# Patient Record
Sex: MALE | Race: WHITE | NOT HISPANIC OR LATINO | Employment: FULL TIME | URBAN - NONMETROPOLITAN AREA
[De-identification: names, ages, dates, MRNs, and addresses within clinical notes are randomized per-mention and may not be internally consistent; named-entity substitution may affect disease eponyms.]

---

## 2017-01-10 DIAGNOSIS — J44.9 CHRONIC OBSTRUCTIVE PULMONARY DISEASE, UNSPECIFIED COPD TYPE (HCC): Primary | ICD-10-CM

## 2017-01-26 ENCOUNTER — OFFICE VISIT (OUTPATIENT)
Dept: FAMILY MEDICINE CLINIC | Facility: CLINIC | Age: 42
End: 2017-01-26

## 2017-01-26 VITALS
HEIGHT: 70 IN | HEART RATE: 84 BPM | SYSTOLIC BLOOD PRESSURE: 128 MMHG | DIASTOLIC BLOOD PRESSURE: 88 MMHG | BODY MASS INDEX: 28.49 KG/M2 | OXYGEN SATURATION: 98 % | WEIGHT: 199 LBS

## 2017-01-26 DIAGNOSIS — J44.9 CHRONIC OBSTRUCTIVE PULMONARY DISEASE, UNSPECIFIED COPD TYPE (HCC): ICD-10-CM

## 2017-01-26 DIAGNOSIS — R55 PRE-SYNCOPE: ICD-10-CM

## 2017-01-26 DIAGNOSIS — R07.9 CHEST PAIN, UNSPECIFIED TYPE: ICD-10-CM

## 2017-01-26 DIAGNOSIS — F17.200 TOBACCO DEPENDENCE SYNDROME: ICD-10-CM

## 2017-01-26 DIAGNOSIS — K21.9 GASTROESOPHAGEAL REFLUX DISEASE, ESOPHAGITIS PRESENCE NOT SPECIFIED: Primary | ICD-10-CM

## 2017-01-26 PROCEDURE — 99406 BEHAV CHNG SMOKING 3-10 MIN: CPT | Performed by: FAMILY MEDICINE

## 2017-01-26 PROCEDURE — 99213 OFFICE O/P EST LOW 20 MIN: CPT | Performed by: FAMILY MEDICINE

## 2017-01-26 RX ORDER — BUDESONIDE 0.25 MG/2ML
0.25 INHALANT ORAL
Qty: 60 ML | Refills: 3 | Status: SHIPPED | OUTPATIENT
Start: 2017-01-26 | End: 2017-01-26 | Stop reason: ALTCHOICE

## 2017-01-26 RX ORDER — ALBUTEROL SULFATE 2.5 MG/3ML
2.5 SOLUTION RESPIRATORY (INHALATION) EVERY 4 HOURS PRN
Qty: 3 ML | Refills: 12 | Status: SHIPPED | OUTPATIENT
Start: 2017-01-26 | End: 2017-05-19 | Stop reason: ALTCHOICE

## 2017-01-26 RX ORDER — ESOMEPRAZOLE MAGNESIUM 40 MG/1
40 CAPSULE, DELAYED RELEASE ORAL
Qty: 30 CAPSULE | Refills: 3 | Status: SHIPPED | OUTPATIENT
Start: 2017-01-26 | End: 2017-06-01 | Stop reason: SDUPTHER

## 2017-01-26 RX ORDER — DICLOFENAC SODIUM 75 MG/1
1 TABLET, DELAYED RELEASE ORAL 2 TIMES DAILY
Refills: 1 | COMMUNITY
Start: 2017-01-11 | End: 2017-01-26 | Stop reason: ALTCHOICE

## 2017-01-26 NOTE — PROGRESS NOTES
"  Subjective:   Subjective   Aquiles Najera is a 41 y.o. male who presents for 2 month follow-up of abdominal pain/chest pain.    Patient states he is not having either at this time.  However he is having quite severe intermittent \"heartburn\", which is somewhat controlled with the Protonix 40 mg.  However his insurance is no longer covering the Protonix and he inquired and was told Nexium is covered.      Patient also notes generalized fatigue for last for 5 days.  He works out regularly at the gym, and on Monday when he was at the end of his workout, he felt lightheaded and dizzy.  This is unusual in that usually at the end of his workout he feels well.  He been working out for about 30-45 minutes.  He sat down for about 10-15 minutes, and the symptoms subsided.  He noted no chest pain at the time.  He denied syncopal episode.  States this has never happened previously.  Patient states he did fill prescription for the Nitrostat; however, he was told by a co-worker that \"if you're taking those pills, you should have a heart cath done first.\"  Patient states he has seen Dr. Parks in the past, and thinks he had a treadmill stress test at his office in 2015 that was normal.  No known family history of coronary artery disease.  He still smokes cigarettes, although he has cut back significantly, and also uses smokeless tobacco.  He is not diabetic, no HTN.  Previous history of cocaine use, patient states his clean now.       The following portions of the patient's history were reviewed and updated as appropriate: allergies, current medications, past family history, past medical history, past social history, past surgical history and problem list.    Preventative:  Over the past 2 weeks, have you felt down, depressed, or hopeless?No   Over the past 2 weeks, have you felt little interest or pleasure in doing things?No  Clinical depression screening refused by patient.No     On osteoporosis therapy?No     Past " Medical Hx:  Past Medical History   Diagnosis Date   • Abdominal pain    • Acute exacerbation of chronic obstructive airways disease    • Allergic rhinitis    • Chest pain      unspecified   • Chronic cough    • Conjunctivitis    • Dyslipidemia    • Dyspnea    • Gastroesophageal reflux disease    • Pain      Pain in left ankle and joints of left foot      • Pain      pain in left leg   • Tobacco dependence syndrome    • Umbilical hernia without obstruction or gangrene    • Viral gastroenteritis        Past Surgical Hx:  Past Surgical History   Procedure Laterality Date   • Incision and drainage abscess  06/09/2009     Irrigation and debridement of facial soft tissue injuries with closure of complex lacerations and repair of maxillary dental alveolar fracture       Health Maintenance:  Health Maintenance   Topic Date Due   • TDAP/TD VACCINES (2 - Td) 08/11/2025   • PNEUMOCOCCAL VACCINE (19-64 MEDIUM RISK)  Completed   • INFLUENZA VACCINE  Addressed       Current Meds:    Current Outpatient Prescriptions:   •  albuterol (VENTOLIN HFA) 108 (90 BASE) MCG/ACT inhaler, Inhale 2 puffs every 4 (four) hours as needed for wheezing., Disp: 18 g, Rfl: 3  •  docusate sodium (COLACE) 100 MG capsule, Take 1 capsule by mouth 2 (two) times a day., Disp: 60 capsule, Rfl: 0  •  Fluticasone Propionate (FLONASE ALLERGY RELIEF NA), into each nostril., Disp: , Rfl:   •  loratadine (CLARITIN) 10 MG tablet, Take 1 tablet by mouth daily., Disp: 30 tablet, Rfl: 11  •  meloxicam (MOBIC) 15 MG tablet, Take 15 mg by mouth daily., Disp: , Rfl:   •  nitroglycerin (NITROSTAT) 0.4 MG SL tablet, Place 1 tablet under the tongue Every 5 (Five) Minutes As Needed for chest pain. Take no more than 3 doses in 15 minutes., Disp: 30 tablet, Rfl: 12  •  pravastatin (PRAVACHOL) 40 MG tablet, Take 1 tablet by mouth daily., Disp: 30 tablet, Rfl: 3  •  albuterol (PROVENTIL) (2.5 MG/3ML) 0.083% nebulizer solution, Take 2.5 mg by nebulization Every 4 (Four) Hours  "As Needed for wheezing., Disp: 3 mL, Rfl: 12  •  budesonide (PULMICORT FLEXHALER) 180 MCG/ACT inhaler, Inhale 2 puffs 2 (Two) Times a Day., Disp: 1 inhaler, Rfl: 11  •  esomeprazole (NEXIUM) 40 MG capsule, Take 1 capsule by mouth Every Morning Before Breakfast., Disp: 30 capsule, Rfl: 3    Allergies:  Review of patient's allergies indicates no known allergies.    Family Hx:  Family History   Problem Relation Age of Onset   • Coronary artery disease Neg Hx    • Hypertension Neg Hx         Social History:  Social History     Social History   • Marital status:      Spouse name: N/A   • Number of children: N/A   • Years of education: N/A     Occupational History   • Not on file.     Social History Main Topics   • Smoking status: Current Every Day Smoker     Packs/day: 0.15     Years: 20.00     Types: Cigarettes   • Smokeless tobacco: Not on file      Comment: Previously smoked 3-5 ppd, quit Jan 2016, then re-started in April (1 pack/week or ~3 cig/day)   • Alcohol use No   • Drug use: Defer   • Sexual activity: Defer     Other Topics Concern   • Not on file     Social History Narrative       Review of Systems  Review of Systems  General ROS: negative  Psychological ROS: negative  Ophthalmic ROS: negative  ENT ROS: negative  Allergy and Immunology ROS: negative  Hematological and Lymphatic ROS: negative  Endocrine ROS: negative  Respiratory ROS: no cough, shortness of breath, or wheezing  Cardiovascular ROS: as above  Gastrointestinal ROS: as above  Genito-Urinary ROS: no dysuria, trouble voiding, or hematuria  Musculoskeletal ROS: negative    Objective:     Visit Vitals   • /88   • Pulse 84   • Ht 70\" (177.8 cm)   • Wt 199 lb (90.3 kg)   • SpO2 98%   • BMI 28.55 kg/m2     Visit Vitals   • /88   • Pulse 84   • Ht 70\" (177.8 cm)   • Wt 199 lb (90.3 kg)   • SpO2 98%   • BMI 28.55 kg/m2       General Appearance:    Alert, cooperative, no distress, appears stated age   Head:    Normocephalic, without " obvious abnormality, atraumatic   Eyes:    PERRL, conjunctiva/corneas clear, EOM's intact, fundi     benign, both eyes        Throat:   Lips, mucosa, and tongue normal; poor dentition   Neck:   Supple, symmetrical, trachea midline, no adenopathy;        thyroid:  No enlargement/tenderness/nodules; no carotid    bruit or JVD   Back:     Symmetric, no curvature, ROM normal, no CVA tenderness   Lungs:     Clear to auscultation bilaterally, respirations unlabored   Chest wall:    No tenderness or deformity   Heart:    Regular rate and rhythm, S1 and S2 normal, no murmur, rub   or gallop   Abdomen:     Soft, bowel sounds active all four quadrants,     no masses, no organomegaly.    Extremities:   Extremities normal, atraumatic, no cyanosis or edema   Pulses:   2+ and symmetric all extremities   Skin:   Skin color, texture, turgor normal, no rashes or lesions   Lymph nodes:   Cervical, supraclavicular, and axillary nodes normal   Neurologic:   CNII-XII intact. Normal strength, sensation and reflexes       throughout      Assessment:     1. Gastroesophageal reflux disease, esophagitis presence not specified    2. Chest pain, unspecified type    3. Chronic obstructive pulmonary disease, unspecified COPD type    4. Pre-syncope         Plan:   1. Atypical chest pain  - Discussed with Dr Mahoney. ECG 12 Lead ordered, patient instructed to go upstairs and have it done today; however, apparently he had somewhere important to go, and therefore subsequently left before it was able to be done.  Patient called and advised he needs to return for EKG.  - Ambulatory Referral to Cardiology.  - Reviewed [facility]; patient had echocardiogram at Dr. Parks's office 6/29/2016, which was normal with normal EF.  His January 2016 note also mentions treadmill stress test to be performed, however result was not sent over to our office; therefore unsure if patient actually did it.  - C/w Nitrostat.    2. Gastroesophageal reflux disease, esophagitis  presence not specified  - esomeprazole (NEXIUM) 40 MG capsule; Take 1 capsule by mouth Every Morning Before Breakfast.  Dispense: 30 capsule; Refill: 3    3. Chronic obstructive pulmonary disease, unspecified COPD type  - albuterol (PROVENTIL) (2.5 MG/3ML) 0.083% nebulizer solution; Take 2.5 mg by nebulization Every 4 (Four) Hours As Needed for wheezing.  Dispense: 3 mL; Refill: 12  - budesonide (PULMICORT FLEXHALER) 180 MCG/ACT inhaler; Inhale 2 puffs 2 (Two) Times a Day.  Dispense: 1 inhaler; Refill: 11  (Qvar no longer covered)  - Full Pulmonary Function Test With Bronchodilator; Future  - Handwritten prescriptions given for nebulizer hose and mouthpiece.    4. Pre-syncope, one episode  -Refer to Cardiology (see #1)    GOALS:  Improve pain.  BARRIERS TO GOALS:  None    Preventative:  Male Preventative: Cholesterol screening up to date  none  delayed   Recommended:Influenza. Declined.  Smoking cessation counseling was provided., 3-10 minutes  does not drink  increase water intake, increase physical activity, cut out extra servings and reduce fast food intake    Return in about 1 month (around 2/26/2017) for Next scheduled follow up.    RISK SCORE: 4           This document has been electronically signed by Dong Lozada MD on January 28, 2017 2:45 PM

## 2017-01-26 NOTE — MR AVS SNAPSHOT
Aquiles Najera   1/26/2017 9:15 AM   Office Visit    Dept Phone:  [phone]   Encounter #:  [id number]    Provider:  Dong Lozada MD   Department:  [facility]                Your Full Care Plan              Your Updated Medication List          This list is accurate as of: 1/26/17 10:01 AM.  Always use your most recent med list.                albuterol 108 (90 BASE) MCG/ACT inhaler   Commonly known as:  VENTOLIN HFA   Inhale 2 puffs every 4 (four) hours as needed for wheezing.       beclomethasone 80 MCG/ACT inhaler   Commonly known as:  QVAR       DICLOFENAC PO       docusate sodium 100 MG capsule   Commonly known as:  COLACE   Take 1 capsule by mouth 2 (two) times a day.       FLONASE ALLERGY RELIEF NA       loratadine 10 MG tablet   Commonly known as:  CLARITIN   Take 1 tablet by mouth daily.       meloxicam 15 MG tablet   Commonly known as:  MOBIC       nitroglycerin 0.4 MG SL tablet   Commonly known as:  NITROSTAT   Place 1 tablet under the tongue Every 5 (Five) Minutes As Needed for chest pain. Take no more than 3 doses in 15 minutes.       pantoprazole 40 MG EC tablet   Commonly known as:  PROTONIX   Take 1 tablet by mouth Daily.       pravastatin 40 MG tablet   Commonly known as:  PRAVACHOL   Take 1 tablet by mouth daily.               We Performed the Following     ECG 12 Lead       You Were Diagnosed With        Codes Comments    Chest pain, unspecified type    -  Primary ICD-10-CM: R07.9  ICD-9-CM: 786.50       Instructions     None    Patient Instructions History      Upcoming Appointments     Visit Type Date Time Department    OFFICE VISIT 1/26/2017  9:15 AM [facility] [place]    OFFICE VISIT 2/24/2017  1:45 PM [facility]IDENT [facility]      MyChart Signup     Our records indicate that you have declined [facility] signup. If you would like to sign up for [facility], please email [email or web address] or call [phone]  "to obtain an activation code.             Other Info from Your Visit           Your Appointments     Feb 24, 2017  1:45 PM CST   Office Visit with Dong Lozada MD   [facility] (--)    [address] [place] [address] [phone]           Arrive 15 minutes prior to appointment.              Other Notes About Your Plan     Risk score 4        Allergies     No Known Allergies      Reason for Visit     Abdominal Pain     Med Refill talk to you about changes some meds       Vital Signs     Blood Pressure Pulse Height Weight Oxygen Saturation Body Mass Index    128/88 84 70\" (177.8 cm) 199 lb (90.3 kg) 98% 28.55 kg/m2    Smoking Status                   Current Every Day Smoker           Problems and Diagnoses Noted     Chest pain        "

## 2017-01-28 PROBLEM — R55 PRE-SYNCOPE: Status: ACTIVE | Noted: 2017-01-28

## 2017-01-30 NOTE — PROGRESS NOTES
I have reviewed the notes, assessments, and/or procedures performed. I concur with her/his documentation of Aquiles Najera.     Jesus Mahoney, DO

## 2017-02-01 ENCOUNTER — TELEPHONE (OUTPATIENT)
Dept: FAMILY MEDICINE CLINIC | Facility: CLINIC | Age: 42
End: 2017-02-01

## 2017-02-01 NOTE — TELEPHONE ENCOUNTER
11:43.  Called pt, no answer, left voicemail.          This document has been electronically signed by Dong Lozada MD on February 1, 2017 11:43 AM        ----- Message from Sierra Ritchie sent at 2/1/2017  8:04 AM CST -----  PT CALLED AGAIN THIS MORNING.  HE IS ONLY NEEDING MOUTH PIECE AND THE HOSE.  HE SAID THAT YOU WROTE THE SCRIPT FOR THOSE.       PT SAID THAT THERE IS STILL SOME CONFUSION ON THE INHALERS, HE IS WANTING TO KNOW IF YOU CAN CALL THE PHARMACY ABOUT THE INHALER    HE BROUGHT A LIST IN AND THE LIST BELOW IS WHAT HE BROUGHT BUT HE IS HOW SAYING THAT HE IS STILL WITHOUT AN INHALER.   PT SAID THAT PHARMACY IS TELLING HIM THAT PULMICORT IS NOT COVERED, HE BROUGHT LIST IN TODAY AND IT HAD BUDESONIDE, AEROSPAN AND ARNUITY INHALER.  PT SAID TAHT THIS IS WHAT THE INSURANCE TOLD HIM.       PT IS ALSO WANTING TO TALK TO YOU ABOUT HIS STOMACH ISSUES, SAID THAT HE IS WANTING TO TALK ABOUT GETTING THE STOMACH SURGERY SET UP, SAID THAT IT IS GETTING A LOT WORSE.   HE IS ASKING THAT YOU PLEASE CALL HIM [phone]  ----- Message -----     From: Dong Lozada MD     Sent: 1/26/2017   4:00 PM       To: Sierra Ritchie    I was confused, because he ALSO asked for nebulizer supplies today.  I re-sent the Pulmicort as an inhaler.    Thanks.      ----- Message -----     From: Sierra Ritchie     Sent: 1/26/2017   1:39 PM       To: Dong Lozada MD    PT SAID THAT THE INHALER WAS SENT IN WRONG.   SAID THAT IT WAS SENT IN FOR NEB TREATMENT SOLUTION.      SAID THAT IT WAS SUPPOSED TO BE JUST THE QVAR....PT SAID THAT INS WILL NOT COVER THAT ANYMORE IT WILL NEED TO BE PULMICORT INHALER, AEROSPAN INHALER, ARNUITY ELLIPTA ARE ALL THAT THE INS WILL COVER    PT USES [facility].    YOU CAN REACH PT [phone] SHOULD YOU HAVE QUESTIONS ABOUT THIS.    HE SPOKE TO THE INS COMPANY ABOUT THIS.    HE NEEDS THIS TODAY, AS HE IS ABOUT OUT.

## 2017-02-03 DIAGNOSIS — J44.9 CHRONIC OBSTRUCTIVE PULMONARY DISEASE, UNSPECIFIED COPD TYPE (HCC): Primary | ICD-10-CM

## 2017-02-21 ENCOUNTER — DOCUMENTATION (OUTPATIENT)
Dept: CARDIOLOGY | Facility: CLINIC | Age: 42
End: 2017-02-21

## 2017-02-21 ENCOUNTER — OFFICE VISIT (OUTPATIENT)
Dept: CARDIOLOGY | Facility: CLINIC | Age: 42
End: 2017-02-21

## 2017-02-21 VITALS
WEIGHT: 200 LBS | SYSTOLIC BLOOD PRESSURE: 140 MMHG | HEART RATE: 74 BPM | BODY MASS INDEX: 28.63 KG/M2 | HEIGHT: 70 IN | DIASTOLIC BLOOD PRESSURE: 92 MMHG

## 2017-02-21 DIAGNOSIS — R55 PRE-SYNCOPE: ICD-10-CM

## 2017-02-21 DIAGNOSIS — E78.5 DYSLIPIDEMIA: ICD-10-CM

## 2017-02-21 DIAGNOSIS — R07.2 PRECORDIAL PAIN: Primary | ICD-10-CM

## 2017-02-21 PROCEDURE — 99214 OFFICE O/P EST MOD 30 MIN: CPT | Performed by: INTERNAL MEDICINE

## 2017-02-21 PROCEDURE — 93000 ELECTROCARDIOGRAM COMPLETE: CPT | Performed by: INTERNAL MEDICINE

## 2017-02-21 RX ORDER — METOPROLOL SUCCINATE 50 MG/1
50 TABLET, EXTENDED RELEASE ORAL DAILY
Qty: 1 TABLET | Refills: 0 | Status: SHIPPED | OUTPATIENT
Start: 2017-02-21 | End: 2017-06-01

## 2017-02-21 NOTE — PROGRESS NOTES
Pt scheduled for CTA coronary on 3/3/17 at 1100. Pt given instruction sheet and reviewed with him. Prescription sent to his stated pharmacy. He denies questions at this time.

## 2017-02-22 NOTE — PROGRESS NOTES
Aquiles Najera  41 y.o. male    02/21/2017  1. Precordial pain    2. Pre-syncope    3. Dyslipidemia        History of Present Illness    Mr. Najera is a 41-year-old male who is being seen by me after long interval.  I had seen him back in January 2016 when he presented for evaluation of chest pain and the this was in the background of tobacco and drug abuse in the past.  His EKG showed no acute changes and echocardiogram showed normal LV systolic function with no wall motion abnormalities or valvular abnormalities.  He walked for 9 minutes of the Ty protocol with no EKG changes suggestive of ischemia.  He was referred for reevaluation of intermittent chest pain that his been going on for the past several weeks.  The pain is described as sharp and sometimes radiating to his arm and back.  His blood pressure was borderline elevated.  His EKG showed sinus rhythm with incomplete right bundle branch block with no ST-T wave changes suggestive of ischemia.        SUBJECTIVE    No Known Allergies      Past Medical History   Diagnosis Date   • Abdominal pain    • Acute exacerbation of chronic obstructive airways disease    • Allergic rhinitis    • Chest pain      unspecified   • Chronic cough    • Conjunctivitis    • Dyslipidemia    • Dyspnea    • Gastroesophageal reflux disease    • Pain      Pain in left ankle and joints of left foot      • Pain      pain in left leg   • Tobacco dependence syndrome    • Umbilical hernia without obstruction or gangrene    • Viral gastroenteritis          Past Surgical History   Procedure Laterality Date   • Incision and drainage abscess  06/09/2009     Irrigation and debridement of facial soft tissue injuries with closure of complex lacerations and repair of maxillary dental alveolar fracture         Family History   Problem Relation Age of Onset   • Coronary artery disease Neg Hx    • Hypertension Neg Hx          Social History     Social History   • Marital status:       Spouse name: N/A   • Number of children: N/A   • Years of education: N/A     Occupational History   • Not on file.     Social History Main Topics   • Smoking status: Current Every Day Smoker     Packs/day: 0.15     Years: 20.00     Types: Cigarettes   • Smokeless tobacco: Current User      Comment: Previously smoked 3-5 ppd, quit Jan 2016, then re-started in April (1 pack/week or ~3 cig/day)   • Alcohol use No   • Drug use: Yes     Special: Cocaine      Comment: not in one year   • Sexual activity: Defer     Other Topics Concern   • Not on file     Social History Narrative         Current Outpatient Prescriptions   Medication Sig Dispense Refill   • albuterol (PROVENTIL) (2.5 MG/3ML) 0.083% nebulizer solution Take 2.5 mg by nebulization Every 4 (Four) Hours As Needed for wheezing. 3 mL 12   • albuterol (VENTOLIN HFA) 108 (90 BASE) MCG/ACT inhaler Inhale 2 puffs every 4 (four) hours as needed for wheezing. 18 g 3   • docusate sodium (COLACE) 100 MG capsule Take 1 capsule by mouth 2 (two) times a day. (Patient taking differently: Take 100 mg by mouth 2 (Two) Times a Day As Needed.) 60 capsule 0   • esomeprazole (NEXIUM) 40 MG capsule Take 1 capsule by mouth Every Morning Before Breakfast. 30 capsule 3   • Flunisolide HFA 80 MCG/ACT aerosol solution Inhale 2 puffs 2 (Two) Times a Day. 5.1 g 3   • Fluticasone Propionate (FLONASE ALLERGY RELIEF NA) into each nostril.     • loratadine (CLARITIN) 10 MG tablet Take 1 tablet by mouth daily. 30 tablet 11   • meloxicam (MOBIC) 15 MG tablet Take 15 mg by mouth daily.     • nitroglycerin (NITROSTAT) 0.4 MG SL tablet Place 1 tablet under the tongue Every 5 (Five) Minutes As Needed for chest pain. Take no more than 3 doses in 15 minutes. 30 tablet 12   • pravastatin (PRAVACHOL) 40 MG tablet Take 1 tablet by mouth daily. 30 tablet 3   • metoprolol succinate XL (TOPROL-XL) 50 MG 24 hr tablet Take 1 tablet by mouth Daily. 1 tablet 0     No current facility-administered medications  "for this visit.          OBJECTIVE    Visit Vitals   • /92   • Pulse 74   • Ht 70\" (177.8 cm)   • Wt 200 lb (90.7 kg)   • BMI 28.7 kg/m2           Review of Systems     Constitutional:  Denies recent weight loss, weight gain, fever or chills, no change in exercise tolerance     HENT:  Denies any hearing loss, epistaxis, hoarseness, or difficulty speaking.     Eyes: Wears eyeglasses or contact lenses     Respiratory:  Dyspnea with exertion,no cough, wheezing, or hemoptysis.     Cardiovascular: See HPI    Gastrointestinal:  GERD    Endocrine: Negative for cold intolerance, heat intolerance, polydipsia, polyphagia and polyuria. Denies any history of weight change, heat/cold intolerance, polydipsia, polyuria     Genitourinary: Negative.      Musculoskeletal: Denies any history of arthritic symptoms or back problems     Skin:  Denies any change in hair or nails, rashes, or skin lesions.     Allergic/Immunologic: Negative.  Negative for environmental allergies, food allergies and immunocompromised state.     Neurological:  Denies any history of recurrent headaches, strokes, TIA, or seizure disorder.     Hematological: Denies any food allergies, seasonal allergies, bleeding disorders, or lymphadenopathy.     Psychiatric/Behavioral: Denies any history of depression, substance abuse, or change in cognitive function.         Physical Exam     Constitutional: Cooperative, alert and oriented, well-developed, well-nourished, in no acute distress.     HENT:   Head: Normocephalic, normal hair patterns, no masses or tenderness.  Ears, Nose, and Throat: No gross abnormalities. No pallor or cyanosis. Dentition good.   Eyes: EOMS intact, PERRL, conjunctivae and lids unremarkable. Fundoscopic exam and visual fields not performed.   Neck: No palpable masses or adenopathy, no thyromegaly, no JVD, carotid pulses are full and equal bilaterally and without  Bruits.     Cardiovascular: Regular rhythm, S1 and S2 normal, no S3 or S4. " Apical impulse not displaced. No murmurs, gallops, or rubs detected.     Pulmonary/Chest: Chest: normal symmetry, no tenderness to palpation, normal respiratory excursion, no intercostal retraction, no use of accessory muscles.            Pulmonary: Normal breath sounds. No rales or ronchi.    Abdominal: Abdomen soft, bowel sounds normoactive, no masses, no hepatosplenomegaly, non-tender, no bruits.     Musculoskeletal: No deformities, clubbing, cyanosis, erythema, or edema observed. There are no spinal abnormalities noted. Normal muscle strength and tone. Pulses full and equal in all extremities, no bruits auscultated.     Neurological: No gross motor or sensory deficits noted, affect appropriate, oriented to time, person, place.     Skin: Warm and dry to the touch, no apparent skin lesions or masses noted.     Psychiatric : Anxious.          ECG 12 Lead  Date/Time: 2/22/2017 7:28 AM  Performed by: CURLY DORANTES  Authorized by: CURLY DORANTES   Comparison: not compared with previous ECG   Rhythm: sinus rhythm  Rate: normal  Conduction: incomplete RBBB  QRS axis: normal  Comments: EKG was performed on 2/21/2017              Lab Results   Component Value Date    WBC 7.9 11/15/2016    HGB 14.1 11/15/2016    HCT 40.7 11/15/2016    MCV 82.2 11/15/2016     11/15/2016     Lab Results   Component Value Date    GLUCOSE 80 11/15/2016    BUN 14 11/15/2016    CREATININE 0.9 11/15/2016    CO2 30 11/15/2016    CALCIUM 9.4 11/15/2016    ALBUMIN 4.2 11/15/2016    AST 25 11/15/2016    ALT 48 11/15/2016     No results found for: CHOL  Lab Results   Component Value Date    TRIG 153 06/14/2016    TRIG 292 (H) 01/05/2016     Lab Results   Component Value Date    HDL 54 (L) 06/14/2016    HDL 49 (L) 01/05/2016     Lab Results   Component Value Date    LDLCALC 72 06/14/2016    LDLCALC 125 01/05/2016     No results found for: LDL  No results found for: HDLLDLRATIO  No components found for: CHOLHDL  Lab Results    Component Value Date    HGBA1C 5.4 06/14/2016     No results found for: TSH, J3KEJLH, V2IXKGQ, THYROIDAB        ASSESSMENT AND PLAN  Mr. Najera does have risk factors for coronary artery disease and history of substance abuse in the past including tobacco and cocaine.  EKG shows no objective evidence of myocardial ischemia.  Exercise treadmill stress test 1 year prior was within normal limits.  I discussed the different options with him and the plan would be to proceed with a CT angiogram to definitively rule out underlying coronary artery disease.  The patient is very concerned about CAD.  Further recommendations will follow.    Aquiles was seen today for chest pain.    Diagnoses and all orders for this visit:    Precordial pain  -     CT Angiogram Coronary; Future    Pre-syncope  -     CT Angiogram Coronary; Future    Dyslipidemia  -     CT Angiogram Coronary; Future        Rosa Parks MD  2/22/2017  7:23 AM

## 2017-03-03 ENCOUNTER — APPOINTMENT (OUTPATIENT)
Dept: CT IMAGING | Facility: HOSPITAL | Age: 42
End: 2017-03-03

## 2017-03-06 ENCOUNTER — OFFICE VISIT (OUTPATIENT)
Dept: FAMILY MEDICINE CLINIC | Facility: CLINIC | Age: 42
End: 2017-03-06

## 2017-03-06 VITALS
DIASTOLIC BLOOD PRESSURE: 78 MMHG | BODY MASS INDEX: 28.06 KG/M2 | HEIGHT: 70 IN | WEIGHT: 196 LBS | HEART RATE: 100 BPM | OXYGEN SATURATION: 98 % | SYSTOLIC BLOOD PRESSURE: 128 MMHG

## 2017-03-06 DIAGNOSIS — R07.2 PRECORDIAL PAIN: ICD-10-CM

## 2017-03-06 DIAGNOSIS — K42.9 UMBILICAL HERNIA WITHOUT OBSTRUCTION OR GANGRENE: Primary | ICD-10-CM

## 2017-03-06 PROCEDURE — 99212 OFFICE O/P EST SF 10 MIN: CPT | Performed by: FAMILY MEDICINE

## 2017-03-06 NOTE — PROGRESS NOTES
"  Subjective:   Subjective   Aquiles Najera is a 41 y.o. male who presents for 1 month follow-up of abdominal pain, umbilical hernia.    The patient called me last week, and requested a prompt referral to general surgery for hernia surgery.  I instructed patient he would need to be seen in the clinic, and re-examined before such referral can be made.  Apparently, Dr. Granda referred him to Dr. Herbert for this issue in January 2016; according to Dr. Herbert's note, the patient was told he needed to be off work for 4 weeks and therefore he declined to have the surgery done at that time, as he was minimally symptomatic.  However patient does want to have the surgery done now, as he is having \"ripping pain.\"    The patient is currently being evaluated by Dr. Parks for possible coronary artery disease.  The patient has a coronary CT scheduled for this week.  However patient states he does he does not want to undergo CT, as he is concerned \"they'll drop my heart rate and then something will happen.\"  He is once again requesting a left heart cath.      The following portions of the patient's history were reviewed and updated as appropriate: allergies, current medications, past family history, past medical history, past social history, past surgical history and problem list.    Preventative:  Over the past 2 weeks, have you felt down, depressed, or hopeless?No   Over the past 2 weeks, have you felt little interest or pleasure in doing things?No  Clinical depression screening refused by patient.No     On osteoporosis therapy?No     Past Medical Hx:  Past Medical History   Diagnosis Date   • Abdominal pain    • Acute exacerbation of chronic obstructive airways disease    • Allergic rhinitis    • Chest pain      unspecified   • Chronic cough    • Conjunctivitis    • Dyslipidemia    • Dyspnea    • Gastroesophageal reflux disease    • Pain      Pain in left ankle and joints of left foot      • Pain      pain in " left leg   • Tobacco dependence syndrome    • Umbilical hernia without obstruction or gangrene    • Viral gastroenteritis        Past Surgical Hx:  Past Surgical History   Procedure Laterality Date   • Incision and drainage abscess  06/09/2009     Irrigation and debridement of facial soft tissue injuries with closure of complex lacerations and repair of maxillary dental alveolar fracture       Health Maintenance:  Health Maintenance   Topic Date Due   • TDAP/TD VACCINES (2 - Td) 08/11/2025   • PNEUMOCOCCAL VACCINE (19-64 MEDIUM RISK)  Completed   • INFLUENZA VACCINE  Addressed       Current Meds:    Current Outpatient Prescriptions:   •  albuterol (PROVENTIL) (2.5 MG/3ML) 0.083% nebulizer solution, Take 2.5 mg by nebulization Every 4 (Four) Hours As Needed for wheezing., Disp: 3 mL, Rfl: 12  •  albuterol (VENTOLIN HFA) 108 (90 BASE) MCG/ACT inhaler, Inhale 2 puffs every 4 (four) hours as needed for wheezing., Disp: 18 g, Rfl: 3  •  docusate sodium (COLACE) 100 MG capsule, Take 1 capsule by mouth 2 (two) times a day. (Patient taking differently: Take 100 mg by mouth 2 (Two) Times a Day As Needed.), Disp: 60 capsule, Rfl: 0  •  esomeprazole (NEXIUM) 40 MG capsule, Take 1 capsule by mouth Every Morning Before Breakfast., Disp: 30 capsule, Rfl: 3  •  Flunisolide HFA 80 MCG/ACT aerosol solution, Inhale 2 puffs 2 (Two) Times a Day., Disp: 5.1 g, Rfl: 3  •  Fluticasone Propionate (FLONASE ALLERGY RELIEF NA), into each nostril., Disp: , Rfl:   •  loratadine (CLARITIN) 10 MG tablet, Take 1 tablet by mouth daily., Disp: 30 tablet, Rfl: 11  •  metoprolol succinate XL (TOPROL-XL) 50 MG 24 hr tablet, Take 1 tablet by mouth Daily., Disp: 1 tablet, Rfl: 0  •  nitroglycerin (NITROSTAT) 0.4 MG SL tablet, Place 1 tablet under the tongue Every 5 (Five) Minutes As Needed for chest pain. Take no more than 3 doses in 15 minutes., Disp: 30 tablet, Rfl: 12  •  pravastatin (PRAVACHOL) 40 MG tablet, Take 1 tablet by mouth daily., Disp: 30  "tablet, Rfl: 3  •  meloxicam (MOBIC) 15 MG tablet, Take 15 mg by mouth daily., Disp: , Rfl:     Allergies:  Review of patient's allergies indicates no known allergies.    Family Hx:  Family History   Problem Relation Age of Onset   • Coronary artery disease Neg Hx    • Hypertension Neg Hx         Social History:  Social History     Social History   • Marital status:      Spouse name: N/A   • Number of children: N/A   • Years of education: N/A     Occupational History   • Not on file.     Social History Main Topics   • Smoking status: Current Every Day Smoker     Packs/day: 0.15     Years: 20.00     Types: Cigarettes   • Smokeless tobacco: Current User      Comment: Previously smoked 3-5 ppd, quit Jan 2016, then re-started in April (1 pack/week or ~3 cig/day)   • Alcohol use No   • Drug use: Yes     Special: Cocaine      Comment: not in one year   • Sexual activity: Defer     Other Topics Concern   • Not on file     Social History Narrative       Review of Systems  Review of Systems  General ROS: negative  Psychological ROS: negative  Ophthalmic ROS: negative  ENT ROS: negative  Allergy and Immunology ROS: negative  Hematological and Lymphatic ROS: negative  Endocrine ROS: negative  Respiratory ROS: no cough, shortness of breath, or wheezing  Cardiovascular ROS: as above  Gastrointestinal ROS: as above  Genito-Urinary ROS: no dysuria, trouble voiding, or hematuria  Musculoskeletal ROS: negative    Objective:     Visit Vitals   • /78   • Pulse 100   • Ht 70\" (177.8 cm)   • Wt 196 lb (88.9 kg)   • SpO2 98%   • BMI 28.12 kg/m2     Patient refused to let me examine him.  I stated I would need to re-examine his hernia and he refused stating \"it hurts too much just to touch it.\"    General Appearance:    Agitated   Head:    Normocephalic, without obvious abnormality, atraumatic       Throat:   Lips, mucosa, and tongue normal; poor dentition                       Abdomen:     Umbilical hernia present, possible " "ventral hernia.  Unsure if it is reducible, as patient refused to let me examine him                   Neurologic:   CNII-XII intact. Normal gait      Assessment:     1. Umbilical hernia without obstruction or gangrene    2. Precordial pain         Plan:   1. Atypical chest pain  - Had extensive discussion with patient, that he has risk factors for CAD, and advised that the coronary CT is a less invasive test that tells his cardiologist whether a heart cath is needed or not, and recommended him to have it done.  However, I suspect he is unlikely to follow through with the CT.    2. Umbilical/ventral hernia  - Advised patient that a general surgeon typically wants to know what risk the patient is, and his ongoing evaluation for CAD is an important part of the risk stratification.  Nonetheless he is still demanding a referral to surgery.  At this point he became quite irate, and states \"well then I'll die with this thing!\"     Patient abruptly left the exam room before I could examine the pt, check out to Dr. Mahoney, or discuss follow-up.    Preventative:  Did not discuss    No Follow-up on file.    RISK SCORE: 4           This document has been electronically signed by Dong Lozada MD on March 6, 2017 10:00 PM    "

## 2017-03-16 RX ORDER — PRAVASTATIN SODIUM 40 MG
TABLET ORAL
Qty: 30 TABLET | Refills: 3 | Status: SHIPPED | OUTPATIENT
Start: 2017-03-16 | End: 2017-07-08 | Stop reason: SDUPTHER

## 2017-05-02 ENCOUNTER — HOSPITAL ENCOUNTER (OUTPATIENT)
Dept: CT IMAGING | Facility: HOSPITAL | Age: 42
Discharge: HOME | End: 2017-05-02
Admitting: INTERNAL MEDICINE

## 2017-05-02 DIAGNOSIS — R55 PRE-SYNCOPE: ICD-10-CM

## 2017-05-02 DIAGNOSIS — R07.2 PRECORDIAL PAIN: ICD-10-CM

## 2017-05-02 DIAGNOSIS — E78.5 DYSLIPIDEMIA: ICD-10-CM

## 2017-05-02 PROCEDURE — 75574 CT ANGIO HRT W/3D IMAGE: CPT

## 2017-05-02 PROCEDURE — 0 IOPAMIDOL PER 1 ML: Performed by: INTERNAL MEDICINE

## 2017-05-02 RX ADMIN — IOPAMIDOL 80 ML: 755 INJECTION, SOLUTION INTRAVENOUS at 12:00

## 2017-05-08 DIAGNOSIS — J44.9 CHRONIC OBSTRUCTIVE PULMONARY DISEASE, UNSPECIFIED COPD TYPE (HCC): ICD-10-CM

## 2017-05-08 RX ORDER — FLUTICASONE PROPIONATE 50 MCG
1 SPRAY, SUSPENSION (ML) NASAL DAILY
Qty: 1 EACH | Refills: 0 | Status: SHIPPED | OUTPATIENT
Start: 2017-05-08 | End: 2017-05-19 | Stop reason: SDUPTHER

## 2017-05-19 ENCOUNTER — OFFICE VISIT (OUTPATIENT)
Dept: FAMILY MEDICINE CLINIC | Facility: CLINIC | Age: 42
End: 2017-05-19

## 2017-05-19 VITALS
SYSTOLIC BLOOD PRESSURE: 140 MMHG | HEART RATE: 73 BPM | WEIGHT: 204.1 LBS | DIASTOLIC BLOOD PRESSURE: 80 MMHG | HEIGHT: 69 IN | BODY MASS INDEX: 30.23 KG/M2 | OXYGEN SATURATION: 96 %

## 2017-05-19 DIAGNOSIS — K42.9 UMBILICAL HERNIA WITHOUT OBSTRUCTION OR GANGRENE: Primary | ICD-10-CM

## 2017-05-19 DIAGNOSIS — J44.9 CHRONIC OBSTRUCTIVE PULMONARY DISEASE, UNSPECIFIED COPD TYPE (HCC): ICD-10-CM

## 2017-05-19 DIAGNOSIS — J30.9 ALLERGIC RHINITIS, UNSPECIFIED ALLERGIC RHINITIS TRIGGER, UNSPECIFIED RHINITIS SEASONALITY: ICD-10-CM

## 2017-05-19 PROCEDURE — 99213 OFFICE O/P EST LOW 20 MIN: CPT | Performed by: FAMILY MEDICINE

## 2017-05-19 RX ORDER — FLUTICASONE PROPIONATE 50 MCG
1 SPRAY, SUSPENSION (ML) NASAL DAILY
Qty: 18.2 ML | Refills: 12 | Status: SHIPPED | OUTPATIENT
Start: 2017-05-19 | End: 2017-06-18

## 2017-05-19 RX ORDER — IPRATROPIUM BROMIDE AND ALBUTEROL SULFATE 2.5; .5 MG/3ML; MG/3ML
3 SOLUTION RESPIRATORY (INHALATION) EVERY 4 HOURS PRN
Qty: 360 ML | Refills: 3 | Status: SHIPPED | OUTPATIENT
Start: 2017-05-19 | End: 2018-04-02 | Stop reason: SDUPTHER

## 2017-05-22 ENCOUNTER — DOCUMENTATION (OUTPATIENT)
Dept: FAMILY MEDICINE CLINIC | Facility: CLINIC | Age: 42
End: 2017-05-22

## 2017-05-26 ENCOUNTER — CONSULT (OUTPATIENT)
Dept: SURGERY | Facility: CLINIC | Age: 42
End: 2017-05-26

## 2017-05-26 VITALS
DIASTOLIC BLOOD PRESSURE: 78 MMHG | SYSTOLIC BLOOD PRESSURE: 136 MMHG | HEIGHT: 69 IN | WEIGHT: 199 LBS | BODY MASS INDEX: 29.47 KG/M2

## 2017-05-26 DIAGNOSIS — K42.9 UMBILICAL HERNIA WITHOUT OBSTRUCTION AND WITHOUT GANGRENE: Primary | ICD-10-CM

## 2017-05-26 PROCEDURE — 99213 OFFICE O/P EST LOW 20 MIN: CPT | Performed by: SURGERY

## 2017-05-26 RX ORDER — SODIUM CHLORIDE 0.9 % (FLUSH) 0.9 %
1-10 SYRINGE (ML) INJECTION AS NEEDED
Status: CANCELLED | OUTPATIENT
Start: 2017-06-02

## 2017-05-26 RX ORDER — PROPRANOLOL HYDROCHLORIDE 10 MG/1
20 TABLET ORAL DAILY
COMMUNITY
Start: 2017-05-25 | End: 2017-08-08 | Stop reason: SDUPTHER

## 2017-05-26 RX ORDER — SODIUM CHLORIDE 9 MG/ML
100 INJECTION, SOLUTION INTRAVENOUS CONTINUOUS
Status: CANCELLED | OUTPATIENT
Start: 2017-06-02

## 2017-05-26 RX ORDER — ASPIRIN 81 MG/1
81 TABLET, CHEWABLE ORAL DAILY
COMMUNITY
End: 2018-02-02 | Stop reason: SDUPTHER

## 2017-06-01 ENCOUNTER — ANESTHESIA EVENT (OUTPATIENT)
Dept: PERIOP | Facility: HOSPITAL | Age: 42
End: 2017-06-01

## 2017-06-01 ENCOUNTER — APPOINTMENT (OUTPATIENT)
Dept: PREADMISSION TESTING | Facility: HOSPITAL | Age: 42
End: 2017-06-01

## 2017-06-01 VITALS
OXYGEN SATURATION: 98 % | SYSTOLIC BLOOD PRESSURE: 120 MMHG | WEIGHT: 203 LBS | BODY MASS INDEX: 30.07 KG/M2 | DIASTOLIC BLOOD PRESSURE: 82 MMHG | HEART RATE: 90 BPM | RESPIRATION RATE: 18 BRPM | HEIGHT: 69 IN

## 2017-06-01 DIAGNOSIS — K42.9 UMBILICAL HERNIA WITHOUT OBSTRUCTION AND WITHOUT GANGRENE: ICD-10-CM

## 2017-06-01 LAB
ANION GAP SERPL CALCULATED.3IONS-SCNC: 10 MMOL/L (ref 5–15)
BUN BLD-MCNC: 15 MG/DL (ref 7–21)
BUN/CREAT SERPL: 14.7 (ref 7–25)
CALCIUM SPEC-SCNC: 9.4 MG/DL (ref 8.4–10.2)
CHLORIDE SERPL-SCNC: 103 MMOL/L (ref 95–110)
CO2 SERPL-SCNC: 23 MMOL/L (ref 22–31)
CREAT BLD-MCNC: 1.02 MG/DL (ref 0.7–1.3)
DEPRECATED RDW RBC AUTO: 39.9 FL (ref 35.1–43.9)
ERYTHROCYTE [DISTWIDTH] IN BLOOD BY AUTOMATED COUNT: 13.3 % (ref 11.5–14.5)
GFR SERPL CREATININE-BSD FRML MDRD: 81 ML/MIN/1.73 (ref 60–162)
GLUCOSE BLD-MCNC: 118 MG/DL (ref 60–100)
HCT VFR BLD AUTO: 41.6 % (ref 39–49)
HGB BLD-MCNC: 14 G/DL (ref 13.7–17.3)
MCH RBC QN AUTO: 27.8 PG (ref 26.5–34)
MCHC RBC AUTO-ENTMCNC: 33.7 G/DL (ref 31.5–36.3)
MCV RBC AUTO: 82.5 FL (ref 80–98)
MRSA DNA SPEC QL NAA+PROBE: NEGATIVE
PLATELET # BLD AUTO: 207 10*3/MM3 (ref 150–450)
PMV BLD AUTO: 11.3 FL (ref 8–12)
POTASSIUM BLD-SCNC: 4 MMOL/L (ref 3.5–5.1)
RBC # BLD AUTO: 5.04 10*6/MM3 (ref 4.37–5.74)
SODIUM BLD-SCNC: 136 MMOL/L (ref 137–145)
WBC NRBC COR # BLD: 7.95 10*3/MM3 (ref 3.2–9.8)

## 2017-06-01 PROCEDURE — 85027 COMPLETE CBC AUTOMATED: CPT | Performed by: SURGERY

## 2017-06-01 PROCEDURE — 87641 MR-STAPH DNA AMP PROBE: CPT | Performed by: SURGERY

## 2017-06-01 PROCEDURE — 36415 COLL VENOUS BLD VENIPUNCTURE: CPT

## 2017-06-01 PROCEDURE — 80048 BASIC METABOLIC PNL TOTAL CA: CPT | Performed by: SURGERY

## 2017-06-01 NOTE — DISCHARGE INSTRUCTIONS
[facility]  Pre-op Information and Guidelines    You will be called after 2 p.m. the day before your surgery (Friday for Monday surgery) and notified of your time for arrival and approximate surgery time.  If you have not received a call by 4P.M., please contact Same Day Surgery at [phone] of if outside [facility] call [phone].    Please Follow these Important Safety Guidelines:    • The morning of your procedure, take only the medications listed below with   A sip of water:_____________________________________________       ___INHALERS, CLARITIN, NEXIUM________________    • DO NOT eat or drink anything after 12:00 midnight the night before surgery  Specific instructions concerning drinking clear liquids will be discussed during  the pre-surgery instruction call the day before your surgery.    • If you take a blood thinner (ex. Plavix, Coumadin, aspirin), ask your doctor when to stop it before surgery  STOP DATE: _________________    • Only 2 visitors are allowed in patient rooms at a time  Your visitors will be asked to wait in the lobby until the admission process is complete with the exception of a parent with a child and patients in need of special assistance.    • YOU CANNOT DRIVE YOURSELF HOME  You must be accompanied by someone who will be responsible for driving you home after surgery and for your care at home.    • DO NOT chew gum, use breath mints, hard candy, or smoke the day of surgery  • DO NOT drink alcohol for at least 24 hours before your surgery  • DO NOT wear any jewelry and remove all body piercing before coming to the hospital  • DO NOT wear make-up to the hospital  • If you are having surgery on an extremity (arm/leg/foot) remove nail polish/artificial nails on the surgical side  • Clothing, glasses, contacts, dentures, and hairpieces must be removed before surgery  • Bathe the night before or the morning of your surgery and do not use powders/lotions on  skin.

## 2017-06-02 ENCOUNTER — ANESTHESIA (OUTPATIENT)
Dept: PERIOP | Facility: HOSPITAL | Age: 42
End: 2017-06-02

## 2017-06-02 ENCOUNTER — HOSPITAL ENCOUNTER (OUTPATIENT)
Facility: HOSPITAL | Age: 42
Setting detail: HOSPITAL OUTPATIENT SURGERY
Discharge: HOME | End: 2017-06-02
Attending: SURGERY | Admitting: SURGERY

## 2017-06-02 VITALS
TEMPERATURE: 97.4 F | HEART RATE: 51 BPM | OXYGEN SATURATION: 96 % | BODY MASS INDEX: 29.55 KG/M2 | HEIGHT: 69 IN | RESPIRATION RATE: 18 BRPM | SYSTOLIC BLOOD PRESSURE: 134 MMHG | WEIGHT: 199.52 LBS | DIASTOLIC BLOOD PRESSURE: 77 MMHG

## 2017-06-02 DIAGNOSIS — K42.9 UMBILICAL HERNIA WITHOUT OBSTRUCTION AND WITHOUT GANGRENE: ICD-10-CM

## 2017-06-02 LAB
AMPHET+METHAMPHET UR QL: NEGATIVE
BARBITURATES UR QL SCN: NEGATIVE
BENZODIAZ UR QL SCN: NEGATIVE
CANNABINOIDS SERPL QL: NEGATIVE
COCAINE UR QL: NEGATIVE
METHADONE UR QL SCN: NEGATIVE
OPIATES UR QL: NEGATIVE
OXYCODONE UR QL SCN: NEGATIVE

## 2017-06-02 PROCEDURE — 80307 DRUG TEST PRSMV CHEM ANLYZR: CPT | Performed by: ANESTHESIOLOGY

## 2017-06-02 PROCEDURE — 25010000002 NEOSTIGMINE PER 0.5 MG: Performed by: NURSE ANESTHETIST, CERTIFIED REGISTERED

## 2017-06-02 PROCEDURE — 25010000002 FENTANYL CITRATE (PF) 100 MCG/2ML SOLUTION: Performed by: NURSE ANESTHETIST, CERTIFIED REGISTERED

## 2017-06-02 PROCEDURE — 25010000002 HYDROMORPHONE PER 4 MG: Performed by: NURSE ANESTHETIST, CERTIFIED REGISTERED

## 2017-06-02 PROCEDURE — 49585 PR REPAIR UMBILICAL HERN,5+Y/O,REDUC: CPT | Performed by: SURGERY

## 2017-06-02 PROCEDURE — 25010000002 PROPOFOL 10 MG/ML EMULSION: Performed by: NURSE ANESTHETIST, CERTIFIED REGISTERED

## 2017-06-02 PROCEDURE — 25010000002 MIDAZOLAM PER 1 MG: Performed by: NURSE ANESTHETIST, CERTIFIED REGISTERED

## 2017-06-02 PROCEDURE — 25010000003 CEFAZOLIN PER 500 MG: Performed by: SURGERY

## 2017-06-02 PROCEDURE — C1781 MESH (IMPLANTABLE): HCPCS | Performed by: SURGERY

## 2017-06-02 DEVICE — VENTRALEX ST HERNIA PATCH
Type: IMPLANTABLE DEVICE | Site: UMBILICAL | Status: FUNCTIONAL
Brand: VENTRALEX ST HERNIA PATCH

## 2017-06-02 RX ORDER — DIPHENHYDRAMINE HYDROCHLORIDE 50 MG/ML
12.5 INJECTION INTRAMUSCULAR; INTRAVENOUS
Status: DISCONTINUED | OUTPATIENT
Start: 2017-06-02 | End: 2017-06-02 | Stop reason: HOSPADM

## 2017-06-02 RX ORDER — ACETAMINOPHEN 650 MG/1
650 SUPPOSITORY RECTAL ONCE AS NEEDED
Status: DISCONTINUED | OUTPATIENT
Start: 2017-06-02 | End: 2017-06-02 | Stop reason: HOSPADM

## 2017-06-02 RX ORDER — DOCUSATE SODIUM 250 MG
250 CAPSULE ORAL 2 TIMES DAILY PRN
Qty: 60 CAPSULE | Refills: 0 | Status: SHIPPED | OUTPATIENT
Start: 2017-06-02 | End: 2018-01-09 | Stop reason: SINTOL

## 2017-06-02 RX ORDER — ACETAMINOPHEN 325 MG/1
650 TABLET ORAL ONCE AS NEEDED
Status: DISCONTINUED | OUTPATIENT
Start: 2017-06-02 | End: 2017-06-02 | Stop reason: HOSPADM

## 2017-06-02 RX ORDER — LIDOCAINE HYDROCHLORIDE 20 MG/ML
INJECTION, SOLUTION INFILTRATION; PERINEURAL AS NEEDED
Status: DISCONTINUED | OUTPATIENT
Start: 2017-06-02 | End: 2017-06-02 | Stop reason: SURG

## 2017-06-02 RX ORDER — FENTANYL CITRATE 50 UG/ML
INJECTION, SOLUTION INTRAMUSCULAR; INTRAVENOUS AS NEEDED
Status: DISCONTINUED | OUTPATIENT
Start: 2017-06-02 | End: 2017-06-02 | Stop reason: SURG

## 2017-06-02 RX ORDER — LABETALOL HYDROCHLORIDE 5 MG/ML
5 INJECTION, SOLUTION INTRAVENOUS
Status: DISCONTINUED | OUTPATIENT
Start: 2017-06-02 | End: 2017-06-02 | Stop reason: HOSPADM

## 2017-06-02 RX ORDER — MIDAZOLAM HYDROCHLORIDE 1 MG/ML
INJECTION INTRAMUSCULAR; INTRAVENOUS AS NEEDED
Status: DISCONTINUED | OUTPATIENT
Start: 2017-06-02 | End: 2017-06-02 | Stop reason: SURG

## 2017-06-02 RX ORDER — HYDROCODONE BITARTRATE AND ACETAMINOPHEN 5; 325 MG/1; MG/1
1 TABLET ORAL ONCE AS NEEDED
Status: COMPLETED | OUTPATIENT
Start: 2017-06-02 | End: 2017-06-02

## 2017-06-02 RX ORDER — ONDANSETRON 2 MG/ML
4 INJECTION INTRAMUSCULAR; INTRAVENOUS ONCE AS NEEDED
Status: DISCONTINUED | OUTPATIENT
Start: 2017-06-02 | End: 2017-06-02 | Stop reason: HOSPADM

## 2017-06-02 RX ORDER — PROPOFOL 10 MG/ML
VIAL (ML) INTRAVENOUS AS NEEDED
Status: DISCONTINUED | OUTPATIENT
Start: 2017-06-02 | End: 2017-06-02 | Stop reason: SURG

## 2017-06-02 RX ORDER — SODIUM CHLORIDE 0.9 % (FLUSH) 0.9 %
1-10 SYRINGE (ML) INJECTION AS NEEDED
Status: DISCONTINUED | OUTPATIENT
Start: 2017-06-02 | End: 2017-06-02 | Stop reason: HOSPADM

## 2017-06-02 RX ORDER — HYDROCODONE BITARTRATE AND ACETAMINOPHEN 5; 325 MG/1; MG/1
1-2 TABLET ORAL EVERY 4 HOURS PRN
Qty: 40 TABLET | Refills: 0 | Status: SHIPPED | OUTPATIENT
Start: 2017-06-02 | End: 2018-01-09

## 2017-06-02 RX ORDER — NALOXONE HCL 0.4 MG/ML
0.2 VIAL (ML) INJECTION AS NEEDED
Status: DISCONTINUED | OUTPATIENT
Start: 2017-06-02 | End: 2017-06-02 | Stop reason: HOSPADM

## 2017-06-02 RX ORDER — SODIUM CHLORIDE 9 MG/ML
100 INJECTION, SOLUTION INTRAVENOUS CONTINUOUS
Status: DISCONTINUED | OUTPATIENT
Start: 2017-06-02 | End: 2017-06-02 | Stop reason: HOSPADM

## 2017-06-02 RX ORDER — FLUMAZENIL 0.1 MG/ML
0.2 INJECTION INTRAVENOUS AS NEEDED
Status: DISCONTINUED | OUTPATIENT
Start: 2017-06-02 | End: 2017-06-02 | Stop reason: HOSPADM

## 2017-06-02 RX ORDER — BUPIVACAINE HYDROCHLORIDE AND EPINEPHRINE 5; 5 MG/ML; UG/ML
INJECTION, SOLUTION EPIDURAL; INTRACAUDAL; PERINEURAL AS NEEDED
Status: DISCONTINUED | OUTPATIENT
Start: 2017-06-02 | End: 2017-06-02 | Stop reason: HOSPADM

## 2017-06-02 RX ORDER — VECURONIUM BROMIDE 20 MG/20ML
INJECTION, POWDER, LYOPHILIZED, FOR SOLUTION INTRAVENOUS AS NEEDED
Status: DISCONTINUED | OUTPATIENT
Start: 2017-06-02 | End: 2017-06-02 | Stop reason: SURG

## 2017-06-02 RX ORDER — GLYCOPYRROLATE 0.2 MG/ML
INJECTION INTRAMUSCULAR; INTRAVENOUS AS NEEDED
Status: DISCONTINUED | OUTPATIENT
Start: 2017-06-02 | End: 2017-06-02 | Stop reason: SURG

## 2017-06-02 RX ADMIN — SODIUM CHLORIDE: 900 INJECTION, SOLUTION INTRAVENOUS at 09:29

## 2017-06-02 RX ADMIN — HYDROMORPHONE HYDROCHLORIDE 0.5 MG: 1 INJECTION, SOLUTION INTRAMUSCULAR; INTRAVENOUS; SUBCUTANEOUS at 10:59

## 2017-06-02 RX ADMIN — CEFAZOLIN SODIUM 2 G: 1 INJECTION, POWDER, FOR SOLUTION INTRAMUSCULAR; INTRAVENOUS at 09:40

## 2017-06-02 RX ADMIN — NEOSTIGMINE METHYLSULFATE 1 MG: 1 INJECTION, SOLUTION INTRAMUSCULAR; INTRAVENOUS; SUBCUTANEOUS at 10:35

## 2017-06-02 RX ADMIN — HYDROMORPHONE HYDROCHLORIDE 0.5 MG: 1 INJECTION, SOLUTION INTRAMUSCULAR; INTRAVENOUS; SUBCUTANEOUS at 11:18

## 2017-06-02 RX ADMIN — NEOSTIGMINE METHYLSULFATE 2 MG: 1 INJECTION, SOLUTION INTRAMUSCULAR; INTRAVENOUS; SUBCUTANEOUS at 10:43

## 2017-06-02 RX ADMIN — LIDOCAINE HYDROCHLORIDE 100 MG: 20 INJECTION, SOLUTION INFILTRATION; PERINEURAL at 09:35

## 2017-06-02 RX ADMIN — HYDROCODONE BITARTRATE AND ACETAMINOPHEN 1 TABLET: 5; 325 TABLET ORAL at 12:52

## 2017-06-02 RX ADMIN — SODIUM CHLORIDE 100 ML/HR: 900 INJECTION, SOLUTION INTRAVENOUS at 08:31

## 2017-06-02 RX ADMIN — FENTANYL CITRATE 100 MCG: 50 INJECTION, SOLUTION INTRAMUSCULAR; INTRAVENOUS at 09:35

## 2017-06-02 RX ADMIN — VECURONIUM BROMIDE 5 MG: 1 INJECTION, POWDER, LYOPHILIZED, FOR SOLUTION INTRAVENOUS at 09:35

## 2017-06-02 RX ADMIN — HYDROMORPHONE HYDROCHLORIDE 0.5 MG: 1 INJECTION, SOLUTION INTRAMUSCULAR; INTRAVENOUS; SUBCUTANEOUS at 10:54

## 2017-06-02 RX ADMIN — GLYCOPYRROLATE 0.2 MG: 0.2 INJECTION, SOLUTION INTRAMUSCULAR; INTRAVENOUS at 10:35

## 2017-06-02 RX ADMIN — HYDROMORPHONE HYDROCHLORIDE 0.5 MG: 1 INJECTION, SOLUTION INTRAMUSCULAR; INTRAVENOUS; SUBCUTANEOUS at 11:08

## 2017-06-02 RX ADMIN — MIDAZOLAM 2 MG: 1 INJECTION INTRAMUSCULAR; INTRAVENOUS at 09:29

## 2017-06-02 RX ADMIN — GLYCOPYRROLATE 0.2 MG: 0.2 INJECTION, SOLUTION INTRAMUSCULAR; INTRAVENOUS at 10:43

## 2017-06-02 RX ADMIN — PROPOFOL 150 MG: 10 INJECTION, EMULSION INTRAVENOUS at 09:35

## 2017-06-02 RX ADMIN — FENTANYL CITRATE 50 MCG: 50 INJECTION, SOLUTION INTRAMUSCULAR; INTRAVENOUS at 10:05

## 2017-06-02 NOTE — PLAN OF CARE
Problem: Patient Care Overview (Adult)  Goal: Plan of Care Review  Outcome: Ongoing (interventions implemented as appropriate)    06/02/17 1127   Coping/Psychosocial Response Interventions   Plan Of Care Reviewed With patient   Patient Care Overview   Progress improving   Outcome Evaluation   Outcome Summary/Follow up Plan VSS on room air oxygen. Denies having nausea, states pain is now tolerable. Ready for phase II care.         Problem: Perioperative Period (Adult)  Goal: Signs and Symptoms of Listed Potential Problems Will be Absent or Manageable (Perioperative Period)  Outcome: Ongoing (interventions implemented as appropriate)

## 2017-06-02 NOTE — ANESTHESIA POSTPROCEDURE EVALUATION
Patient: Aquiles Najera    Procedure Summary     Date Anesthesia Start Anesthesia Stop Room / Location    06/02/17 0929 1057  [facility] 09 / [facility]       Procedure Diagnosis Surgeon Provider    OPEN UMBILICAL AND EPIGASTRIC HERNIA REPAIR WITH MESH (N/A Abdomen) Umbilical hernia without obstruction and without gangrene  (Umbilical hernia without obstruction and without gangrene [K42.9]) MD Brian Han MD          Anesthesia Type: general  Last vitals  /86 (06/02/17 1128)    Temp 97.6 °F (36.4 °C) (06/02/17 1128)    Pulse 62 (06/02/17 1128)   Resp 20 (06/02/17 1128)    SpO2 100 % (06/02/17 1128)      Post Anesthesia Care and Evaluation    Patient location during evaluation: PACU  Patient participation: complete - patient participated  Level of consciousness: awake and alert  Pain score: 0  Pain management: adequate  Airway patency: patent  Anesthetic complications: No anesthetic complications  PONV Status: none  Cardiovascular status: acceptable  Respiratory status: acceptable  Hydration status: acceptable

## 2017-06-02 NOTE — ANESTHESIA PROCEDURE NOTES
Airway  Urgency: elective    Airway not difficult    General Information and Staff    Patient location during procedure: OR  CRNA: AIDEE OSUNA    Indications and Patient Condition  Indications for airway management: airway protection    Preoxygenated: yes  Mask difficulty assessment: 2 - vent by mask + OA or adjuvant +/- NMBA    Final Airway Details  Final airway type: endotracheal airway      Successful airway: ETT  Cuffed: yes   Successful intubation technique: direct laryngoscopy  Facilitating devices/methods: intubating stylet  Blade: Gilmer  Blade size: #4  ETT size: 7.5 mm  Cormack-Lehane Classification: grade IIa - partial view of glottis  Placement verified by: chest auscultation and capnometry   Cuff volume (mL): 8  Measured from: lips  ETT to lips (cm): 22  Number of attempts at approach: 1

## 2017-06-02 NOTE — H&P (VIEW-ONLY)
CHIEF COMPLAINT:    Umbilical hernia    HISTORY OF PRESENT ILLNESS:    Aquiles Najera is a 41 y.o. male who I have seen previously for an umbilical hernia.  This was approximately 2 years ago per the patient's recollection.  At that time he was actively using cocaine, and smoking and his hernia was minimally symptomatic.  Since that time he states the hernia has enlarged.  He now regularly has pain in the sherlyn-umbilical region related to the bulge there.  The hernia does reduce spontaneously when he is lying down and relaxing.  He denies any current smoking or drug use.  He would like to have his hernia repaired.    Past Medical History:   Diagnosis Date   • Abdominal pain    • Acute exacerbation of chronic obstructive airways disease    • Allergic rhinitis    • Chest pain     unspecified   • Chronic cough    • Conjunctivitis    • Dyslipidemia    • Dyspnea    • Gastroesophageal reflux disease    • Pain     Pain in left ankle and joints of left foot      • Pain     pain in left leg   • Tobacco dependence syndrome    • Umbilical hernia without obstruction or gangrene    • Viral gastroenteritis        Past Surgical History:   Procedure Laterality Date   • INCISION AND DRAINAGE ABSCESS  06/09/2009    Irrigation and debridement of facial soft tissue injuries with closure of complex lacerations and repair of maxillary dental alveolar fracture         Current Outpatient Prescriptions:   •  albuterol (VENTOLIN HFA) 108 (90 BASE) MCG/ACT inhaler, Inhale 2 puffs every 4 (four) hours as needed for wheezing., Disp: 18 g, Rfl: 3  •  amoxicillin-clavulanate (AUGMENTIN) 875-125 MG per tablet, Take 1 tablet by mouth 2 (Two) Times a Day for 10 days., Disp: 20 tablet, Rfl: 0  •  aspirin 81 MG chewable tablet, Chew 81 mg Daily., Disp: , Rfl:   •  budesonide (PULMICORT) 0.5 MG/2ML nebulizer solution, Take 2 mL by nebulization every night at bedtime., Disp: 80 mL, Rfl: 0  •  esomeprazole (NEXIUM) 40 MG capsule, Take 1 capsule by  mouth Every Morning Before Breakfast., Disp: 30 capsule, Rfl: 3  •  Flunisolide HFA 80 MCG/ACT aerosol solution, Inhale 2 puffs 2 (Two) Times a Day., Disp: 5.1 g, Rfl: 3  •  fluticasone (FLONASE ALLERGY RELIEF) 50 MCG/ACT nasal spray, 1 spray into each nostril Daily for 30 days., Disp: 18.2 mL, Rfl: 12  •  ipratropium-albuterol (DUONEB) 0.5-2.5 mg/mL nebulizer, Take 3 mL by nebulization Every 4 (Four) Hours As Needed for Wheezing., Disp: 360 mL, Rfl: 3  •  loratadine (CLARITIN) 10 MG tablet, Take 1 tablet by mouth daily., Disp: 30 tablet, Rfl: 11  •  nitroglycerin (NITROSTAT) 0.4 MG SL tablet, Place 1 tablet under the tongue Every 5 (Five) Minutes As Needed for chest pain. Take no more than 3 doses in 15 minutes., Disp: 30 tablet, Rfl: 12  •  pravastatin (PRAVACHOL) 40 MG tablet, TAKE 1 TABLET BY MOUTH DAILY., Disp: 30 tablet, Rfl: 3  •  docusate sodium (COLACE) 100 MG capsule, Take 1 capsule by mouth 2 (two) times a day. (Patient taking differently: Take 100 mg by mouth 2 (Two) Times a Day As Needed.), Disp: 60 capsule, Rfl: 0  •  meloxicam (MOBIC) 15 MG tablet, Take 15 mg by mouth daily., Disp: , Rfl:   •  metoprolol succinate XL (TOPROL-XL) 50 MG 24 hr tablet, Take 1 tablet by mouth Daily., Disp: 1 tablet, Rfl: 0  •  NEXIUM 24HR 20 MG capsule, Take 20 mg by mouth Daily., Disp: , Rfl:   •  predniSONE (DELTASONE) 10 MG tablet, Take one tablet by mouth x 3 day then 0.5 tablet by mouth daily x 4 days., Disp: 5 tablet, Rfl: 0  •  promethazine-dextromethorphan (PROMETHAZINE-DM) 6.25-15 MG/5ML syrup, Take 5 mL by mouth 4 (Four) Times a Day As Needed for Cough., Disp: 150 mL, Rfl: 0    No Known Allergies    Family History   Problem Relation Age of Onset   • Coronary artery disease Neg Hx    • Hypertension Neg Hx        Social History     Social History   • Marital status:      Spouse name: N/A   • Number of children: N/A   • Years of education: N/A     Occupational History   • Not on file.     Social History Main  "Topics   • Smoking status: Former Smoker     Packs/day: 0.15     Years: 20.00     Types: Cigarettes   • Smokeless tobacco: Current User      Comment: Previously smoked 3-5 ppd, quit Jan 2016, then re-started in April (1 pack/week or ~3 cig/day)   • Alcohol use No   • Drug use: Yes     Special: Cocaine      Comment: not in one year   • Sexual activity: Defer     Other Topics Concern   • Not on file     Social History Narrative       Review of Systems   Constitutional: Negative for activity change, appetite change, chills and fever.   HENT: Negative for hearing loss, nosebleeds and trouble swallowing.    Respiratory: Positive for shortness of breath and wheezing.    Cardiovascular: Positive for chest pain. Negative for palpitations and leg swelling.   Gastrointestinal: Positive for abdominal pain. Negative for abdominal distention, anal bleeding, blood in stool, constipation, diarrhea, nausea, rectal pain and vomiting.   Endocrine: Negative for cold intolerance, heat intolerance, polydipsia and polyuria.   Genitourinary: Negative for decreased urine volume, difficulty urinating, dysuria, enuresis, frequency, hematuria and urgency.   Musculoskeletal: Positive for back pain. Negative for arthralgias, gait problem, myalgias and neck pain.   Skin: Negative for pallor, rash and wound.   Allergic/Immunologic: Negative for immunocompromised state.   Neurological: Negative for dizziness, seizures, weakness, light-headedness, numbness and headaches.   Psychiatric/Behavioral: Negative for agitation and behavioral problems. The patient is not nervous/anxious.        Objective     /78  Ht 68.5\" (174 cm)  Wt 199 lb (90.3 kg)  BMI 29.82 kg/m2    Physical Exam   Constitutional: He is oriented to person, place, and time. He appears well-developed and well-nourished.   HENT:   Head: Normocephalic and atraumatic.   Nose: Nose normal.   Eyes: Conjunctivae and EOM are normal. Right eye exhibits no discharge. Left eye exhibits no " discharge.   Neck: Trachea normal, normal range of motion and phonation normal. Neck supple. No JVD present. No tracheal deviation and no edema present. No thyromegaly present.   Cardiovascular: Normal rate, regular rhythm and normal heart sounds.  Exam reveals no gallop and no friction rub.    No murmur heard.  Pulmonary/Chest: Effort normal and breath sounds normal. No accessory muscle usage. No respiratory distress. He has no decreased breath sounds. He has no wheezes. He has no rales. He exhibits no tenderness.   Abdominal: Soft. He exhibits no distension, no fluid wave, no ascites, no pulsatile midline mass and no mass. There is no tenderness. There is no rebound and no guarding. A hernia is present.       Diastasis recti   Musculoskeletal: Normal range of motion. He exhibits no edema, tenderness or deformity.   Lymphadenopathy:     He has no cervical adenopathy.        Left: No supraclavicular adenopathy present.   Neurological: He is alert and oriented to person, place, and time. He has normal strength. No cranial nerve deficit.   Skin: Skin is warm and dry. No rash noted. He is not diaphoretic. No erythema. No pallor.   Psychiatric: He has a normal mood and affect. His speech is normal and behavior is normal. Judgment and thought content normal. Cognition and memory are normal.   Vitals reviewed.        ASSESSMENT:    Umbilical hernia containing fat    PLAN:    His umbilical hernia is now symptomatic.  He would like to have it repaired.  Risks and benefits of open umbilical hernia repair with mesh were discussed with the patient and he is agreeable with receiving.  Has been scheduled for June 2.    Although he has had chest pain in the past few months he did have a coronary CT which showed no blockages.  He denies any recent chest pain.          This document has been electronically signed by Sal Herbert MD on May 26, 2017 4:27 PM

## 2017-06-02 NOTE — ANESTHESIA PREPROCEDURE EVALUATION
" Anesthesia Evaluation     Patient summary reviewed and Nursing notes reviewed   NPO Solid Status: > 8 hours       Airway   Mallampati: II  TM distance: >3 FB  Neck ROM: full  possible difficult intubation  Dental    (+) edentulous    Pulmonary - normal exam    breath sounds clear to auscultation  (+) a smoker Former, COPD mild, shortness of breath,   Cardiovascular - negative cardio ROS and normal exam    ECG reviewed  Rhythm: regular  Rate: normal      ROS comment: EKG:NSR with incomplete bundle branch block.    Neuro/Psych- negative ROS  GI/Hepatic/Renal/Endo    (+) obesity,  GERD well controlled,     Musculoskeletal (-) negative ROS    Abdominal    Substance History   (+) drug use (History of stimulant abuse;shows no signs or symptoms this AM. Patient denies any use for \"months\".)     OB/GYN negative ob/gyn ROS         Other - negative ROS                                       Anesthesia Plan    ASA 3     general     intravenous induction   Anesthetic plan and risks discussed with patient and mother.      "

## 2017-06-02 NOTE — INTERVAL H&P NOTE
H&P reviewed. The patient was examined and there are no changes to the H&P.         This document has been electronically signed by Sal Herbert MD on June 2, 2017 8:51 AM

## 2017-06-02 NOTE — PLAN OF CARE
Problem: Patient Care Overview (Adult)  Goal: Plan of Care Review  Outcome: Outcome(s) achieved Date Met:  06/02/17  Discharge criteria met

## 2017-06-05 NOTE — OP NOTE
UMBILICAL HERNIA REPAIR  Procedure Note    Aquiles Najera  6/2/2017    Pre-op Diagnosis:   Umbilical hernia without obstruction and without gangrene [K42.9]    Post-op Diagnosis:     Post-Op Diagnosis Codes:     * Umbilical hernia without obstruction and without gangrene [K42.9]    Procedure/CPT® Codes:      Procedure(s):  OPEN UMBILICAL AND EPIGASTRIC HERNIA REPAIR WITH MESH    Surgeon(s):  Sal Herbert MD    Anesthesia: General    Staff:   Circulator: Tracee Don RN; Armando Roland RN  Scrub Person: Saad Russell  Assistant: Natalia Hay    Estimated Blood Loss: 10cc    Specimens:                * No specimens in log *  Implant: 6.4cm VentraLex Mesh    Drains:           Findings: Umbilical and epigastric hernias    Complications: none    Indication: Umbilical and epigastric hernias    Operative Note:    Patient was seen and consented preoperatively.  Following this he is taken to the operating room and placed in supine position on the OR table.  Gen. anesthetic was administered and the patient had an airway placed and secured by anesthesia.  A preoperative briefing was then performed.  The abdomen was prepped and draped in normal sterile fashion.  A timeout was then performed.    Following timeout local anesthetic was injected around the patient's umbilicus.  A curvilinear incision was made below the umbilicus.  Sharp dissection was then undertaken down to and around the umbilical stalk.  A small hernia defect was felt in this area.  The umbilical stalk was divided and the hernia sac was opened.  A small amount of incarcerated fat was encountered.  The hernia sac was trimmed down to the level of the fascia.  Approximately 1 cm superior to this a second hernia defect was found.  The hernia sac in this area was larger and appeared also to contain fat.  It was sharply dissected away from the surrounding tissues.  The hernia sac was then carefully opened and its contents reduced below the  level of the fascia.  This hernia sac was also trimmed away down to the level of the fascia.  The bridge of fascia between these 2 hernia defects was then divided combining them into one larger defect.  The posterior aspect of the abdominal wall was then cleared using a combination of sharp and blunt dissection.  A 6.4 cm round ventralex mesh was then selected.  It was placed below the fascia and secured trans-fascially with a series of 0 Ethibond sutures.  The overlying fascia including the 2 hernia defects was then closed using interrupted figure-of-eight 0 Ethibond sutures as well.  The wound was then irrigated.  The umbilical stalk was reattached with suture.  The incision was closed in layers using 3-0 and 4-0 Vicryl.  Mastisol, Steri-Strips and a pressure dressing were then placed.  Patient was awakened and returned to recovery in good condition.          This document has been electronically signed by Sal Herbert MD on June 5, 2017 8:28 AM        Sal Herbert MD     Date: 6/5/2017  Time: 8:23 AM

## 2017-06-09 ENCOUNTER — OFFICE VISIT (OUTPATIENT)
Dept: SURGERY | Facility: CLINIC | Age: 42
End: 2017-06-09

## 2017-06-09 VITALS
BODY MASS INDEX: 29.18 KG/M2 | HEIGHT: 69 IN | DIASTOLIC BLOOD PRESSURE: 82 MMHG | WEIGHT: 197 LBS | SYSTOLIC BLOOD PRESSURE: 130 MMHG

## 2017-06-09 DIAGNOSIS — Z09 FOLLOW UP: Primary | ICD-10-CM

## 2017-06-09 PROCEDURE — 99024 POSTOP FOLLOW-UP VISIT: CPT | Performed by: SURGERY

## 2017-06-09 NOTE — PROGRESS NOTES
CHIEF COMPLAINT:    Chief Complaint   Patient presents with   • Post-op     Open Umbilical and epigastric hernia repair with mesh on 6/21/17.       HISTORY OF PRESENT ILLNESS:    Aquiles Najera is a 41 y.o. male who underwent umbilical hernia repair. He is doing well overall.  Has no complaints today.    EXAM:  Vitals:    06/09/17 1003   BP: 130/82         Abdomen soft, incision healing well. No palpable hernia.    ASSESSMENT:    S/p UHR    PLAN:    Overall doing well.  Can return to work 7/2/17.  See prn.            This document has been electronically signed by Sal Herbert MD on June 9, 2017 10:29 AM

## 2017-06-24 ENCOUNTER — HOSPITAL ENCOUNTER (EMERGENCY)
Facility: HOSPITAL | Age: 42
Discharge: HOME | End: 2017-06-24
Attending: EMERGENCY MEDICINE | Admitting: EMERGENCY MEDICINE

## 2017-06-24 ENCOUNTER — APPOINTMENT (OUTPATIENT)
Dept: CT IMAGING | Facility: HOSPITAL | Age: 42
End: 2017-06-24

## 2017-06-24 VITALS
DIASTOLIC BLOOD PRESSURE: 92 MMHG | SYSTOLIC BLOOD PRESSURE: 133 MMHG | RESPIRATION RATE: 20 BRPM | HEIGHT: 68 IN | OXYGEN SATURATION: 97 % | HEART RATE: 69 BPM | TEMPERATURE: 98.3 F | BODY MASS INDEX: 30.28 KG/M2 | WEIGHT: 199.8 LBS

## 2017-06-24 DIAGNOSIS — R10.31 ABDOMINAL WALL PAIN IN RIGHT LOWER QUADRANT: Primary | ICD-10-CM

## 2017-06-24 LAB
ALBUMIN SERPL-MCNC: 4.4 G/DL (ref 3.4–4.8)
ALBUMIN/GLOB SERPL: 1.3 G/DL (ref 1.1–1.8)
ALP SERPL-CCNC: 78 U/L (ref 38–126)
ALT SERPL W P-5'-P-CCNC: 45 U/L (ref 21–72)
ANION GAP SERPL CALCULATED.3IONS-SCNC: 10 MMOL/L (ref 5–15)
AST SERPL-CCNC: 23 U/L (ref 17–59)
BASOPHILS # BLD AUTO: 0.03 10*3/MM3 (ref 0–0.2)
BASOPHILS NFR BLD AUTO: 0.4 % (ref 0–2)
BILIRUB SERPL-MCNC: 0.3 MG/DL (ref 0.2–1.3)
BILIRUB UR QL STRIP: NEGATIVE
BUN BLD-MCNC: 13 MG/DL (ref 7–21)
BUN/CREAT SERPL: 13.4 (ref 7–25)
CALCIUM SPEC-SCNC: 9.5 MG/DL (ref 8.4–10.2)
CHLORIDE SERPL-SCNC: 102 MMOL/L (ref 95–110)
CLARITY UR: CLEAR
CO2 SERPL-SCNC: 28 MMOL/L (ref 22–31)
COLOR UR: YELLOW
CREAT BLD-MCNC: 0.97 MG/DL (ref 0.7–1.3)
DEPRECATED RDW RBC AUTO: 39.5 FL (ref 35.1–43.9)
EOSINOPHIL # BLD AUTO: 0.01 10*3/MM3 (ref 0–0.7)
EOSINOPHIL NFR BLD AUTO: 0.1 % (ref 0–7)
ERYTHROCYTE [DISTWIDTH] IN BLOOD BY AUTOMATED COUNT: 13.3 % (ref 11.5–14.5)
GFR SERPL CREATININE-BSD FRML MDRD: 86 ML/MIN/1.73 (ref 70–162)
GLOBULIN UR ELPH-MCNC: 3.3 GM/DL (ref 2.3–3.5)
GLUCOSE BLD-MCNC: 92 MG/DL (ref 60–100)
GLUCOSE UR STRIP-MCNC: NEGATIVE MG/DL
HCT VFR BLD AUTO: 42.7 % (ref 39–49)
HGB BLD-MCNC: 14.7 G/DL (ref 13.7–17.3)
HGB UR QL STRIP.AUTO: NEGATIVE
HOLD SPECIMEN: NORMAL
HOLD SPECIMEN: NORMAL
IMM GRANULOCYTES # BLD: 0.03 10*3/MM3 (ref 0–0.02)
IMM GRANULOCYTES NFR BLD: 0.4 % (ref 0–0.5)
KETONES UR QL STRIP: NEGATIVE
LEUKOCYTE ESTERASE UR QL STRIP.AUTO: NEGATIVE
LIPASE SERPL-CCNC: 64 U/L (ref 23–300)
LYMPHOCYTES # BLD AUTO: 2.92 10*3/MM3 (ref 0.6–4.2)
LYMPHOCYTES NFR BLD AUTO: 41 % (ref 10–50)
MCH RBC QN AUTO: 27.7 PG (ref 26.5–34)
MCHC RBC AUTO-ENTMCNC: 34.4 G/DL (ref 31.5–36.3)
MCV RBC AUTO: 80.4 FL (ref 80–98)
MONOCYTES # BLD AUTO: 0.33 10*3/MM3 (ref 0–0.9)
MONOCYTES NFR BLD AUTO: 4.6 % (ref 0–12)
NEUTROPHILS # BLD AUTO: 3.81 10*3/MM3 (ref 2–8.6)
NEUTROPHILS NFR BLD AUTO: 53.5 % (ref 37–80)
NITRITE UR QL STRIP: NEGATIVE
NRBC BLD MANUAL-RTO: 0 /100 WBC (ref 0–0)
PH UR STRIP.AUTO: 6.5 [PH] (ref 5–9)
PLATELET # BLD AUTO: 245 10*3/MM3 (ref 150–450)
PMV BLD AUTO: 11.2 FL (ref 8–12)
POTASSIUM BLD-SCNC: 4.3 MMOL/L (ref 3.5–5.1)
PROT SERPL-MCNC: 7.7 G/DL (ref 6.3–8.6)
PROT UR QL STRIP: NEGATIVE
RBC # BLD AUTO: 5.31 10*6/MM3 (ref 4.37–5.74)
SODIUM BLD-SCNC: 140 MMOL/L (ref 137–145)
SP GR UR STRIP: 1.01 (ref 1–1.03)
UROBILINOGEN UR QL STRIP: NORMAL
WBC NRBC COR # BLD: 7.13 10*3/MM3 (ref 3.2–9.8)
WHOLE BLOOD HOLD SPECIMEN: NORMAL
WHOLE BLOOD HOLD SPECIMEN: NORMAL

## 2017-06-24 PROCEDURE — 85025 COMPLETE CBC W/AUTO DIFF WBC: CPT | Performed by: EMERGENCY MEDICINE

## 2017-06-24 PROCEDURE — 81003 URINALYSIS AUTO W/O SCOPE: CPT | Performed by: EMERGENCY MEDICINE

## 2017-06-24 PROCEDURE — 83690 ASSAY OF LIPASE: CPT | Performed by: EMERGENCY MEDICINE

## 2017-06-24 PROCEDURE — 99284 EMERGENCY DEPT VISIT MOD MDM: CPT

## 2017-06-24 PROCEDURE — 74176 CT ABD & PELVIS W/O CONTRAST: CPT

## 2017-06-24 PROCEDURE — 80053 COMPREHEN METABOLIC PANEL: CPT | Performed by: EMERGENCY MEDICINE

## 2017-06-24 RX ORDER — SODIUM CHLORIDE 0.9 % (FLUSH) 0.9 %
10 SYRINGE (ML) INJECTION AS NEEDED
Status: DISCONTINUED | OUTPATIENT
Start: 2017-06-24 | End: 2017-06-25 | Stop reason: HOSPADM

## 2017-06-24 RX ORDER — SODIUM CHLORIDE 9 MG/ML
125 INJECTION, SOLUTION INTRAVENOUS CONTINUOUS
Status: DISCONTINUED | OUTPATIENT
Start: 2017-06-24 | End: 2017-06-25 | Stop reason: HOSPADM

## 2017-06-24 RX ORDER — ONDANSETRON 2 MG/ML
4 INJECTION INTRAMUSCULAR; INTRAVENOUS ONCE
Status: DISCONTINUED | OUTPATIENT
Start: 2017-06-24 | End: 2017-06-25 | Stop reason: HOSPADM

## 2017-06-25 NOTE — ED PROVIDER NOTES
Subjective   HPI Comments: Patient came complaining of  right lower quadrant abdominal pain that radiated to his testicle.  He had umbilical hernia repair about 2 weeks ago by Dr. Herbert and all was fine but he think he may have pulled something.  No nausea vomiting diarrhea fever and there is a lot of discomfort for when he touches abdominal wall in this area or he walk.     Allergic to Colace    Medication: Albuterol aspirin Norco Flonase aspirin and Pravachol    Medical history: COPD GERD hyperlipidemia abdominal pain, gastroenteritis, conjunctivitis    Surgical history: Umbilical hernia    Former smoker    Former alcoholic    Former cocaine use      History provided by:  Patient      Review of Systems   Constitutional: Negative for activity change, appetite change, fatigue and fever.   HENT: Negative for congestion, facial swelling, mouth sores, nosebleeds, sore throat and trouble swallowing.    Eyes: Negative for discharge, redness and itching.   Respiratory: Negative for apnea, cough and wheezing.    Cardiovascular: Negative for chest pain and palpitations.   Gastrointestinal: Positive for abdominal pain. Negative for blood in stool and nausea.   Endocrine: Negative for cold intolerance, heat intolerance, polydipsia, polyphagia and polyuria.   Genitourinary: Negative for difficulty urinating, dysuria, flank pain, frequency and hematuria.   Musculoskeletal: Negative for gait problem, joint swelling and neck pain.   Skin: Negative.  Negative for color change, pallor and rash.   Allergic/Immunologic: Negative for environmental allergies.   Neurological: Negative for dizziness, seizures, syncope, speech difficulty, light-headedness, numbness and headaches.   Hematological: Negative for adenopathy.   Psychiatric/Behavioral: Negative for agitation, behavioral problems, confusion and sleep disturbance. The patient is not nervous/anxious.        Past Medical History:   Diagnosis Date   • Abdominal pain    • Acute  exacerbation of chronic obstructive airways disease    • Allergic rhinitis    • Chest pain     unspecified   • Chronic cough    • Conjunctivitis    • Dyslipidemia    • Dyspnea    • Gastroesophageal reflux disease    • Pain     Pain in left ankle and joints of left foot      • Pain     pain in left leg   • Tobacco dependence syndrome    • Umbilical hernia without obstruction or gangrene    • Viral gastroenteritis        Allergies   Allergen Reactions   • Colace [Docusate]        Past Surgical History:   Procedure Laterality Date   • INCISION AND DRAINAGE ABSCESS  06/09/2009    Irrigation and debridement of facial soft tissue injuries with closure of complex lacerations and repair of maxillary dental alveolar fracture   • UMBILICAL HERNIA REPAIR N/A 6/2/2017    Procedure: OPEN UMBILICAL AND EPIGASTRIC HERNIA REPAIR WITH MESH;  Surgeon: Sal Herbert MD;  Location: [facility] OR;  Service:        Family History   Problem Relation Age of Onset   • Coronary artery disease Neg Hx    • Hypertension Neg Hx        Social History     Social History   • Marital status:      Spouse name: N/A   • Number of children: N/A   • Years of education: N/A     Social History Main Topics   • Smoking status: Former Smoker     Years: 20.00     Types: Cigarettes     Quit date: 03/2017   • Smokeless tobacco: Former User   • Alcohol use No   • Drug use: Yes     Special: Cocaine      Comment: 3/2017 last time   • Sexual activity: Defer     Other Topics Concern   • None     Social History Narrative           Objective   Physical Exam   Constitutional: He is oriented to person, place, and time. He appears well-developed and well-nourished.   HENT:   Head: Normocephalic and atraumatic.   Nose: Nose normal.   Mouth/Throat: Oropharynx is clear and moist.   Eyes: Conjunctivae and EOM are normal. Pupils are equal, round, and reactive to light.   Neck: Normal range of motion. Neck supple.   Cardiovascular: Normal rate, regular rhythm,  normal heart sounds and intact distal pulses.    Pulmonary/Chest: Effort normal and breath sounds normal.   Abdominal: Soft. Bowel sounds are normal.   Musculoskeletal: Normal range of motion.   Neurological: He is alert and oriented to person, place, and time.   Skin: Skin is warm and dry.   Psychiatric: He has a normal mood and affect. His behavior is normal. Judgment and thought content normal.   Nursing note and vitals reviewed.      Procedures         ED Course  ED Course      Labs Reviewed   CBC WITH AUTO DIFFERENTIAL - Abnormal; Notable for the following:        Result Value    Immature Grans, Absolute 0.03 (*)     All other components within normal limits   COMPREHENSIVE METABOLIC PANEL - Normal   LIPASE - Normal   URINALYSIS W/ CULTURE IF INDICATED - Normal    Narrative:     Urine microscopic not indicated.   RAINBOW DRAW    Narrative:     The following orders were created for panel order [place] Draw.  Procedure                               Abnormality         Status                     ---------                               -----------         ------                     Light Blue Top[[id number]]                                   Final result               Green Top (Gel)[[id number]]                                  Final result               Lavender Top[[id number]]                                     Final result               Gold Top - SST[[id number]]                                   Final result                 Please view results for these tests on the individual orders.   CBC AND DIFFERENTIAL    Narrative:     The following orders were created for panel order CBC & Differential.  Procedure                               Abnormality         Status                     ---------                               -----------         ------                     Scan Slide[[id number]]                                                                  CBC Auto Differential[[id number]]        Abnormal             Final result                 Please view results for these tests on the individual orders.   LIGHT BLUE TOP   GREEN TOP   LAVENDER TOP   GOLD TOP - SST        CT Abdomen Pelvis Without Contrast   Final Result   Essentially unremarkable unenhanced exam.      Electronically signed by:  Asim Perez  6/24/2017 9:37 PM   CDT Workstation: [id number]            Test result discussed with patient and prepared for discharge        MDM    Final diagnoses:   Abdominal wall pain in right lower quadrant            Al Carnes MD  06/24/17 4341

## 2017-06-29 ENCOUNTER — TELEPHONE (OUTPATIENT)
Dept: FAMILY MEDICINE CLINIC | Facility: CLINIC | Age: 42
End: 2017-06-29

## 2017-06-29 NOTE — TELEPHONE ENCOUNTER
----- Message from Naye Mooreele Satish sent at 6/29/2017  9:48 AM CDT -----  Regarding: ER F/U-APPT DECLINED  Contact: [phone]  PT SAYS HE IS FINE NOW.  HE WAS JUST FRIGHTENED, THOUGHT HE HAD TORN SOMETHING INSIDE.  (HAD HERNIA SURGERY).  BUT THAT THE ER DR EXPLAINED EVERYTHING WELL TO HIM AND THAT IT WAS JUST BLOOD BACKED UP FROM HIS SURGERY.  NO APPT NEEDED.

## 2017-07-11 RX ORDER — PRAVASTATIN SODIUM 40 MG
TABLET ORAL
Qty: 30 TABLET | Refills: 3 | Status: SHIPPED | OUTPATIENT
Start: 2017-07-11 | End: 2017-11-06 | Stop reason: SDUPTHER

## 2017-07-13 ENCOUNTER — TELEPHONE (OUTPATIENT)
Dept: FAMILY MEDICINE CLINIC | Facility: CLINIC | Age: 42
End: 2017-07-13

## 2017-07-13 DIAGNOSIS — J44.9 CHRONIC OBSTRUCTIVE PULMONARY DISEASE, UNSPECIFIED COPD TYPE (HCC): ICD-10-CM

## 2017-07-13 NOTE — TELEPHONE ENCOUNTER
Flunisolide refill sent.          This document has been electronically signed by Dong Lozada MD on July 13, 2017 5:52 PM

## 2017-07-18 ENCOUNTER — TELEPHONE (OUTPATIENT)
Dept: FAMILY MEDICINE CLINIC | Facility: CLINIC | Age: 42
End: 2017-07-18

## 2017-07-18 DIAGNOSIS — J44.9 CHRONIC OBSTRUCTIVE PULMONARY DISEASE, UNSPECIFIED COPD TYPE (HCC): Primary | ICD-10-CM

## 2017-07-18 NOTE — TELEPHONE ENCOUNTER
PHARMACY CALLED AND THE AEROSPAN INHALER IS ON BACK ORDER THEY ARE WANTING TO KNOW IF THIS CAN BE CHANGED TO SOMETHING ELSE. [phone].

## 2017-07-19 RX ORDER — FLUTICASONE PROPIONATE 50 UG/1
SPRAY, METERED NASAL
COMMUNITY
Start: 2017-06-05 | End: 2019-02-11 | Stop reason: SDUPTHER

## 2017-08-07 ENCOUNTER — TELEPHONE (OUTPATIENT)
Dept: FAMILY MEDICINE CLINIC | Facility: CLINIC | Age: 42
End: 2017-08-07

## 2017-08-07 DIAGNOSIS — K21.9 GASTROESOPHAGEAL REFLUX DISEASE, ESOPHAGITIS PRESENCE NOT SPECIFIED: ICD-10-CM

## 2017-08-07 RX ORDER — PROPRANOLOL HYDROCHLORIDE 10 MG/1
TABLET ORAL
Qty: 42 CAPSULE | Refills: 3 | OUTPATIENT
Start: 2017-08-07

## 2017-08-07 NOTE — TELEPHONE ENCOUNTER
GABRIELLA PT///    PT is requesting a refill of: nexium    Sent [facility]    Please send this at your earliest convenience or let PT know if there is any issues filling this Prescription.    Thanks.

## 2017-08-08 RX ORDER — PROPRANOLOL HYDROCHLORIDE 10 MG/1
20 TABLET ORAL DAILY
Qty: 30 CAPSULE | Refills: 3 | Status: SHIPPED | OUTPATIENT
Start: 2017-08-08 | End: 2018-02-02 | Stop reason: SDUPTHER

## 2017-11-03 ENCOUNTER — OFFICE VISIT (OUTPATIENT)
Dept: FAMILY MEDICINE CLINIC | Facility: CLINIC | Age: 42
End: 2017-11-03

## 2017-11-03 ENCOUNTER — APPOINTMENT (OUTPATIENT)
Dept: LAB | Facility: HOSPITAL | Age: 42
End: 2017-11-03

## 2017-11-03 ENCOUNTER — TELEPHONE (OUTPATIENT)
Dept: FAMILY MEDICINE CLINIC | Facility: CLINIC | Age: 42
End: 2017-11-03

## 2017-11-03 VITALS
HEART RATE: 110 BPM | WEIGHT: 196 LBS | DIASTOLIC BLOOD PRESSURE: 84 MMHG | SYSTOLIC BLOOD PRESSURE: 142 MMHG | OXYGEN SATURATION: 97 % | BODY MASS INDEX: 29.7 KG/M2 | HEIGHT: 68 IN

## 2017-11-03 DIAGNOSIS — K42.9 UMBILICAL HERNIA WITHOUT OBSTRUCTION OR GANGRENE: ICD-10-CM

## 2017-11-03 DIAGNOSIS — R06.00 DYSPNEA, UNSPECIFIED TYPE: Primary | ICD-10-CM

## 2017-11-03 DIAGNOSIS — J44.9 CHRONIC OBSTRUCTIVE PULMONARY DISEASE, UNSPECIFIED COPD TYPE (HCC): ICD-10-CM

## 2017-11-03 DIAGNOSIS — R00.0 TACHYCARDIA: ICD-10-CM

## 2017-11-03 DIAGNOSIS — Z51.81 MEDICATION MONITORING ENCOUNTER: ICD-10-CM

## 2017-11-03 DIAGNOSIS — G89.29 OTHER CHRONIC PAIN: ICD-10-CM

## 2017-11-03 PROCEDURE — 99213 OFFICE O/P EST LOW 20 MIN: CPT | Performed by: FAMILY MEDICINE

## 2017-11-03 PROCEDURE — 80307 DRUG TEST PRSMV CHEM ANLYZR: CPT | Performed by: FAMILY MEDICINE

## 2017-11-03 PROCEDURE — G0481 DRUG TEST DEF 8-14 CLASSES: HCPCS | Performed by: FAMILY MEDICINE

## 2017-11-03 RX ORDER — TRAMADOL HYDROCHLORIDE 50 MG/1
50 TABLET ORAL EVERY 6 HOURS PRN
Qty: 120 TABLET | Refills: 2 | Status: SHIPPED | OUTPATIENT
Start: 2017-11-03 | End: 2018-07-01

## 2017-11-03 NOTE — TELEPHONE ENCOUNTER
I personally called Diana at [employer].  She informed me no pre-auth is required for Qvar or Pravachol for the amounts prescribed.  PA is required for Tramadol, which was approved via phone until 11/3/18.          This document has been electronically signed by Dong Lozada MD on November 3, 2017 2:08 PM    ---------------------    PATIENT CALLED ABOUT PRESCRIPTION FOR TRAMADOL 50 MG.  PER [facility] IT HAS TO HAVE A PA FOR THE QUANTITY.    THANK YOU

## 2017-11-06 ENCOUNTER — TELEPHONE (OUTPATIENT)
Dept: FAMILY MEDICINE CLINIC | Facility: CLINIC | Age: 42
End: 2017-11-06

## 2017-11-06 DIAGNOSIS — J44.9 CHRONIC OBSTRUCTIVE PULMONARY DISEASE, UNSPECIFIED COPD TYPE (HCC): Primary | ICD-10-CM

## 2017-11-06 DIAGNOSIS — E78.5 DYSLIPIDEMIA: ICD-10-CM

## 2017-11-06 RX ORDER — PRAVASTATIN SODIUM 40 MG
40 TABLET ORAL DAILY
Qty: 90 TABLET | Refills: 1 | Status: SHIPPED | OUTPATIENT
Start: 2017-11-06 | End: 2017-11-06 | Stop reason: SDUPTHER

## 2017-11-06 RX ORDER — PRAVASTATIN SODIUM 40 MG
40 TABLET ORAL DAILY
Qty: 30 TABLET | Refills: 3 | Status: SHIPPED | OUTPATIENT
Start: 2017-11-06 | End: 2017-11-07 | Stop reason: SDUPTHER

## 2017-11-06 NOTE — TELEPHONE ENCOUNTER
Medication sent to [facility] and [facility] as requested.          This document has been electronically signed by Dong Lozada MD on November 6, 2017 1:47 PM    -------------    PATIENT IS CALLING ABOUT THE QVAR INHALER, IS WANTING TO SPEAK TO YOU REGARDING SOME ISSUES WITH GETTING THE INHALER.

## 2017-11-07 ENCOUNTER — TELEPHONE (OUTPATIENT)
Dept: FAMILY MEDICINE CLINIC | Facility: CLINIC | Age: 42
End: 2017-11-07

## 2017-11-07 DIAGNOSIS — J44.9 CHRONIC OBSTRUCTIVE PULMONARY DISEASE, UNSPECIFIED COPD TYPE (HCC): ICD-10-CM

## 2017-11-07 DIAGNOSIS — E78.5 DYSLIPIDEMIA: ICD-10-CM

## 2017-11-07 RX ORDER — PRAVASTATIN SODIUM 40 MG
40 TABLET ORAL DAILY
Qty: 30 TABLET | Refills: 3 | Status: SHIPPED | OUTPATIENT
Start: 2017-11-07 | End: 2017-11-07 | Stop reason: SDUPTHER

## 2017-11-07 RX ORDER — PRAVASTATIN SODIUM 40 MG
40 TABLET ORAL DAILY
Qty: 90 TABLET | Refills: 1 | Status: SHIPPED | OUTPATIENT
Start: 2017-11-07 | End: 2018-02-02 | Stop reason: SDUPTHER

## 2017-11-07 NOTE — TELEPHONE ENCOUNTER
Prescriptions re-sent as per pt request          This document has been electronically signed by Dong Lozada MD on November 7, 2017 8:09 PM      ----------------    Pt called said that he was seen last week and that his Prevastatin was sent in for 90 days with 1 refills.   And on his Qvar it was done just as 1 with 11 refills.   Pt stated that this too has to be done as a 3 month supply,      He is wanting to know if this was supposed to be just a 6 month supply or a year supply.   Please call the pt as he is confused as how it was done      Can reach pt at [phone]

## 2017-11-08 NOTE — PROGRESS NOTES
"  Chief Complaint   Patient presents with   • COPD     f/u     Subjective:   Subjective   Aquiles Najera is a 42 y.o. male who presents for follow-up of dyspnea.    He is overall doing well.  He needs refills on to his medications, Qvar and Pravachol, and the specific that he wants [facility], than [facility] since he has recently switched insurance.    He is also requesting that the diagnosis COPD be added to his chart, so that he cannot limited exposure to smoke and dust.  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    Since I last saw him, he had the umbilical hernia repair by Dr. Herbert in June.  Postoperatively he has done well; he last saw Dr. Herbert on 6/9, was told he can be seen as needed.  The patient thinks that his mesh has been displaced superiorly.  He's having no pain as such, just is concerned about it.    He is also requesting another medication for arthritis, as the over-the-counter medications he is currently taking are ineffective.      The following portions of the patient's history were reviewed and updated as appropriate: allergies, current medications, past family history, past medical history, past social history, past surgical history and problem list.    Preventative:  Over the past 2 weeks, have you felt down, depressed, or hopeless?No   Over the past 2 weeks, have you felt little interest or pleasure in doing things?No  Clinical depression screening refused by patient.No     On osteoporosis therapy?No     Past Medical Hx:  Past Medical History:   Diagnosis Date   • Abdominal pain    • Acute exacerbation of chronic obstructive airways disease    • Allergic rhinitis    • Chest pain     unspecified   • Chronic cough    • Conjunctivitis    • Dyslipidemia    • Dyspnea    • Gastroesophageal reflux disease    • Pain     Pain in left " ankle and joints of left foot      • Pain     pain in left leg   • Tobacco dependence syndrome    • Umbilical hernia without obstruction or gangrene    • Viral gastroenteritis        Past Surgical Hx:  Past Surgical History:   Procedure Laterality Date   • INCISION AND DRAINAGE ABSCESS  06/09/2009    Irrigation and debridement of facial soft tissue injuries with closure of complex lacerations and repair of maxillary dental alveolar fracture   • UMBILICAL HERNIA REPAIR N/A 6/2/2017    Procedure: OPEN UMBILICAL AND EPIGASTRIC HERNIA REPAIR WITH MESH;  Surgeon: Sal Herbert MD;  Location: [facility];  Service:        Health Maintenance:  Health Maintenance   Topic Date Due   • INFLUENZA VACCINE  08/01/2017   • TDAP/TD VACCINES (2 - Td) 08/11/2025       Current Meds:    Current Outpatient Prescriptions:   •  albuterol (VENTOLIN HFA) 108 (90 BASE) MCG/ACT inhaler, Inhale 2 puffs every 4 (four) hours as needed for wheezing., Disp: 18 g, Rfl: 3  •  aspirin 81 MG chewable tablet, Chew 81 mg Daily., Disp: , Rfl:   •  budesonide (PULMICORT) 0.5 MG/2ML nebulizer solution, Take 2 mL by nebulization every night at bedtime. (Patient taking differently: Take 0.5 mg by nebulization At Night As Needed.), Disp: 80 mL, Rfl: 0  •  docusate sodium (COLACE) 250 MG capsule, Take 1 capsule by mouth 2 (Two) Times a Day As Needed for Constipation., Disp: 60 capsule, Rfl: 0  •  FLONASE ALLERGY RELIEF 50 MCG/ACT nasal spray, , Disp: , Rfl:   •  Fluticasone Furoate 100 MCG/ACT aerosol powder , Inhale 1 Act Daily., Disp: 30 each, Rfl: 1  •  HYDROcodone-acetaminophen (NORCO) 5-325 MG per tablet, Take 1-2 tablets by mouth Every 4 (Four) Hours As Needed (Pain)., Disp: 40 tablet, Rfl: 0  •  ipratropium-albuterol (DUONEB) 0.5-2.5 mg/mL nebulizer, Take 3 mL by nebulization Every 4 (Four) Hours As Needed for Wheezing., Disp: 360 mL, Rfl: 3  •  loratadine (CLARITIN) 10 MG tablet, Take 1 tablet by mouth daily., Disp: 30 tablet, Rfl: 11  •   NEXIUM 24HR 20 MG capsule, Take 1 capsule by mouth Daily., Disp: 30 capsule, Rfl: 3  •  nitroglycerin (NITROSTAT) 0.4 MG SL tablet, Place 1 tablet under the tongue Every 5 (Five) Minutes As Needed for chest pain. Take no more than 3 doses in 15 minutes., Disp: 30 tablet, Rfl: 12  •  beclomethasone (QVAR) 80 MCG/ACT inhaler, Inhale 1 puff 2 (Two) Times a Day., Disp: 8.7 g, Rfl: 3  •  pravastatin (PRAVACHOL) 40 MG tablet, Take 1 tablet by mouth Daily., Disp: 90 tablet, Rfl: 1  •  traMADol (ULTRAM) 50 MG tablet, Take 1 tablet by mouth Every 6 (Six) Hours As Needed for Moderate Pain  or Severe Pain ., Disp: 120 tablet, Rfl: 2    Allergies:  Colace [docusate]    Family Hx:  Family History   Problem Relation Age of Onset   • Coronary artery disease Neg Hx    • Hypertension Neg Hx         Social History:  Social History     Social History   • Marital status:      Spouse name: N/A   • Number of children: N/A   • Years of education: N/A     Occupational History   • Not on file.     Social History Main Topics   • Smoking status: Former Smoker     Years: 20.00     Types: Cigarettes     Quit date: 03/2017   • Smokeless tobacco: Former User   • Alcohol use No   • Drug use: Yes     Special: Cocaine      Comment: 3/2017 last time   • Sexual activity: Defer     Other Topics Concern   • Not on file     Social History Narrative       Review of Systems  Review of Systems   Constitutional: Negative for activity change and appetite change.   HENT: Negative for congestion and dental problem.    Eyes: Negative for discharge and itching.   Respiratory: Negative for apnea.    Cardiovascular: Negative for chest pain and leg swelling.   Gastrointestinal: Negative for abdominal distention.   Endocrine: Negative for cold intolerance.   Genitourinary: Negative for difficulty urinating and enuresis.   Musculoskeletal: Negative for arthralgias and back pain.   Allergic/Immunologic: Negative for environmental allergies and food allergies.  "  Neurological: Negative for dizziness and facial asymmetry.   Hematological: Negative for adenopathy.   Psychiatric/Behavioral: Negative for agitation and behavioral problems.     Objective:     /84  Pulse 110  Ht 68\" (172.7 cm)  Wt 196 lb (88.9 kg)  SpO2 97%  BMI 29.8 kg/m2    General Appearance:    Alert, appears stated age.       Head:    Normocephalic, without obvious abnormality, atraumatic   Throat:   Lips, mucosa, and tongue normal; Poor dentition   Neck:   Supple, symmetrical, trachea midline, no adenopathy;        thyroid:  No enlargement/tenderness/nodules; no carotid    bruit or JVD   Back:     Symmetric, no abnormal curvature, ROM normal, no CVA tenderness   Lungs:     Clear to auscultation bilaterally, respirations unlabored   Chest wall:    No tenderness or deformity   Heart:    Regular rate and rhythm, S1 and S2 normal, no murmur, rub    or gallop   Abdomen:     Soft, non-tender, bowel sounds active all four quadrants,     no masses, no organomegaly.  Stable post-op abdominal exam, no signs of peritonitis   Extremities:   Extremities normal, atraumatic, no cyanosis or edema   Pulses:   2+ and symmetric all extremities   Skin:   Skin color, texture, turgor normal, no rashes or lesions   Lymph nodes:   Cervical, supraclavicular, and axillary nodes normal   Musculoskeletal:  Neurologic:   No joint tenderness, crepitus, or effusion.    CNII-XII grossly intact.        Assessment:     Aquiles was seen today for copd.    Diagnoses and all orders for this visit:    Dyspnea, unspecified type  -     Full Pulmonary Function Test With Bronchodilator; Future    Medication monitoring encounter  -     ToxASSURE Select 13 (MW) - Urine, Clean Catch    Other chronic pain  - Will try tramadol.  However explained to patient he will need to sign drug contract, and submit Toxassure today.  Patient is agreeable.  [facility] request #[id number] obtained, consistent.  -     traMADol (ULTRAM) 50 MG tablet; Take 1 tablet " by mouth Every 6 (Six) Hours As Needed for Moderate Pain  or Severe Pain .    Chronic obstructive pulmonary disease, unspecified COPD type  -     Full Pulmonary Function Test With Bronchodilator; Future    Umbilical hernia without obstruction or gangrene         - Appears to be stable abdominal exam.  However if he is still concerned about the mesh, he can follow up with Dr. Herbert    Tachycardia         - Patient denies chest pain or palpitations.  May be secondary to anxiety, chart review shows he is typically in the 60s to 70s.  Will monitor for now.    Preventative:  Up to date, former smoker.  Had flu shot at work November 1    Return in about 3 months (around 2/3/2018) for Recheck HLD/COPD.    RISK SCORE: 4           This document has been electronically signed by Dong Lozada MD on November 7, 2017 8:33 PM

## 2017-11-10 LAB — CONV REPORT SUMMARY: NORMAL

## 2017-11-15 ENCOUNTER — OFFICE VISIT (OUTPATIENT)
Dept: PULMONOLOGY | Facility: CLINIC | Age: 42
End: 2017-11-15

## 2017-11-15 DIAGNOSIS — R06.00 DYSPNEA, UNSPECIFIED TYPE: ICD-10-CM

## 2017-11-15 DIAGNOSIS — J44.9 CHRONIC OBSTRUCTIVE PULMONARY DISEASE, UNSPECIFIED COPD TYPE (HCC): ICD-10-CM

## 2017-11-15 PROCEDURE — 94060 EVALUATION OF WHEEZING: CPT | Performed by: INTERNAL MEDICINE

## 2017-11-15 PROCEDURE — 94727 GAS DIL/WSHOT DETER LNG VOL: CPT | Performed by: INTERNAL MEDICINE

## 2017-11-15 PROCEDURE — 94729 DIFFUSING CAPACITY: CPT | Performed by: INTERNAL MEDICINE

## 2017-11-17 ENCOUNTER — TELEPHONE (OUTPATIENT)
Dept: FAMILY MEDICINE CLINIC | Facility: CLINIC | Age: 42
End: 2017-11-17

## 2017-11-17 NOTE — TELEPHONE ENCOUNTER
Patient's only lab was the [facility].  I suspect he was calling about his PFT results.  Called pt back, no answer, voicemail has not been setup.          This document has been electronically signed by Dong Quiroz MD on November 17, 2017 2:03 PM      ---------------      DR QUIROZ     PLEASE CALL PATIENT WITH THE RESULTS OF HIS LABS.      [phone]    THANK YOU

## 2017-11-20 ENCOUNTER — TELEPHONE (OUTPATIENT)
Dept: FAMILY MEDICINE CLINIC | Facility: CLINIC | Age: 42
End: 2017-11-20

## 2017-11-20 NOTE — TELEPHONE ENCOUNTER
Discussed 11/15 pulmonary function test results with patient.  He was found to have moderate restrictive lung disease, with reversibility with administration of bronchodilator, which I explained to have suggests an asthma component.  Continue with current medications.  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  I will provide letter for patient as requested.          This document has been electronically signed by Dong Lozada MD on November 20, 2017 2:43 PM    -----------------    Patient called regarding some results he is wanting. Would like a call back at [phone].

## 2017-12-06 ENCOUNTER — TELEPHONE (OUTPATIENT)
Dept: FAMILY MEDICINE CLINIC | Facility: CLINIC | Age: 42
End: 2017-12-06

## 2017-12-06 NOTE — TELEPHONE ENCOUNTER
DR QUIROZ    PATIENT CALLED ASKING FOR A LETTER STATING HE HAS NO WORK RESTRICTIONS.  [employer]IS [employer] OF EM[employer]OY[employer] IS DEMANDING IT AS SOON AS POSSIBLE OR HE LOSES HIS JOB IMMEDIATELY.

## 2017-12-07 NOTE — TELEPHONE ENCOUNTER
PT CALLED AGAIN THIS MORNING, AND SAYS HIS JOB IS PUTTING PRESSURE ON HIM THAT HE GET A LETTER STATING HE HAS NO RESTRICTIONS, OR HE WILL LOSE HIS JOB.     SAYS HE CANT AFFORD TO LOSE IT.  THEY GAVE HIM TILL TOMORROW MORNING TO LET THEM KNOW.

## 2017-12-08 NOTE — TELEPHONE ENCOUNTER
Spoke to Dr Lozada, as per the Dr MCMANUS explained to the pt that the doctor could not write a new letter stating no restrictions, as in November a letter was written stating that pt had restrictive lung disease and would need to limit exposure to chemicals at his job.   Pt was upset and said that he does not have restrictions. Pt asked to switch to a new provider.

## 2018-01-09 ENCOUNTER — OFFICE VISIT (OUTPATIENT)
Dept: FAMILY MEDICINE CLINIC | Facility: CLINIC | Age: 43
End: 2018-01-09

## 2018-01-09 VITALS
HEIGHT: 69 IN | WEIGHT: 194 LBS | HEART RATE: 95 BPM | OXYGEN SATURATION: 96 % | BODY MASS INDEX: 28.73 KG/M2 | DIASTOLIC BLOOD PRESSURE: 78 MMHG | SYSTOLIC BLOOD PRESSURE: 138 MMHG

## 2018-01-09 DIAGNOSIS — J06.9 ACUTE URI: Primary | ICD-10-CM

## 2018-01-09 LAB
EXPIRATION DATE: NORMAL
FLUAV AG NPH QL: NORMAL
FLUBV AG NPH QL: NORMAL
INTERNAL CONTROL: NORMAL
Lab: NORMAL

## 2018-01-09 PROCEDURE — 99213 OFFICE O/P EST LOW 20 MIN: CPT | Performed by: FAMILY MEDICINE

## 2018-01-09 PROCEDURE — 87804 INFLUENZA ASSAY W/OPTIC: CPT | Performed by: FAMILY MEDICINE

## 2018-01-09 NOTE — PROGRESS NOTES
Subjective   Aquiles Najera is a 42 y.o. male.     HPI Comments: Patient reports sudden onset last night of feeling ill, lightheaded and dizzy.  Patient reported sudden onset of diaphoresis, nausea, with episode of emesis, and sudden weakness in his legs.  He reports that he went home with one episode of any cysts where he felt somewhat better, but did have a headache.  Reports decreased appetite.  Says this morning he did feel a little improved, was able to eat but still feels nauseated.  Patient reports subjective fevers feeling very hot and flushed.  Says that he has had increased thirst reports drinking 2-3 ounces of water a day and more yesterday with onset of illness.  Patient reports that he quit smoking last Easter 2017.  Patient was amenable to influenza swab but refused any other blood tests today.      Headache    Associated symptoms include nausea, rhinorrhea, vomiting and weakness. Pertinent negatives include no abdominal pain, dizziness, ear pain, eye pain, fever or sore throat.   Vomiting    Associated symptoms include chills and headaches. Pertinent negatives include no abdominal pain, arthralgias, chest pain, diarrhea, dizziness or fever.   Fatigue   Associated symptoms include chills, congestion, fatigue, headaches, nausea, vomiting and weakness. Pertinent negatives include no abdominal pain, arthralgias, chest pain, fever, rash or sore throat.        The following portions of the patient's history were reviewed and updated as appropriate: allergies, current medications, past family history, past medical history, past social history, past surgical history and problem list.    Social History     Social History   • Marital status:      Spouse name: N/A   • Number of children: N/A   • Years of education: N/A     Occupational History   • Not on file.     Social History Main Topics   • Smoking status: Former Smoker     Years: 20.00     Types: Cigarettes     Quit date: 03/2017   •  Smokeless tobacco: Former User   • Alcohol use No   • Drug use: No      Comment: 3/2017 last time   • Sexual activity: Defer     Other Topics Concern   • Not on file     Social History Narrative       Immunization History   Administered Date(s) Administered   • Influenza, Quadrivalent 11/01/2017   • Pneumococcal Polysaccharide 08/11/2015   • Tetanus 08/11/2015        Current Outpatient Prescriptions on File Prior to Visit   Medication Sig Dispense Refill   • albuterol (VENTOLIN HFA) 108 (90 BASE) MCG/ACT inhaler Inhale 2 puffs every 4 (four) hours as needed for wheezing. 18 g 3   • aspirin 81 MG chewable tablet Chew 81 mg Daily.     • beclomethasone (QVAR) 80 MCG/ACT inhaler Inhale 1 puff 2 (Two) Times a Day. 8.7 g 3   • budesonide (PULMICORT) 0.5 MG/2ML nebulizer solution Take 2 mL by nebulization every night at bedtime. (Patient taking differently: Take 0.5 mg by nebulization At Night As Needed.) 80 mL 0   • FLONASE ALLERGY RELIEF 50 MCG/ACT nasal spray      • Fluticasone Furoate 100 MCG/ACT aerosol powder  Inhale 1 Act Daily. 30 each 1   • ipratropium-albuterol (DUONEB) 0.5-2.5 mg/mL nebulizer Take 3 mL by nebulization Every 4 (Four) Hours As Needed for Wheezing. 360 mL 3   • loratadine (CLARITIN) 10 MG tablet Take 1 tablet by mouth daily. 30 tablet 11   • NEXIUM 24HR 20 MG capsule Take 1 capsule by mouth Daily. 30 capsule 3   • nitroglycerin (NITROSTAT) 0.4 MG SL tablet Place 1 tablet under the tongue Every 5 (Five) Minutes As Needed for chest pain. Take no more than 3 doses in 15 minutes. 30 tablet 12   • pravastatin (PRAVACHOL) 40 MG tablet Take 1 tablet by mouth Daily. 90 tablet 1   • traMADol (ULTRAM) 50 MG tablet Take 1 tablet by mouth Every 6 (Six) Hours As Needed for Moderate Pain  or Severe Pain . 120 tablet 2   • [DISCONTINUED] azithromycin (ZITHROMAX) 250 MG tablet Take 2 tablets the first day, then 1 tablet daily for 4 days. 6 tablet 0   • [DISCONTINUED] docusate sodium (COLACE) 250 MG capsule Take 1  capsule by mouth 2 (Two) Times a Day As Needed for Constipation. 60 capsule 0   • [DISCONTINUED] HYDROcodone-acetaminophen (NORCO) 5-325 MG per tablet Take 1-2 tablets by mouth Every 4 (Four) Hours As Needed (Pain). 40 tablet 0   • [DISCONTINUED] promethazine-dextromethorphan (PROMETHAZINE-DM) 6.25-15 MG/5ML syrup Take 5 mL by mouth 4 (Four) Times a Day As Needed for Cough. 118 mL 0     No current facility-administered medications on file prior to visit.        Review of Systems   Constitutional: Positive for appetite change, chills and fatigue. Negative for activity change and fever.   HENT: Positive for congestion, postnasal drip and rhinorrhea. Negative for ear pain and sore throat.    Eyes: Negative for pain and visual disturbance.   Respiratory: Positive for chest tightness and wheezing. Negative for shortness of breath.    Cardiovascular: Negative for chest pain and palpitations.   Gastrointestinal: Positive for nausea and vomiting. Negative for abdominal pain, blood in stool, constipation and diarrhea.   Endocrine: Positive for polydipsia. Negative for cold intolerance and heat intolerance.   Genitourinary: Negative for difficulty urinating and dysuria.   Musculoskeletal: Negative for arthralgias and gait problem.   Skin: Negative for color change and rash.   Neurological: Positive for weakness and headaches. Negative for dizziness.   Hematological: Negative for adenopathy. Does not bruise/bleed easily.   Psychiatric/Behavioral: Negative for agitation, confusion and sleep disturbance.       Objective   Physical Exam   Constitutional: He is oriented to person, place, and time. He appears well-developed and well-nourished.   HENT:   Head: Normocephalic and atraumatic.   Nose: Right sinus exhibits maxillary sinus tenderness and frontal sinus tenderness. Left sinus exhibits maxillary sinus tenderness and frontal sinus tenderness.   Mouth/Throat: Oropharynx is clear and moist.   Adentulous.  Patient expressed  significant tenderness to touch of the external ear canal, the maxillary and erythemoid sinuses he reports is chronic.   Eyes: Conjunctivae, EOM and lids are normal. Pupils are equal, round, and reactive to light.   Neck: Normal range of motion. Neck supple.   Cardiovascular: Normal rate, regular rhythm and normal heart sounds.  Exam reveals no gallop and no friction rub.    No murmur heard.  Pulmonary/Chest: Effort normal and breath sounds normal.   Abdominal: Soft. Normal appearance and bowel sounds are normal. There is no tenderness.   Musculoskeletal: Normal range of motion.   Neurological: He is alert and oriented to person, place, and time.   Skin: Skin is warm, dry and intact.   Psychiatric: He has a normal mood and affect. His speech is normal and behavior is normal. Judgment and thought content normal. Cognition and memory are normal.       Lab Results (most recent)     None          Vitals:    01/09/18 1445   BP: 138/78   Pulse: 95   SpO2: 96%       Assessment/Plan   Aquiles was seen today for headache, vomiting and fatigue.    Diagnoses and all orders for this visit:    Acute URI  -     POCT Influenza A/B    Discussed negative results of influenza swab, over the counter medications as best treatment for viral infection, and likely duration of symptoms.   Patients reported water intake is somewhat concerning for polydypsia, but patient was uninterested in any lab work for investigation of his symptoms if not acute infection related, refused further work up.           GOALS:  Pain control  BARRIERS TO GOALS:  Patient reported right shoulder pain with upcoming visit with surgeon, requesting change from Ultram for pain management.     Preventative:  Male Preventative: Exercises regularly  up to date and documented   Recommended:none  Former smoker  does not drink  eat more fruits and vegetables, decrease soda or juice intake, increase physical activity, reduce screen time and reduce portion size    RISK  SCORE: 4        This document has been electronically signed by Margareth Romo MD on January 9, 2018 2:52 PM

## 2018-02-01 ENCOUNTER — TELEPHONE (OUTPATIENT)
Dept: FAMILY MEDICINE CLINIC | Facility: CLINIC | Age: 43
End: 2018-02-01

## 2018-02-01 NOTE — TELEPHONE ENCOUNTER
I  Approved pt to switch residents. He saw Dr. Romo before and would like to switch to her. Pt made an appt with Nga to follow-up with Dr. Romo.

## 2018-02-02 ENCOUNTER — OFFICE VISIT (OUTPATIENT)
Dept: FAMILY MEDICINE CLINIC | Facility: CLINIC | Age: 43
End: 2018-02-02

## 2018-02-02 VITALS
DIASTOLIC BLOOD PRESSURE: 81 MMHG | OXYGEN SATURATION: 98 % | BODY MASS INDEX: 29.28 KG/M2 | WEIGHT: 193.2 LBS | HEART RATE: 89 BPM | HEIGHT: 68 IN | SYSTOLIC BLOOD PRESSURE: 143 MMHG

## 2018-02-02 DIAGNOSIS — J44.9 CHRONIC OBSTRUCTIVE PULMONARY DISEASE, UNSPECIFIED COPD TYPE (HCC): ICD-10-CM

## 2018-02-02 DIAGNOSIS — E78.5 DYSLIPIDEMIA: ICD-10-CM

## 2018-02-02 DIAGNOSIS — J30.9 ALLERGIC RHINITIS, UNSPECIFIED CHRONICITY, UNSPECIFIED SEASONALITY, UNSPECIFIED TRIGGER: ICD-10-CM

## 2018-02-02 DIAGNOSIS — K21.9 GASTROESOPHAGEAL REFLUX DISEASE, ESOPHAGITIS PRESENCE NOT SPECIFIED: Primary | ICD-10-CM

## 2018-02-02 PROCEDURE — 99213 OFFICE O/P EST LOW 20 MIN: CPT | Performed by: FAMILY MEDICINE

## 2018-02-02 RX ORDER — PROPRANOLOL HYDROCHLORIDE 10 MG/1
20 TABLET ORAL DAILY
Qty: 30 CAPSULE | Refills: 6 | Status: SHIPPED | OUTPATIENT
Start: 2018-02-02 | End: 2018-02-05

## 2018-02-02 RX ORDER — LORATADINE 10 MG/1
10 TABLET ORAL DAILY
Qty: 30 TABLET | Refills: 11 | Status: SHIPPED | OUTPATIENT
Start: 2018-02-02 | End: 2019-01-10 | Stop reason: SDUPTHER

## 2018-02-02 RX ORDER — ASPIRIN 81 MG/1
81 TABLET, CHEWABLE ORAL DAILY
Qty: 90 TABLET | Refills: 6 | Status: SHIPPED | OUTPATIENT
Start: 2018-02-02 | End: 2022-01-05 | Stop reason: SDUPTHER

## 2018-02-02 RX ORDER — PRAVASTATIN SODIUM 40 MG
40 TABLET ORAL DAILY
Qty: 90 TABLET | Refills: 6 | Status: SHIPPED | OUTPATIENT
Start: 2018-02-02 | End: 2018-10-30 | Stop reason: SDUPTHER

## 2018-02-02 NOTE — PROGRESS NOTES
Subjective   Aquiles Najera is a 42 y.o. male.     HPI Comments: Patient has GERD on Nexium for two years. Reports that no diet modification or smoking cessation has helped decrease his symptoms. The nexium does work but if he misses two days in a row of doses he gets severe symptom return.       The following portions of the patient's history were reviewed and updated as appropriate: allergies, current medications, past family history, past medical history, past social history, past surgical history and problem list.    Social History     Social History   • Marital status:      Spouse name: N/A   • Number of children: N/A   • Years of education: N/A     Occupational History   • Not on file.     Social History Main Topics   • Smoking status: Former Smoker     Years: 20.00     Types: Cigarettes     Quit date: 03/2017   • Smokeless tobacco: Former User   • Alcohol use No   • Drug use: No      Comment: 3/2017 last time   • Sexual activity: Defer     Other Topics Concern   • Not on file     Social History Narrative       Immunization History   Administered Date(s) Administered   • Influenza, Quadrivalent 11/01/2017   • Pneumococcal Polysaccharide 08/11/2015   • Tetanus 08/11/2015        Current Outpatient Prescriptions on File Prior to Visit   Medication Sig Dispense Refill   • albuterol (VENTOLIN HFA) 108 (90 BASE) MCG/ACT inhaler Inhale 2 puffs every 4 (four) hours as needed for wheezing. 18 g 3   • beclomethasone (QVAR) 80 MCG/ACT inhaler Inhale 1 puff 2 (Two) Times a Day. 8.7 g 3   • budesonide (PULMICORT) 0.5 MG/2ML nebulizer solution Take 2 mL by nebulization every night at bedtime. (Patient taking differently: Take 0.5 mg by nebulization At Night As Needed.) 80 mL 0   • ipratropium-albuterol (DUONEB) 0.5-2.5 mg/mL nebulizer Take 3 mL by nebulization Every 4 (Four) Hours As Needed for Wheezing. 360 mL 3   • nitroglycerin (NITROSTAT) 0.4 MG SL tablet Place 1 tablet under the tongue Every 5 (Five)  Minutes As Needed for chest pain. Take no more than 3 doses in 15 minutes. 30 tablet 12   • traMADol (ULTRAM) 50 MG tablet Take 1 tablet by mouth Every 6 (Six) Hours As Needed for Moderate Pain  or Severe Pain . 120 tablet 2   • [DISCONTINUED] aspirin 81 MG chewable tablet Chew 81 mg Daily.     • [DISCONTINUED] pravastatin (PRAVACHOL) 40 MG tablet Take 1 tablet by mouth Daily. 90 tablet 1   • FLONASE ALLERGY RELIEF 50 MCG/ACT nasal spray      • [DISCONTINUED] Fluticasone Furoate 100 MCG/ACT aerosol powder  Inhale 1 Act Daily. 30 each 1   • [DISCONTINUED] loratadine (CLARITIN) 10 MG tablet Take 1 tablet by mouth daily. 30 tablet 11   • [DISCONTINUED] NEXIUM 24HR 20 MG capsule Take 1 capsule by mouth Daily. 30 capsule 3     No current facility-administered medications on file prior to visit.        Review of Systems   Constitutional: Negative for activity change, appetite change, fatigue and fever.   HENT: Negative for ear pain and sore throat.    Eyes: Negative for pain and visual disturbance.   Respiratory: Negative for cough and shortness of breath.    Cardiovascular: Negative for chest pain and palpitations.   Gastrointestinal: Negative for abdominal pain and nausea.   Endocrine: Negative for cold intolerance and heat intolerance.   Genitourinary: Negative for difficulty urinating and dysuria.   Musculoskeletal: Negative for arthralgias and gait problem.   Skin: Negative for color change and rash.   Neurological: Positive for headaches. Negative for dizziness and weakness.   Hematological: Negative for adenopathy. Does not bruise/bleed easily.   Psychiatric/Behavioral: Negative for agitation, confusion and sleep disturbance.       Objective   Physical Exam   Constitutional: He is oriented to person, place, and time. He appears well-developed and well-nourished.   HENT:   Head: Normocephalic and atraumatic.   Right Ear: Tympanic membrane normal.   Left Ear: Tympanic membrane normal.   Adentulous   Eyes:  Conjunctivae, EOM and lids are normal. Pupils are equal, round, and reactive to light.   Neck: Normal range of motion. Neck supple.   Cardiovascular: Normal rate, regular rhythm and normal heart sounds.  Exam reveals no gallop and no friction rub.    No murmur heard.  Pulmonary/Chest: Effort normal and breath sounds normal.   Abdominal: Soft. Normal appearance and bowel sounds are normal. There is no tenderness.   Musculoskeletal: Normal range of motion.   Neurological: He is alert and oriented to person, place, and time.   Skin: Skin is warm, dry and intact.   Psychiatric: He has a normal mood and affect. His speech is normal and behavior is normal. Judgment and thought content normal. Cognition and memory are normal.       Lab Results (most recent)     None          Vitals:    02/02/18 1432   BP: 143/81   Pulse: 89   SpO2: 98%       Assessment/Plan   Aquiles was seen today for establish care.    Diagnoses and all orders for this visit:    Gastroesophageal reflux disease, esophagitis presence not specified  -     NEXIUM 24HR 20 MG capsule; Take 1 capsule by mouth Daily.    Chronic obstructive pulmonary disease, unspecified COPD type  -     Fluticasone Furoate 100 MCG/ACT aerosol powder ; Inhale 1 Act Daily.    Dyslipidemia  -     pravastatin (PRAVACHOL) 40 MG tablet; Take 1 tablet by mouth Daily.  -     aspirin 81 MG chewable tablet; Chew 1 tablet Daily.    Allergic rhinitis, unspecified chronicity, unspecified seasonality, unspecified trigger  -     loratadine (CLARITIN) 10 MG tablet; Take 1 tablet by mouth Daily.                   GOALS:  Goals     • Reduce portion size          BARRIERS TO GOALS:  Discussed with patient various methods to decrease his symptoms of GERD, including smaller and more frequent meals. Patient expressed concern that he would then be hungry.    Preventative:  Male Preventative: Exercises regularly  up to date and documented   Recommended:none  Former smoker  does not drink  eat more  fruits and vegetables, decrease soda or juice intake and increase water intake    RISK SCORE: 3        This document has been electronically signed by Margareth Romo MD on February 2, 2018 2:53 PM

## 2018-02-02 NOTE — PATIENT INSTRUCTIONS
[place] Diet  Introduction  A bland diet consists of foods that do not have a lot of fat or fiber. Foods without fat or fiber are easier for the body to digest. They are also less likely to irritate your mouth, throat, stomach, and other parts of your gastrointestinal tract. A bland diet is sometimes called a BRAT diet.  What is my plan?  Your health care provider or dietitian may recommend specific changes to your diet to prevent and treat your symptoms, such as:  · Eating small meals often.  · Cooking food until it is soft enough to chew easily.  · Chewing your food well.  · Drinking fluids slowly.  · Not eating foods that are very spicy, sour, or fatty.  · Not eating citrus fruits, such as oranges and grapefruit.  What do I need to know about this diet?  · Eat a variety of foods from the bland diet food list.  · Do not follow a bland diet longer than you have to.  · Ask your health care provider whether you should take vitamins.  What foods can I eat?  Grains   Hot cereals, such as cream of wheat. Bread, crackers, or tortillas made from refined white flour. Rice.  Vegetables   Canned or cooked vegetables. Mashed or boiled potatoes.  Fruits   Bananas. Applesauce. Other types of cooked or canned fruit with the skin and seeds removed, such as canned peaches or pears.  Meats and Other Protein Sources   Scrambled eggs. Creamy peanut butter or other nut butters. Lean, well-cooked meats, such as chicken or fish. Tofu. Soups or broths.  Dairy   Low-fat dairy products, such as milk, cottage cheese, or yogurt.  Beverages   Water. Herbal tea. Apple juice.  Sweets and Desserts   Pudding. Custard. Fruit gelatin. Ice cream.  Fats and Oils   Mild salad dressings. Canola or olive oil.  The items listed above may not be a complete list of allowed foods or beverages. Contact your dietitian for more options.   What foods are not recommended?  Foods and ingredients that are often not recommended include:  · Spicy foods, such as hot  sauce or salsa.  · Fried foods.  · Sour foods, such as pickled or fermented foods.  · Raw vegetables or fruits, especially citrus or berries.  · Caffeinated drinks.  · Alcohol.  · Strongly flavored seasonings or condiments.  The items listed above may not be a complete list of foods and beverages that are not allowed. Contact your dietitian for more information.   This information is not intended to replace advice given to you by your health care provider. Make sure you discuss any questions you have with your health care provider.  Document Released: 04/10/2017 Document Revised: 05/25/2017 Document Reviewed: 12/30/2015 © 2017 [employer]    Food Choices for Gastroesophageal Reflux Disease, Adult  When you have gastroesophageal reflux disease (GERD), the foods you eat and your eating habits are very important. Choosing the right foods can help ease your discomfort.  What guidelines do I need to follow?  · Choose fruits, vegetables, whole grains, and low-fat dairy products.  · Choose low-fat meat, fish, and poultry.  · Limit fats such as oils, salad dressings, butter, nuts, and avocado.  · Keep a food diary. This helps you identify foods that cause symptoms.  · Avoid foods that cause symptoms. These may be different for everyone.  · Eat small meals often instead of 3 large meals a day.  · Eat your meals slowly, in a place where you are relaxed.  · Limit fried foods.  · Cook foods using methods other than frying.  · Avoid drinking alcohol.  · Avoid drinking large amounts of liquids with your meals.  · Avoid bending over or lying down until 2-3 hours after eating.  What foods are not recommended?  These are some foods and drinks that may make your symptoms worse:  Vegetables   Tomatoes. Tomato juice. Tomato and spaghetti sauce. Chili peppers. Onion and garlic. Horseradish.  Fruits   Oranges, grapefruit, and lemon (fruit and juice).  Meats   High-fat meats, fish, and poultry. This includes hot dogs, ribs, ham, sausage,  salami, and mata.  Dairy   Whole milk and chocolate milk. Sour cream. Cream. Butter. Ice cream. Cream cheese.  Drinks   Coffee and tea. Bubbly (carbonated) drinks or energy drinks.  Condiments   Hot sauce. Barbecue sauce.  Sweets/Desserts   Chocolate and cocoa. Donuts. Peppermint and spearmint.  Fats and Oils   High-fat foods. This includes French fries and potato chips.  Other   Vinegar. Strong spices. This includes black pepper, white pepper, red pepper, cayenne, rachel powder, cloves, ginger, and chili powder.  The items listed above may not be a complete list of foods and drinks to avoid. Contact your dietitian for more information.   This information is not intended to replace advice given to you by your health care provider. Make sure you discuss any questions you have with your health care provider.  Document Released: 06/18/2013 Document Revised: 05/25/2017 Document Reviewed: 10/22/2014  [employer] Interactive Patient Education © 2017 [employer]    Heartburn  Heartburn is a type of pain or discomfort that can happen in the throat or chest. It is often described as a burning pain. It may also cause a bad taste in the mouth. Heartburn may feel worse when you lie down or bend over, and it is often worse at night. Heartburn may be caused by stomach contents that move back up into the esophagus (reflux).  Follow these instructions at home:  Take these actions to decrease your discomfort and to help avoid complications.  Diet  · Follow a diet as recommended by your health care provider. This may involve avoiding foods and drinks such as:  ¨ Coffee and tea (with or without caffeine).  ¨ Drinks that contain alcohol.  ¨ Energy drinks and sports drinks.  ¨ Carbonated drinks or sodas.  ¨ Chocolate and cocoa.  ¨ Peppermint and mint flavorings.  ¨ Garlic and onions.  ¨ Horseradish.  ¨ Spicy and acidic foods, including peppers, chili powder, rachel powder, vinegar, hot sauces, and barbecue sauce.  ¨ Citrus fruit juices  and citrus fruits, such as oranges, pio, and limes.  ¨ Tomato-based foods, such as red sauce, chili, salsa, and pizza with red sauce.  ¨ Fried and fatty foods, such as donuts, french fries, potato chips, and high-fat dressings.  ¨ High-fat meats, such as hot dogs and fatty cuts of red and white meats, such as rib eye steak, sausage, ham, and mata.  ¨ High-fat dairy items, such as whole milk, butter, and cream cheese.  · Eat small, frequent meals instead of large meals.  · Avoid drinking large amounts of liquid with your meals.  · Avoid eating meals during the 2-3 hours before bedtime.  · Avoid lying down right after you eat.  · Do not exercise right after you eat.  General instructions  · Pay attention to any changes in your symptoms.  · Take over-the-counter and prescription medicines only as told by your health care provider. Do not take aspirin, ibuprofen, or other NSAIDs unless your health care provider told you to do so.  · Do not use any tobacco products, including cigarettes, chewing tobacco, and e-cigarettes. If you need help quitting, ask your health care provider.  · Wear loose-fitting clothing. Do not wear anything tight around your waist that causes pressure on your abdomen.  · Raise (elevate) the head of your bed about 6 inches (15 cm).  · Try to reduce your stress, such as with yoga or meditation. If you need help reducing stress, ask your health care provider.  · If you are overweight, reduce your weight to an amount that is healthy for you. Ask your health care provider for guidance about a safe weight loss goal.  · Keep all follow-up visits as told by your health care provider. This is important.  Contact a health care provider if:  · You have new symptoms.  · You have unexplained weight loss.  · You have difficulty swallowing, or it hurts to swallow.  · You have wheezing or a persistent cough.  · Your symptoms do not improve with treatment.  · You have frequent heartburn for more than two  weeks.  Get help right away if:  · You have pain in your arms, neck, jaw, teeth, or back.  · You feel sweaty, dizzy, or light-headed.  · You have chest pain or shortness of breath.  · You vomit and your vomit looks like blood or coffee grounds.  · Your stool is bloody or black.  This information is not intended to replace advice given to you by your health care provider. Make sure you discuss any questions you have with your health care provider.  Document Released: 05/06/2010 Document Revised: 05/25/2017 Document Reviewed: 04/13/2016  [employer] Interactive Patient Education © 2017 [employer]

## 2018-02-05 ENCOUNTER — TELEPHONE (OUTPATIENT)
Dept: FAMILY MEDICINE CLINIC | Facility: CLINIC | Age: 43
End: 2018-02-05

## 2018-02-05 RX ORDER — ESOMEPRAZOLE MAGNESIUM 40 MG/1
40 CAPSULE, DELAYED RELEASE ORAL
Qty: 30 CAPSULE | Refills: 6 | Status: SHIPPED | OUTPATIENT
Start: 2018-02-05 | End: 2018-07-09

## 2018-02-05 NOTE — TELEPHONE ENCOUNTER
PT SAYS THEIR INSURANCE WONT COVER THE NEXIUM, HE NEEDS GENERIC.        HE ALSO IS ASKING IF YOU WANT HIM TO COME EVERY 3 OR 4 OR 6 MONTHS.

## 2018-02-05 NOTE — TELEPHONE ENCOUNTER
Please let patient know that I have changed the script to a generic brand and I can see him every 6 months to 1 year unless he is having any issues.

## 2018-02-06 NOTE — TELEPHONE ENCOUNTER
TALKED TO PT AND GAVE HIM YOUR MESSAGE, HE IS SAYING THAT THE PHARMACY DIDN'T RECEIVE THE SCRIPT FOR THE FLONASE.  ASKING IF YOU CAN RESEND IT.

## 2018-02-08 ENCOUNTER — TRANSCRIBE ORDERS (OUTPATIENT)
Dept: PHYSICAL THERAPY | Facility: HOSPITAL | Age: 43
End: 2018-02-08

## 2018-02-08 DIAGNOSIS — M25.511 CHRONIC RIGHT SHOULDER PAIN: Primary | ICD-10-CM

## 2018-02-08 DIAGNOSIS — G89.29 CHRONIC RIGHT SHOULDER PAIN: Primary | ICD-10-CM

## 2018-02-20 ENCOUNTER — HOSPITAL ENCOUNTER (OUTPATIENT)
Dept: PHYSICAL THERAPY | Facility: HOSPITAL | Age: 43
Setting detail: THERAPIES SERIES
Discharge: HOME | End: 2018-02-20

## 2018-02-20 DIAGNOSIS — M25.511 CHRONIC RIGHT SHOULDER PAIN: ICD-10-CM

## 2018-02-20 DIAGNOSIS — G89.29 CHRONIC RIGHT SHOULDER PAIN: ICD-10-CM

## 2018-02-20 PROCEDURE — 97161 PT EVAL LOW COMPLEX 20 MIN: CPT

## 2018-02-20 PROCEDURE — 97110 THERAPEUTIC EXERCISES: CPT

## 2018-02-20 PROCEDURE — 97530 THERAPEUTIC ACTIVITIES: CPT

## 2018-02-20 NOTE — THERAPY EVALUATION
Outpatient Physical Therapy Ortho Initial Evaluation  [facility]     Patient Name: Aquiles Najera  : 1975  MRN: [id number]  Today's Date: 2018      Visit Date: 2018     Insurance [place] medicaid   Visit #  ()   Next MD Visit unsure   Recert Date 3/13/18             Patient Active Problem List   Diagnosis   • Umbilical hernia without obstruction or gangrene   • Tobacco dependence syndrome   • Gastroesophageal reflux disease   • Dyslipidemia   • Chest pain   • Allergic rhinitis   • COPD (chronic obstructive pulmonary disease) - presumed   • Constipation   • Cocaine abuse in remission   • Pre-syncope        Past Medical History:   Diagnosis Date   • Abdominal pain    • Acute exacerbation of chronic obstructive airways disease    • Allergic rhinitis    • Chest pain     unspecified   • Chronic cough    • Conjunctivitis    • Dyslipidemia    • Dyspnea    • Gastroesophageal reflux disease    • Pain     Pain in left ankle and joints of left foot      • Pain     pain in left leg   • Tobacco dependence syndrome    • Umbilical hernia without obstruction or gangrene    • Viral gastroenteritis         Past Surgical History:   Procedure Laterality Date   • INCISION AND DRAINAGE ABSCESS  2009    Irrigation and debridement of facial soft tissue injuries with closure of complex lacerations and repair of maxillary dental alveolar fracture   • UMBILICAL HERNIA REPAIR N/A 2017    Procedure: OPEN UMBILICAL AND EPIGASTRIC HERNIA REPAIR WITH MESH;  Surgeon: Sal Herbert MD;  Location: [facility];  Service:        Visit Dx:     ICD-10-CM ICD-9-CM   1. Chronic right shoulder pain M25.511 719.41    G89.29 338.29             Patient History       18 0900          History    Chief Complaint Pain  -WC      Type of Pain Shoulder pain   Right  -WC      Date Current Problem(s) Began 18  -      Brief Description of Current Complaint Patient s/p R shoulder arthroscopy to  remove a bone spur on 2/16/18. Patient reports prior to surgery he was a  about a month prior to surgery, but had to quit due to his limitations with his R shoulder. He states he wants to get back to working. He reports no restrictions/limitations from MD. Patient reports R shoulder pain off and on for as long as he can remember prior to surgery  -WC      Previous treatment for THIS PROBLEM --   none  -WC      Onset Date- PT 2/20/18  -WC      Patient/Caregiver Goals Return to prior level of function;Relieve pain;Return to work  -WC      Current Tobacco Use no  -WC      Smoking Status quit, about a year  -WC      Patient's Rating of General Health Very good  -WC      Occupation/sports/leisure activities , working with scrap metal  -WC      Pain     Pain Location Shoulder   Right  -WC      Pain at Present 5  -WC      Pain at Best 5  -WC      Pain at Worst 10  -WC      Pain Frequency Constant/continuous  -WC      Pain Description Aching  -WC      What Performance Factors Make the Current Problem(s) WORSE? movement  -WC      What Performance Factors Make the Current Problem(s) BETTER? pain medication  -WC      Is your sleep disturbed? Yes  -      Daily Activities    Pt Participated in POC and Goals Yes  -        User Key  (r) = Recorded By, (t) = Taken By, (c) = Cosigned By    Initials Name Provider Type    YANELI Bah PT Physical Therapist                PT Ortho       02/20/18 0900    Subjective Comments    Subjective Comments see hx  -WC    Precautions and Contraindications    Precautions/Limitations no known precautions/limitations  -WC    Precautions lifting precautions - 10#, R shoulder sling prn per patient report  -WC    Subjective Pain    Able to rate subjective pain? yes  -WC    Pre-Treatment Pain Level 5  -WC    Post-Treatment Pain Level 5  -WC    Posture/Observations    Posture/Observations Comments Patient presented to clinic wearing R shoulder sling, 3 incision points R  shoulder healing well  -WC    Quarter Clearing    Quarter Clearing Upper Quarter Clearing  -WC    Sensory Screen for Light Touch- Upper Quarter Clearing    C5 (lateral upper arm) Intact  -WC    C6 (tip of thumb) Intact  -WC    C7 (tip of 3rd finger) Intact  -WC    C8 (tip of 5th finger) Intact  -WC    T1 (medial lower arm) Intact  -WC    Cervical/Shoulder ROM Screen    Cervical flexion Normal  -WC    Cervical extension Normal  -WC    Cervical lateral flexion Normal  -WC    Cervical rotation Normal  -WC    Shoulder elevation  Normal  -WC    Shoulder Girdle Palpation    Subscapularis Right:;Tender  -WC    Infraspinatus Right:;Tender  -WC    Upper Trap Right:;Tender  -WC    Levator Scapula Right:;Tender  -WC    Shoulder Girdle Palpation? Yes  -    ROM (Range of Motion)    General ROM Detail L shoulder AROM: flex 169 deg,  deg, IR 72 deg, ER 82 deg; R shoulder AROM: flex 148 deg (pain 3/10),  deg (pain 5/10), IR 74 deg, ER 77 deg  -    MMT (Manual Muscle Testing)    General MMT Assessment Detail L shoulder MMT WFL; R shoulder MMT not assessed due to acuity of surgery  -      User Key  (r) = Recorded By, (t) = Taken By, (c) = Cosigned By    Initials Name Provider Type    YANELI Bah, PT Physical Therapist                      Therapy Education  Education Details: Patient educated on PT POC and goals. Instructed patient on HEP and not lifting more than 10# with R arm at this time, handout given  Given: HEP, Symptoms/condition management, Pain management  Program: New  How Provided: Written, Verbal  Provided to: Patient  Level of Understanding: Verbalized, Demonstrated           PT OP Goals       02/20/18 1200       PT Short Term Goals    STG Date to Achieve 03/13/18  -     STG 1 Patient will improve R shoulder active ABD from 111 deg to 135 deg with pain </= 2/10  -     STG 2 Patient will improve R shoulder MMT grades to 4-/5 globally  -     STG 3 Patient be able to wean from R shoulder sling  with pain </= 2/10  -     Long Term Goals    LTG Date to Achieve 04/03/18  -     LTG 1 Patient will have R shoulder AROM WFL and no pain  -     LTG 2 Patient will be independent with HEP  -     LTG 3 Patient will improve R shoulder MMT to 4+/5 globally  -     LTG 4 Patient will report a 90% subjective improvement  -     LTG 5 Patient will improve quickDASH score from 86.36% impairment to < 10% impairment  -     Time Calculation    PT Goal Re-Cert Due Date 03/13/18  -       User Key  (r) = Recorded By, (t) = Taken By, (c) = Cosigned By    Initials Name Provider Type    YANELI Bah PT Physical Therapist                PT Assessment/Plan       02/20/18 1000       PT Assessment    Functional Limitations Limitation in home management;Limitations in community activities;Limitations in functional capacity and performance;Performance in self-care ADL;Performance in work activities  -     Impairments Muscle strength;Pain;Poor body mechanics;Posture;Range of motion  -     Assessment Comments Patient is a 42 year old s/p R shoulder arthroscopy for a bone spur on 2/16/18. Patient presented to PT today in R shoulder sling and demonstrated decreased R shoulder ROM and strength as well as pain. Patient would benefit from skilled PT services in order to return to PLOF and work.  -     Please refer to paper survey for additional self-reported information Yes  -     Rehab Potential Good  -     Patient/caregiver participated in establishment of treatment plan and goals Yes  -     Patient would benefit from skilled therapy intervention Yes  -     PT Plan    PT Frequency 2x/week  -     Predicted Duration of Therapy Intervention (days/wks) 4-8 weeks  -     Planned CPT's? PT EVAL LOW COMPLEXITY: 48901;PT RE-EVAL: 77268;PT THER PROC EA 15 MIN: 87168;PT THER ACT EA 15 MIN: 64868;PT MANUAL THERAPY EA 15 MIN: 29946;PT NEUROMUSC RE-EDUCATION EA 15 MIN: 22333;PT SELF CARE/HOME MGMT/TRAIN EA 15: 99266;PT  ELECTRICAL STIM UNATTEND: ;PT ULTRASOUND EA 15 MIN: 30249;PT THER SUPP EA 15 MIN  -WC     PT Plan Comments PT POC: ROM (progression PROM - AAROM - AROM) as tolerated, gentle scap/shoulder strengthening and progress as tolerated, R shoulder capsule and upper trap/levator stretching modalities for pain (estim and ice) prn; patient as gym equipment at home and wants to get where he can perform therapy at home on own as quickly as possible  -       User Key  (r) = Recorded By, (t) = Taken By, (c) = Cosigned By    Initials Name Provider Type    YANELI Bah PT Physical Therapist                  Exercises       02/20/18 0900          Subjective Comments    Subjective Comments see hx  -WC      Subjective Pain    Able to rate subjective pain? yes  -WC      Pre-Treatment Pain Level 5  -WC      Post-Treatment Pain Level 5  -WC      Exercise 1    Exercise Name 1 Pendulums  -WC      Sets 1 2  -WC      Reps 1 20  -WC      Exercise 2    Exercise Name 2 scap squeezes  -WC      Sets 2 2  -WC      Reps 2 15  -WC      Exercise 3    Exercise Name 3 mid rows  -WC      Sets 3 2  -WC      Reps 3 10  -WC      Additional Comments red tband  -WC      Exercise 4    Exercise Name 4 PROM flex/ ABD  -WC      Time (Minutes) 4 5 min  -        User Key  (r) = Recorded By, (t) = Taken By, (c) = Cosigned By    Initials Name Provider Type    YANELI Bah PT Physical Therapist                        Outcome Measure Options: Quick DASH  Quick DASH  Open a tight or new jar.: Unable  Do heavy household chores (e.g., wash walls, wash floors): Unable  Carry a shopping bag or briefcase: Unable  Wash your back: Unable  Use a knife to cut food: Unable  Recreational activities in which you take some force or impact through your arm, should or hand (e.g. golf, hammering, tennis, etc.): Unable  During the past week, to what extent has your arm, shoulder, or hand problem interfered with your normal social activites with family, friends,  neighbors or groups?: Extremely  During the past week, were you limited in your work or other regular daily activities as a result of your arm, shoulder or hand problem?: Unable  Arm, Shoulder, or hand pain: Severe  Tingling (pins and needles) in your arm, shoulder, or hand: None  During the past week, how much difficulty have you had sleeping because of the pain in your arm, shoulder or hand?: Severe Difficulty  Number of Questions Answered: 11  Quick DASH Score: 86.36         Time Calculation:   Start Time: 0929  Stop Time: 1010  Time Calculation (min): 41 min  Total Timed Code Minutes- PT: 30 minute(s)     Therapy Charges for Today     Code Description Service Date Service Provider Modifiers Qty    55194661180 HC PT EVAL LOW COMPLEXITY 1 2/20/2018 Dejon Bah, PT GP 1    31225610568 HC PT THERAPEUTIC ACT EA 15 MIN 2/20/2018 Dejon Bah, PT GP 1    79508650919 HC PT THER PROC EA 15 MIN 2/20/2018 Dejon Bah, PT GP 1                   Dejon Bah PT  2/20/2018

## 2018-02-22 ENCOUNTER — HOSPITAL ENCOUNTER (OUTPATIENT)
Dept: PHYSICAL THERAPY | Facility: HOSPITAL | Age: 43
Setting detail: THERAPIES SERIES
Discharge: HOME | End: 2018-02-22

## 2018-02-22 DIAGNOSIS — M25.511 CHRONIC RIGHT SHOULDER PAIN: Primary | ICD-10-CM

## 2018-02-22 DIAGNOSIS — G89.29 CHRONIC RIGHT SHOULDER PAIN: Primary | ICD-10-CM

## 2018-02-22 PROCEDURE — 97110 THERAPEUTIC EXERCISES: CPT

## 2018-02-22 PROCEDURE — G0283 ELEC STIM OTHER THAN WOUND: HCPCS

## 2018-02-22 NOTE — THERAPY TREATMENT NOTE
Outpatient Physical Therapy Ortho Treatment Note  [facility]     Patient Name: Aquiles Najera  : 1975  MRN: [id number]  Today's Date: 2018      Visit Date: 2018    Insurance [place] medicaid   Visit # 2/2 (20/yr)   Next MD Visit 18   Recert Date 3/13/18     S/p R shoulder arthroscopy on 18      Visit Dx:    ICD-10-CM ICD-9-CM   1. Chronic right shoulder pain M25.511 719.41    G89.29 338.29       Patient Active Problem List   Diagnosis   • Umbilical hernia without obstruction or gangrene   • Tobacco dependence syndrome   • Gastroesophageal reflux disease   • Dyslipidemia   • Chest pain   • Allergic rhinitis   • COPD (chronic obstructive pulmonary disease) - presumed   • Constipation   • Cocaine abuse in remission   • Pre-syncope        Past Medical History:   Diagnosis Date   • Abdominal pain    • Acute exacerbation of chronic obstructive airways disease    • Allergic rhinitis    • Chest pain     unspecified   • Chronic cough    • Conjunctivitis    • Dyslipidemia    • Dyspnea    • Gastroesophageal reflux disease    • Pain     Pain in left ankle and joints of left foot      • Pain     pain in left leg   • Tobacco dependence syndrome    • Umbilical hernia without obstruction or gangrene    • Viral gastroenteritis         Past Surgical History:   Procedure Laterality Date   • INCISION AND DRAINAGE ABSCESS  2009    Irrigation and debridement of facial soft tissue injuries with closure of complex lacerations and repair of maxillary dental alveolar fracture   • UMBILICAL HERNIA REPAIR N/A 2017    Procedure: OPEN UMBILICAL AND EPIGASTRIC HERNIA REPAIR WITH MESH;  Surgeon: Sal Herbert MD;  Location: [facility];  Service:              PT Ortho       18 0800    Subjective Comments    Subjective Comments Patient reports his shoulder is pretty sore today, he tries not to use it but it is hard. He states that he unconsciously picks things up with his R hand  every now and then  -    Precautions and Contraindications    Precautions/Limitations no known precautions/limitations  -    Precautions R shoulder sling PRN  -    Subjective Pain    Able to rate subjective pain? yes  -    Pre-Treatment Pain Level 7  -WC    Post-Treatment Pain Level 3  -WC      02/20/18 0900    Subjective Comments    Subjective Comments see hx  -WC    Precautions and Contraindications    Precautions/Limitations no known precautions/limitations  -    Precautions lifting precautions - 10#, R shoulder sling prn per patient report  -WC    Subjective Pain    Able to rate subjective pain? yes  -    Pre-Treatment Pain Level 5  -WC    Post-Treatment Pain Level 5  -    Posture/Observations    Posture/Observations Comments Patient presented to clinic wearing R shoulder sling, 3 incision points R shoulder healing well  -WC    Quarter Clearing    Quarter Clearing Upper Quarter Clearing  -    Sensory Screen for Light Touch- Upper Quarter Clearing    C5 (lateral upper arm) Intact  -WC    C6 (tip of thumb) Intact  -WC    C7 (tip of 3rd finger) Intact  -WC    C8 (tip of 5th finger) Intact  -WC    T1 (medial lower arm) Intact  -WC    Cervical/Shoulder ROM Screen    Cervical flexion Normal  -WC    Cervical extension Normal  -WC    Cervical lateral flexion Normal  -WC    Cervical rotation Normal  -WC    Shoulder elevation  Normal  -WC    Shoulder Girdle Palpation    Subscapularis Right:;Tender  -WC    Infraspinatus Right:;Tender  -WC    Upper Trap Right:;Tender  -WC    Levator Scapula Right:;Tender  -WC    Shoulder Girdle Palpation? Yes  -    ROM (Range of Motion)    General ROM Detail L shoulder AROM: flex 169 deg,  deg, IR 72 deg, ER 82 deg; R shoulder AROM: flex 148 deg (pain 3/10),  deg (pain 5/10), IR 74 deg, ER 77 deg  -    MMT (Manual Muscle Testing)    General MMT Assessment Detail L shoulder MMT WFL; R shoulder MMT not assessed due to acuity of surgery  -      User Key  (r)  = Recorded By, (t) = Taken By, (c) = Cosigned By    Initials Name Provider Type     Dejon Bah PT Physical Therapist                            PT Assessment/Plan       02/22/18 0900       PT Assessment    Assessment Comments Patient tolerated AAROM and gentle strengthening execises well today as evidenced by decreased pain at end of session  -WC     PT Plan    PT Frequency 2x/week  -     Predicted Duration of Therapy Intervention (days/wks) 4-8 weeks  -WC     PT Plan Comments Continue with PT POC: ROM activities as tolerated, gentle strengthening with progression as tolerated, modalities for pain management PRN  -       User Key  (r) = Recorded By, (t) = Taken By, (c) = Cosigned By    Initials Name Provider Type     Dejon Bah PT Physical Therapist                Modalities       02/22/18 0800          Ice    Ice Applied Yes  -WC      Location R shoulder concurrent with IFC  -      Rx Minutes 15 mins  -WC      Ice S/P Rx Yes  -WC      ELECTRICAL STIMULATION    Attended/Unattended Unattended  -      Stimulation Type IFC  -      Max mAmp 8  -WC      Rx Minutes 15 mins  -WC        User Key  (r) = Recorded By, (t) = Taken By, (c) = Cosigned By    Initials Name Provider Type     Dejon Bah PT Physical Therapist                Exercises       02/22/18 1000 02/22/18 0800       Subjective Comments    Subjective Comments (P)  Pt intially scheduled on CLAUDIO Manning ATC. When patient was asked how he was feeling today he stated to read the chart and help up shoulder in sling. When asked how his pain was today he said 1000. Pt reported he only wanted to see Dejon Bah PT, his primary therapist.   -SOFIA Patient reports his shoulder is pretty sore today, he tries not to use it but it is hard. He states that he unconsciously picks things up with his R hand every now and then  -     Subjective Pain    Able to rate subjective pain?  yes  -WC     Pre-Treatment Pain Level  7  -WC     Post-Treatment Pain Level  3   -WC     Exercise 1    Exercise Name 1  PROM  -WC     Time (Minutes) 1  8 min  -WC     Exercise 2    Exercise Name 2  Cane AAROM flex  -WC     Reps 2  10  -WC     Exercise 3    Exercise Name 3  Pulleys flex/ABD  -WC     Time (Minutes) 3  5  -WC     Exercise 4    Exercise Name 4  shoulder extension  -WC     Sets 4  2  -WC     Reps 4  10  -WC     Additional Comments  red tband  -WC     Exercise 5    Exercise Name 5  no monies  -WC     Sets 5  2  -WC     Reps 5  10  -WC     Additional Comments  red tband  -WC       User Key  (r) = Recorded By, (t) = Taken By, (c) = Cosigned By    Initials Name Provider Type    SOFIA Rodrick Manning, ATC     YANELI Bah, PT Physical Therapist                               PT OP Goals       02/22/18 0900 02/22/18 0800    PT Short Term Goals    STG Date to Achieve 03/13/18  -WC (P)  03/13/18  -SOFIA    STG 1 Patient will improve R shoulder active ABD from 111 deg to 135 deg with pain </= 2/10  -WC (P)  Patient will improve R shoulder active ABD from 111 deg to 135 deg with pain </= 2/10  -SOFIA    STG 1 Progress Progressing  -WC     STG 2 Patient will improve R shoulder MMT grades to 4-/5 globally  -WC (P)  Patient will improve R shoulder MMT grades to 4-/5 globally  -SOFIA    STG 2 Progress Progressing  -WC     STG 3 Patient be able to wean from R shoulder sling with pain </= 2/10  -WC (P)  Patient be able to wean from R shoulder sling with pain </= 2/10  -SOFIA    STG 3 Progress Progressing  -WC     Long Term Goals    LTG Date to Achieve 04/03/18  -WC (P)  04/03/18  -SOFIA    LTG 1 Patient will have R shoulder AROM WFL and no pain  -WC (P)  Patient will have R shoulder AROM WFL and no pain  -SOFIA    LTG 2 Patient will be independent with HEP  -WC (P)  Patient will be independent with HEP  -SOFIA    LTG 3 Patient will improve R shoulder MMT to 4+/5 globally  -WC (P)  Patient will improve R shoulder MMT to 4+/5 globally  -SOFIA    LTG 4 Patient will report a 90% subjective improvement  -WC (P)   Patient will report a 90% subjective improvement  -SOFIA    LTG 5 Patient will improve quickDASH score from 86.36% impairment to < 10% impairment  -WC (P)  Patient will improve quickDASH score from 86.36% impairment to < 10% impairment  -SOFIA      User Key  (r) = Recorded By, (t) = Taken By, (c) = Cosigned By    Initials Name Provider Type    SOFIA Manning, ATC     YANELI Bah, PT Physical Therapist          Therapy Education  Education Details: Added no monies and shoulder extensions  Given: HEP  Program: Progressed  How Provided: Verbal, Written  Provided to: Patient  Level of Understanding: Verbalized, Demonstrated              Time Calculation:   Start Time: 0855  Stop Time: 0940  Time Calculation (min): 45 min  Total Timed Code Minutes- PT: 30 minute(s)    Therapy Charges for Today     Code Description Service Date Service Provider Modifiers Qty    61693551226  PT THER PROC EA 15 MIN 2/22/2018 Dejon Bah, PT GP 2    [id number]  PT ELECTRICAL STIM UNATTENDED 2/22/2018 Dejon Bah PT  1    [id number]  PT THER SUPP EA 15 MIN 2/22/2018 Dejon Bah PT GP 1                    Dejon Bah PT  2/22/2018

## 2018-02-26 ENCOUNTER — HOSPITAL ENCOUNTER (OUTPATIENT)
Dept: PHYSICAL THERAPY | Facility: HOSPITAL | Age: 43
Setting detail: THERAPIES SERIES
Discharge: HOME | End: 2018-02-26

## 2018-02-26 DIAGNOSIS — M25.511 CHRONIC RIGHT SHOULDER PAIN: Primary | ICD-10-CM

## 2018-02-26 DIAGNOSIS — G89.29 CHRONIC RIGHT SHOULDER PAIN: Primary | ICD-10-CM

## 2018-02-26 PROCEDURE — G0283 ELEC STIM OTHER THAN WOUND: HCPCS

## 2018-02-26 PROCEDURE — 97110 THERAPEUTIC EXERCISES: CPT

## 2018-02-26 NOTE — THERAPY TREATMENT NOTE
Outpatient Physical Therapy Ortho Treatment Note  [facility]     Patient Name: Aquiles Najera  : 1975  MRN: [id number]  Today's Date: 2018      Visit Date: 2018    Insurance [place] medicaid   Visit # 3/3 (20/yr)   Next MD Visit 18   Recert Date 3/13/18           Visit Dx:    ICD-10-CM ICD-9-CM   1. Chronic right shoulder pain M25.511 719.41    G89.29 338.29       Patient Active Problem List   Diagnosis   • Umbilical hernia without obstruction or gangrene   • Tobacco dependence syndrome   • Gastroesophageal reflux disease   • Dyslipidemia   • Chest pain   • Allergic rhinitis   • COPD (chronic obstructive pulmonary disease) - presumed   • Constipation   • Cocaine abuse in remission   • Pre-syncope        Past Medical History:   Diagnosis Date   • Abdominal pain    • Acute exacerbation of chronic obstructive airways disease    • Allergic rhinitis    • Chest pain     unspecified   • Chronic cough    • Conjunctivitis    • Dyslipidemia    • Dyspnea    • Gastroesophageal reflux disease    • Pain     Pain in left ankle and joints of left foot      • Pain     pain in left leg   • Tobacco dependence syndrome    • Umbilical hernia without obstruction or gangrene    • Viral gastroenteritis         Past Surgical History:   Procedure Laterality Date   • INCISION AND DRAINAGE ABSCESS  2009    Irrigation and debridement of facial soft tissue injuries with closure of complex lacerations and repair of maxillary dental alveolar fracture   • UMBILICAL HERNIA REPAIR N/A 2017    Procedure: OPEN UMBILICAL AND EPIGASTRIC HERNIA REPAIR WITH MESH;  Surgeon: Sal Herbert MD;  Location: [facility];  Service:              PT Ortho       18 1100    Subjective Comments    Subjective Comments Patient reports he has been weaning from his sling and his pain medication. He reports his shoulder has been feeling better, and states no increase in pain without sling. States he hasn't been  wearing R shoulder sling for the past 2 days  -WC    Precautions and Contraindications    Precautions/Limitations no known precautions/limitations  -WC    Precautions R shoulder sling prn  -WC    Subjective Pain    Able to rate subjective pain? yes  -WC    Pre-Treatment Pain Level 4  -WC    Post-Treatment Pain Level (P)  2  -WC    Posture/Observations    Posture/Observations Comments Patient presents to clinic with no shoulder sling  -WC      User Key  (r) = Recorded By, (t) = Taken By, (c) = Cosigned By    Initials Name Provider Type    YANELI Bah PT Physical Therapist                            PT Assessment/Plan       02/26/18 1100       PT Assessment    Assessment Comments Patient tolerated increased intensity with strengthening execises well today. Patient only c/o slight anterior shoulder pain during strengthening exercises.   -WC     PT Plan    PT Frequency 2x/week  -WC     Predicted Duration of Therapy Intervention (days/wks) 4-8 weeks  -WC     PT Plan Comments Continue with current PT POC - progress ROM and strengthening as tolerated  -WC       User Key  (r) = Recorded By, (t) = Taken By, (c) = Cosigned By    Initials Name Provider Type    YANELI Bah PT Physical Therapist                Modalities       02/26/18 1100          Ice    Ice Applied Yes  -WC      Location R shoulder concurrent with IFC  -      Rx Minutes 15 mins  -      Ice S/P Rx Yes  -      ELECTRICAL STIMULATION    Attended/Unattended Unattended  -      Stimulation Type IFC  -      Max mAmp 11  -WC      Rx Minutes 15 mins  -WC        User Key  (r) = Recorded By, (t) = Taken By, (c) = Cosigned By    Initials Name Provider Type    YANELI Bah PT Physical Therapist                Exercises       02/26/18 1100          Subjective Comments    Subjective Comments Patient reports he has been weaning from his sling and his pain medication. He reports his shoulder has been feeling better, and states no increase in pain  without sling. States he hasn't been wearing R shoulder sling for the past 2 days  -WC      Subjective Pain    Able to rate subjective pain? yes  -WC      Pre-Treatment Pain Level 4  -WC      Post-Treatment Pain Level (P)  2  -WC      Exercise 1    Exercise Name 1 Pro II UE only  -WC      Time (Minutes) 1 10 min  -WC      Additional Comments L1  -WC      Exercise 2    Exercise Name 2 PROM  -WC      Time (Minutes) 2 5  -WC      Exercise 3    Exercise Name 3 Cane AAROM  -WC      Reps 3 15  -WC      Additional Comments 1#  -WC      Exercise 4    Exercise Name 4 Supine AROM flex  -WC      Reps 4 10  -WC      Exercise 5    Exercise Name 5 sidelying ER  -WC      Sets 5 2  -WC      Reps 5 10  -WC      Additional Comments 1#  -WC      Exercise 6    Exercise Name 6 prone shoulder horizontal ABD  -WC      Sets 6 2  -WC      Reps 6 10  -WC      Exercise 7    Exercise Name 7 prone shoulder extension  -WC      Sets 7 2  -WC      Reps 7 10  -WC      Exercise 8    Exercise Name 8 prone rows  -WC      Sets 8 2  -WC      Reps 8 10  -WC      Exercise 9    Exercise Name 9 Biodex mid row  -WC      Sets 9 2  -WC      Reps 9 10  -WC      Additional Comments 3 plates  -WC      Exercise 10    Exercise Name 10 standing scaption  -WC      Sets 10 2  -WC      Reps 10 10  -WC      Exercise 11    Exercise Name 11 Ball wall shoulder stabalization rolls  -WC      Sets 11 2  -WC      Reps 11 15  -WC      Additional Comments clockwise/counterclockwise  -WC        User Key  (r) = Recorded By, (t) = Taken By, (c) = Cosigned By    Initials Name Provider Type    YANELI Bah, PT Physical Therapist                               PT OP Goals       02/26/18 1100       PT Short Term Goals    STG Date to Achieve 03/13/18  -WC     STG 1 Patient will improve R shoulder active ABD from 111 deg to 135 deg with pain </= 2/10  -WC     STG 1 Progress Progressing  -WC     STG 2 Patient will improve R shoulder MMT grades to 4-/5 globally  -WC     STG 2 Progress  Progressing  -     STG 3 Patient be able to wean from R shoulder sling with pain </= 2/10  -     STG 3 Progress Progressing  -     Long Term Goals    LTG Date to Achieve 04/03/18  -     LTG 1 Patient will have R shoulder AROM WFL and no pain  -     LTG 2 Patient will be independent with HEP  -     LTG 3 Patient will improve R shoulder MMT to 4+/5 globally  -     LTG 4 Patient will report a 90% subjective improvement  -     LTG 5 Patient will improve quickDASH score from 86.36% impairment to < 10% impairment  -       User Key  (r) = Recorded By, (t) = Taken By, (c) = Cosigned By    Initials Name Provider Type    WC Dejon Bah PT Physical Therapist          Therapy Education  Education Details: Progressed HEP to using green tband  Given: HEP  Program: Progressed  How Provided: Verbal  Provided to: Patient  Level of Understanding: Verbalized              Time Calculation:   Start Time: 1058  Stop Time: 1155  Time Calculation (min): 57 min  Total Timed Code Minutes- PT: 42 minute(s)    Therapy Charges for Today     Code Description Service Date Service Provider Modifiers Qty    52397122037 HC PT THER PROC EA 15 MIN 2/26/2018 Dejon Bah PT GP 3    00518914561 HC PT THER SUPP EA 15 MIN 2/26/2018 Dejon Bah, PT GP 1    [id number]  PT ELECTRICAL STIM UNATTENDED 2/26/2018 Dejon Bah PT  1                    Dejon Bah PT  2/26/2018

## 2018-02-28 ENCOUNTER — HOSPITAL ENCOUNTER (OUTPATIENT)
Dept: PHYSICAL THERAPY | Facility: HOSPITAL | Age: 43
Setting detail: THERAPIES SERIES
Discharge: HOME | End: 2018-02-28

## 2018-02-28 DIAGNOSIS — G89.29 CHRONIC RIGHT SHOULDER PAIN: Primary | ICD-10-CM

## 2018-02-28 DIAGNOSIS — M25.511 CHRONIC RIGHT SHOULDER PAIN: Primary | ICD-10-CM

## 2018-02-28 PROCEDURE — 97110 THERAPEUTIC EXERCISES: CPT

## 2018-02-28 PROCEDURE — G0283 ELEC STIM OTHER THAN WOUND: HCPCS

## 2018-03-05 ENCOUNTER — HOSPITAL ENCOUNTER (OUTPATIENT)
Dept: PHYSICAL THERAPY | Facility: HOSPITAL | Age: 43
Setting detail: THERAPIES SERIES
Discharge: HOME | End: 2018-03-05

## 2018-03-05 DIAGNOSIS — G89.29 CHRONIC RIGHT SHOULDER PAIN: Primary | ICD-10-CM

## 2018-03-05 DIAGNOSIS — M25.511 CHRONIC RIGHT SHOULDER PAIN: Primary | ICD-10-CM

## 2018-03-05 PROCEDURE — G0283 ELEC STIM OTHER THAN WOUND: HCPCS

## 2018-03-05 PROCEDURE — 97110 THERAPEUTIC EXERCISES: CPT

## 2018-03-05 NOTE — THERAPY TREATMENT NOTE
Outpatient Physical Therapy Ortho Treatment Note  [facility]     Patient Name: Aquiles Najera  : 1975  MRN: [id number]  Today's Date: 3/5/2018      Visit Date: 2018     Insurance [place] medicaid   Visit # 5/   Next MD Visit 3/28/18   Recert Date 3/13/18           Visit Dx:    ICD-10-CM ICD-9-CM   1. Chronic right shoulder pain M25.511 719.41    G89.29 338.29       Patient Active Problem List   Diagnosis   • Umbilical hernia without obstruction or gangrene   • Tobacco dependence syndrome   • Gastroesophageal reflux disease   • Dyslipidemia   • Chest pain   • Allergic rhinitis   • COPD (chronic obstructive pulmonary disease) - presumed   • Constipation   • Cocaine abuse in remission   • Pre-syncope        Past Medical History:   Diagnosis Date   • Abdominal pain    • Acute exacerbation of chronic obstructive airways disease    • Allergic rhinitis    • Chest pain     unspecified   • Chronic cough    • Conjunctivitis    • Dyslipidemia    • Dyspnea    • Gastroesophageal reflux disease    • Pain     Pain in left ankle and joints of left foot      • Pain     pain in left leg   • Tobacco dependence syndrome    • Umbilical hernia without obstruction or gangrene    • Viral gastroenteritis         Past Surgical History:   Procedure Laterality Date   • INCISION AND DRAINAGE ABSCESS  2009    Irrigation and debridement of facial soft tissue injuries with closure of complex lacerations and repair of maxillary dental alveolar fracture   • UMBILICAL HERNIA REPAIR N/A 2017    Procedure: OPEN UMBILICAL AND EPIGASTRIC HERNIA REPAIR WITH MESH;  Surgeon: aSl Herbert MD;  Location: [facility];  Service:              PT Ortho       18 1000    Subjective Comments    Subjective Comments Patient states his shoulder is feeling much better than last visit. He reports he has been taking it easy with his R shoulder. Reports his MD appt went well with no complaints  -WC    Precautions and  Contraindications    Precautions/Limitations no known precautions/limitations  -    Subjective Pain    Able to rate subjective pain? yes  -WC    Pre-Treatment Pain Level 3  -WC    Post-Treatment Pain Level 1  -      User Key  (r) = Recorded By, (t) = Taken By, (c) = Cosigned By    Initials Name Provider Type    YANELI Bah PT Physical Therapist                            PT Assessment/Plan       03/05/18 1100       PT Assessment    Assessment Comments Patient tolerated addition of modifed push ups and shoulder extensions today without an increase in pain. Patient continues to demonstrate progress in regards to activity tolerance, pain intensity, and R shoulder function  -     PT Plan    PT Frequency 2x/week  -     Predicted Duration of Therapy Intervention (days/wks) 4-8 weeks  -     PT Plan Comments Continue with current PT POC - progress as toleratable. Add D1 ext next visit  -       User Key  (r) = Recorded By, (t) = Taken By, (c) = Cosigned By    Initials Name Provider Type    YANELI Bah, PT Physical Therapist                Modalities       03/05/18 1000          Ice    Ice Applied Yes  -      Location R shoulder concurrent with IFC  -WC      Rx Minutes 15 mins  -      Ice S/P Rx Yes  -      ELECTRICAL STIMULATION    Attended/Unattended Unattended  -      Stimulation Type IFC  -      Max mAmp 9  -WC      Rx Minutes 15 mins  -WC        User Key  (r) = Recorded By, (t) = Taken By, (c) = Cosigned By    Initials Name Provider Type    YANELI Bah PT Physical Therapist                Exercises       03/05/18 1000          Subjective Comments    Subjective Comments Patient states his shoulder is feeling much better than last visit. He reports he has been taking it easy with his R shoulder. Reports his MD appt went well with no complaints  -      Subjective Pain    Able to rate subjective pain? yes  -WC      Pre-Treatment Pain Level 3  -WC      Post-Treatment Pain Level 1  -       Exercise 1    Exercise Name 1 Pro II UE only  -WC      Time (Minutes) 1 10 min  -WC      Additional Comments L1.5  -WC      Exercise 2    Exercise Name 2 PROM  -WC      Time (Minutes) 2 3 min  -WC      Exercise 3    Exercise Name 3 Prone ITY's  -WC      Sets 3 2  -WC      Reps 3 15  -WC      Exercise 4    Exercise Name 4 Prone rows  -WC      Sets 4 2  -WC      Reps 4 15  -WC      Exercise 5    Exercise Name 5 Cybex shoulder extensions  -WC      Sets 5 2  -WC      Reps 5 10  -WC      Additional Comments 2 plates  -WC      Exercise 6    Exercise Name 6 modified incline push-up  -WC      Sets 6 2  -WC      Reps 6 15  -WC      Exercise 7    Exercise Name 7 Biodex mid row  -WC      Sets 7 2  -WC      Reps 7 15  -WC      Exercise 8    Exercise Name 8 Standing scaption  -WC      Sets 8 2  -WC      Reps 8 15  -WC      Exercise 9    Exercise Name 9 ball wall circles  -WC      Sets 9 2  -WC      Reps 9 20  -WC      Additional Comments counterclockwise/clockwise  -WC        User Key  (r) = Recorded By, (t) = Taken By, (c) = Cosigned By    Initials Name Provider Type    YANELI Bah, PT Physical Therapist                               PT OP Goals       03/05/18 1100       PT Short Term Goals    STG Date to Achieve 03/13/18  -WC     STG 1 Patient will improve R shoulder active ABD from 111 deg to 135 deg with pain </= 2/10  -WC     STG 1 Progress Progressing  -     STG 2 Patient will improve R shoulder MMT grades to 4-/5 globally  -     STG 2 Progress Progressing  -     STG 3 Patient be able to wean from R shoulder sling with pain </= 2/10  -WC     STG 3 Progress Progressing  -     Long Term Goals    LTG Date to Achieve 04/03/18  -WC     LTG 1 Patient will have R shoulder AROM WFL and no pain  -     LTG 2 Patient will be independent with HEP  -     LTG 3 Patient will improve R shoulder MMT to 4+/5 globally  -     LTG 4 Patient will report a 90% subjective improvement  -     LTG 5 Patient will improve  quickDASH score from 86.36% impairment to < 10% impairment  -     Time Calculation    PT Goal Re-Cert Due Date 03/13/18  -WC       User Key  (r) = Recorded By, (t) = Taken By, (c) = Cosigned By    Initials Name Provider Type    YANELI Bah PT Physical Therapist          Therapy Education  Education Details: Upgraded to blue tband  Given: HEP  Program: Progressed  How Provided: Verbal  Provided to: Patient  Level of Understanding: Verbalized              Time Calculation:   Start Time: 1017  Stop Time: 1115  Time Calculation (min): 58 min  Total Timed Code Minutes- PT: 43 minute(s)    Therapy Charges for Today     Code Description Service Date Service Provider Modifiers Qty    55268582585 HC PT THER PROC EA 15 MIN 3/5/2018 Dejon Bah, PT GP 3    60893654923 HC PT ELECTRICAL STIM UNATTENDED 3/5/2018 Dejon Bah, PT  1    92832015926 HC PT THER SUPP EA 15 MIN 3/5/2018 Dejon Bah, PT GP 1                    Dejon Bah PT  3/5/2018

## 2018-03-07 ENCOUNTER — HOSPITAL ENCOUNTER (OUTPATIENT)
Dept: PHYSICAL THERAPY | Facility: HOSPITAL | Age: 43
Setting detail: THERAPIES SERIES
Discharge: HOME | End: 2018-03-07

## 2018-03-07 DIAGNOSIS — M25.511 CHRONIC RIGHT SHOULDER PAIN: Primary | ICD-10-CM

## 2018-03-07 DIAGNOSIS — G89.29 CHRONIC RIGHT SHOULDER PAIN: Primary | ICD-10-CM

## 2018-03-07 PROCEDURE — 97110 THERAPEUTIC EXERCISES: CPT

## 2018-03-07 PROCEDURE — G0283 ELEC STIM OTHER THAN WOUND: HCPCS

## 2018-03-07 NOTE — THERAPY TREATMENT NOTE
Outpatient Physical Therapy Ortho Treatment Note  [facility]     Patient Name: Aquiles Najera  : 1975  MRN: [id number]  Today's Date: 3/7/2018      Visit Date: 2018     Insurance [place] medicaid   Visit # 6 (20/yr)   Next MD Visit 3/28/18   Recert Date 3/13/18           Visit Dx:    ICD-10-CM ICD-9-CM   1. Chronic right shoulder pain M25.511 719.41    G89.29 338.29       Patient Active Problem List   Diagnosis   • Umbilical hernia without obstruction or gangrene   • Tobacco dependence syndrome   • Gastroesophageal reflux disease   • Dyslipidemia   • Chest pain   • Allergic rhinitis   • COPD (chronic obstructive pulmonary disease) - presumed   • Constipation   • Cocaine abuse in remission   • Pre-syncope        Past Medical History:   Diagnosis Date   • Abdominal pain    • Acute exacerbation of chronic obstructive airways disease    • Allergic rhinitis    • Chest pain     unspecified   • Chronic cough    • Conjunctivitis    • Dyslipidemia    • Dyspnea    • Gastroesophageal reflux disease    • Pain     Pain in left ankle and joints of left foot      • Pain     pain in left leg   • Tobacco dependence syndrome    • Umbilical hernia without obstruction or gangrene    • Viral gastroenteritis         Past Surgical History:   Procedure Laterality Date   • INCISION AND DRAINAGE ABSCESS  2009    Irrigation and debridement of facial soft tissue injuries with closure of complex lacerations and repair of maxillary dental alveolar fracture   • UMBILICAL HERNIA REPAIR N/A 2017    Procedure: OPEN UMBILICAL AND EPIGASTRIC HERNIA REPAIR WITH MESH;  Surgeon: Sal Herbert MD;  Location: [facility];  Service:              PT Ortho       18 0900    Subjective Comments    Subjective Comments Patient reports his shoulder is pretty sore today, no new complaints  -WC    Precautions and Contraindications    Precautions/Limitations no known precautions/limitations  -WC    Subjective  Pain    Able to rate subjective pain? yes  -    Pre-Treatment Pain Level 3  -WC      03/05/18 1000    Subjective Comments    Subjective Comments Patient states his shoulder is feeling much better than last visit. He reports he has been taking it easy with his R shoulder. Reports his MD appt went well with no complaints  -WC    Precautions and Contraindications    Precautions/Limitations no known precautions/limitations  -WC    Subjective Pain    Able to rate subjective pain? yes  -WC    Pre-Treatment Pain Level 3  -WC    Post-Treatment Pain Level 1  -      User Key  (r) = Recorded By, (t) = Taken By, (c) = Cosigned By    Initials Name Provider Type    YANELI Bah PT Physical Therapist                            PT Assessment/Plan       03/07/18 1000       PT Assessment    Assessment Comments Patient continues to tolerate treatment well without an increase in pain and demonstrates correct body mechanics during therex.   -     PT Plan    PT Frequency 2x/week  -     Predicted Duration of Therapy Intervention (days/wks) 4-8 weeks  -     PT Plan Comments Continue with current PT POC - progress R shoulder strengthening as tolerated  -       User Key  (r) = Recorded By, (t) = Taken By, (c) = Cosigned By    Initials Name Provider Type    YANELI Bah PT Physical Therapist                    Exercises       03/07/18 0900          Subjective Comments    Subjective Comments Patient reports his shoulder is pretty sore today, no new complaints  -      Subjective Pain    Able to rate subjective pain? yes  -      Pre-Treatment Pain Level 3  -      Exercise 1    Exercise Name 1 Pro II UE  -      Time (Minutes) 1 10 min  -      Additional Comments L2  -WC      Exercise 2    Exercise Name 2 Cane AAROM  -WC      Reps 2 20  -WC      Additional Comments 3#  -WC      Exercise 3    Exercise Name 3 supine flexion  -      Sets 3 2  -WC      Reps 3 15  -WC      Additional Comments 2#  -WC      Exercise 4     Exercise Name 4 Prone ITYs  -WC      Sets 4 2  -WC      Reps 4 15  -WC      Exercise 5    Exercise Name 5 Prone rows  -WC      Sets 5 2  -WC      Reps 5 15  -WC      Exercise 6    Exercise Name 6 rhythmic stabalization  -WC      Sets 6 2  -WC      Time (Seconds) 6 60  -WC      Exercise 7    Exercise Name 7 modified incline push-up  -WC      Sets 7 2  -WC      Reps 7 15  -WC      Exercise 8    Exercise Name 8 cybex sholder ext  -WC      Sets 8 2  -WC      Reps 8 15  -WC      Additional Comments 2 plates  -WC      Exercise 9    Exercise Name 9 Biodex mid row  -WC      Sets 9 2  -WC      Reps 9 15  -WC      Additional Comments 5 plates  -WC        User Key  (r) = Recorded By, (t) = Taken By, (c) = Cosigned By    Initials Name Provider Type    YANELI Bah PT Physical Therapist                               PT OP Goals       03/07/18 1000       PT Short Term Goals    STG Date to Achieve 03/13/18  -WC     STG 1 Patient will improve R shoulder active ABD from 111 deg to 135 deg with pain </= 2/10  -     STG 1 Progress Progressing  -     STG 2 Patient will improve R shoulder MMT grades to 4-/5 globally  -     STG 2 Progress Progressing  -WC     STG 3 Patient be able to wean from R shoulder sling with pain </= 2/10  -WC     STG 3 Progress Met  -WC     Long Term Goals    LTG Date to Achieve 04/03/18  -WC     LTG 1 Patient will have R shoulder AROM WFL and no pain  -     LTG 2 Patient will be independent with HEP  -     LTG 3 Patient will improve R shoulder MMT to 4+/5 globally  -     LTG 4 Patient will report a 90% subjective improvement  -     LTG 5 Patient will improve quickDASH score from 86.36% impairment to < 10% impairment  -       User Key  (r) = Recorded By, (t) = Taken By, (c) = Cosigned By    Initials Name Provider Type    YANELI Bah PT Physical Therapist          Therapy Education  Given: HEP  Program: Reinforced  How Provided: Verbal  Provided to: Patient  Level of Understanding:  Verbalized              Time Calculation:   Start Time: 0930  Stop Time: 1020  Time Calculation (min): 50 min  Total Timed Code Minutes- PT: 35 minute(s)    Therapy Charges for Today     Code Description Service Date Service Provider Modifiers Qty    12837245298 HC PT THER PROC EA 15 MIN 3/7/2018 Dejon Bah, PT GP 2    62364495362 HC PT ELECTRICAL STIM UNATTENDED 3/7/2018 Dejon Bah PT  1    52174041400 HC PT THER SUPP EA 15 MIN 3/7/2018 Dejon Bah PT GP 1                    Dejon Bah PT  3/7/2018

## 2018-03-12 ENCOUNTER — HOSPITAL ENCOUNTER (OUTPATIENT)
Dept: PHYSICAL THERAPY | Facility: HOSPITAL | Age: 43
Setting detail: THERAPIES SERIES
Discharge: HOME | End: 2018-03-12

## 2018-03-12 DIAGNOSIS — M25.511 CHRONIC RIGHT SHOULDER PAIN: Primary | ICD-10-CM

## 2018-03-12 DIAGNOSIS — G89.29 CHRONIC RIGHT SHOULDER PAIN: Primary | ICD-10-CM

## 2018-03-12 PROCEDURE — 97110 THERAPEUTIC EXERCISES: CPT

## 2018-03-12 PROCEDURE — G0283 ELEC STIM OTHER THAN WOUND: HCPCS

## 2018-03-12 NOTE — THERAPY PROGRESS REPORT/RE-CERT
Outpatient Physical Therapy Ortho Progress Note  [facility]     Patient Name: Aquiles Najera  : 1975  MRN: [id number]  Today's Date: 3/12/2018      Visit Date: 2018    Insurance [place]   Visit # 7/   Next MD Visit 3/28/18   Recert Date 18           Patient Active Problem List   Diagnosis   • Umbilical hernia without obstruction or gangrene   • Tobacco dependence syndrome   • Gastroesophageal reflux disease   • Dyslipidemia   • Chest pain   • Allergic rhinitis   • COPD (chronic obstructive pulmonary disease) - presumed   • Constipation   • Cocaine abuse in remission   • Pre-syncope        Past Medical History:   Diagnosis Date   • Abdominal pain    • Acute exacerbation of chronic obstructive airways disease    • Allergic rhinitis    • Chest pain     unspecified   • Chronic cough    • Conjunctivitis    • Dyslipidemia    • Dyspnea    • Gastroesophageal reflux disease    • Pain     Pain in left ankle and joints of left foot      • Pain     pain in left leg   • Tobacco dependence syndrome    • Umbilical hernia without obstruction or gangrene    • Viral gastroenteritis         Past Surgical History:   Procedure Laterality Date   • INCISION AND DRAINAGE ABSCESS  2009    Irrigation and debridement of facial soft tissue injuries with closure of complex lacerations and repair of maxillary dental alveolar fracture   • UMBILICAL HERNIA REPAIR N/A 2017    Procedure: OPEN UMBILICAL AND EPIGASTRIC HERNIA REPAIR WITH MESH;  Surgeon: Sal Herbert MD;  Location: [facility];  Service:        Visit Dx:     ICD-10-CM ICD-9-CM   1. Chronic right shoulder pain M25.511 719.41    G89.29 338.29                 PT Ortho     Row Name 18 0900       Subjective Comments    Subjective Comments Patient denies pain on this date, just reports slight soreness. He states his R shoulder function is returning, he just has to continue to be careful when lifting objects with R UE. 100%  subjective improvement noted  -       Precautions and Contraindications    Precautions/Limitations no known precautions/limitations  -       Subjective Pain    Able to rate subjective pain? yes  -    Pre-Treatment Pain Level 0  -       Posture/Observations    Posture/Observations Comments Unremarkable  -WC       Shoulder Girdle Palpation    Subacromial Space Right:;Tender  -WC    Subscapularis --   non-tender  -WC    Infraspinatus --   non-tender  -WC    Upper Trap --   non-tender  -WC    Levator Scapula --   non-tender  -WC    Shoulder Girdle Palpation? Yes  -       General ROM    GENERAL ROM COMMENTS R shoulder AROM: flex 161 deg,  deg, IR 60 deg, ER 87 deg - no pain  -       General Assessment (Manual Muscle Testing)    Comment, General Manual Muscle Testing (MMT) Assessment R shoulder MMT:  4+/5 flex, ABD, IR, and ER with mild soreness with resistance; scap stabalizers 4-/5  -      User Key  (r) = Recorded By, (t) = Taken By, (c) = Cosigned By    Initials Name Provider Type    YANELI Bah, PT Physical Therapist                      Therapy Education  Education Details: Patient updated on PT POC and progress toward goals  Given: HEP  Program: Reinforced  How Provided: Verbal  Provided to: Patient  Level of Understanding: Verbalized           PT OP Goals     Row Name 03/12/18 0900          PT Short Term Goals    STG Date to Achieve 03/13/18  -     STG 1 Patient will improve R shoulder active ABD from 111 deg to 135 deg with pain </= 2/10  -     STG 1 Progress Met  -     STG 1 Progress Comments 171 deg  -     STG 2 Patient will improve R shoulder MMT grades to 4-/5 globally  -     STG 2 Progress Met  -     STG 3 Patient be able to wean from R shoulder sling with pain </= 2/10  -     STG 3 Progress Met  -        Long Term Goals    LTG Date to Achieve 04/03/18  -     LTG 1 Patient will have R shoulder AROM WFL and no pain  -     LTG 1 Progress Progressing  -     LTG 1  Progress Comments mild pain  -     LTG 2 Patient will be independent with HEP  -     LTG 2 Progress Progressing  -     LTG 3 Patient will improve R shoulder MMT to 4+/5 globally  -WC     LTG 3 Progress Progressing  -     LTG 3 Progress Comments scap stabalizers 4-/5  -WC     LTG 4 Patient will report a 90% subjective improvement  -     LTG 4 Progress Met  -     LTG 4 Progress Comments 100%  -     LTG 5 Patient will improve quickDASH score from 86.36% impairment to < 10% impairment  -     LTG 5 Progress Met  -     LTG 5 Progress Comments 9.09% impairment  -        Time Calculation    PT Goal Re-Cert Due Date 04/02/18  -       User Key  (r) = Recorded By, (t) = Taken By, (c) = Cosigned By    Initials Name Provider Type    YANELI Bah PT Physical Therapist                PT Assessment/Plan     Row Name 03/12/18 1000          PT Assessment    Functional Limitations Limitation in home management;Limitations in community activities;Limitations in functional capacity and performance;Performance in leisure activities;Performance in self-care ADL  -     Impairments Muscle strength;Pain;Poor body mechanics;Posture  -     Assessment Comments Patient demonstrated significant progress today as indicated by achievement of all short term goals and several long term goals. Patient demonstrated R shoulder AROM WFL with only mild pain and R shoulder MMT 5/5 exccept scap stabalizers. Patient requires moderate verbal cueing for posture and biomechanics with exercises. Patient would continue to benefit from skilled PT services in order to return R shoulder function to PLOF.  -     Please refer to paper survey for additional self-reported information Yes  -     Rehab Potential Good  -     Patient/caregiver participated in establishment of treatment plan and goals Yes  -     Patient would benefit from skilled therapy intervention Yes  -        PT Plan    PT Frequency 1x/week;2x/week  -     PT  Plan Comments Continue with current PT POC - downgrade to once per week starting next week; continue progressing R shoulder/scapular strengthening as tolerated and continue with posture/biomechanic education  -WC        Clinical Impression    Predicted Duration of Therapy Intervention (days/wks) 3-4 weeks  -WC       User Key  (r) = Recorded By, (t) = Taken By, (c) = Cosigned By    Initials Name Provider Type    YANELI Bah, PT Physical Therapist                Modalities     Row Name 03/12/18 0900             Ice    Ice Applied Yes  -WC      Location R shoulder concurrent with IFC  -WC      Rx Minutes 15 mins  -WC      Ice S/P Rx Yes  -WC         ELECTRICAL STIMULATION    Attended/Unattended Unattended  -      Stimulation Type IFC  -      Max mAmp 8  -WC      Rx Minutes 15 mins  -WC        User Key  (r) = Recorded By, (t) = Taken By, (c) = Cosigned By    Initials Name Provider Type    YANELI Bah, PT Physical Therapist              Exercises     Row Name 03/12/18 0900             Subjective Comments    Subjective Comments Patient denies pain on this date, just reports slight soreness. He states his R shoulder function is returning, he just has to continue to be careful when lifting objects with R UE. 100% subjective improvement noted  -WC         Subjective Pain    Able to rate subjective pain? yes  -WC      Pre-Treatment Pain Level 0  -WC         Exercise 1    Exercise Name 1 Pro II UE  -WC      Time 1 10 min  -WC      Additional Comments L3  -WC         Exercise 2    Exercise Name 2 Prone ITY  -WC      Sets 2 2  -WC      Reps 2 15  -WC      Additional Comments 2#  -WC         Exercise 3    Exercise Name 3 Prone rows  -WC      Sets 3 2  -WC      Reps 3 15  -WC      Additional Comments 2#  -WC         Exercise 4    Exercise Name 4 Cybex row machine  -WC      Sets 4 2  -WC      Reps 4 15  -WC      Additional Comments 30#  -WC         Exercise 5    Sets 5 2  -WC      Reps 5 10  -WC      Additional Comments  1 plate  -WC      Time (Seconds) 5 D1 ext  -WC         Exercise 6    Sets 6 2  -WC      Reps 6 15  -WC      Additional Comments 2 plates  -WC      Time (Seconds) 6 Shoulder extensions  -WC        User Key  (r) = Recorded By, (t) = Taken By, (c) = Cosigned By    Initials Name Provider Type    YANELI Bah PT Physical Therapist                        Outcome Measure Options: Quick DASH  Quick DASH  Open a tight or new jar.: Mild Difficulty  Do heavy household chores (e.g., wash walls, wash floors): No Difficulty  Carry a shopping bag or briefcase: No Difficulty  Wash your back: No Difficulty  Use a knife to cut food: No Difficulty  Recreational activities in which you take some force or impact through your arm, should or hand (e.g. golf, hammering, tennis, etc.): Mild Difficulty  During the past week, to what extent has your arm, shoulder, or hand problem interfered with your normal social activites with family, friends, neighbors or groups?: Not at all  During the past week, were you limited in your work or other regular daily activities as a result of your arm, shoulder or hand problem?: Slightly Limited  Arm, Shoulder, or hand pain: Mild  Tingling (pins and needles) in your arm, shoulder, or hand: None  During the past week, how much difficulty have you had sleeping because of the pain in your arm, shoulder or hand?: No difficulty  Number of Questions Answered: 11  Quick DASH Score: 9.09         Time Calculation:   Start Time: 0930  Stop Time: 1025  Time Calculation (min): 55 min  Total Timed Code Minutes- PT: 40 minute(s)     Therapy Charges for Today     Code Description Service Date Service Provider Modifiers Qty    56885017591 HC PT THER PROC EA 15 MIN 3/12/2018 Dejon Bah PT GP 2    42320954446 HC PT ELECTRICAL STIM UNATTENDED 3/12/2018 Dejon Bah PT  1    79380913969 HC PT THER SUPP EA 15 MIN 3/12/2018 Dejon Bah PT GP 1                   Dejon Bah PT  3/12/2018

## 2018-03-14 ENCOUNTER — HOSPITAL ENCOUNTER (OUTPATIENT)
Dept: PHYSICAL THERAPY | Facility: HOSPITAL | Age: 43
Setting detail: THERAPIES SERIES
Discharge: HOME | End: 2018-03-14

## 2018-03-14 DIAGNOSIS — M25.511 CHRONIC RIGHT SHOULDER PAIN: Primary | ICD-10-CM

## 2018-03-14 DIAGNOSIS — G89.29 CHRONIC RIGHT SHOULDER PAIN: Primary | ICD-10-CM

## 2018-03-14 PROCEDURE — G0283 ELEC STIM OTHER THAN WOUND: HCPCS

## 2018-03-14 PROCEDURE — 97110 THERAPEUTIC EXERCISES: CPT

## 2018-03-14 NOTE — THERAPY TREATMENT NOTE
Outpatient Physical Therapy Ortho Treatment Note  [facility]     Patient Name: Aquiles Najera  : 1975  MRN: [id number]  Today's Date: 3/14/2018      Visit Date: 2018     Insurance [place] medicaid   Visit # 8/8   Next MD Visit 3/28/18   Recert Date 18           Visit Dx:    ICD-10-CM ICD-9-CM   1. Chronic right shoulder pain M25.511 719.41    G89.29 338.29       Patient Active Problem List   Diagnosis   • Umbilical hernia without obstruction or gangrene   • Tobacco dependence syndrome   • Gastroesophageal reflux disease   • Dyslipidemia   • Chest pain   • Allergic rhinitis   • COPD (chronic obstructive pulmonary disease) - presumed   • Constipation   • Cocaine abuse in remission   • Pre-syncope        Past Medical History:   Diagnosis Date   • Abdominal pain    • Acute exacerbation of chronic obstructive airways disease    • Allergic rhinitis    • Chest pain     unspecified   • Chronic cough    • Conjunctivitis    • Dyslipidemia    • Dyspnea    • Gastroesophageal reflux disease    • Pain     Pain in left ankle and joints of left foot      • Pain     pain in left leg   • Tobacco dependence syndrome    • Umbilical hernia without obstruction or gangrene    • Viral gastroenteritis         Past Surgical History:   Procedure Laterality Date   • INCISION AND DRAINAGE ABSCESS  2009    Irrigation and debridement of facial soft tissue injuries with closure of complex lacerations and repair of maxillary dental alveolar fracture   • UMBILICAL HERNIA REPAIR N/A 2017    Procedure: OPEN UMBILICAL AND EPIGASTRIC HERNIA REPAIR WITH MESH;  Surgeon: Sal Herbert MD;  Location: [facility];  Service:              PT Ortho     Row Name 18 0900       Subjective Comments    Subjective Comments Patient states his shoulder is feeling good today, with just slight pain. No new complaints  -WC       Precautions and Contraindications    Precautions/Limitations no known  precautions/limitations  -       Subjective Pain    Able to rate subjective pain? yes  -    Pre-Treatment Pain Level 1  -    Row Name 03/12/18 0900       Subjective Comments    Subjective Comments Patient denies pain on this date, just reports slight soreness. He states his R shoulder function is returning, he just has to continue to be careful when lifting objects with R UE. 100% subjective improvement noted  -       Precautions and Contraindications    Precautions/Limitations no known precautions/limitations  -       Subjective Pain    Able to rate subjective pain? yes  -    Pre-Treatment Pain Level 0  -       Posture/Observations    Posture/Observations Comments Unremarkable  -       Shoulder Girdle Palpation    Subacromial Space Right:;Tender  -WC    Subscapularis --   non-tender  -WC    Infraspinatus --   non-tender  -WC    Upper Trap --   non-tender  -WC    Levator Scapula --   non-tender  -WC    Shoulder Girdle Palpation? Yes  -       General ROM    GENERAL ROM COMMENTS R shoulder AROM: flex 161 deg,  deg, IR 60 deg, ER 87 deg - no pain  -       General Assessment (Manual Muscle Testing)    Comment, General Manual Muscle Testing (MMT) Assessment R shoulder MMT:  4+/5 flex, ABD, IR, and ER with mild soreness with resistance; scap stabalizers 4-/5  -      User Key  (r) = Recorded By, (t) = Taken By, (c) = Cosigned By    Initials Name Provider Type    YANELI Bah PT Physical Therapist                            PT Assessment/Plan     Row Name 03/14/18 1000          PT Assessment    Assessment Comments Patient tolerated tx well today, able to perform D2 flex and Lat pulldown machine with no increase in pain today  -        PT Plan    PT Frequency 1x/week  -     PT Plan Comments continue with current PT POC - add ball wall shoulder rhythmic stabalization next session  -        Clinical Impression    Predicted Duration of Therapy Intervention (days/wks) 3 week  -       User  Key  (r) = Recorded By, (t) = Taken By, (c) = Cosigned By    Initials Name Provider Type    YANELI Bah PT Physical Therapist                    Exercises     Row Name 03/14/18 0900             Subjective Comments    Subjective Comments Patient states his shoulder is feeling good today, with just slight pain. No new complaints  -WC         Subjective Pain    Able to rate subjective pain? yes  -WC      Pre-Treatment Pain Level 1  -WC         Exercise 1    Exercise Name 1 Pro II UE  -WC      Time 1 10 min  -WC      Additional Comments L4  -WC         Exercise 2    Exercise Name 2 Supine flexion  -WC      Reps 2 20  -WC      Additional Comments 3#  -WC         Exercise 3    Exercise Name 3 Prone ITY  -WC      Sets 3 2  -WC      Reps 3 10  -WC      Additional Comments 3#  -WC         Exercise 4    Exercise Name 4 Prone rows  -WC      Sets 4 2  -WC      Reps 4 10  -WC      Additional Comments 3#  -WC         Exercise 5    Exercise Name 5 cybex row machine  -WC      Sets 5 2  -WC      Reps 5 15  -WC      Additional Comments 30#  -WC         Exercise 6    Exercise Name 6 Cybex LAT pulldownmachine  -WC      Sets 6 2  -WC      Reps 6 15  -WC      Additional Comments 50#  -WC         Exercise 7    Exercise Name 7 D1 Extension cybex  -WC      Sets 7 2  -WC      Reps 7 15  -WC      Additional Comments 1 plate  -WC         Exercise 8    Exercise Name 8 D2 flex cybex  -WC      Sets 8 1  -WC      Reps 8 10  -WC      Additional Comments 1 plate  -WC         Exercise 9    Exercise Name 9 cybex shoulder ext  -WC      Sets 9 2  -WC      Reps 9 15  -WC      Additional Comments 2 plates  -WC        User Key  (r) = Recorded By, (t) = Taken By, (c) = Cosigned By    Initials Name Provider Type    YANELI Bah PT Physical Therapist                             Therapy Education  Education Details: Educated on return to gym activity - light weights and only to tolerance  Given: Symptoms/condition management  Program: New  How  Provided: Verbal  Provided to: Patient  Level of Understanding: Verbalized              Time Calculation:   Start Time: 0930  Stop Time: 1018  Time Calculation (min): 48 min  Total Timed Code Minutes- PT: 32 minute(s)    Therapy Charges for Today     Code Description Service Date Service Provider Modifiers Qty    13533565340 HC PT THER PROC EA 15 MIN 3/14/2018 Dejon Bah, PT GP 2    78666437809 HC PT ELECTRICAL STIM UNATTENDED 3/14/2018 Dejon Bah PT  1    03680671927 HC PT THER SUPP EA 15 MIN 3/14/2018 Dejon Bah PT GP 1                    Dejon Bah PT  3/14/2018

## 2018-03-19 ENCOUNTER — HOSPITAL ENCOUNTER (OUTPATIENT)
Dept: PHYSICAL THERAPY | Facility: HOSPITAL | Age: 43
Setting detail: THERAPIES SERIES
Discharge: HOME | End: 2018-03-19

## 2018-03-19 DIAGNOSIS — G89.29 CHRONIC RIGHT SHOULDER PAIN: Primary | ICD-10-CM

## 2018-03-19 DIAGNOSIS — M25.511 CHRONIC RIGHT SHOULDER PAIN: Primary | ICD-10-CM

## 2018-03-19 PROCEDURE — G0283 ELEC STIM OTHER THAN WOUND: HCPCS

## 2018-03-19 PROCEDURE — 97110 THERAPEUTIC EXERCISES: CPT

## 2018-03-19 NOTE — THERAPY TREATMENT NOTE
Outpatient Physical Therapy Ortho Treatment Note  [facility]     Patient Name: Aquiles Najera  : 1975  MRN: [id number]  Today's Date: 3/19/2018      Visit Date: 2018    Insurance [place] Medicaid   Visit # 9/ (20/yr)   Next MD Visit 3/28/18   Recert Date 18           Visit Dx:    ICD-10-CM ICD-9-CM   1. Chronic right shoulder pain M25.511 719.41    G89.29 338.29       Patient Active Problem List   Diagnosis   • Umbilical hernia without obstruction or gangrene   • Tobacco dependence syndrome   • Gastroesophageal reflux disease   • Dyslipidemia   • Chest pain   • Allergic rhinitis   • COPD (chronic obstructive pulmonary disease) - presumed   • Constipation   • Cocaine abuse in remission   • Pre-syncope        Past Medical History:   Diagnosis Date   • Abdominal pain    • Acute exacerbation of chronic obstructive airways disease    • Allergic rhinitis    • Chest pain     unspecified   • Chronic cough    • Conjunctivitis    • Dyslipidemia    • Dyspnea    • Gastroesophageal reflux disease    • Pain     Pain in left ankle and joints of left foot      • Pain     pain in left leg   • Tobacco dependence syndrome    • Umbilical hernia without obstruction or gangrene    • Viral gastroenteritis         Past Surgical History:   Procedure Laterality Date   • INCISION AND DRAINAGE ABSCESS  2009    Irrigation and debridement of facial soft tissue injuries with closure of complex lacerations and repair of maxillary dental alveolar fracture   • UMBILICAL HERNIA REPAIR N/A 2017    Procedure: OPEN UMBILICAL AND EPIGASTRIC HERNIA REPAIR WITH MESH;  Surgeon: Sal Herbert MD;  Location: [facility];  Service:              PT Ortho     Row Name 18 0800       Subjective Comments    Subjective Comments Patient reports his R shoulder has had a burning sensation since Thursday  when he was picking up scrap metal. Patient states the pain has decreased since that day, and that he rested  the rest of the weekend.  -WC       Subjective Pain    Able to rate subjective pain? yes  -WC    Pre-Treatment Pain Level 1  -      User Key  (r) = Recorded By, (t) = Taken By, (c) = Cosigned By    Initials Name Provider Type    YANELI Bah, PT Physical Therapist                            PT Assessment/Plan     Row Name 03/19/18 0900          PT Assessment    Assessment Comments Patient demonstrated improved strenght today indicated by increased weight with machine based exercises. Patient also tolerated rhytmic stabalization well today with ball. Patient continues to demonstrate functional progress  -WC        PT Plan    PT Frequency 1x/week  -WC     Predicted Duration of Therapy Intervention (OT Eval) 2-3 weeks  -WC     PT Plan Comments Continue with current PT POC - progression as tolerated  -        Clinical Impression    Predicted Duration of Therapy Intervention (days/wks) 2-3 weeks  -       User Key  (r) = Recorded By, (t) = Taken By, (c) = Cosigned By    Initials Name Provider Type    YANELI Bah, PT Physical Therapist                Modalities     Row Name 03/19/18 0900             Ice    Ice Applied Yes  -      Location R shoulder  -WC      Rx Minutes 15 mins  -      Ice S/P Rx Yes  -      Patient reports will apply ice at home to involved area Yes  -         ELECTRICAL STIMULATION    Attended/Unattended Unattended  -      Stimulation Type IFC  -      Max mAmp 8  -      Location/Electrode Placement/Other R shoulder girdle  -WC      Rx Minutes 15 mins  -WC        User Key  (r) = Recorded By, (t) = Taken By, (c) = Cosigned By    Initials Name Provider Type    YANELI Bah, PT Physical Therapist                Exercises     Row Name 03/19/18 0800             Subjective Comments    Subjective Comments Patient reports his R shoulder has had a burning sensation since Thursday  when he was picking up scrap metal. Patient states the pain has decreased since that day, and that he  rested the rest of the weekend.  -WC         Subjective Pain    Able to rate subjective pain? yes  -WC      Pre-Treatment Pain Level 1  -WC         Exercise 1    Exercise Name 1 Pro II UE  -WC      Time 1 10 min  -WC      Additional Comments L4  -WC         Exercise 2    Exercise Name 2 R upper trap stretch  -WC      Reps 2 3  -WC      Time 2 30 sec  -WC      Additional Comments --  -WC         Exercise 3    Exercise Name 3 Supine flexion  -WC      Sets 3 --  -WC      Reps 3 20  -WC      Additional Comments 3#  -WC         Exercise 4    Exercise Name 4 Prone ITY  -WC      Sets 4 2  -WC      Reps 4 10  -WC      Additional Comments 3#  -WC         Exercise 5    Exercise Name 5 Prone Rows  -WC      Sets 5 2  -WC      Reps 5 10  -WC      Additional Comments 3#  -WC         Exercise 6    Exercise Name 6 cybex row machine  -WC      Sets 6 2  -WC      Reps 6 15  -WC      Additional Comments 45#  -WC         Exercise 7    Exercise Name 7 Cybex LAT pulldown  -WC      Sets 7 2  -WC      Reps 7 15  -WC      Additional Comments 60#  -WC         Exercise 8    Exercise Name 8 D1 extension cybex  -WC      Sets 8 2  -WC      Reps 8 15  -WC      Additional Comments 2 plates  -WC         Exercise 9    Exercise Name 9 D2 flex cybex  -WC      Sets 9 2  -WC      Reps 9 10  -WC      Additional Comments 1 plate  -WC         Exercise 10    Exercise Name 10 cybex shoulder ext  -WC      Sets 10 2  -WC      Reps 10 15  -WC      Additional Comments 2 plates  -WC         Exercise 11    Exercise Name 11 ball wall rhythmic stabalization  -WC      Sets 11 2  -WC      Time 11 1 min  -WC        User Key  (r) = Recorded By, (t) = Taken By, (c) = Cosigned By    Initials Name Provider Type    YANELI Bah PT Physical Therapist                               PT OP Goals     Row Name 03/19/18 0900          PT Short Term Goals    STG Date to Achieve 03/13/18  -WC     STG 1 Patient will improve R shoulder active ABD from 111 deg to 135 deg with pain </=  2/10  -     STG 1 Progress Met  -     STG 2 Patient will improve R shoulder MMT grades to 4-/5 globally  -     STG 2 Progress Met  -     STG 3 Patient be able to wean from R shoulder sling with pain </= 2/10  -     STG 3 Progress Met  -        Long Term Goals    LTG Date to Achieve 04/03/18  -     LTG 1 Patient will have R shoulder AROM WFL and no pain  -     LTG 1 Progress Progressing  -     LTG 2 Patient will be independent with HEP  -     LTG 2 Progress Progressing  -     LTG 3 Patient will improve R shoulder MMT to 4+/5 globally  -     LTG 3 Progress Progressing  -     LTG 4 Patient will report a 90% subjective improvement  -     LTG 4 Progress Met  -     LTG 5 Patient will improve quickDASH score from 86.36% impairment to < 10% impairment  -     LTG 5 Progress Met  -       User Key  (r) = Recorded By, (t) = Taken By, (c) = Cosigned By    Initials Name Provider Type    YANELI Bah PT Physical Therapist          Therapy Education  Given: HEP  Program: Reinforced  How Provided: Verbal  Provided to: Patient  Level of Understanding: Verbalized              Time Calculation:   Start Time: 0846  Stop Time: 0945  Time Calculation (min): 59 min  Total Timed Code Minutes- PT: 44 minute(s)    Therapy Charges for Today     Code Description Service Date Service Provider Modifiers Qty    49561639814 HC PT THER PROC EA 15 MIN 3/19/2018 Dejon Bah, PT GP 3    08691289538 HC PT THER SUPP EA 15 MIN 3/19/2018 Dejon Bah, PT GP 1    65105676316 HC PT ELECTRICAL STIM UNATTENDED 3/19/2018 Dejon Bah PT  1                    Dejon Bah PT  3/19/2018

## 2018-03-26 ENCOUNTER — HOSPITAL ENCOUNTER (OUTPATIENT)
Dept: PHYSICAL THERAPY | Facility: HOSPITAL | Age: 43
Setting detail: THERAPIES SERIES
Discharge: HOME | End: 2018-03-26

## 2018-03-26 DIAGNOSIS — G89.29 CHRONIC RIGHT SHOULDER PAIN: Primary | ICD-10-CM

## 2018-03-26 DIAGNOSIS — M25.511 CHRONIC RIGHT SHOULDER PAIN: Primary | ICD-10-CM

## 2018-03-26 PROCEDURE — 97110 THERAPEUTIC EXERCISES: CPT

## 2018-04-02 ENCOUNTER — OFFICE VISIT (OUTPATIENT)
Dept: FAMILY MEDICINE CLINIC | Facility: CLINIC | Age: 43
End: 2018-04-02

## 2018-04-02 ENCOUNTER — TELEPHONE (OUTPATIENT)
Dept: FAMILY MEDICINE CLINIC | Facility: CLINIC | Age: 43
End: 2018-04-02

## 2018-04-02 VITALS
BODY MASS INDEX: 29.92 KG/M2 | DIASTOLIC BLOOD PRESSURE: 80 MMHG | HEIGHT: 68 IN | HEART RATE: 91 BPM | OXYGEN SATURATION: 95 % | WEIGHT: 197.44 LBS | SYSTOLIC BLOOD PRESSURE: 126 MMHG

## 2018-04-02 DIAGNOSIS — M54.6 ACUTE BILATERAL THORACIC BACK PAIN: Primary | ICD-10-CM

## 2018-04-02 DIAGNOSIS — J44.9 CHRONIC OBSTRUCTIVE PULMONARY DISEASE, UNSPECIFIED COPD TYPE (HCC): ICD-10-CM

## 2018-04-02 DIAGNOSIS — I10 ESSENTIAL HYPERTENSION: ICD-10-CM

## 2018-04-02 PROCEDURE — 99213 OFFICE O/P EST LOW 20 MIN: CPT | Performed by: FAMILY MEDICINE

## 2018-04-02 RX ORDER — ALBUTEROL SULFATE 90 UG/1
2 AEROSOL, METERED RESPIRATORY (INHALATION) EVERY 4 HOURS PRN
Qty: 18 G | Refills: 3 | Status: SHIPPED | OUTPATIENT
Start: 2018-04-02 | End: 2018-10-16 | Stop reason: SDUPTHER

## 2018-04-02 RX ORDER — SULFAMETHOXAZOLE AND TRIMETHOPRIM 800; 160 MG/1; MG/1
TABLET ORAL
Refills: 0 | COMMUNITY
Start: 2018-03-02 | End: 2018-04-02

## 2018-04-02 RX ORDER — MELOXICAM 15 MG/1
15 TABLET ORAL DAILY
Qty: 30 TABLET | Refills: 2 | Status: SHIPPED | OUTPATIENT
Start: 2018-04-02 | End: 2018-07-01

## 2018-04-02 RX ORDER — IPRATROPIUM BROMIDE AND ALBUTEROL SULFATE 2.5; .5 MG/3ML; MG/3ML
3 SOLUTION RESPIRATORY (INHALATION) EVERY 4 HOURS PRN
Qty: 360 ML | Refills: 3 | Status: SHIPPED | OUTPATIENT
Start: 2018-04-02 | End: 2019-04-18 | Stop reason: SDUPTHER

## 2018-04-02 NOTE — PROGRESS NOTES
Subjective   Aquiles Najera is a 42 y.o. male.     Patient reports that two days ago he had chest pain that was substernal and pressure like that lasted a day before resolving. He could not describe any further qualities, denies any alleviating or aggravating symptoms. He did not take any of his home sublingual nitro pills but does report having them. Immediately following the chest pain resolution he reports onset of ~T12 back pain that was bilateral, constant, and dull. Nothing makes it better or worse. He has not taken any medications previously for pain relief. He has taken controlled substances for pain in the past reported their effectiveness in treating his pain but reports he has not had any such medications recently.  Discussed patients multiple reading of elevated blood pressure indicating he has hypertension. Patient reports that his blood pressure gets up when he has to deal with people. He was evasive about wanting to start new medications.         The following portions of the patient's history were reviewed and updated as appropriate: allergies, current medications, past family history, past medical history, past social history, past surgical history and problem list.    Social History     Social History   • Marital status:      Spouse name: N/A   • Number of children: N/A   • Years of education: N/A     Occupational History   • Not on file.     Social History Main Topics   • Smoking status: Former Smoker     Years: 20.00     Types: Cigarettes     Quit date: 03/2017   • Smokeless tobacco: Former User   • Alcohol use No   • Drug use: No      Comment: 3/2017 last time   • Sexual activity: Defer     Other Topics Concern   • Not on file     Social History Narrative   • No narrative on file       Immunization History   Administered Date(s) Administered   • Influenza, Quadrivalent 11/01/2017   • Pneumococcal Polysaccharide (PPSV23) 08/11/2015   • Tetanus 08/11/2015        Current Outpatient  Prescriptions on File Prior to Visit   Medication Sig Dispense Refill   • albuterol (VENTOLIN HFA) 108 (90 BASE) MCG/ACT inhaler Inhale 2 puffs every 4 (four) hours as needed for wheezing. 18 g 3   • aspirin 81 MG chewable tablet Chew 1 tablet Daily. 90 tablet 6   • beclomethasone (QVAR) 80 MCG/ACT inhaler Inhale 1 puff 2 (Two) Times a Day. 8.7 g 3   • budesonide (PULMICORT) 0.5 MG/2ML nebulizer solution Take 2 mL by nebulization every night at bedtime. (Patient taking differently: Take 0.5 mg by nebulization At Night As Needed.) 80 mL 0   • esomeprazole (nexIUM) 40 MG capsule Take 1 capsule by mouth Every Morning Before Breakfast. 30 capsule 6   • FLONASE ALLERGY RELIEF 50 MCG/ACT nasal spray      • Fluticasone Furoate 100 MCG/ACT aerosol powder  Inhale 1 Act Daily. 30 each 6   • ipratropium-albuterol (DUONEB) 0.5-2.5 mg/mL nebulizer Take 3 mL by nebulization Every 4 (Four) Hours As Needed for Wheezing. 360 mL 3   • loratadine (CLARITIN) 10 MG tablet Take 1 tablet by mouth Daily. 30 tablet 11   • nitroglycerin (NITROSTAT) 0.4 MG SL tablet Place 1 tablet under the tongue Every 5 (Five) Minutes As Needed for chest pain. Take no more than 3 doses in 15 minutes. 30 tablet 12   • pravastatin (PRAVACHOL) 40 MG tablet Take 1 tablet by mouth Daily. 90 tablet 6   • traMADol (ULTRAM) 50 MG tablet Take 1 tablet by mouth Every 6 (Six) Hours As Needed for Moderate Pain  or Severe Pain . 120 tablet 2     No current facility-administered medications on file prior to visit.        Review of Systems   Constitutional: Negative for activity change, appetite change, fatigue and fever.   HENT: Negative for ear pain and sore throat.    Eyes: Negative for pain and visual disturbance.   Respiratory: Negative for cough and shortness of breath.    Cardiovascular: Negative for chest pain and palpitations.   Gastrointestinal: Negative for abdominal pain and nausea.   Endocrine: Negative for cold intolerance and heat intolerance.    Genitourinary: Negative for difficulty urinating and dysuria.   Musculoskeletal: Positive for back pain. Negative for arthralgias and gait problem.   Skin: Negative for color change and rash.   Neurological: Negative for dizziness, weakness and headaches.   Hematological: Negative for adenopathy. Does not bruise/bleed easily.   Psychiatric/Behavioral: Negative for agitation, confusion and sleep disturbance.       Objective   Physical Exam   Constitutional: He is oriented to person, place, and time. He appears well-developed and well-nourished.   HENT:   Head: Normocephalic and atraumatic.   Eyes: Conjunctivae, EOM and lids are normal. Pupils are equal, round, and reactive to light.   Neck: Normal range of motion. Neck supple.   Cardiovascular: Normal rate, regular rhythm and normal heart sounds.  Exam reveals no gallop and no friction rub.    No murmur heard.  Pulmonary/Chest: Effort normal and breath sounds normal.   Abdominal: Soft. Normal appearance and bowel sounds are normal. There is no tenderness.   Musculoskeletal: Normal range of motion.        Thoracic back: He exhibits tenderness and pain. He exhibits normal range of motion, no bony tenderness, no swelling, no edema, no deformity and no laceration.        Back:    Neurological: He is alert and oriented to person, place, and time.   Skin: Skin is warm, dry and intact.   Psychiatric: He has a normal mood and affect. His speech is normal and behavior is normal. Judgment and thought content normal. Cognition and memory are normal.       Lab Results (most recent)     None          Vitals:    04/02/18 1506   BP: 126/80   Pulse: 91   SpO2: 95%       Assessment/Plan   Aquiles was seen today for bronchitis, back pain and chest pain.    Diagnoses and all orders for this visit:    Acute bilateral thoracic back pain  -     meloxicam (MOBIC) 15 MG tablet; Take 1 tablet by mouth Daily.    Chronic obstructive pulmonary disease, unspecified COPD type  -     albuterol  (VENTOLIN HFA) 108 (90 Base) MCG/ACT inhaler; Inhale 2 puffs Every 4 (Four) Hours As Needed for Wheezing.  -     ipratropium-albuterol (DUONEB) 0.5-2.5 mg/mL nebulizer; Take 3 mL by nebulization Every 4 (Four) Hours As Needed for Wheezing.    Essential hypertension  -     lisinopril (PRINIVIL,ZESTRIL) 10 MG tablet; Take 1 tablet by mouth Daily.      And initially seemed completely uninterested and does not want to start any blood pressure medications but later called asking for prescription.         GOALS:  Goals     • Reduce portion size            BARRIERS TO GOALS:  Patient has history of poor compliance     Preventative:  Male Preventative: Exercises regularly  up to date and documented   Recommended:none  Former smoker  does not drink  eat more fruits and vegetables, decrease soda or juice intake, increase water intake and increase physical activity    RISK SCORE: 3        This document has been electronically signed by Margareth Romo MD on April 2, 2018 3:22 PM

## 2018-04-02 NOTE — TELEPHONE ENCOUNTER
Patient called and requested a refill on his Ventolin inhaler and his Duoneb nebulizer solution.     [facility]    Thank you

## 2018-04-02 NOTE — PATIENT INSTRUCTIONS
Chronic Back Pain  When back pain lasts longer than 3 months, it is called chronic back pain. The cause of your back pain may not be known. Some common causes include:  · Wear and tear (degenerative disease) of the bones, ligaments, or disks in your back.  · Inflammation and stiffness in your back (arthritis).  People who have chronic back pain often go through certain periods in which the pain is more intense (flare-ups). Many people can learn to manage the pain with home care.  Follow these instructions at home:  Pay attention to any changes in your symptoms. Take these actions to help with your pain:  Activity   · Avoid bending and activities that make the problem worse.  · Do not sit or  one place for long periods of time.  · Take brief periods of rest throughout the day. This will reduce your pain. Resting in a lying or standing position is usually better than sitting to rest.  · When you are resting for longer periods, mix in some mild activity or stretching between periods of rest. This will help to prevent stiffness and pain.  · Get regular exercise. Ask your health care provider what activities are safe for you.  · Do not lift anything that is heavier than 10 lb (4.5 kg). Always use proper lifting technique, which includes:  ¨ Bending your knees.  ¨ Keeping the load close to your body.  ¨ Avoiding twisting.  Managing pain   · If directed, apply ice to the painful area. Your health care provider may recommend applying ice during the first 24-48 hours after a flare-up begins.  ¨ Put ice in a plastic bag.  ¨ Place a towel between your skin and the bag.  ¨ Leave the ice on for 20 minutes, 2-3 times per day.  · After icing, apply heat to the affected area as often as told by your health care provider. Use the heat source that your health care provider recommends, such as a moist heat pack or a heating pad.  ¨ Place a towel between your skin and the heat source.  ¨ Leave the heat on for 20-30  minutes.  ¨ Remove the heat if your skin turns bright red. This is especially important if you are unable to feel pain, heat, or cold. You may have a greater risk of getting burned.  · Try soaking in a warm tub.  · Take over-the-counter and prescription medicines only as told by your health care provider.  · Keep all follow-up visits as told by your health care provider. This is important.  Contact a health care provider if:  · You have pain that is not relieved with rest or medicine.  Get help right away if:  · You have weakness or numbness in one or both of your legs or feet.  · You have trouble controlling your bladder or your bowels.  · You have nausea or vomiting.  · You have pain in your abdomen.  · You have shortness of breath or you faint.  This information is not intended to replace advice given to you by your health care provider. Make sure you discuss any questions you have with your health care provider.  Document Released: 01/25/2006 Document Revised: 04/27/2017 Document Reviewed: 06/06/2016  [employer] Interactive Patient Education © 2017 [employer]    Back Pain, Adult  Back pain is very common in adults. The cause of back pain is rarely dangerous and the pain often gets better over time. The cause of your back pain may not be known. Some common causes of back pain include:  · Strain of the muscles or ligaments supporting the spine.  · Wear and tear (degeneration) of the spinal disks.  · Arthritis.  · Direct injury to the back.  For many people, back pain may return. Since back pain is rarely dangerous, most people can learn to manage this condition on their own.  Follow these instructions at home:  Watch your back pain for any changes. The following actions may help to lessen any discomfort you are feeling:  · Remain active. It is stressful on your back to sit or  one place for long periods of time. Do not sit, drive, or  one place for more than 30 minutes at a time. Take short walks  on even surfaces as soon as you are able. Try to increase the length of time you walk each day.  · Exercise regularly as directed by your health care provider. Exercise helps your back heal faster. It also helps avoid future injury by keeping your muscles strong and flexible.  · Do not stay in bed. Resting more than 1-2 days can delay your recovery.  · Pay attention to your body when you bend and lift. The most comfortable positions are those that put less stress on your recovering back. Always use proper lifting techniques, including:  ¨ Bending your knees.  ¨ Keeping the load close to your body.  ¨ Avoiding twisting.  · Find a comfortable position to sleep. Use a firm mattress and lie on your side with your knees slightly bent. If you lie on your back, put a pillow under your knees.  · Avoid feeling anxious or stressed. Stress increases muscle tension and can worsen back pain. It is important to recognize when you are anxious or stressed and learn ways to manage it, such as with exercise.  · Take medicines only as directed by your health care provider. Over-the-counter medicines to reduce pain and inflammation are often the most helpful. Your health care provider may prescribe muscle relaxant drugs. These medicines help dull your pain so you can more quickly return to your normal activities and healthy exercise.  · Apply ice to the injured area:  ¨ Put ice in a plastic bag.  ¨ Place a towel between your skin and the bag.  ¨ Leave the ice on for 20 minutes, 2-3 times a day for the first 2-3 days. After that, ice and heat may be alternated to reduce pain and spasms.  · Maintain a healthy weight. Excess weight puts extra stress on your back and makes it difficult to maintain good posture.  Contact a health care provider if:  · You have pain that is not relieved with rest or medicine.  · You have increasing pain going down into the legs or buttocks.  · You have pain that does not improve in one week.  · You have night  pain.  · You lose weight.  · You have a fever or chills.  Get help right away if:  · You develop new bowel or bladder control problems.  · You have unusual weakness or numbness in your arms or legs.  · You develop nausea or vomiting.  · You develop abdominal pain.  · You feel faint.  This information is not intended to replace advice given to you by your health care provider. Make sure you discuss any questions you have with your health care provider.  Document Released: 2006 Document Revised: 2017 Document Reviewed: 2015  [employer] Interactive Patient Education © 2017 [employer]    Back Exercises  If you have pain in your back, do these exercises 2-3 times each day or as told by your doctor. When the pain goes away, do the exercises once each day, but repeat the steps more times for each exercise (do more repetitions). If you do not have pain in your back, do these exercises once each day or as told by your doctor.  Exercises  Single Knee to Chest     Do these steps 3-5 times in a row for each le. Lie on your back on a firm bed or the floor with your legs stretched out.  2. Bring one knee to your chest.  3. Hold your knee to your chest by grabbing your knee or thigh.  4. Pull on your knee until you feel a gentle stretch in your lower back.  5. Keep doing the stretch for 10-30 seconds.  6. Slowly let go of your leg and straighten it.  Pelvic Tilt     Do these steps 5-10 times in a row:  1. Lie on your back on a firm bed or the floor with your legs stretched out.  2. Bend your knees so they point up to the ceiling. Your feet should be flat on the floor.  3. Tighten your lower belly (abdomen) muscles to press your lower back against the floor. This will make your tailbone point up to the ceiling instead of pointing down to your feet or the floor.  4. Stay in this position for 5-10 seconds while you gently tighten your muscles and breathe evenly.  Cat-Cow     Do these steps until your lower  back bends more easily:  1. Get on your hands and knees on a firm surface. Keep your hands under your shoulders, and keep your knees under your hips. You may put padding under your knees.  2. Let your head hang down, and make your tailbone point down to the floor so your lower back is round like the back of a cat.  3. Stay in this position for 5 seconds.  4. Slowly lift your head and make your tailbone point up to the ceiling so your back hangs low (sags) like the back of a cow.  5. Stay in this position for 5 seconds.  Press-Ups     Do these steps 5-10 times in a row:  1. Lie on your belly (face-down) on the floor.  2. Place your hands near your head, about shoulder-width apart.  3. While you keep your back relaxed and keep your hips on the floor, slowly straighten your arms to raise the top half of your body and lift your shoulders. Do not use your back muscles. To make yourself more comfortable, you may change where you place your hands.  4. Stay in this position for 5 seconds.  5. Slowly return to lying flat on the floor.  Bridges     Do these steps 10 times in a row:  1. Lie on your back on a firm surface.  2. Bend your knees so they point up to the ceiling. Your feet should be flat on the floor.  3. Tighten your butt muscles and lift your butt off of the floor until your waist is almost as high as your knees. If you do not feel the muscles working in your butt and the back of your thighs, slide your feet 1-2 inches farther away from your butt.  4. Stay in this position for 3-5 seconds.  5. Slowly lower your butt to the floor, and let your butt muscles relax.  If this exercise is too easy, try doing it with your arms crossed over your chest.  Sonny Godfreyes     Do these steps 5-10 times in a row:  1. Lie on your back on a firm bed or the floor with your legs stretched out.  2. Bend your knees so they point up to the ceiling. Your feet should be flat on the floor.  3. Cross your arms over your chest.  4. Tip  your chin a little bit toward your chest but do not bend your neck.  5. Tighten your belly muscles and slowly raise your chest just enough to lift your shoulder blades a tiny bit off of the floor.  6. Slowly lower your chest and your head to the floor.  Back Lifts   Do these steps 5-10 times in a row:  1. Lie on your belly (face-down) with your arms at your sides, and rest your forehead on the floor.  2. Tighten the muscles in your legs and your butt.  3. Slowly lift your chest off of the floor while you keep your hips on the floor. Keep the back of your head in line with the curve in your back. Look at the floor while you do this.  4. Stay in this position for 3-5 seconds.  5. Slowly lower your chest and your face to the floor.  Contact a doctor if:  · Your back pain gets a lot worse when you do an exercise.  · Your back pain does not lessen 2 hours after you exercise.  If you have any of these problems, stop doing the exercises. Do not do them again unless your doctor says it is okay.  Get help right away if:  · You have sudden, very bad back pain. If this happens, stop doing the exercises. Do not do them again unless your doctor says it is okay.  This information is not intended to replace advice given to you by your health care provider. Make sure you discuss any questions you have with your health care provider.  Document Released: 01/20/2012 Document Revised: 05/25/2017 Document Reviewed: 02/11/2016  [employer] Interactive Patient Education © 2017 [employer]

## 2018-04-02 NOTE — PROGRESS NOTES
I have seen the patient.  I have reviewed the notes, assessments, and/or procedures performed by Margareth Romo MD , I concur with her/his documentation and assessment and plan for Aquiles Najera.          This document has been electronically signed by Vania Cruz MD on April 2, 2018 3:30 PM

## 2018-04-03 ENCOUNTER — TELEPHONE (OUTPATIENT)
Dept: FAMILY MEDICINE CLINIC | Facility: CLINIC | Age: 43
End: 2018-04-03

## 2018-04-03 PROBLEM — I10 ESSENTIAL HYPERTENSION: Status: ACTIVE | Noted: 2018-04-03

## 2018-04-03 RX ORDER — LISINOPRIL 10 MG/1
10 TABLET ORAL DAILY
Qty: 30 TABLET | Refills: 2 | Status: SHIPPED | OUTPATIENT
Start: 2018-04-03 | End: 2018-05-02

## 2018-04-03 NOTE — TELEPHONE ENCOUNTER
PATIENT CALLED ASKING ABOUT A BLOOD PRESSURE MEDICATION YOU MENTIONED YOU MIGHT PUT HIM ON ?    AND ALSO, THE PHARMACY DID NOT RECEIVE THE DUONEB PRESCRIPTION.    THANK YOU

## 2018-05-02 ENCOUNTER — TELEPHONE (OUTPATIENT)
Dept: FAMILY MEDICINE CLINIC | Facility: CLINIC | Age: 43
End: 2018-05-02

## 2018-05-02 DIAGNOSIS — I10 ESSENTIAL HYPERTENSION: Primary | ICD-10-CM

## 2018-05-02 RX ORDER — CANDESARTAN 4 MG/1
4 TABLET ORAL DAILY
Qty: 30 TABLET | Refills: 1 | Status: SHIPPED | OUTPATIENT
Start: 2018-05-02 | End: 2018-07-01

## 2018-05-23 ENCOUNTER — TELEPHONE (OUTPATIENT)
Dept: FAMILY MEDICINE CLINIC | Facility: CLINIC | Age: 43
End: 2018-05-23

## 2018-05-23 NOTE — TELEPHONE ENCOUNTER
Pt called and asked about [facility] Pt was extremely upset that he hadn't heard back and said his insurance company called him and said they were still waiting on the PA to be done. He said he asked about it on Friday when he called. He was told when he called that he should have kept his appointment because there was nothing we could do since he missed it.     Pt became very belligerent and said that he would call very day until this was done (while using a few choice words in the process).     I told him that his choice of language wasn't necessary and that I couldn't help him if he continued to yell, berate, and cuss at me.     He said this better be done then and soon and then hung up on me.

## 2018-05-30 NOTE — TELEPHONE ENCOUNTER
PT SAYS HIS INS WILL PAY FOR  FLOVENT OR DULERA ARESOL TWICE A DAY IF WE CANT DO A PA ON THE OTHERS.

## 2018-05-31 NOTE — TELEPHONE ENCOUNTER
Tried to call pt to let him know what Dr Romo said in regards to his PA for Qvar.     No answer; phone went straight to automated message saying no voicemail set up.

## 2018-06-30 DIAGNOSIS — I10 ESSENTIAL HYPERTENSION: ICD-10-CM

## 2018-06-30 DIAGNOSIS — M54.6 ACUTE BILATERAL THORACIC BACK PAIN: ICD-10-CM

## 2018-07-01 ENCOUNTER — HOSPITAL ENCOUNTER (OUTPATIENT)
Dept: CT IMAGING | Facility: HOSPITAL | Age: 43
Discharge: HOME | End: 2018-07-01
Admitting: NURSE PRACTITIONER

## 2018-07-01 DIAGNOSIS — R10.9 ABDOMINAL PAIN, UNSPECIFIED ABDOMINAL LOCATION: ICD-10-CM

## 2018-07-01 PROCEDURE — 25010000002 IOPAMIDOL 61 % SOLUTION: Performed by: NURSE PRACTITIONER

## 2018-07-01 PROCEDURE — 0 DIATRIZOATE MEGLUMINE & SODIUM PER 1 ML: Performed by: NURSE PRACTITIONER

## 2018-07-01 PROCEDURE — 74177 CT ABD & PELVIS W/CONTRAST: CPT

## 2018-07-01 RX ADMIN — IOPAMIDOL 90 ML: 612 INJECTION, SOLUTION INTRAVENOUS at 14:30

## 2018-07-01 RX ADMIN — DIATRIZOATE MEGLUMINE AND DIATRIZOATE SODIUM 15 ML: 660; 100 LIQUID ORAL; RECTAL at 14:30

## 2018-07-02 RX ORDER — CANDESARTAN 4 MG/1
TABLET ORAL
Qty: 30 TABLET | Refills: 1 | Status: SHIPPED | OUTPATIENT
Start: 2018-07-02 | End: 2018-08-10

## 2018-07-02 RX ORDER — MELOXICAM 15 MG/1
15 TABLET ORAL DAILY
Qty: 30 TABLET | Refills: 2 | Status: SHIPPED | OUTPATIENT
Start: 2018-07-02 | End: 2018-08-10

## 2018-07-09 ENCOUNTER — OFFICE VISIT (OUTPATIENT)
Dept: FAMILY MEDICINE CLINIC | Facility: CLINIC | Age: 43
End: 2018-07-09

## 2018-07-09 VITALS
DIASTOLIC BLOOD PRESSURE: 88 MMHG | BODY MASS INDEX: 31.13 KG/M2 | SYSTOLIC BLOOD PRESSURE: 130 MMHG | OXYGEN SATURATION: 99 % | RESPIRATION RATE: 20 BRPM | WEIGHT: 205.4 LBS | HEIGHT: 68 IN | HEART RATE: 79 BPM

## 2018-07-09 DIAGNOSIS — H65.01 RIGHT ACUTE SEROUS OTITIS MEDIA, RECURRENCE NOT SPECIFIED: ICD-10-CM

## 2018-07-09 DIAGNOSIS — I10 ESSENTIAL HYPERTENSION: ICD-10-CM

## 2018-07-09 DIAGNOSIS — J44.9 CHRONIC OBSTRUCTIVE PULMONARY DISEASE, UNSPECIFIED COPD TYPE (HCC): ICD-10-CM

## 2018-07-09 DIAGNOSIS — K59.00 CONSTIPATION, UNSPECIFIED CONSTIPATION TYPE: ICD-10-CM

## 2018-07-09 DIAGNOSIS — Z00.00 ENCOUNTER FOR SCREENING AND PREVENTATIVE CARE: Primary | ICD-10-CM

## 2018-07-09 DIAGNOSIS — K21.9 GASTROESOPHAGEAL REFLUX DISEASE, ESOPHAGITIS PRESENCE NOT SPECIFIED: ICD-10-CM

## 2018-07-09 PROCEDURE — 99396 PREV VISIT EST AGE 40-64: CPT | Performed by: NURSE PRACTITIONER

## 2018-07-09 RX ORDER — FLUTICASONE PROPIONATE 110 UG/1
1 AEROSOL, METERED RESPIRATORY (INHALATION)
Qty: 1 INHALER | Refills: 11 | Status: SHIPPED | OUTPATIENT
Start: 2018-07-09 | End: 2018-08-10

## 2018-07-09 RX ORDER — POLYETHYLENE GLYCOL 3350 17 G/17G
17 POWDER, FOR SOLUTION ORAL DAILY
Qty: 765 G | Refills: 5 | Status: SHIPPED | OUTPATIENT
Start: 2018-07-09 | End: 2019-01-10 | Stop reason: SDUPTHER

## 2018-07-09 RX ORDER — ESOMEPRAZOLE MAGNESIUM 20 MG/1
40 TABLET, DELAYED RELEASE ORAL DAILY
Qty: 60 TABLET | Refills: 0 | Status: SHIPPED | OUTPATIENT
Start: 2018-07-09 | End: 2018-08-10 | Stop reason: DRUGHIGH

## 2018-07-09 RX ORDER — ESOMEPRAZOLE MAGNESIUM 20 MG/1
40 TABLET, DELAYED RELEASE ORAL DAILY
Qty: 30 TABLET | Refills: 0 | Status: SHIPPED | OUTPATIENT
Start: 2018-07-09 | End: 2018-07-09 | Stop reason: SDUPTHER

## 2018-07-09 RX ORDER — PREDNISONE 20 MG/1
20 TABLET ORAL DAILY
Qty: 3 TABLET | Refills: 0 | Status: SHIPPED | OUTPATIENT
Start: 2018-07-09 | End: 2018-07-12

## 2018-07-09 NOTE — PATIENT INSTRUCTIONS
Constipation, Adult  Constipation is when a person has fewer bowel movements in a week than normal, has difficulty having a bowel movement, or has stools that are dry, hard, or larger than normal. Constipation may be caused by an underlying condition. It may become worse with age if a person takes certain medicines and does not take in enough fluids.  Follow these instructions at home:  Eating and drinking    · Eat foods that have a lot of fiber, such as fresh fruits and vegetables, whole grains, and beans.  · Limit foods that are high in fat, low in fiber, or overly processed, such as french fries, hamburgers, cookies, candies, and soda.  · Drink enough fluid to keep your urine clear or pale yellow.  General instructions  · Exercise regularly or as told by your health care provider.  · Go to the restroom when you have the urge to go. Do not hold it in.  · Take over-the-counter and prescription medicines only as told by your health care provider. These include any fiber supplements.  · Practice pelvic floor retraining exercises, such as deep breathing while relaxing the lower abdomen and pelvic floor relaxation during bowel movements.  · Watch your condition for any changes.  · Keep all follow-up visits as told by your health care provider. This is important.  Contact a health care provider if:  · You have pain that gets worse.  · You have a fever.  · You do not have a bowel movement after 4 days.  · You vomit.  · You are not hungry.  · You lose weight.  · You are bleeding from the anus.  · You have thin, pencil-like stools.  Get help right away if:  · You have a fever and your symptoms suddenly get worse.  · You leak stool or have blood in your stool.  · Your abdomen is bloated.  · You have severe pain in your abdomen.  · You feel dizzy or you faint.  This information is not intended to replace advice given to you by your health care provider. Make sure you discuss any questions you have with your health care  provider.  Document Released: 09/15/2005 Document Revised: 07/07/2017 Document Reviewed: 06/07/2017  [employer] Interactive Patient Education © 2018 [employer]  Chronic Obstructive Pulmonary Disease  Chronic obstructive pulmonary disease (COPD) is a long-term (chronic) condition that affects the lungs. COPD is a general term that can be used to describe many different lung problems that cause lung swelling (inflammation) and limit airflow, including chronic bronchitis and emphysema. If you have COPD, your lung function will probably never return to normal. In most cases, it gets worse over time. However, there are steps you can take to slow the progression of the disease and improve your quality of life.  What are the causes?  This condition may be caused by:  · Smoking. This is the most common cause.  · Certain genes passed down through families.    What increases the risk?  The following factors may make you more likely to develop this condition:  · Secondhand smoke from cigarettes, pipes, or cigars.  · Exposure to chemicals and other irritants such as fumes and dust in the work environment.  · Chronic lung conditions or infections.    What are the signs or symptoms?  Symptoms of this condition include:  · Shortness of breath, especially during physical activity.  · Chronic cough with a large amount of thick mucus. Sometimes the cough may not have any mucus (dry cough).  · Wheezing.  · Rapid breaths.  · Gray or bluish discoloration (cyanosis) of the skin, especially in your fingers, toes, or lips.  · Feeling tired (fatigue).  · Weight loss.  · Chest tightness.  · Frequent infections.  · Episodes when breathing symptoms become much worse (exacerbations).  · Swelling in the ankles, feet, or legs. This may occur in later stages of the disease.    How is this diagnosed?  This condition is diagnosed based on:  · Your medical history.  · A physical exam.    You may also have tests, including:  · Lung (pulmonary)  function tests. This may include a spirometry test, which measures your ability to exhale properly.  · Chest X-ray.  · CT scan.  · Blood tests.    How is this treated?  This condition may be treated with:  · Medicines. These may include inhaled rescue medicines to treat acute exacerbations as well as long-term, or maintenance, medicines to prevent flare-ups of COPD.  ? Bronchodilators help treat COPD by dilating the airways to allow increased airflow and make your breathing more comfortable.  ? Steroids can reduce airway inflammation and help prevent exacerbations.  · Smoking cessation. If you smoke, your health care provider may ask you to quit, and may also recommend therapy or replacement products to help you quit.  · Pulmonary rehabilitation. This may involve working with a team of health care providers and specialists, such as respiratory, occupational, and physical therapists.  · Exercise and physical activity. These are beneficial for nearly all people with COPD.  · Nutrition therapy to gain weight, if you are underweight.  · Oxygen. Supplemental oxygen therapy is only helpful if you have a low oxygen level in your blood (hypoxemia).  · Lung surgery or transplant.  · Palliative care. This is to help people with COPD feel comfortable when treatment is no longer working.    Follow these instructions at home:  Medicines  · Take over-the-counter and prescription medicines (inhaled or pills) only as told by your health care provider.  · Talk to your health care provider before taking any cough or allergy medicines. You may need to avoid certain medicines that dry out your airways.  Lifestyle  · If you are a smoker, the most important thing that you can do is to stop smoking. Do not use any products that contain nicotine or tobacco, such as cigarettes and e-cigarettes. If you need help quitting, ask your health care provider. Continuing to smoke will cause the disease to progress faster.  · Avoid exposure to things  that irritate your lungs, such as smoke, chemicals, and fumes.  · Stay active, but balance activity with periods of rest. Exercise and physical activity will help you maintain your ability to do things you want to do.  · Learn and use relaxation techniques to manage stress and to control your breathing.  · Get the right amount of sleep and get quality sleep. Most adults need 7 or more hours per night.  · Eat healthy foods. Eating smaller, more frequent meals and resting before meals may help you maintain your strength.  Controlled breathing  Learn and use controlled breathing techniques as directed by your health care provider. Controlled breathing techniques include:  · Pursed lip breathing. Start by breathing in (inhaling) through your nose for 1 second. Then, purse your lips as if you were going to whistle and breathe out (exhale) through the pursed lips for 2 seconds.  · Diaphragmatic breathing. Start by putting one hand on your abdomen just above your waist. Inhale slowly through your nose. The hand on your abdomen should move out. Then purse your lips and exhale slowly. You should be able to feel the hand on your abdomen moving in as you exhale.    Controlled coughing  Learn and use controlled coughing to clear mucus from your lungs. Controlled coughing is a series of short, progressive coughs. The steps of controlled coughing are:  1. Lean your head slightly forward.  2. Breathe in deeply using diaphragmatic breathing.  3. Try to hold your breath for 3 seconds.  4. Keep your mouth slightly open while coughing twice.  5. Spit any mucus out into a tissue.  6. Rest and repeat the steps once or twice as needed.    General instructions  · Make sure you receive all the vaccines that your health care provider recommends, especially the pneumococcal and influenza vaccines. Preventing infection and hospitalization is very important when you have COPD.  · Use oxygen therapy and pulmonary rehabilitation if directed to  by your health care provider. If you require home oxygen therapy, ask your health care provider whether you should purchase a pulse oximeter to measure your oxygen level at home.  · Work with your health care provider to develop a COPD action plan. This will help you know what steps to take if your condition gets worse.  · Keep other chronic health conditions under control as told by your health care provider.  · Avoid extreme temperature and humidity changes.  · Avoid contact with people who have an illness that spreads from person to person (is contagious), such as viral infections or pneumonia.  · Keep all follow-up visits as told by your health care provider. This is important.  Contact a health care provider if:  · You are coughing up more mucus than usual.  · There is a change in the color or thickness of your mucus.  · Your breathing is more labored than usual.  · Your breathing is faster than usual.  · You have difficulty sleeping.  · You need to use your rescue medicines or inhalers more often than expected.  · You have trouble doing routine activities such as getting dressed or walking around the house.  Get help right away if:  · You have shortness of breath while you are resting.  · You have shortness of breath that prevents you from:  ? Being able to talk.  ? Performing your usual physical activities.  · You have chest pain lasting longer than 5 minutes.  · Your skin color is more blue (cyanotic) than usual.  · You measure low oxygen saturations for longer than 5 minutes with a pulse oximeter.  · You have a fever.  · You feel too tired to breathe normally.  Summary  · Chronic obstructive pulmonary disease (COPD) is a long-term (chronic) condition that affects the lungs.  · Your lung function will probably never return to normal. In most cases, it gets worse over time. However, there are steps you can take to slow the progression of the disease and improve your quality of life.  · Treatment for COPD may  include taking medicines, quitting smoking, pulmonary rehabilitation, and changes to diet and exercise. As the disease progresses, you may need oxygen therapy, a lung transplant, or palliative care.  · To help manage your condition, do not smoke, avoid exposure to things that irritate your lungs, stay up to date on all vaccines, and follow your health care provider's instructions for taking medicines.  This information is not intended to replace advice given to you by your health care provider. Make sure you discuss any questions you have with your health care provider.  Document Released: 09/27/2006 Document Revised: 01/22/2018 Document Reviewed: 01/22/2018  [employer] Interactive Patient Education © 2018 [employer]

## 2018-07-09 NOTE — PROGRESS NOTES
Subjective   Aquiles Najera is a 42 y.o. male.     Mr. Najera is a 42-year-old male who presents today for a wellness visit and to establish care.         The following portions of the patient's history were reviewed and updated as appropriate: allergies, current medications, past family history, past medical history, past social history, past surgical history and problem list.    Review of Systems   Constitutional: Negative for activity change, appetite change and unexpected weight gain.   HENT: Negative.  Negative for congestion, sneezing, sore throat, trouble swallowing and voice change.    Eyes: Negative.    Respiratory: Negative for choking and shortness of breath.         Patient is a former smoker and reports history of COPD.  Patient states he is intolerant to powder inhalers.  Patient requests to aerosol-type inhalers.   Cardiovascular:        Patient denies chest pain shortness of breath or palpitations.  Patient states he does not take his prescribed blood pressure medication.  Historically over the last 3 months patient's blood pressure has been less than 140/90 without any medication.   Gastrointestinal: Positive for constipation, GERD and indigestion. Negative for diarrhea and vomiting.        Patient states that he has a history of GERD.  Has difficulty obtaining over-the-counter PPIs due to financial reasons.  Insurance does not cover his PPIs because they are over-the-counter.   Endocrine: Negative.    Genitourinary: Negative for dysuria, hematuria, penile pain and scrotal swelling.   Musculoskeletal: Negative for neck pain and neck stiffness.   Skin: Negative.    Psychiatric/Behavioral: Negative for agitation, suicidal ideas and depressed mood. The patient is not nervous/anxious.        Objective   Physical Exam   Constitutional: He is oriented to person, place, and time. He appears well-developed and well-nourished. No distress.   HENT:   Head: Normocephalic and atraumatic.   Right Ear:  External ear normal. No swelling or tenderness. Tympanic membrane is not erythematous. A middle ear effusion is present.   Left Ear: External ear normal. No swelling or tenderness. Tympanic membrane is not erythematous.  No middle ear effusion.   Nose: Nose normal.   Mouth/Throat: Oropharynx is clear and moist.   Eyes: Conjunctivae and EOM are normal. Pupils are equal, round, and reactive to light.   Neck: Normal range of motion. Neck supple.   Cardiovascular: Normal rate, regular rhythm, normal heart sounds and intact distal pulses.  Exam reveals no gallop and no friction rub.    No murmur heard.  Pulmonary/Chest: Effort normal and breath sounds normal. No stridor. No respiratory distress. He has no wheezes. He has no rales.   Abdominal: Soft. Normal appearance. He exhibits no distension and no mass. Bowel sounds are decreased. There is no tenderness. There is no rebound and no guarding. No hernia.   Musculoskeletal: Normal range of motion. He exhibits no edema.   Lymphadenopathy:     He has no cervical adenopathy.   Neurological: He is alert and oriented to person, place, and time. No cranial nerve deficit.   Skin: Skin is warm and dry. No rash noted. He is not diaphoretic. No pallor.   Psychiatric: He has a normal mood and affect. His behavior is normal. Judgment and thought content normal.   Nursing note and vitals reviewed.        Assessment/Plan   Aquiles was seen today for establish care.    Diagnoses and all orders for this visit:    Encounter for screening and preventative care  -     CBC & Differential; Future  -     Comprehensive Metabolic Panel; Future  -     Hemoglobin A1c; Future  -     TSH; Future  -     T4, Free; Future  -     Lipid Panel; Future  -     Urinalysis With Microscopic If Indicated (No Culture) - Urine, Clean Catch; Future    Chronic obstructive pulmonary disease, unspecified COPD type (CMS/HCC)    Constipation, unspecified constipation type    Right acute serous otitis media, recurrence  not specified    Essential hypertension    Gastroesophageal reflux disease, esophagitis presence not specified    Other orders  -     fluticasone (FLOVENT HFA) 110 MCG/ACT inhaler; Inhale 1 puff 2 (Two) Times a Day.  -     polyethylene glycol (MIRALAX) powder; Take 17 g by mouth Daily.  -     predniSONE (DELTASONE) 20 MG tablet; Take 1 tablet by mouth Daily for 3 days.  -     Discontinue: Esomeprazole Magnesium 20 MG tablet delayed-release; Take 40 mg by mouth Daily.  -     Esomeprazole Magnesium 20 MG tablet delayed-release; Take 40 mg by mouth Daily.    1.  Health maintenance-routine lab work ordered today.  Will call results.    2.  COPD-Flovent inhaler ordered.  Follow-up in one month.  Patient to call if medication is ineffective before 1 month.    3.  Constipation-MiraLAX daily ordered.  Patient instructed to increase fluid intake and fiber intake.    4.  Right ear serous otitis media-continue Claritin and Flonase daily and is on 20 mg by mouth daily ×3 days ordered.  Patient to call if symptoms do not improve.    5.  Hypertension-patient states he has never taken his blood pressure medication.  Blood pressure over the last 3 months has been less than 140/90.  Patient's blood pressure medicationremoved from med list.  Reevaluate blood pressure in one month and next follow-up.    6.  GERD-Nexium 20 mg 2 tablets daily.  If this is not covered and patient is unable to obtain will pursue other options.  Will follow up in 1 month appointment.    7.  Follow-up appointment in 1 month or sooner as needed.            This document has been electronically signed by ERIC Oleary on July 9, 2018 3:28 PM

## 2018-07-16 ENCOUNTER — LAB (OUTPATIENT)
Dept: LAB | Facility: HOSPITAL | Age: 43
End: 2018-07-16

## 2018-07-16 DIAGNOSIS — R79.89 LOW TSH LEVEL: ICD-10-CM

## 2018-07-16 DIAGNOSIS — R79.89 LOW TSH LEVEL: Primary | ICD-10-CM

## 2018-07-16 DIAGNOSIS — Z00.00 ENCOUNTER FOR SCREENING AND PREVENTATIVE CARE: ICD-10-CM

## 2018-07-16 LAB
ALBUMIN SERPL-MCNC: 4.6 G/DL (ref 3.4–4.8)
ALBUMIN/GLOB SERPL: 1.4 G/DL (ref 1.1–1.8)
ALP SERPL-CCNC: 134 U/L (ref 38–126)
ALT SERPL W P-5'-P-CCNC: 81 U/L (ref 21–72)
ANION GAP SERPL CALCULATED.3IONS-SCNC: 8 MMOL/L (ref 5–15)
ARTICHOKE IGE QN: 119 MG/DL (ref 1–129)
AST SERPL-CCNC: 69 U/L (ref 17–59)
BASOPHILS # BLD AUTO: 0.02 10*3/MM3 (ref 0–0.2)
BASOPHILS NFR BLD AUTO: 0.3 % (ref 0–2)
BILIRUB SERPL-MCNC: 0.7 MG/DL (ref 0.2–1.3)
BILIRUB UR QL STRIP: NEGATIVE
BUN BLD-MCNC: 20 MG/DL (ref 7–21)
BUN/CREAT SERPL: 25.6 (ref 7–25)
CALCIUM SPEC-SCNC: 9.2 MG/DL (ref 8.4–10.2)
CHLORIDE SERPL-SCNC: 102 MMOL/L (ref 95–110)
CHOLEST SERPL-MCNC: 212 MG/DL (ref 0–199)
CLARITY UR: CLEAR
CO2 SERPL-SCNC: 29 MMOL/L (ref 22–31)
COLOR UR: YELLOW
CREAT BLD-MCNC: 0.78 MG/DL (ref 0.7–1.3)
DEPRECATED RDW RBC AUTO: 42.2 FL (ref 35.1–43.9)
EOSINOPHIL # BLD AUTO: 0 10*3/MM3 (ref 0–0.7)
EOSINOPHIL NFR BLD AUTO: 0 % (ref 0–7)
ERYTHROCYTE [DISTWIDTH] IN BLOOD BY AUTOMATED COUNT: 14.5 % (ref 11.5–14.5)
GFR SERPL CREATININE-BSD FRML MDRD: 110 ML/MIN/1.73 (ref 63–147)
GLOBULIN UR ELPH-MCNC: 3.3 GM/DL (ref 2.3–3.5)
GLUCOSE BLD-MCNC: 101 MG/DL (ref 60–100)
GLUCOSE UR STRIP-MCNC: NEGATIVE MG/DL
HBA1C MFR BLD: 5.5 % (ref 4–5.6)
HCT VFR BLD AUTO: 46.6 % (ref 39–49)
HDLC SERPL-MCNC: 68 MG/DL (ref 60–200)
HGB BLD-MCNC: 15.9 G/DL (ref 13.7–17.3)
HGB UR QL STRIP.AUTO: NEGATIVE
IMM GRANULOCYTES # BLD: 0.03 10*3/MM3 (ref 0–0.02)
IMM GRANULOCYTES NFR BLD: 0.4 % (ref 0–0.5)
KETONES UR QL STRIP: NEGATIVE
LDLC/HDLC SERPL: 1.61 {RATIO} (ref 0–3.55)
LEUKOCYTE ESTERASE UR QL STRIP.AUTO: NEGATIVE
LYMPHOCYTES # BLD AUTO: 2.71 10*3/MM3 (ref 0.6–4.2)
LYMPHOCYTES NFR BLD AUTO: 37.4 % (ref 10–50)
MCH RBC QN AUTO: 27.1 PG (ref 26.5–34)
MCHC RBC AUTO-ENTMCNC: 34.1 G/DL (ref 31.5–36.3)
MCV RBC AUTO: 79.4 FL (ref 80–98)
MONOCYTES # BLD AUTO: 0.4 10*3/MM3 (ref 0–0.9)
MONOCYTES NFR BLD AUTO: 5.5 % (ref 0–12)
NEUTROPHILS # BLD AUTO: 4.08 10*3/MM3 (ref 2–8.6)
NEUTROPHILS NFR BLD AUTO: 56.4 % (ref 37–80)
NITRITE UR QL STRIP: NEGATIVE
PH UR STRIP.AUTO: 7 [PH] (ref 5–9)
PLATELET # BLD AUTO: 220 10*3/MM3 (ref 150–450)
PMV BLD AUTO: 10.1 FL (ref 8–12)
POTASSIUM BLD-SCNC: 4.9 MMOL/L (ref 3.5–5.1)
PROT SERPL-MCNC: 7.9 G/DL (ref 6.3–8.6)
PROT UR QL STRIP: NEGATIVE
RBC # BLD AUTO: 5.87 10*6/MM3 (ref 4.37–5.74)
SODIUM BLD-SCNC: 139 MMOL/L (ref 137–145)
SP GR UR STRIP: 1.02 (ref 1–1.03)
T4 FREE SERPL-MCNC: 1.03 NG/DL (ref 0.78–2.19)
TRIGL SERPL-MCNC: 174 MG/DL (ref 20–199)
TSH SERPL DL<=0.05 MIU/L-ACNC: <0.02 MIU/ML (ref 0.46–4.68)
UROBILINOGEN UR QL STRIP: NORMAL
WBC NRBC COR # BLD: 7.24 10*3/MM3 (ref 3.2–9.8)

## 2018-07-16 PROCEDURE — 84443 ASSAY THYROID STIM HORMONE: CPT

## 2018-07-16 PROCEDURE — 81003 URINALYSIS AUTO W/O SCOPE: CPT

## 2018-07-16 PROCEDURE — 84481 FREE ASSAY (FT-3): CPT

## 2018-07-16 PROCEDURE — 86376 MICROSOMAL ANTIBODY EACH: CPT

## 2018-07-16 PROCEDURE — 36415 COLL VENOUS BLD VENIPUNCTURE: CPT

## 2018-07-16 PROCEDURE — 80061 LIPID PANEL: CPT

## 2018-07-16 PROCEDURE — 84439 ASSAY OF FREE THYROXINE: CPT

## 2018-07-16 PROCEDURE — 80053 COMPREHEN METABOLIC PANEL: CPT

## 2018-07-16 PROCEDURE — 83036 HEMOGLOBIN GLYCOSYLATED A1C: CPT

## 2018-07-16 PROCEDURE — 85025 COMPLETE CBC W/AUTO DIFF WBC: CPT

## 2018-07-17 LAB
T3FREE SERPL-MCNC: 3.4 PG/ML (ref 2–4.4)
THYROPEROXIDASE AB SERPL-ACNC: >600 IU/ML (ref 0–34)

## 2018-07-17 NOTE — PROGRESS NOTES
Please let Mr Najera know that his labs came back. His total cholesterol was slightly elevated at 212. Encourage him to continue with diet and exercise and we will reevaluate in 1 year. His TSH was low and I have sent off additional lab work from the lab sample he provided for further testing. It will take up to 7 days for it to come back and we will call with results and if we need to see him again we will make an appt at that time.. Thank you.

## 2018-07-18 DIAGNOSIS — R79.89 LOW THYROID STIMULATING HORMONE (TSH) LEVEL: Primary | ICD-10-CM

## 2018-07-18 NOTE — PROGRESS NOTES
Please make a referral to Endocrinology to see Dr. Fernandez for suppressed TSH less than 0.02 and TPO over 600.   Please call Mr. Najera and tell him that his lab work indicates that his thyroid is not properly functioning and we are getting him an appointment to see and Endocrinologist to further evaluate this. If he has any issues before seeing Dr. Fernandez have him follow up with me. Thank you.

## 2018-07-23 ENCOUNTER — TRANSCRIBE ORDERS (OUTPATIENT)
Dept: PHYSICAL THERAPY | Facility: HOSPITAL | Age: 43
End: 2018-07-23

## 2018-07-23 DIAGNOSIS — M54.12 CERVICAL RADICULITIS: Primary | ICD-10-CM

## 2018-08-03 ENCOUNTER — HOSPITAL ENCOUNTER (OUTPATIENT)
Dept: PHYSICAL THERAPY | Facility: HOSPITAL | Age: 43
Setting detail: THERAPIES SERIES
Discharge: HOME | End: 2018-08-03

## 2018-08-03 DIAGNOSIS — M54.12 CERVICAL RADICULITIS: Primary | ICD-10-CM

## 2018-08-03 PROCEDURE — 97140 MANUAL THERAPY 1/> REGIONS: CPT

## 2018-08-03 PROCEDURE — 97161 PT EVAL LOW COMPLEX 20 MIN: CPT

## 2018-08-10 ENCOUNTER — OFFICE VISIT (OUTPATIENT)
Dept: ENDOCRINOLOGY | Facility: CLINIC | Age: 43
End: 2018-08-10

## 2018-08-10 ENCOUNTER — OFFICE VISIT (OUTPATIENT)
Dept: FAMILY MEDICINE CLINIC | Facility: CLINIC | Age: 43
End: 2018-08-10

## 2018-08-10 ENCOUNTER — HOSPITAL ENCOUNTER (OUTPATIENT)
Dept: PHYSICAL THERAPY | Facility: HOSPITAL | Age: 43
Setting detail: THERAPIES SERIES
Discharge: HOME | End: 2018-08-10

## 2018-08-10 ENCOUNTER — APPOINTMENT (OUTPATIENT)
Dept: LAB | Facility: HOSPITAL | Age: 43
End: 2018-08-10

## 2018-08-10 ENCOUNTER — TELEPHONE (OUTPATIENT)
Dept: ENDOCRINOLOGY | Facility: CLINIC | Age: 43
End: 2018-08-10

## 2018-08-10 VITALS
HEIGHT: 68 IN | TEMPERATURE: 97.7 F | HEART RATE: 73 BPM | RESPIRATION RATE: 20 BRPM | BODY MASS INDEX: 30.3 KG/M2 | WEIGHT: 199.9 LBS | OXYGEN SATURATION: 99 % | DIASTOLIC BLOOD PRESSURE: 86 MMHG | SYSTOLIC BLOOD PRESSURE: 130 MMHG

## 2018-08-10 VITALS
HEART RATE: 73 BPM | SYSTOLIC BLOOD PRESSURE: 124 MMHG | DIASTOLIC BLOOD PRESSURE: 90 MMHG | WEIGHT: 201 LBS | BODY MASS INDEX: 30.46 KG/M2 | HEIGHT: 68 IN

## 2018-08-10 DIAGNOSIS — M54.12 CERVICAL RADICULITIS: Primary | ICD-10-CM

## 2018-08-10 DIAGNOSIS — E78.00 ELEVATED CHOLESTEROL: Primary | ICD-10-CM

## 2018-08-10 DIAGNOSIS — E04.9 ENLARGED THYROID: ICD-10-CM

## 2018-08-10 DIAGNOSIS — J44.9 CHRONIC OBSTRUCTIVE PULMONARY DISEASE, UNSPECIFIED COPD TYPE (HCC): ICD-10-CM

## 2018-08-10 DIAGNOSIS — M25.511 ACUTE PAIN OF RIGHT SHOULDER: ICD-10-CM

## 2018-08-10 DIAGNOSIS — R94.6 ABNORMAL THYROID FUNCTION TEST: Primary | ICD-10-CM

## 2018-08-10 DIAGNOSIS — I10 HYPERTENSION, UNSPECIFIED TYPE: ICD-10-CM

## 2018-08-10 DIAGNOSIS — K21.9 GASTROESOPHAGEAL REFLUX DISEASE, ESOPHAGITIS PRESENCE NOT SPECIFIED: ICD-10-CM

## 2018-08-10 DIAGNOSIS — R79.89 LOW TSH LEVEL: ICD-10-CM

## 2018-08-10 DIAGNOSIS — E05.90 HYPERTHYROIDISM: Primary | ICD-10-CM

## 2018-08-10 LAB
T3 SERPL-MCNC: 220 NG/DL (ref 97–169)
T4 FREE SERPL-MCNC: 1.3 NG/DL (ref 0.78–2.19)
TSH SERPL DL<=0.05 MIU/L-ACNC: <0.02 MIU/ML (ref 0.46–4.68)

## 2018-08-10 PROCEDURE — 84445 ASSAY OF TSI GLOBULIN: CPT | Performed by: NURSE PRACTITIONER

## 2018-08-10 PROCEDURE — 97140 MANUAL THERAPY 1/> REGIONS: CPT

## 2018-08-10 PROCEDURE — G0283 ELEC STIM OTHER THAN WOUND: HCPCS

## 2018-08-10 PROCEDURE — 86376 MICROSOMAL ANTIBODY EACH: CPT | Performed by: NURSE PRACTITIONER

## 2018-08-10 PROCEDURE — 36415 COLL VENOUS BLD VENIPUNCTURE: CPT | Performed by: NURSE PRACTITIONER

## 2018-08-10 PROCEDURE — 99244 OFF/OP CNSLTJ NEW/EST MOD 40: CPT | Performed by: NURSE PRACTITIONER

## 2018-08-10 PROCEDURE — 84439 ASSAY OF FREE THYROXINE: CPT | Performed by: NURSE PRACTITIONER

## 2018-08-10 PROCEDURE — 84443 ASSAY THYROID STIM HORMONE: CPT | Performed by: NURSE PRACTITIONER

## 2018-08-10 PROCEDURE — 84480 ASSAY TRIIODOTHYRONINE (T3): CPT | Performed by: NURSE PRACTITIONER

## 2018-08-10 PROCEDURE — 83520 IMMUNOASSAY QUANT NOS NONAB: CPT | Performed by: NURSE PRACTITIONER

## 2018-08-10 PROCEDURE — 99213 OFFICE O/P EST LOW 20 MIN: CPT | Performed by: NURSE PRACTITIONER

## 2018-08-10 RX ORDER — CYCLOBENZAPRINE HCL 5 MG
5 TABLET ORAL 2 TIMES DAILY PRN
Qty: 15 TABLET | Refills: 0 | Status: SHIPPED | OUTPATIENT
Start: 2018-08-10 | End: 2018-09-07 | Stop reason: SDDI

## 2018-08-10 RX ORDER — METHIMAZOLE 10 MG/1
10 TABLET ORAL DAILY
Qty: 30 TABLET | Refills: 2 | Status: SHIPPED | OUTPATIENT
Start: 2018-08-10 | End: 2018-09-20 | Stop reason: DRUGHIGH

## 2018-08-10 RX ORDER — ESOMEPRAZOLE MAGNESIUM 40 MG/1
40 FOR SUSPENSION ORAL
Qty: 30 EACH | Refills: 6 | Status: SHIPPED | OUTPATIENT
Start: 2018-08-10 | End: 2018-08-15

## 2018-08-10 NOTE — THERAPY TREATMENT NOTE
Outpatient Physical Therapy Ortho Treatment Note  [facility]     Patient Name: Aquiles Najera  : 1975  MRN: [id number]  Today's Date: 8/10/2018      Visit Date: 08/10/2018  Recert: 18  Visit: /2  MD f/u: 5 weeks  Sub Imp: n/a      Visit Dx:    ICD-10-CM ICD-9-CM   1. Cervical radiculitis M54.12 723.4       Patient Active Problem List   Diagnosis   • Umbilical hernia without obstruction or gangrene   • Tobacco dependence syndrome   • Gastroesophageal reflux disease   • Dyslipidemia   • Chest pain   • Allergic rhinitis   • COPD (chronic obstructive pulmonary disease) - presumed   • Constipation   • Cocaine abuse in remission   • Pre-syncope   • Essential hypertension   • Abnormal thyroid function test   • Enlarged thyroid        Past Medical History:   Diagnosis Date   • Abdominal pain    • Acute exacerbation of chronic obstructive airways disease (CMS/HCC)    • Allergic rhinitis    • Chest pain     unspecified   • Chronic cough    • Conjunctivitis    • Dyslipidemia    • Dyspnea    • Gastroesophageal reflux disease    • Pain     Pain in left ankle and joints of left foot      • Pain     pain in left leg   • Tobacco dependence syndrome    • Umbilical hernia without obstruction or gangrene    • Viral gastroenteritis         Past Surgical History:   Procedure Laterality Date   • INCISION AND DRAINAGE ABSCESS  2009    Irrigation and debridement of facial soft tissue injuries with closure of complex lacerations and repair of maxillary dental alveolar fracture   • UMBILICAL HERNIA REPAIR N/A 2017    Procedure: OPEN UMBILICAL AND EPIGASTRIC HERNIA REPAIR WITH MESH;  Surgeon: Sal Herbert MD;  Location: [facility];  Service:              PT Ortho     Row Name 08/10/18 1000       Precautions and Contraindications    Precautions hx of chest pains in the past  -MW       Subjective Pain    Post-Treatment Pain Level 5  -MW       Posture/Observations    Posture/Observations Comments  Pt has significant guarding present in R scap stabilizers. Large palpable triggerpoints present throughout thoracic paraspinals.  -MW      User Key  (r) = Recorded By, (t) = Taken By, (c) = Cosigned By    Initials Name Provider Type    Sabiha Le, PT Physical Therapist                            PT Assessment/Plan     Row Name 08/10/18 1000          PT Assessment    Assessment Comments The pt tolerated tx well today. The pt is requesting to come more frequently secondary to increased shoulder pain.  Re-examined pt's shoulder today and pt does have increased soft-tissue restrictions compared to previous session. Adjusting pt's POC to incorporate possibly 2x/week if pt's schedule allows.  It is my impression the pt's goals and bout of therapy will be completed in approx 4 weeks.   -MW     Patient/caregiver participated in establishment of treatment plan and goals Yes  -MW     Patient would benefit from skilled therapy intervention Yes  -MW        PT Plan    PT Frequency 1x/week;2x/week  -MW     Predicted Duration of Therapy Intervention (Therapy Eval) 4 weeks  -MW     PT Plan Comments Cont per POC.  -MW       User Key  (r) = Recorded By, (t) = Taken By, (c) = Cosigned By    Initials Name Provider Type    Sabiha Le, PT Physical Therapist                Modalities     Row Name 08/10/18 1000             Moist Heat    MH Applied Yes   w/ IFC  -MW      Location R scapula  -MW      Rx Minutes 15 mins   not billed  -MW         ELECTRICAL STIMULATION    Attended/Unattended Unattended  -MW      Stimulation Type IFC  -MW      Max mAmp 9.5  -MW      Location/Electrode Placement/Other thoracic paraspinals/scap stabilizers- 15 mins  -MW        User Key  (r) = Recorded By, (t) = Taken By, (c) = Cosigned By    Initials Name Provider Type    Sabiha Le, PT Physical Therapist                Exercises     Row Name 08/10/18 1000             Subjective Comments    Subjective Comments The pt states he  felt  "much better following last tx session for approx 24 hours; however, he states his shoulder now hurts worse than ever. he states he thinks he may have \"over done it\". He states he would like to try and come more frequently to PT (possibly 2x/week) secondary to his sxs worsening and him wanting his shoulder to heal more quickly than it is. he consents to tx.  -MW         Subjective Pain    Able to rate subjective pain? yes  -MW      Pre-Treatment Pain Level 10  -MW      Post-Treatment Pain Level 5  -MW        User Key  (r) = Recorded By, (t) = Taken By, (c) = Cosigned By    Initials Name Provider Type    MW Sabiha Maravilla, PT Physical Therapist                        Manual Rx (last 36 hours)      Manual Treatments     Row Name 08/10/18 1100             Manual Rx 1    Manual Rx 1 Location R scap stabilzers/paraspinals/UT  -MW      Manual Rx 1 Type MFR  -MW      Manual Rx 1 Duration 15'  -MW         Manual Rx 2    Manual Rx 2 Location thoracic paraspinals  -MW      Manual Rx 2 Type triggerpooint release/PA mobs  -MW      Manual Rx 2 Duration 10'  -MW        User Key  (r) = Recorded By, (t) = Taken By, (c) = Cosigned By    Initials Name Provider Type    MW Sabiha Maravilla, PT Physical Therapist                PT OP Goals     Row Name 08/10/18 1000          PT Short Term Goals    STG 1 STG deferred  -MW        Long Term Goals    LTG Date to Achieve 08/31/18  -MW     LTG 1 pt will be independent w/ HEP  -MW     LTG 1 Progress Progressing  -MW     LTG 2 Pt will report subjective improvement >50%  -MW     LTG 2 Progress Progressing  -MW     LTG 3 pt will tolerate ADLs and work activities w/ <2/10 pain   -MW     LTG 3 Progress Progressing  -MW        Time Calculation    PT Goal Re-Cert Due Date 08/24/18  -MW       User Key  (r) = Recorded By, (t) = Taken By, (c) = Cosigned By    Initials Name Provider Type    Sabiha Le, PT Physical Therapist                         Time Calculation:   Start Time: 1050  Stop " Time: 1140  Time Calculation (min): 50 min  Total Timed Code Minutes- PT: 25 minute(s)     Therapy Suggested Charges     Code   Minutes Charges    None           Therapy Charges for Today     Code Description Service Date Service Provider Modifiers Qty    69895571627 HC PT THER SUPP EA 15 MIN 8/10/2018 Sabiha Maravilla, PT GP 1    89919099111 HC PT MANUAL THERAPY EA 15 MIN 8/10/2018 Sabiha Maravilla, PT GP 2    19519665074 HC PT ELECTRICAL STIM UNATTENDED 8/10/2018 Sabiha Maarvilla, PT  1                    Sabiha Maravilla PT  8/10/2018

## 2018-08-10 NOTE — PATIENT INSTRUCTIONS
Cholesterol  Cholesterol is a white, waxy, fat-like substance that is needed by the human body in small amounts. The liver makes all the cholesterol we need. Cholesterol is carried from the liver by the blood through the blood vessels. Deposits of cholesterol (plaques) may build up on blood vessel (artery) walls. Plaques make the arteries narrower and stiffer. Cholesterol plaques increase the risk for heart attack and stroke.  You cannot feel your cholesterol level even if it is very high. The only way to know that it is high is to have a blood test. Once you know your cholesterol levels, you should keep a record of the test results. Work with your health care provider to keep your levels in the desired range.  What do the results mean?  · Total cholesterol is a rough measure of all the cholesterol in your blood.  · LDL (low-density lipoprotein) is the “bad” cholesterol. This is the type that causes plaque to build up on the artery walls. You want this level to be low.  · HDL (high-density lipoprotein) is the “good” cholesterol because it cleans the arteries and carries the LDL away. You want this level to be high.  · Triglycerides are fat that the body can either burn for energy or store. High levels are closely linked to heart disease.  What are the desired levels of cholesterol?  · Total cholesterol below 200.  · LDL below 100 for people who are at risk, below 70 for people at very high risk.  · HDL above 40 is good. A level of 60 or higher is considered to be protective against heart disease.  · Triglycerides below 150.  How can I lower my cholesterol?  Diet  Follow your diet program as told by your health care provider.  · Choose fish or white meat chicken and turkey, roasted or baked. Limit fatty cuts of red meat, fried foods, and processed meats, such as sausage and lunch meats.  · Eat lots of fresh fruits and vegetables.  · Choose whole grains, beans, pasta, potatoes, and cereals.  · Choose olive oil, corn  oil, or canola oil, and use only small amounts.  · Avoid butter, mayonnaise, shortening, or palm kernel oils.  · Avoid foods with trans fats.  · Drink skim or nonfat milk and eat low-fat or nonfat yogurt and cheeses. Avoid whole milk, cream, ice cream, egg yolks, and full-fat cheeses.  · Healthier desserts include kenyon food cake, marilin snaps, animal crackers, hard candy, popsicles, and low-fat or nonfat frozen yogurt. Avoid pastries, cakes, pies, and cookies.    Exercise  · Follow your exercise program as told by your health care provider. A regular program:  ? Helps to decrease LDL and raise HDL.  ? Helps with weight control.  · Do things that increase your activity level, such as gardening, walking, and taking the stairs.  · Ask your health care provider about ways that you can be more active in your daily life.    Medicine  · Take over-the-counter and prescription medicines only as told by your health care provider.  ? Medicine may be prescribed by your health care provider to help lower cholesterol and decrease the risk for heart disease. This is usually done if diet and exercise have failed to bring down cholesterol levels.  ? If you have several risk factors, you may need medicine even if your levels are normal.    This information is not intended to replace advice given to you by your health care provider. Make sure you discuss any questions you have with your health care provider.  Document Released: 09/12/2002 Document Revised: 07/15/2017 Document Reviewed: 06/17/2017  [employer] Interactive Patient Education © 2018 [employer]

## 2018-08-10 NOTE — PROGRESS NOTES
Referring provider REIC Peter       History       42 year old male presents for consultation    Reason - abnormal thyroid function test       Duration  - since July 2018     Timing - constant     Quality - not controlled    Severity - moderate     Context - routine lab work     Location/size - no ultrasound     Quantity -     Lab Results   Component Value Date    TSH <0.020 (L) 07/16/2018     Component      Latest Ref Rng & Units 7/16/2018   Free T4      0.78 - 2.19 ng/dL 1.03     Component      Latest Ref Rng & Units 7/16/2018   Thyroid Peroxidase Antibody      0 - 34 IU/mL >600 (H)   T3, Free      2.0 - 4.4 pg/mL 3.4     Symptoms -  Fatigue, hot flashes, increased thirst, difficulty swallowing, diarrhea, nausea, joint pain      Alleviating Factors - none    Aggravating Factors - none      Past Medical History:   Diagnosis Date   • Abdominal pain    • Acute exacerbation of chronic obstructive airways disease (CMS/HCC)    • Allergic rhinitis    • Chest pain     unspecified   • Chronic cough    • Conjunctivitis    • Dyslipidemia    • Dyspnea    • Gastroesophageal reflux disease    • Pain     Pain in left ankle and joints of left foot      • Pain     pain in left leg   • Tobacco dependence syndrome    • Umbilical hernia without obstruction or gangrene    • Viral gastroenteritis      Past Surgical History:   Procedure Laterality Date   • INCISION AND DRAINAGE ABSCESS  06/09/2009    Irrigation and debridement of facial soft tissue injuries with closure of complex lacerations and repair of maxillary dental alveolar fracture   • UMBILICAL HERNIA REPAIR N/A 6/2/2017    Procedure: OPEN UMBILICAL AND EPIGASTRIC HERNIA REPAIR WITH MESH;  Surgeon: Sal Herbert MD;  Location: [facility];  Service:      Family History   Problem Relation Age of Onset   • Stroke Mother    • Alcohol abuse Father    • Cirrhosis Father    • No Known Problems Sister    • No Known Problems Brother    • Anxiety disorder Son    •  Coronary artery disease Neg Hx    • Hypertension Neg Hx      Social History   Substance Use Topics   • Smoking status: Former Smoker     Years: 20.00     Types: Cigarettes     Quit date: 03/2017   • Smokeless tobacco: Former User   • Alcohol use No           Current Outpatient Prescriptions:   •  albuterol (VENTOLIN HFA) 108 (90 Base) MCG/ACT inhaler, Inhale 2 puffs Every 4 (Four) Hours As Needed for Wheezing., Disp: 18 g, Rfl: 3  •  aspirin 81 MG chewable tablet, Chew 1 tablet Daily., Disp: 90 tablet, Rfl: 6  •  beclomethasone (QVAR) 80 MCG/ACT inhaler, Inhale 1 puff 2 (Two) Times a Day., Disp: 1 inhaler, Rfl: 6  •  candesartan (ATACAND) 4 MG tablet, TAKE 1 TABLET BY MOUTH EVERY DAY, Disp: 30 tablet, Rfl: 1  •  cyclobenzaprine (FLEXERIL) 5 MG tablet, Take 1 tablet by mouth 2 (Two) Times a Day As Needed for Muscle Spasms., Disp: 15 tablet, Rfl: 0  •  esomeprazole (NexIUM) 40 MG packet, Take 40 mg by mouth Every Morning Before Breakfast., Disp: 30 each, Rfl: 6  •  FLONASE ALLERGY RELIEF 50 MCG/ACT nasal spray, , Disp: , Rfl:   •  ipratropium-albuterol (DUONEB) 0.5-2.5 mg/mL nebulizer, Take 3 mL by nebulization Every 4 (Four) Hours As Needed for Wheezing., Disp: 360 mL, Rfl: 3  •  loratadine (CLARITIN) 10 MG tablet, Take 1 tablet by mouth Daily., Disp: 30 tablet, Rfl: 11  •  nitroglycerin (NITROSTAT) 0.4 MG SL tablet, Place 1 tablet under the tongue Every 5 (Five) Minutes As Needed for chest pain. Take no more than 3 doses in 15 minutes., Disp: 30 tablet, Rfl: 12  •  polyethylene glycol (MIRALAX) powder, Take 17 g by mouth Daily., Disp: 765 g, Rfl: 5  •  pravastatin (PRAVACHOL) 40 MG tablet, Take 1 tablet by mouth Daily., Disp: 90 tablet, Rfl: 6        Review of Systems   Constitutional: Positive for fatigue. Negative for activity change, appetite change and diaphoresis.   HENT: Positive for trouble swallowing. Negative for facial swelling, sneezing, sore throat, tinnitus and voice change.    Eyes: Negative for  photophobia, pain, discharge, redness, itching and visual disturbance.   Respiratory: Positive for shortness of breath. Negative for apnea, cough, choking and chest tightness.    Cardiovascular: Negative for chest pain, palpitations and leg swelling.   Gastrointestinal: Positive for abdominal pain, diarrhea and nausea. Negative for abdominal distention, constipation and vomiting.   Endocrine: Positive for heat intolerance and polydipsia. Negative for cold intolerance, polyphagia and polyuria.   Genitourinary: Negative for difficulty urinating, dysuria, frequency, hematuria and urgency.   Musculoskeletal: Positive for arthralgias and myalgias. Negative for back pain, gait problem, joint swelling, neck pain and neck stiffness.   Skin: Negative for color change, pallor, rash and wound.   Neurological: Negative for dizziness, tremors, weakness, light-headedness, numbness and headaches.   Hematological: Negative for adenopathy. Does not bruise/bleed easily.   Psychiatric/Behavioral: Negative for behavioral problems, confusion and sleep disturbance.       There were no vitals taken for this visit.    Physical Exam   Constitutional: He is oriented to person, place, and time. He appears well-developed and well-nourished. No distress.   HENT:   Head: Normocephalic and atraumatic.   Right Ear: External ear normal.   Left Ear: External ear normal.   Nose: Nose normal.   Eyes: Pupils are equal, round, and reactive to light. Conjunctivae and EOM are normal.   Neck: Normal range of motion. Neck supple. No tracheal deviation present. Thyromegaly present.   25 g gland   Cardiovascular: Normal rate, regular rhythm and normal heart sounds.    No murmur heard.  Pulmonary/Chest: Effort normal and breath sounds normal. No respiratory distress. He has no wheezes.   Abdominal: Soft. Bowel sounds are normal. There is no tenderness. There is no rebound and no guarding.   Musculoskeletal: Normal range of motion. He exhibits no edema,  tenderness or deformity.   Neurological: He is alert and oriented to person, place, and time. No cranial nerve deficit.   Skin: Skin is warm and dry. No rash noted.   Psychiatric: He has a normal mood and affect. His behavior is normal. Judgment and thought content normal.           Labs    Lab Results   Component Value Date    GLUCOSE 101 (H) 07/16/2018    BUN 20 07/16/2018    CREATININE 0.78 07/16/2018    EGFRIFNONA 110 07/16/2018    BCR 25.6 (H) 07/16/2018    K 4.9 07/16/2018    CO2 29.0 07/16/2018    CALCIUM 9.2 07/16/2018    ALBUMIN 4.60 07/16/2018    AST 69 (H) 07/16/2018    ALT 81 (H) 07/16/2018       Lab Results   Component Value Date    WBC 7.24 07/16/2018    HGB 15.9 07/16/2018    HCT 46.6 07/16/2018    MCV 79.4 (L) 07/16/2018     07/16/2018       No results found for: [id number]    Lab Results   Component Value Date    HGBA1C 5.5 07/16/2018    HGBA1C 5.4 06/14/2016             Lab Results   Component Value Date    TSH <0.020 (L) 07/16/2018       Lab Results   Component Value Date    FREET4 1.03 07/16/2018       No results found for: [id number]    Thyroid Peroxidase Antibodies    Lab Results   Component Value Date    THYROIDAB >600 (H) 07/16/2018       ThyroidStimulating Immunoglobulin    No results found for: LABTHYR      Assessment         ICD-10-CM ICD-9-CM   1. Abnormal thyroid function test R94.6 794.5       Abnormal thyroid function studies  Positive Hashimoto's antibody     In summary Mr. Najera is a 42 year old male that was found to have abnormal thyroid function study and has been referred to us for further evaluation.      PLAN    Repeat labs today include TSH, FT4, T3, TSI and TPO ab     Will call with results    Differential includes subclinical hyperthyroidism, or  hashithyrotoxicosis    He does have positive antibodies for Hashimoto's so he may have Graves disease as well    We did discuss methimazole     Side effects including possible liver affection and aplastic anemia discussed. Stop  medication and call the office or go to the ER if fever, sore throat, RUQ pain, and yellowing of the skin     Thyroid enlarged and complaints of difficulty swallowing --- obtain thyroid ultrasound       Will follow up in 6 to 8 weeks        Records from  received and reviewed  Thank you for this consultation              Records from 2018 obtained and summarized  Labs from July 2018 obtained and ordered  Thyroid imaging ordered

## 2018-08-10 NOTE — TELEPHONE ENCOUNTER
----- Message from ERIC Mcguire sent at 8/10/2018 11:52 AM CDT -----  Please let him know the labs confirm hyperthyroidism and the T3 is now elevated -- we need to start methimazole 10 mg daily ( I told him side effects in office;) he needs an appt in 6 weeks and labs a few days prior to the appointment

## 2018-08-10 NOTE — PROGRESS NOTES
Subjective   Aquiles Najera is a 42 y.o. male.     Mr. Najera presents today for follow up related to GERD,COPD, low serum tsh, positive TPO, hx HTN and elevated cholesterol. Patient denies any hyperthyroid like symptoms or history of thyroid disease. Denies chest pain, soa, palpitations, leg pain.   Also reports right shoulder pain. He has surgery on this shoulder earlier this year. States he has aching and stiffness at times. He is scheduled to see his Orthopedic surgeon later this month.          The following portions of the patient's history were reviewed and updated as appropriate: allergies, current medications, past family history, past medical history, past social history, past surgical history and problem list.    Review of Systems   Constitutional: Negative for activity change, appetite change, fatigue, unexpected weight gain and unexpected weight loss.   HENT: Negative for congestion, sore throat, trouble swallowing and voice change.    Eyes: Negative for pain.   Respiratory: Negative for chest tightness, shortness of breath and wheezing.    Cardiovascular: Negative for chest pain, palpitations and leg swelling.   Gastrointestinal: Negative for abdominal pain, constipation, diarrhea, nausea, vomiting, GERD and indigestion.   Endocrine: Negative for cold intolerance, heat intolerance, polydipsia, polyphagia and polyuria.   Genitourinary: Negative for dysuria.   Musculoskeletal: Positive for arthralgias (right shoulder, aching and sore. rest and OTC medication does not help). Negative for joint swelling, neck pain and neck stiffness.   Skin: Negative for rash.   Neurological: Negative.    Hematological: Negative.    Psychiatric/Behavioral: Negative.        Objective   Physical Exam   Constitutional: He is oriented to person, place, and time. He appears well-developed and well-nourished. No distress.   HENT:   Head: Normocephalic and atraumatic.   Right Ear: External ear normal.   Left Ear: External  ear normal.   Nose: Nose normal.   Mouth/Throat: Oropharynx is clear and moist.   Eyes: Pupils are equal, round, and reactive to light. Conjunctivae and EOM are normal.   Neck: Normal range of motion. Neck supple. No tracheal deviation present. No thyromegaly present.   Cardiovascular: Normal rate, regular rhythm and normal heart sounds.    Pulmonary/Chest: Effort normal and breath sounds normal. No stridor. No respiratory distress. He has no wheezes. He has no rales.   Abdominal: Soft. Bowel sounds are normal. He exhibits no distension and no mass. There is no tenderness. There is no rebound and no guarding. No hernia.   Musculoskeletal: Normal range of motion. He exhibits tenderness. He exhibits no edema.        Right shoulder: He exhibits tenderness and pain. He exhibits normal range of motion, no bony tenderness, no swelling, no effusion, no crepitus, no deformity, no laceration, no spasm (reports intermittent cramping type pain), normal pulse and normal strength.   Lymphadenopathy:     He has no cervical adenopathy.   Neurological: He is alert and oriented to person, place, and time.   Skin: Skin is warm and dry. No rash noted. He is not diaphoretic. No erythema. No pallor.   Psychiatric: He has a normal mood and affect. His behavior is normal. Judgment and thought content normal.   Nursing note and vitals reviewed.        Assessment/Plan   Aquiles was seen today for follow-up and shoulder pain.    Diagnoses and all orders for this visit:    Elevated cholesterol  -     Lipid Panel; Future    Low TSH level    Chronic obstructive pulmonary disease, unspecified COPD type (CMS/HCC)    Acute pain of right shoulder    Gastroesophageal reflux disease, esophagitis presence not specified    Hypertension, unspecified type  Comments:  resolved    Other orders  -     beclomethasone (QVAR) 80 MCG/ACT inhaler; Inhale 1 puff 2 (Two) Times a Day.  -     esomeprazole (NexIUM) 40 MG packet; Take 40 mg by mouth Every Morning  Before Breakfast.  -     cyclobenzaprine (FLEXERIL) 5 MG tablet; Take 1 tablet by mouth 2 (Two) Times a Day As Needed for Muscle Spasms.    1. GERD-Nexium 40 mg once daily refilled today. Patient states symptoms are well controlled.    2. Elevated cholesterol- continue with diet and exercise. Recheck lipid level in 6 months.    3. Hypertension-patient remains normotensive without medication. Will not resume medication. Will monitor at each follow-up appointment.    4. COPD-patient states he feels he had better control of his COPD when he was on Qvar inhaler, but his insurance did not cover. Patient requests to restart Qvar and see if we can get a PA to cover it. Qvar 80 MCG one puff 2 times a day. Discontinue Flovent inhaler.    5. Acute right shoulder pain-Flexeril 5 mg 2 times daily as needed for shoulder pain, spasm, quantity 15, no refills. Patient to follow-up with orthopedic surgeon later this month.    6. Low TSH level-patient to see endocrinology, ERIC Yu, later today for further evaluation.    7. Follow-up in 6 months or sooner as needed.            This document has been electronically signed by ERIC Oleary on August 10, 2018 11:00 AM

## 2018-08-11 LAB
THYROPEROXIDASE AB SERPL-ACNC: >600 IU/ML (ref 0–34)
TSH RECEP AB SER-ACNC: <0.5 IU/L (ref 0–1.75)

## 2018-08-13 ENCOUNTER — TELEPHONE (OUTPATIENT)
Dept: ENDOCRINOLOGY | Facility: CLINIC | Age: 43
End: 2018-08-13

## 2018-08-13 ENCOUNTER — HOSPITAL ENCOUNTER (OUTPATIENT)
Dept: ULTRASOUND IMAGING | Facility: HOSPITAL | Age: 43
Discharge: HOME | End: 2018-08-13
Admitting: NURSE PRACTITIONER

## 2018-08-13 LAB — TSI SER-MCNC: 1.01 IU/L (ref 0–0.55)

## 2018-08-13 PROCEDURE — 76536 US EXAM OF HEAD AND NECK: CPT

## 2018-08-13 NOTE — TELEPHONE ENCOUNTER
----- Message from ERIC Mcguire sent at 8/13/2018  4:17 PM CDT -----  Antibody was positive for Graves disease -- this is the cause of the overactive thyroid

## 2018-08-15 RX ORDER — LANSOPRAZOLE 15 MG/1
15 CAPSULE, DELAYED RELEASE ORAL DAILY
Qty: 30 CAPSULE | Refills: 5 | Status: SHIPPED | OUTPATIENT
Start: 2018-08-15 | End: 2018-09-17

## 2018-08-17 ENCOUNTER — HOSPITAL ENCOUNTER (OUTPATIENT)
Dept: PHYSICAL THERAPY | Facility: HOSPITAL | Age: 43
Setting detail: THERAPIES SERIES
Discharge: HOME | End: 2018-08-17

## 2018-08-17 ENCOUNTER — PRIOR AUTHORIZATION (OUTPATIENT)
Dept: FAMILY MEDICINE CLINIC | Facility: CLINIC | Age: 43
End: 2018-08-17

## 2018-08-17 ENCOUNTER — TELEPHONE (OUTPATIENT)
Dept: ENDOCRINOLOGY | Facility: CLINIC | Age: 43
End: 2018-08-17

## 2018-08-17 DIAGNOSIS — M54.12 CERVICAL RADICULITIS: Primary | ICD-10-CM

## 2018-08-17 PROCEDURE — 97110 THERAPEUTIC EXERCISES: CPT

## 2018-08-17 PROCEDURE — 97140 MANUAL THERAPY 1/> REGIONS: CPT | Performed by: PHYSICAL THERAPIST

## 2018-08-17 NOTE — TELEPHONE ENCOUNTER
----- Message from ERIC Mcguire sent at 8/17/2018  4:15 PM CDT -----  Thyroid ultrasound consistent with Graves

## 2018-08-17 NOTE — THERAPY TREATMENT NOTE
Outpatient Physical Therapy Ortho Treatment Note  [facility]     Patient Name: Aquiles Najera  : 1975  MRN: [id number]  Today's Date: 2018      Visit Date: 2018   Recert: 18  Visit: 3/3  MD f/u: 5 weeks  Sub Imp: n/a    Visit Dx:    ICD-10-CM ICD-9-CM   1. Cervical radiculitis M54.12 723.4       Patient Active Problem List   Diagnosis   • Umbilical hernia without obstruction or gangrene   • Tobacco dependence syndrome   • Gastroesophageal reflux disease   • Dyslipidemia   • Chest pain   • Allergic rhinitis   • COPD (chronic obstructive pulmonary disease) - presumed   • Constipation   • Cocaine abuse in remission   • Pre-syncope   • Essential hypertension   • Abnormal thyroid function test   • Enlarged thyroid        Past Medical History:   Diagnosis Date   • Abdominal pain    • Acute exacerbation of chronic obstructive airways disease (CMS/HCC)    • Allergic rhinitis    • Chest pain     unspecified   • Chronic cough    • Conjunctivitis    • Dyslipidemia    • Dyspnea    • Gastroesophageal reflux disease    • Pain     Pain in left ankle and joints of left foot      • Pain     pain in left leg   • Tobacco dependence syndrome    • Umbilical hernia without obstruction or gangrene    • Viral gastroenteritis         Past Surgical History:   Procedure Laterality Date   • INCISION AND DRAINAGE ABSCESS  2009    Irrigation and debridement of facial soft tissue injuries with closure of complex lacerations and repair of maxillary dental alveolar fracture   • UMBILICAL HERNIA REPAIR N/A 2017    Procedure: OPEN UMBILICAL AND EPIGASTRIC HERNIA REPAIR WITH MESH;  Surgeon: Sal Herbert MD;  Location: [facility];  Service:                              PT Assessment/Plan     Row Name 18 1150 18 1100       PT Assessment    Assessment Comments Twitch response thoracic paraspinals. Overall decrease muscle tension after dry needling. Cavitation mid thoracic spine with  "joint mobilization.  -SS The pt tolerated ther ex well today. He had mild fatigue w/ TB exercises. Encouraged pt to limit the amount of heavy weight he is lifting at home w/ his R shoulder to allow his shoulder to properly heal.  -MW       PT Plan    PT Frequency  -- 1x/week;2x/week  -MW    Predicted Duration of Therapy Intervention (Therapy Eval)  -- 4 weeks  -MW    PT Plan Comments  -- Cont per POC  -MW      User Key  (r) = Recorded By, (t) = Taken By, (c) = Cosigned By    Initials Name Provider Type    SS Lui Romero, PT DPT Physical Therapist    MW Sabiha Maravilla, PT Physical Therapist                    Exercises     Row Name 08/17/18 1100             Subjective Comments    Subjective Comments The pt states he has \"taken it easy as suggested and he states he is hurting worse now than before. He states he has 10/10 pain that is unrelieved by anuthing. He states he continues to have shoulder pain that limits him at work. He consents to tx.  The pt sttaes he hasn't worked since Monday due to some problems at work. He states he feels like PT helps the day of; however, he stattes effects don't last. He states he has been unable to come to PT 2x/week secondary to being busy. The pt states he currently contnues to use heavy weights w/ weightlifting at home.  -MW         Subjective Pain    Able to rate subjective pain? yes  -MW      Pre-Treatment Pain Level 10  -MW      Post-Treatment Pain Level 5  -MW         Exercise 1    Exercise Name 1 ER w/ TB  -MW      Sets 1 2  -MW      Reps 1 10  -MW      Time 1 2 sec hold  -MW         Exercise 2    Exercise Name 2 quadruped rocking for scap training  -MW      Sets 2 2  -MW      Time 2 30\"  -MW         Exercise 3    Exercise Name 3 HEP implementation  -MW      Time 3 5'  -MW      Additional Comments STARS, TB ER  -MW         Exercise 4    Exercise Name 4 TB STARS  -MW      Sets 4 2  -MW      Reps 4 10  -MW         Exercise 5    Exercise Name 5 TB walkouts IR  -MW   "    Sets 5 2  -MW      Reps 5 15  -MW         Exercise 6    Exercise Name 6 TB walkouts ER  -MW      Sets 6 2  -MW      Reps 6 15  -MW         Exercise 7    Exercise Name 7 TB rows  -MW      Sets 7 2  -MW      Reps 7 10  -MW        User Key  (r) = Recorded By, (t) = Taken By, (c) = Cosigned By    Initials Name Provider Type    MW Sabiha Maravilla, PT Physical Therapist                        Manual Rx (last 36 hours)      Manual Treatments     Row Name 08/17/18 1100             Manual Rx 1    Manual Rx 1 Location R thoracic paraspinals  -SS      Manual Rx 1 Type dry needle  -SS         Manual Rx 2    Manual Rx 2 Location R subscapularis medially  -SS      Manual Rx 2 Type dry needle  -SS         Manual Rx 3    Manual Rx 3 Location R thoracic spine  -SS      Manual Rx 3 Type joint mobilization  -SS      Manual Rx 3 Grade 4  -SS        User Key  (r) = Recorded By, (t) = Taken By, (c) = Cosigned By    Initials Name Provider Type    SS Lui Romero, PT DPT Physical Therapist                PT OP Goals     Row Name 08/17/18 1100          PT Short Term Goals    STG 1 STG deferred  -MW        Long Term Goals    LTG Date to Achieve 08/31/18  -MW     LTG 1 pt will be independent w/ HEP  -MW     LTG 1 Progress Progressing  -MW     LTG 2 Pt will report subjective improvement >50%  -MW     LTG 2 Progress Progressing  -MW     LTG 3 pt will tolerate ADLs and work activities w/ <2/10 pain   -MW     LTG 3 Progress Progressing  -MW        Time Calculation    PT Goal Re-Cert Due Date 08/24/18  -MW       User Key  (r) = Recorded By, (t) = Taken By, (c) = Cosigned By    Initials Name Provider Type    MW Sabiha Maravilla, PT Physical Therapist                         Time Calculation:   Start Time: 1058  Stop Time: 1153  Time Calculation (min): 55 min  Therapy Suggested Charges     Code   Minutes Charges    None           Therapy Charges for Today     Code Description Service Date Service Provider Modifiers Qty    [id number]  HC PT THER PROC EA 15 MIN 8/17/2018 Sabiha Maravilla, PT GP 3              [facility] PT MANUAL THERAPY EA 15 MIN [id number]   8/17/2018 Lui Romero, PT DPT GP 1 Filed             Sabiha Maravilla, PT  8/17/2018

## 2018-08-20 ENCOUNTER — APPOINTMENT (OUTPATIENT)
Dept: PHYSICAL THERAPY | Facility: HOSPITAL | Age: 43
End: 2018-08-20

## 2018-08-21 ENCOUNTER — HOSPITAL ENCOUNTER (OUTPATIENT)
Dept: PHYSICAL THERAPY | Facility: HOSPITAL | Age: 43
Setting detail: THERAPIES SERIES
Discharge: HOME | End: 2018-08-21

## 2018-08-21 DIAGNOSIS — M54.12 CERVICAL RADICULITIS: Primary | ICD-10-CM

## 2018-08-21 PROCEDURE — 97110 THERAPEUTIC EXERCISES: CPT

## 2018-08-21 PROCEDURE — 97140 MANUAL THERAPY 1/> REGIONS: CPT

## 2018-08-21 PROCEDURE — G0283 ELEC STIM OTHER THAN WOUND: HCPCS

## 2018-08-21 NOTE — THERAPY TREATMENT NOTE
"    Outpatient Physical Therapy Ortho Treatment Note  [facility]     Patient Name: Aquiles Najera  : 1975  MRN: [id number]  Today's Date: 2018      Visit Date: 2018   Recert: 18  Visit:   MD f/u: 2-3 weeks  Sub Imp: \"hurts\"    Visit Dx:    ICD-10-CM ICD-9-CM   1. Cervical radiculitis M54.12 723.4       Patient Active Problem List   Diagnosis   • Umbilical hernia without obstruction or gangrene   • Tobacco dependence syndrome   • Gastroesophageal reflux disease   • Dyslipidemia   • Chest pain   • Allergic rhinitis   • COPD (chronic obstructive pulmonary disease) - presumed   • Constipation   • Cocaine abuse in remission   • Pre-syncope   • Essential hypertension   • Abnormal thyroid function test   • Enlarged thyroid        Past Medical History:   Diagnosis Date   • Abdominal pain    • Acute exacerbation of chronic obstructive airways disease (CMS/HCC)    • Allergic rhinitis    • Chest pain     unspecified   • Chronic cough    • Conjunctivitis    • Dyslipidemia    • Dyspnea    • Gastroesophageal reflux disease    • Pain     Pain in left ankle and joints of left foot      • Pain     pain in left leg   • Tobacco dependence syndrome    • Umbilical hernia without obstruction or gangrene    • Viral gastroenteritis         Past Surgical History:   Procedure Laterality Date   • INCISION AND DRAINAGE ABSCESS  2009    Irrigation and debridement of facial soft tissue injuries with closure of complex lacerations and repair of maxillary dental alveolar fracture   • UMBILICAL HERNIA REPAIR N/A 2017    Procedure: OPEN UMBILICAL AND EPIGASTRIC HERNIA REPAIR WITH MESH;  Surgeon: Sal Herbert MD;  Location: [facility];  Service:              PT Ortho     Row Name 18 1500       Precautions and Contraindications    Precautions hx of chest pains in the past  -MW    Contraindications Graves Disease  -MW       Posture/Observations    Posture/Observations Comments Pt in no " acute distress. Pt has severe tenderness to palpation along R subscapularis and supraspinatus today w/ palpable triggerpoints.  -MW      User Key  (r) = Recorded By, (t) = Taken By, (c) = Cosigned By    Initials Name Provider Type    Sabiha Le, PT Physical Therapist                            PT Assessment/Plan     Row Name 08/21/18 1500          PT Assessment    Assessment Comments The pt had significantly increased pain w/ scapular protraction/retraction today.  The pt had significant thoracic and cervical spine immobility today. Manual tx applied for improved spine and scapula mobility. Will cont to progress as tolerated. Pt's spirits seemed down this date; however, it is my impression this may be related to his recent health news regarding Graves Disease based on subjective comments made during tx session.  -MW     Patient would benefit from skilled therapy intervention Yes  -MW        PT Plan    PT Frequency 1x/week;2x/week  -MW     Predicted Duration of Therapy Intervention (Therapy Eval) 4 weeks  -MW     PT Plan Comments Dry-needling of subscapularis/teres minor next if applicable. Recert w/ primary PT next week.   -MW       User Key  (r) = Recorded By, (t) = Taken By, (c) = Cosigned By    Initials Name Provider Type    Sabiha Le, PT Physical Therapist                    Exercises     Row Name 08/21/18 1500             Subjective Comments    Subjective Comments The pt states he feels okay today. He states the whole shoulder hurts badly around the joint. He states his back pain and shoulder blade pain improved after dry needling. He consents to tx.  The pt states he found out on Friday that he has Graves Disease per diagnostic testing.  -MW         Subjective Pain    Able to rate subjective pain? yes  -MW      Pre-Treatment Pain Level 7  -MW      Post-Treatment Pain Level 5  -MW         Exercise 1    Exercise Name 1 Pro 2 UE, level 1.5  -MW      Time 1 10'  -MW         Exercise 2     Exercise Name 2 TB STARS w/ green TB  -MW      Sets 2 2  -MW      Reps 2 15  -MW         Exercise 3    Exercise Name 3 Scapular PNF- protraction/retraction  -MW      Sets 3 1  -MW      Reps 3 10  -MW      Additional Comments exercise stopped early due to wincing pain  -MW         Exercise 4    Exercise Name 4 sidelying ER w/ 2#  -MW      Sets 4 2  -MW      Reps 4 10  -MW         Exercise 5    Exercise Name 5 Ling shoulder ext w/ 2#  -MW      Sets 5 2  -MW      Reps 5 10  -MW      Additional Comments exercise induced numbness/tingling into 3rd/4th digits  -MW        User Key  (r) = Recorded By, (t) = Taken By, (c) = Cosigned By    Initials Name Provider Type    MW Sabiha Maravilla, PT Physical Therapist                        Manual Rx (last 36 hours)      Manual Treatments     Row Name 08/21/18 1500             Manual Rx 1    Manual Rx 1 Location throacic spine   -MW      Manual Rx 1 Type PA mobs/MFR or paraspinals  -MW      Manual Rx 1 Duration 8'  -MW         Manual Rx 2    Manual Rx 2 Location cervical spine  -MW      Manual Rx 2 Type PA/lateral glides  -MW      Manual Rx 2 Grade 1, 2   -MW      Manual Rx 2 Duration 5'  -MW         Manual Rx 3    Manual Rx 3 Location subscapularis  -MW      Manual Rx 3 Type triggerpoint release  -MW      Manual Rx 3 Duration 2'  -MW        User Key  (r) = Recorded By, (t) = Taken By, (c) = Cosigned By    Initials Name Provider Type    MW Sabiha Maravilla, PT Physical Therapist                PT OP Goals     Row Name 08/21/18 1500          PT Short Term Goals    STG 1 STG deferred  -MW        Long Term Goals    LTG Date to Achieve 08/31/18  -MW     LTG 1 pt will be independent w/ HEP  -MW     LTG 1 Progress Progressing  -MW     LTG 2 Pt will report subjective improvement >50%  -MW     LTG 2 Progress Progressing  -MW     LTG 3 pt will tolerate ADLs and work activities w/ <2/10 pain   -MW     LTG 3 Progress Progressing  -MW        Time Calculation    PT Goal Re-Cert Due Date  08/24/18  -ASHLI       User Key  (r) = Recorded By, (t) = Taken By, (c) = Cosigned By    Initials Name Provider Type    Sabiha Le, PT Physical Therapist                         Time Calculation:   Start Time: 1500  Stop Time: 1554  Time Calculation (min): 54 min  PT Non-Billable Time (min): 1 min  Total Timed Code Minutes- PT: 38 minute(s)  Therapy Suggested Charges     Code   Minutes Charges    None           Therapy Charges for Today     Code Description Service Date Service Provider Modifiers Qty    75171643870 HC PT THER SUPP EA 15 MIN 8/21/2018 Sabiha Maravilla, PT GP 1    29358070042 HC PT MANUAL THERAPY EA 15 MIN 8/21/2018 Sabiha Maravilla, PT GP 1    00220341348 HC PT THER PROC EA 15 MIN 8/21/2018 Sabiha Maravilla, PT GP 2    37222493739 HC PT ELECTRICAL STIM UNATTENDED 8/21/2018 Sabiha Maravilla, PT  1                    Sabiha Maravilla PT  8/21/2018

## 2018-08-24 ENCOUNTER — APPOINTMENT (OUTPATIENT)
Dept: PHYSICAL THERAPY | Facility: HOSPITAL | Age: 43
End: 2018-08-24

## 2018-08-27 ENCOUNTER — APPOINTMENT (OUTPATIENT)
Dept: PHYSICAL THERAPY | Facility: HOSPITAL | Age: 43
End: 2018-08-27

## 2018-08-27 ENCOUNTER — HOSPITAL ENCOUNTER (OUTPATIENT)
Dept: PHYSICAL THERAPY | Facility: HOSPITAL | Age: 43
Setting detail: THERAPIES SERIES
Discharge: HOME | End: 2018-08-27

## 2018-08-27 DIAGNOSIS — G89.29 CHRONIC RIGHT SHOULDER PAIN: ICD-10-CM

## 2018-08-27 DIAGNOSIS — M25.511 CHRONIC RIGHT SHOULDER PAIN: ICD-10-CM

## 2018-08-27 DIAGNOSIS — M54.12 CERVICAL RADICULITIS: Primary | ICD-10-CM

## 2018-08-27 PROCEDURE — 97140 MANUAL THERAPY 1/> REGIONS: CPT | Performed by: PHYSICAL THERAPIST

## 2018-08-28 NOTE — THERAPY TREATMENT NOTE
Outpatient Physical Therapy Ortho Treatment Note  [facility]     Patient Name: Aquiles Najera  : 1975  MRN: [id number]  Today's Date: 2018      Visit Date: 2018  Attendance:    Subjective Improvement: ???  Next MD Appt: 18  Recert Date: 18      Visit Dx:    ICD-10-CM ICD-9-CM   1. Cervical radiculitis M54.12 723.4   2. Chronic right shoulder pain M25.511 719.41    G89.29 338.29       Patient Active Problem List   Diagnosis   • Umbilical hernia without obstruction or gangrene   • Tobacco dependence syndrome   • Gastroesophageal reflux disease   • Dyslipidemia   • Chest pain   • Allergic rhinitis   • COPD (chronic obstructive pulmonary disease) - presumed   • Constipation   • Cocaine abuse in remission   • Pre-syncope   • Essential hypertension   • Abnormal thyroid function test   • Enlarged thyroid        Past Medical History:   Diagnosis Date   • Abdominal pain    • Acute exacerbation of chronic obstructive airways disease (CMS/HCC)    • Allergic rhinitis    • Chest pain     unspecified   • Chronic cough    • Conjunctivitis    • Dyslipidemia    • Dyspnea    • Gastroesophageal reflux disease    • Pain     Pain in left ankle and joints of left foot      • Pain     pain in left leg   • Tobacco dependence syndrome    • Umbilical hernia without obstruction or gangrene    • Viral gastroenteritis         Past Surgical History:   Procedure Laterality Date   • INCISION AND DRAINAGE ABSCESS  2009    Irrigation and debridement of facial soft tissue injuries with closure of complex lacerations and repair of maxillary dental alveolar fracture   • UMBILICAL HERNIA REPAIR N/A 2017    Procedure: OPEN UMBILICAL AND EPIGASTRIC HERNIA REPAIR WITH MESH;  Surgeon: Sal Herbert MD;  Location: [facility];  Service:              PT Ortho     Row Name 18 1600       Subjective Comments    Subjective Comments Scapular pain is better. Pain is around the shoulder joint  this date.   -SS       Precautions and Contraindications    Precautions hx of chest pains in the past  -SS    Contraindications Graves Disease  -SS       Posture/Observations    Posture/Observations Comments Increased tone and TTP R SS, IS, teres minor, and lat dorsi in axilla  -SS      User Key  (r) = Recorded By, (t) = Taken By, (c) = Cosigned By    Initials Name Provider Type    Lui Zaldivar, PT DPT Physical Therapist                            PT Assessment/Plan     Row Name 08/27/18 1600          PT Assessment    Assessment Comments Twitch responses and decreased adverse muscle tone noted with dry needling. Discussed postural exercises for scapular retraction and positioning.   -SS     Rehab Potential Good  -SS     Patient/caregiver participated in establishment of treatment plan and goals Yes  -SS     Patient would benefit from skilled therapy intervention Yes  -SS        PT Plan    PT Frequency 1x/week;2x/week  -     Predicted Duration of Therapy Intervention (Therapy Eval) 4 weeks  -     PT Plan Comments Recert scheduled 8/29/18. Scap muscle strengthening  -SS       User Key  (r) = Recorded By, (t) = Taken By, (c) = Cosigned By    Initials Name Provider Type    Lui Zaldivar, PT DPT Physical Therapist                                Manual Rx (last 36 hours)      Manual Treatments     Row Name 08/27/18 1600             Manual Rx 1    Manual Rx 1 Location R infraspinatus  -SS      Manual Rx 1 Type dry needle  -SS         Manual Rx 2    Manual Rx 2 Location R supraspinatus  -SS      Manual Rx 2 Type dry needle  -SS         Manual Rx 3    Manual Rx 3 Location R lat dorsi laterally  -SS      Manual Rx 3 Type dry needle  -SS         Manual Rx 4    Manual Rx 4 Location R subscapularis laterally  -SS      Manual Rx 4 Type dry needle  -SS         Manual Rx 5    Manual Rx 5 Location R lower trapezius  -SS      Manual Rx 5 Type dry needle  -SS        User Key  (r) = Recorded By, (t) = Taken  By, (c) = Cosigned By    Initials Name Provider Type    Lui Zaldivar, PT DPT Physical Therapist                PT OP Goals     Row Name 08/27/18 1600          PT Short Term Goals    STG 1 STG deferred  -SS        Long Term Goals    LTG Date to Achieve 08/31/18  -SS     LTG 1 pt will be independent w/ HEP  -SS     LTG 1 Progress Progressing  -SS     LTG 2 Pt will report subjective improvement >50%  -SS     LTG 2 Progress Progressing  -SS     LTG 3 pt will tolerate ADLs and work activities w/ <2/10 pain   -SS     LTG 3 Progress Progressing  -SS        Time Calculation    PT Goal Re-Cert Due Date 08/24/18  -       User Key  (r) = Recorded By, (t) = Taken By, (c) = Cosigned By    Initials Name Provider Type    Lui Zaldivar, PT DPT Physical Therapist          Therapy Education  Education Details: Bent over row, reverse fly, emphasis on scapular retraction with rows/Y/T/I, postural exercises  Given: Posture/body mechanics, Other (comment) (recommendations for personal workout)  How Provided: Verbal, Demonstration  Provided to: Patient  Level of Understanding: Verbalized, Demonstrated              Time Calculation:   Start Time: 1615  Stop Time: 1645  Time Calculation (min): 30 min    Therapy Charges for Today     Code Description Service Date Service Provider Modifiers Qty    05904807110 HC PT MANUAL THERAPY EA 15 MIN 8/27/2018 Lui Romero, PT DPT GP 2                    Lui Romero, PT, DPT, CHT  8/27/2018

## 2018-08-29 ENCOUNTER — HOSPITAL ENCOUNTER (OUTPATIENT)
Dept: PHYSICAL THERAPY | Facility: HOSPITAL | Age: 43
Setting detail: THERAPIES SERIES
Discharge: HOME | End: 2018-08-29

## 2018-08-29 DIAGNOSIS — M54.12 CERVICAL RADICULITIS: Primary | ICD-10-CM

## 2018-08-29 PROCEDURE — 97530 THERAPEUTIC ACTIVITIES: CPT

## 2018-08-29 PROCEDURE — 97110 THERAPEUTIC EXERCISES: CPT

## 2018-08-29 NOTE — THERAPY PROGRESS REPORT/RE-CERT
"    Outpatient Physical Therapy Ortho Progress Note  [facility]     Patient Name: Aquiles Najera  : 1975  MRN: [id number]  Today's Date: 2018      Visit Date: 2018  Attendance:   Subjective Improvement: \"can't say\"  Next MD Appt: not scheduled  Recert Date: 18      Patient Active Problem List   Diagnosis   • Umbilical hernia without obstruction or gangrene   • Tobacco dependence syndrome   • Gastroesophageal reflux disease   • Dyslipidemia   • Chest pain   • Allergic rhinitis   • COPD (chronic obstructive pulmonary disease) - presumed   • Constipation   • Cocaine abuse in remission   • Pre-syncope   • Essential hypertension   • Abnormal thyroid function test   • Enlarged thyroid        Past Medical History:   Diagnosis Date   • Abdominal pain    • Acute exacerbation of chronic obstructive airways disease (CMS/HCC)    • Allergic rhinitis    • Chest pain     unspecified   • Chronic cough    • Conjunctivitis    • Dyslipidemia    • Dyspnea    • Gastroesophageal reflux disease    • Pain     Pain in left ankle and joints of left foot      • Pain     pain in left leg   • Tobacco dependence syndrome    • Umbilical hernia without obstruction or gangrene    • Viral gastroenteritis         Past Surgical History:   Procedure Laterality Date   • INCISION AND DRAINAGE ABSCESS  2009    Irrigation and debridement of facial soft tissue injuries with closure of complex lacerations and repair of maxillary dental alveolar fracture   • UMBILICAL HERNIA REPAIR N/A 2017    Procedure: OPEN UMBILICAL AND EPIGASTRIC HERNIA REPAIR WITH MESH;  Surgeon: Sal Herbert MD;  Location: [facility];  Service:        Visit Dx:     ICD-10-CM ICD-9-CM   1. Cervical radiculitis M54.12 723.4                 PT Ortho     Row Name 18 1600       Precautions and Contraindications    Precautions hx of chest pains in the past  -MW    Contraindications Graves Disease  -MW       Subjective Pain    " Post-Treatment Pain Level 5  -MW       Posture/Observations    Posture/Observations Comments Special testing as follows: (+) Upper Cut test on R, (+) Speed's test on R, (-) Drop Arm. (+) neurotension testing (Median, Ulnar, and Radial) on R. The pt has severe withdrawing pain along the Supraclavicular fossa and UT on the R. Pt's first rib on R does not appear to be elevated; however, light mobing of the first rib on the R caused tenderness throughout the R arm.   -MW       General ROM    GENERAL ROM COMMENTS AROM B shoulders WNL  -MW       MMT (Manual Muscle Testing)    General MMT Comments MMT revealed R UE 5/5 for abduction, 4/5 for flex, 4/5 for ER, and 5/5 for IR  -MW    Row Name 08/27/18 1600       Subjective Comments    Subjective Comments Scapular pain is better. Pain is around the shoulder joint this date.   -SS       Precautions and Contraindications    Precautions hx of chest pains in the past  -SS    Contraindications Graves Disease  -SS       Posture/Observations    Posture/Observations Comments Increased tone and TTP R SS, IS, teres minor, and lat dorsi in axilla  -SS      User Key  (r) = Recorded By, (t) = Taken By, (c) = Cosigned By    Initials Name Provider Type    SS Lui Romero, PT DPT Physical Therapist    Sabiha Le, PT Physical Therapist                                  PT OP Goals     Row Name 08/29/18 1700          PT Short Term Goals    STG 1 STG deferred  -MW        Long Term Goals    LTG Date to Achieve 09/18/18  -MW     LTG 1 pt will be independent w/ HEP  -MW     LTG 1 Progress Progressing  -MW     LTG 2 Pt will report subjective improvement >50%  -MW     LTG 2 Progress Progressing  -MW     LTG 3 pt will tolerate ADLs and work activities w/ <2/10 pain   -MW     LTG 3 Progress Progressing  -MW        Time Calculation    PT Goal Re-Cert Due Date 09/18/18  -MW       User Key  (r) = Recorded By, (t) = Taken By, (c) = Cosigned By    Initials Name Provider Type    ASHLI Maravilla  Sabiha SOLIS PT Physical Therapist                PT Assessment/Plan     Row Name 08/29/18 1700          PT Assessment    Functional Limitations Performance in self-care ADL;Performance in work activities  -     Impairments Impaired flexibility;Muscle strength;Pain;Posture  -MW     Assessment Comments The pt continues to have pain in his R shoulder that seems unresolved w/ therapy thus far. The pt has special testing that may be consistent w/ soft-tissue injury of the R shoulder and possible Brachial Plexopathy. It is my impression the pt continues to lift weights and do recreational and work related activities that put high muscular strain on the R shoulder. I had pt perform some of his weight lifting regimen w/ me this date to visually inspect if the pt is using safe body mechanics w/ lifting. The pt appears to be using safe body mechanics; however, he did experience pinching and pulling in his upper R shoulder that may suggest residual or further developing pathological issue. The pt missed his f/u w/ his referring MD today. I advised pt that he has a high need to f/u w/ an MD regarding his shoulder. He continues to have strength and pain deficits that require skilled therapy services. Will cont to progress him as tolerated.   -MW     Rehab Potential Fair  -MW     Patient/caregiver participated in establishment of treatment plan and goals Yes  -MW     Patient would benefit from skilled therapy intervention Yes  -MW        PT Plan    PT Frequency 1x/week  -MW     Predicted Duration of Therapy Intervention (Therapy Eval) 3-4 weeks  -MW     PT Plan Comments Cont w/ dry-needling and scap strengthening. Assess first rib on R shoulder next.  -MW       User Key  (r) = Recorded By, (t) = Taken By, (c) = Cosigned By    Initials Name Provider Type    Sabiha Le PT Physical Therapist                  Exercises     Row Name 08/29/18 1600             Subjective Comments    Subjective Comments The pt states he does  not feel lik eh is really any better since eval. He states that his pain has moved back up to the top of his shoulder. He states this is where it hurt before he had shoulder surgery. He states he is unable to return to recreational activites and work related activites that require him to extend his shoulder and raise it above 90 deg. He states  the dry-needling has really helped.  -MW         Subjective Pain    Able to rate subjective pain? yes  -MW      Pre-Treatment Pain Level 5  -MW      Post-Treatment Pain Level 5  -MW         Exercise 1    Exercise Name 1 POC discussion/discussed sxs management  -MW      Time 1 15'  -MW         Exercise 2    Exercise Name 2 Lifting mechanics- used [employer] cable weights  -MW      Time 2 15'  -MW         Exercise 3    Exercise Name 3 AROM shoulder  -MW      Time 3 3'  -MW         Exercise 4    Exercise Name 4 HEP review  -MW      Time 4 5'  -MW        User Key  (r) = Recorded By, (t) = Taken By, (c) = Cosigned By    Initials Name Provider Type    MW Sabiha Maravilla, PT Physical Therapist                           Neck Disability Index  Section 1 - Pain Intensity: The pain is very mild at the moment.  Section 2 - Personal Care: I can look after myself normally, but it causes extra pain.  Section 3 - Lifting: I can lift heavy weights, but it gives me extra pain.  Section 4 - Work: I can hardly do any work at all.  Section 5 - Headaches: I have moderate headaches that come infrequently.  Section 6 - Concentration: I can concentrate fully with slight difficulty.  Section 7 - Sleeping: My sleep is mildly disturbed for up to 1-2 hours.  Section 8 - Driving: I can drive as long as I want with moderate neck pain.  Section 9 - Reading: I can't read as much as I want because of moderate neck pain.  Neck Disability Index Score: 17      Time Calculation:       Start Time: 1629  Stop Time: 1707  Time Calculation (min): 38 min  Total Timed Code Minutes- PT: 38 minute(s)     Therapy Charges for  Today     Code Description Service Date Service Provider Modifiers Qty    75812879798 HC PT THERAPEUTIC ACT EA 15 MIN 8/29/2018 Sabiha Maravilla, PT GP 2    01334549943 HC PT THER PROC EA 15 MIN 8/29/2018 Sabiha Maravilla, PT GP 1                    Sabiha Maravilla, PT  8/29/2018

## 2018-08-31 ENCOUNTER — APPOINTMENT (OUTPATIENT)
Dept: PHYSICAL THERAPY | Facility: HOSPITAL | Age: 43
End: 2018-08-31

## 2018-09-05 ENCOUNTER — HOSPITAL ENCOUNTER (OUTPATIENT)
Dept: PHYSICAL THERAPY | Facility: HOSPITAL | Age: 43
Setting detail: THERAPIES SERIES
Discharge: HOME | End: 2018-09-05

## 2018-09-05 DIAGNOSIS — M54.12 CERVICAL RADICULITIS: Primary | ICD-10-CM

## 2018-09-05 DIAGNOSIS — M25.511 CHRONIC RIGHT SHOULDER PAIN: ICD-10-CM

## 2018-09-05 DIAGNOSIS — G89.29 CHRONIC RIGHT SHOULDER PAIN: ICD-10-CM

## 2018-09-05 PROCEDURE — 97140 MANUAL THERAPY 1/> REGIONS: CPT | Performed by: PHYSICAL THERAPIST

## 2018-09-05 NOTE — THERAPY TREATMENT NOTE
Outpatient Physical Therapy Ortho Treatment Note  [facility]     Patient Name: Aquiles Najera  : 1975  MRN: [id number]  Today's Date: 2018      Visit Date: 2018  Attendance:    Subjective Improvement: 60%  Next MD Appt: none scheduled  Recert Date: 18     Visit Dx:    ICD-10-CM ICD-9-CM   1. Cervical radiculitis M54.12 723.4   2. Chronic right shoulder pain M25.511 719.41    G89.29 338.29       Patient Active Problem List   Diagnosis   • Umbilical hernia without obstruction or gangrene   • Tobacco dependence syndrome   • Gastroesophageal reflux disease   • Dyslipidemia   • Chest pain   • Allergic rhinitis   • COPD (chronic obstructive pulmonary disease) - presumed   • Constipation   • Cocaine abuse in remission   • Pre-syncope   • Essential hypertension   • Abnormal thyroid function test   • Enlarged thyroid        Past Medical History:   Diagnosis Date   • Abdominal pain    • Acute exacerbation of chronic obstructive airways disease (CMS/HCC)    • Allergic rhinitis    • Chest pain     unspecified   • Chronic cough    • Conjunctivitis    • Dyslipidemia    • Dyspnea    • Gastroesophageal reflux disease    • Pain     Pain in left ankle and joints of left foot      • Pain     pain in left leg   • Tobacco dependence syndrome    • Umbilical hernia without obstruction or gangrene    • Viral gastroenteritis         Past Surgical History:   Procedure Laterality Date   • INCISION AND DRAINAGE ABSCESS  2009    Irrigation and debridement of facial soft tissue injuries with closure of complex lacerations and repair of maxillary dental alveolar fracture   • UMBILICAL HERNIA REPAIR N/A 2017    Procedure: OPEN UMBILICAL AND EPIGASTRIC HERNIA REPAIR WITH MESH;  Surgeon: Sal Herbert MD;  Location: [facility];  Service:              PT Ortho     Row Name 18 1300       Subjective Comments    Subjective Comments When has pain, tends to be in R UT and radiates up into  "cervical spine. Pain in posterior shoulder \"because of what I do.\" Some pain in the supraspinatus region with pulling activities. Dry needling has seemed to be helping \"a lot.\" 60% \"maybe a little higher\" subjective improvement. Has called Dr. Olivarez's office requesting MRI for shoulder.   -       Precautions and Contraindications    Precautions hx of chest pains in the past  -    Contraindications Graves Disease  -       Posture/Observations    Posture/Observations Comments Trigger point R UT and rhomboids.  -      User Key  (r) = Recorded By, (t) = Taken By, (c) = Cosigned By    Initials Name Provider Type     Lui Romero, PT DPT Physical Therapist                            PT Assessment/Plan     Row Name 09/05/18 1300          PT Assessment    Functional Limitations Performance in self-care ADL;Performance in work activities  -     Impairments Impaired flexibility;Muscle strength;Pain;Posture  -     Assessment Comments Twitch response R subscapularis and UT and decreased muscle tension each needled muscle with dry needling this date. Patient reports sore in UT and subscapularis but can tell looser after treatment.  -SS     Rehab Potential Fair  -     Patient/caregiver participated in establishment of treatment plan and goals Yes  -SS     Patient would benefit from skilled therapy intervention Yes  -SS        PT Plan    PT Frequency 1x/week  -     Predicted Duration of Therapy Intervention (Therapy Eval) 3-4 weeks  -     PT Plan Comments Dry needling, scap stabilization, joint mobilization.  -       User Key  (r) = Recorded By, (t) = Taken By, (c) = Cosigned By    Initials Name Provider Type     Lui Romero, PT DPT Physical Therapist                                Manual Rx (last 36 hours)      Manual Treatments     Row Name 09/05/18 1300             Manual Rx 1    Manual Rx 1 Location R iliocostalis thoracis  -      Manual Rx 1 Type dry needle  -         Manual Rx " 2    Manual Rx 2 Location R subscapularis medially  -SS      Manual Rx 2 Type dry needle  -SS         Manual Rx 3    Manual Rx 3 Location R rhomboids  -SS      Manual Rx 3 Type dry needle  -SS         Manual Rx 4    Manual Rx 4 Location R UT  -SS      Manual Rx 4 Type dry needle  -SS        User Key  (r) = Recorded By, (t) = Taken By, (c) = Cosigned By    Initials Name Provider Type    Lui Zaldivar, PT DPT Physical Therapist                PT OP Goals     Row Name 09/05/18 1300          PT Short Term Goals    STG 1 STG deferred  -SS        Long Term Goals    LTG Date to Achieve 09/18/18  -SS     LTG 1 pt will be independent w/ HEP  -SS     LTG 1 Progress Progressing  -SS     LTG 2 Pt will report subjective improvement >50%  -SS     LTG 2 Progress Progressing  -SS     LTG 3 pt will tolerate ADLs and work activities w/ <2/10 pain   -SS     LTG 3 Progress Progressing  -SS        Time Calculation    PT Goal Re-Cert Due Date 09/18/18  -       User Key  (r) = Recorded By, (t) = Taken By, (c) = Cosigned By    Initials Name Provider Type    Lui Zaldivar, PT DPT Physical Therapist          Therapy Education  Education Details: use of heat and ice  Given: Symptoms/condition management  How Provided: Verbal  Provided to: Patient  Level of Understanding: Verbalized              Time Calculation:   Start Time: 1348  Stop Time: 1415  Time Calculation (min): 27 min    Therapy Charges for Today     Code Description Service Date Service Provider Modifiers Qty    99102896792 HC PT MANUAL THERAPY EA 15 MIN 9/5/2018 Lui Romero, PT DPT GP 2                    Lui Romero, PT, DPT, CHT  9/5/2018

## 2018-09-07 ENCOUNTER — APPOINTMENT (OUTPATIENT)
Dept: PHYSICAL THERAPY | Facility: HOSPITAL | Age: 43
End: 2018-09-07

## 2018-09-07 PROBLEM — J20.9 ACUTE BRONCHITIS: Status: ACTIVE | Noted: 2018-09-07

## 2018-09-12 ENCOUNTER — HOSPITAL ENCOUNTER (OUTPATIENT)
Dept: PHYSICAL THERAPY | Facility: HOSPITAL | Age: 43
Setting detail: THERAPIES SERIES
Discharge: HOME | End: 2018-09-12

## 2018-09-12 DIAGNOSIS — M25.511 CHRONIC RIGHT SHOULDER PAIN: ICD-10-CM

## 2018-09-12 DIAGNOSIS — M54.12 CERVICAL RADICULITIS: Primary | ICD-10-CM

## 2018-09-12 DIAGNOSIS — G89.29 CHRONIC RIGHT SHOULDER PAIN: ICD-10-CM

## 2018-09-12 PROCEDURE — 97110 THERAPEUTIC EXERCISES: CPT | Performed by: PHYSICAL THERAPIST

## 2018-09-12 NOTE — THERAPY TREATMENT NOTE
Outpatient Physical Therapy Ortho Treatment Note  [facility]     Patient Name: Aquiles Najera  : 1975  MRN: [id number]  Today's Date: 2018      Visit Date: 2018  Attendance:  (10)   Subjective Improvement: 75-80%  Next MD Appt: none scheduled  Recert Date: 18     Visit Dx:    ICD-10-CM ICD-9-CM   1. Cervical radiculitis M54.12 723.4   2. Chronic right shoulder pain M25.511 719.41    G89.29 338.29            Past Medical History:   Diagnosis Date   • Abdominal pain    • Acute exacerbation of chronic obstructive airways disease (CMS/HCC)    • Allergic rhinitis    • Chest pain     unspecified   • Chronic cough    • Conjunctivitis    • Dyslipidemia    • Dyspnea    • Gastroesophageal reflux disease    • Pain     Pain in left ankle and joints of left foot      • Pain     pain in left leg   • Tobacco dependence syndrome    • Umbilical hernia without obstruction or gangrene    • Viral gastroenteritis         Past Surgical History:   Procedure Laterality Date   • INCISION AND DRAINAGE ABSCESS  2009    Irrigation and debridement of facial soft tissue injuries with closure of complex lacerations and repair of maxillary dental alveolar fracture   • UMBILICAL HERNIA REPAIR N/A 2017    Procedure: OPEN UMBILICAL AND EPIGASTRIC HERNIA REPAIR WITH MESH;  Surgeon: Sal Herbert MD;  Location: [facility];  Service:              PT Ortho     Row Name 18 1100       Subjective Comments    Subjective Comments Patient was able to workout for the first time in a month last night. Didn't really bother the right shoulder. Having pain in the left upper trap/cervical spine. Pinching in the R posterior shoulder and levator scap region. 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  -SS       Precautions and Contraindications    Precautions hx of chest pains in the past  -SS    Contraindications Graves Disease  -SS       Subjective Pain    Able to rate subjective pain? yes   "-SS    Pre-Treatment Pain Level    --   3/10 R, 6/10 L  -SS       Post-Treatment Pain Level --   \"Less\" -       Posture/Observations    Posture/Observations Comments No appreciable trigger points this date. TTP R posterior shoulder.  -       MMT (Manual Muscle Testing)    General MMT Comments MMT middle trap 4+/5, lower trap 4/5  -      User Key  (r) = Recorded By, (t) = Taken By, (c) = Cosigned By    Initials Name Provider Type    Lui Zaldivar, PT DPT Physical Therapist                            PT Assessment/Plan     Row Name 09/12/18 1100          PT Assessment    Functional Limitations Performance in self-care ADL;Performance in work activities  -     Impairments Impaired flexibility;Muscle strength;Pain;Posture  -     Assessment Comments Good effort with therapy this date. We discussed exercise and exercise progression for increasing shoulder and scapulothoracic strengthening.   -     Rehab Potential Fair  -SS     Patient/caregiver participated in establishment of treatment plan and goals Yes  -SS     Patient would benefit from skilled therapy intervention Yes  -SS        PT Plan    PT Frequency 1x/week  -     Predicted Duration of Therapy Intervention (Therapy Eval) 3-4 weeks  -     PT Plan Comments Continue POC. Attempt PNF patterns next.   -       User Key  (r) = Recorded By, (t) = Taken By, (c) = Cosigned By    Initials Name Provider Type    Lui Zaldivar, PT DPT Physical Therapist                    Exercises     Row Name 09/12/18 1100             Subjective Comments    Subjective Comments Patient was able to workout for the first time in a month last night. Didn't really bother the right shoulder. Having pain in the left upper trap/cervical spine. Pinching in the R posterior shoulder and levator scap region. 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  -         Subjective Pain    Able to rate subjective pain? yes  -      Pre-Treatment Pain Level -- " "  3/10 R, 6/10 L  -SS      Post-Treatment Pain Level --   \"less\"  -SS         Exercise 1    Exercise Name 1 DB prone T  -SS      Cueing 1 Verbal  -SS      Sets 1 2  -SS      Reps 1 10  -SS      Additional Comments 5#  -SS         Exercise 2    Exercise Name 2 DB prone Y  -SS      Cueing 2 Verbal  -SS      Sets 2 2  -SS      Reps 2 10  -SS      Additional Comments 3#  -SS         Exercise 3    Exercise Name 3 Bent over row in free weight area  -SS      Cueing 3 Verbal;Demo  -SS      Sets 3 1  -SS      Reps 3 10  -SS         Exercise 4    Exercise Name 4 Cybex Assist Pull Up  -SS      Cueing 4 Verbal  -SS      Sets 4 2  -SS      Reps 4 8  -SS      Additional Comments 20 plates assist  -SS         Exercise 5    Exercise Name 5 DB front raises  -SS      Cueing 5 Verbal;Demo  -SS      Sets 5 2  -SS      Reps 5 10  -SS      Additional Comments 5#  -SS         Exercise 6    Exercise Name 6 DB bent arm lateral raises  -SS      Cueing 6 Verbal  -SS      Sets 6 2  -SS      Reps 6 10  -SS      Additional Comments 5#  -SS        User Key  (r) = Recorded By, (t) = Taken By, (c) = Cosigned By    Initials Name Provider Type    Lui Zaldivar, PT DPT Physical Therapist                               PT OP Goals     Row Name 09/12/18 1000          PT Short Term Goals    STG 1 STG deferred  -SS        Long Term Goals    LTG Date to Achieve 09/18/18  -SS     LTG 1 pt will be independent w/ HEP  -SS     LTG 1 Progress Progressing  -SS     LTG 2 Pt will report subjective improvement >50%  -SS     LTG 2 Progress Progressing  -SS     LTG 3 pt will tolerate ADLs and work activities w/ <2/10 pain   -SS     LTG 3 Progress Progressing  -SS        Time Calculation    PT Goal Re-Cert Due Date 09/18/18  -       User Key  (r) = Recorded By, (t) = Taken By, (c) = Cosigned By    Initials Name Provider Type    Lui Zaldivar, PT DPT Physical Therapist          Therapy Education  Education Details: bent over row, prone Y, prone " T; recommendations for strengthening about the shoulder  Given: HEP, Symptoms/condition management  How Provided: Verbal  Provided to: Patient  Level of Understanding: Verbalized, Demonstrated              Time Calculation:   Start Time: 1058  Stop Time: 1136  Time Calculation (min): 38 min    Therapy Charges for Today     Code Description Service Date Service Provider Modifiers Qty    99837159379 HC PT THER PROC EA 15 MIN 9/12/2018 Lui Romero, PT DPT GP 3                    Lui Romero, PT, DPT, CHT  9/12/2018

## 2018-09-13 ENCOUNTER — APPOINTMENT (OUTPATIENT)
Dept: LAB | Facility: HOSPITAL | Age: 43
End: 2018-09-13

## 2018-09-13 LAB
ALBUMIN SERPL-MCNC: 4.3 G/DL (ref 3.4–4.8)
ALBUMIN/GLOB SERPL: 1.4 G/DL (ref 1.1–1.8)
ALP SERPL-CCNC: 136 U/L (ref 38–126)
ALT SERPL W P-5'-P-CCNC: 37 U/L (ref 21–72)
ANION GAP SERPL CALCULATED.3IONS-SCNC: 10 MMOL/L (ref 5–15)
AST SERPL-CCNC: 25 U/L (ref 17–59)
BASOPHILS # BLD AUTO: 0.05 10*3/MM3 (ref 0–0.2)
BASOPHILS NFR BLD AUTO: 0.5 % (ref 0–2)
BILIRUB SERPL-MCNC: 0.4 MG/DL (ref 0.2–1.3)
BUN BLD-MCNC: 15 MG/DL (ref 7–21)
BUN/CREAT SERPL: 18.1 (ref 7–25)
CALCIUM SPEC-SCNC: 8.9 MG/DL (ref 8.4–10.2)
CHLORIDE SERPL-SCNC: 104 MMOL/L (ref 95–110)
CO2 SERPL-SCNC: 26 MMOL/L (ref 22–31)
CREAT BLD-MCNC: 0.83 MG/DL (ref 0.7–1.3)
DEPRECATED RDW RBC AUTO: 41.4 FL (ref 35.1–43.9)
EOSINOPHIL # BLD AUTO: 0.22 10*3/MM3 (ref 0–0.7)
EOSINOPHIL NFR BLD AUTO: 2.3 % (ref 0–7)
ERYTHROCYTE [DISTWIDTH] IN BLOOD BY AUTOMATED COUNT: 14.1 % (ref 11.5–14.5)
GFR SERPL CREATININE-BSD FRML MDRD: 103 ML/MIN/1.73 (ref 63–147)
GLOBULIN UR ELPH-MCNC: 3 GM/DL (ref 2.3–3.5)
GLUCOSE BLD-MCNC: 88 MG/DL (ref 60–100)
HCT VFR BLD AUTO: 46.4 % (ref 39–49)
HGB BLD-MCNC: 15.8 G/DL (ref 13.7–17.3)
IMM GRANULOCYTES # BLD: 0.17 10*3/MM3 (ref 0–0.02)
IMM GRANULOCYTES NFR BLD: 1.7 % (ref 0–0.5)
LYMPHOCYTES # BLD AUTO: 3.14 10*3/MM3 (ref 0.6–4.2)
LYMPHOCYTES NFR BLD AUTO: 32.2 % (ref 10–50)
MCH RBC QN AUTO: 27.6 PG (ref 26.5–34)
MCHC RBC AUTO-ENTMCNC: 34.1 G/DL (ref 31.5–36.3)
MCV RBC AUTO: 81 FL (ref 80–98)
MONOCYTES # BLD AUTO: 0.61 10*3/MM3 (ref 0–0.9)
MONOCYTES NFR BLD AUTO: 6.3 % (ref 0–12)
NEUTROPHILS # BLD AUTO: 5.56 10*3/MM3 (ref 2–8.6)
NEUTROPHILS NFR BLD AUTO: 57 % (ref 37–80)
PLATELET # BLD AUTO: 250 10*3/MM3 (ref 150–450)
PMV BLD AUTO: 10.6 FL (ref 8–12)
POTASSIUM BLD-SCNC: 4.4 MMOL/L (ref 3.5–5.1)
PROT SERPL-MCNC: 7.3 G/DL (ref 6.3–8.6)
RBC # BLD AUTO: 5.73 10*6/MM3 (ref 4.37–5.74)
SODIUM BLD-SCNC: 140 MMOL/L (ref 137–145)
T3 SERPL-MCNC: 103 NG/DL (ref 97–169)
T4 FREE SERPL-MCNC: 0.73 NG/DL (ref 0.78–2.19)
TSH SERPL DL<=0.05 MIU/L-ACNC: 0.21 MIU/ML (ref 0.46–4.68)
WBC NRBC COR # BLD: 9.75 10*3/MM3 (ref 3.2–9.8)

## 2018-09-13 PROCEDURE — 84480 ASSAY TRIIODOTHYRONINE (T3): CPT | Performed by: NURSE PRACTITIONER

## 2018-09-13 PROCEDURE — 84443 ASSAY THYROID STIM HORMONE: CPT | Performed by: NURSE PRACTITIONER

## 2018-09-13 PROCEDURE — 84439 ASSAY OF FREE THYROXINE: CPT | Performed by: NURSE PRACTITIONER

## 2018-09-13 PROCEDURE — 85025 COMPLETE CBC W/AUTO DIFF WBC: CPT | Performed by: NURSE PRACTITIONER

## 2018-09-13 PROCEDURE — 36415 COLL VENOUS BLD VENIPUNCTURE: CPT | Performed by: NURSE PRACTITIONER

## 2018-09-13 PROCEDURE — 80053 COMPREHEN METABOLIC PANEL: CPT | Performed by: NURSE PRACTITIONER

## 2018-09-14 ENCOUNTER — HOSPITAL ENCOUNTER (OUTPATIENT)
Dept: PHYSICAL THERAPY | Facility: HOSPITAL | Age: 43
Setting detail: THERAPIES SERIES
Discharge: HOME | End: 2018-09-14

## 2018-09-14 DIAGNOSIS — G89.29 CHRONIC RIGHT SHOULDER PAIN: ICD-10-CM

## 2018-09-14 DIAGNOSIS — M25.511 CHRONIC RIGHT SHOULDER PAIN: ICD-10-CM

## 2018-09-14 DIAGNOSIS — M54.12 CERVICAL RADICULITIS: Primary | ICD-10-CM

## 2018-09-14 PROCEDURE — 97110 THERAPEUTIC EXERCISES: CPT | Performed by: PHYSICAL THERAPIST

## 2018-09-14 PROCEDURE — 97140 MANUAL THERAPY 1/> REGIONS: CPT | Performed by: PHYSICAL THERAPIST

## 2018-09-14 NOTE — THERAPY TREATMENT NOTE
Outpatient Physical Therapy Ortho Treatment Note  [facility]     Patient Name: Aquiles Najera  : 1975  MRN: [id number]  Today's Date: 2018      Visit Date: 2018  Attendance: 9/10 (10)   Subjective Improvement: 80%  Next MD Appt: none scheduled  Recert Date: 18     Visit Dx:    ICD-10-CM ICD-9-CM   1. Cervical radiculitis M54.12 723.4   2. Chronic right shoulder pain M25.511 719.41    G89.29 338.29       Patient Active Problem List   Diagnosis   • Umbilical hernia without obstruction or gangrene   • Tobacco dependence syndrome   • Gastroesophageal reflux disease   • Dyslipidemia   • Chest pain   • Allergic rhinitis   • COPD (chronic obstructive pulmonary disease) - presumed   • Constipation   • Cocaine abuse in remission   • Pre-syncope   • Essential hypertension   • Abnormal thyroid function test   • Enlarged thyroid   • Acute bronchitis        Past Medical History:   Diagnosis Date   • Abdominal pain    • Acute exacerbation of chronic obstructive airways disease (CMS/HCC)    • Allergic rhinitis    • Chest pain     unspecified   • Chronic cough    • Conjunctivitis    • Dyslipidemia    • Dyspnea    • Gastroesophageal reflux disease    • Pain     Pain in left ankle and joints of left foot      • Pain     pain in left leg   • Tobacco dependence syndrome    • Umbilical hernia without obstruction or gangrene    • Viral gastroenteritis         Past Surgical History:   Procedure Laterality Date   • INCISION AND DRAINAGE ABSCESS  2009    Irrigation and debridement of facial soft tissue injuries with closure of complex lacerations and repair of maxillary dental alveolar fracture   • UMBILICAL HERNIA REPAIR N/A 2017    Procedure: OPEN UMBILICAL AND EPIGASTRIC HERNIA REPAIR WITH MESH;  Surgeon: Sal Herbert MD;  Location: [facility];  Service:              PT Ortho     Row Name 18 1000       Precautions and Contraindications    Precautions hx of chest pains in  "the past  -SS    Contraindications Graves Disease  -       Subjective Pain    Post-Treatment Pain Level 1  -       Posture/Observations    Posture/Observations Comments TTP with myofascial knot, possible trigger point, R rhomboid.  -    Row Name 09/12/18 1100       Subjective Comments    Subjective Comments Patient was able to workout for the first time in a month last night. Didn't really bother the right shoulder. Having pain in the left upper trap/cervical spine. Pinching in the R posterior shoulder and levator scap region. 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  -       Precautions and Contraindications    Precautions hx of chest pains in the past  -    Contraindications Graves Disease  -       Subjective Pain    Able to rate subjective pain? yes  -SS    Pre-Treatment Pain Level --   3/10 R, 6/10 L  -SS    Post-Treatment Pain Level --   \"less\"  -       Posture/Observations    Posture/Observations Comments No appreciable trigger points this date. TTP R posterior shoulder.  -       MMT (Manual Muscle Testing)    General MMT Comments MMT middle trap 4+/5, lower trap 4/5  -      User Key  (r) = Recorded By, (t) = Taken By, (c) = Cosigned By    Initials Name Provider Type     Lui Romero, PT DPT Physical Therapist                            PT Assessment/Plan     Row Name 09/14/18 1015          PT Assessment    Functional Limitations Performance in self-care ADL;Performance in work activities  -     Impairments Impaired flexibility;Muscle strength;Pain;Posture  -     Assessment Comments Patient tolerated therapy well. Decreased muscle tension/pain after treatment.  -SS     Rehab Potential Fair  -SS     Patient/caregiver participated in establishment of treatment plan and goals Yes  -SS     Patient would benefit from skilled therapy intervention Yes  -SS        PT Plan    PT Frequency 1x/week  -SS     Predicted Duration of Therapy Intervention (Therapy Eval) 3-4 weeks  " -SS     PT Plan Comments Recheck next. Progress as indicated  -SS       User Key  (r) = Recorded By, (t) = Taken By, (c) = Cosigned By    Initials Name Provider Type    Lui Zaldivar, PT DPT Physical Therapist                    Exercises     Row Name 09/14/18 1000             Subjective Comments    Subjective Comments Generally sore from last therapy session. Has been working on his scapular and mid-back musculature in his home gym. Pain in the posterior shoulder joint region and medial to the R scapula medial border. 90% subjective improvement  -SS         Subjective Pain    Able to rate subjective pain? yes  -SS      Pre-Treatment Pain Level 4  -SS      Post-Treatment Pain Level 1  -SS         Exercise 1    Exercise Name 1 Pro2, Seat 10, UE, retro only  -SS      Cueing 1 Verbal  -SS      Time 1 10 mins  -SS      Additional Comments Level 4  -SS         Exercise 2    Exercise Name 2 Prone trunk extension  -SS      Cueing 2 Verbal;Demo  -SS      Sets 2 1  -SS      Reps 2 10  -SS      Time 2 2 sec hold  -SS         Exercise 3    Exercise Name 3 Prone B shoulder extension with UE ER  -SS      Cueing 3 Verbal;Demo  -SS      Sets 3 2  -SS      Reps 3 10  -SS      Time 3 2 sec hold  -SS         Exercise 4    Exercise Name 4 Quadruped diagonals  -SS      Cueing 4 Verbal  -SS      Reps 4 12 ea  -SS        User Key  (r) = Recorded By, (t) = Taken By, (c) = Cosigned By    Initials Name Provider Type    Lui Zaldivar, PT DPT Physical Therapist                        Manual Rx (last 36 hours)      Manual Treatments     Row Name 09/14/18 0900             Manual Rx 1    Manual Rx 1 Location R UT  -SS      Manual Rx 1 Type dry needle  -SS         Manual Rx 2    Manual Rx 2 Location R rhomboid  -SS      Manual Rx 2 Type dry needle  -SS         Manual Rx 3    Manual Rx 3 Location R levator scapula  -SS      Manual Rx 3 Type dry needle  -SS         Manual Rx 4    Manual Rx 4 Location R cervical paraspinals   -SS      Manual Rx 4 Type dry needle  -SS        User Key  (r) = Recorded By, (t) = Taken By, (c) = Cosigned By    Initials Name Provider Type    Lui Zaldivar, PT DPT Physical Therapist                PT OP Goals     Row Name 09/14/18 1000          PT Short Term Goals    STG 1 STG deferred  -SS        Long Term Goals    LTG Date to Achieve 09/18/18  -SS     LTG 1 pt will be independent w/ HEP  -SS     LTG 1 Progress Progressing  -SS     LTG 2 Pt will report subjective improvement >50%  -SS     LTG 2 Progress Progressing  -SS     LTG 3 pt will tolerate ADLs and work activities w/ <2/10 pain   -SS     LTG 3 Progress Progressing  -SS        Time Calculation    PT Goal Re-Cert Due Date 09/18/18  -       User Key  (r) = Recorded By, (t) = Taken By, (c) = Cosigned By    Initials Name Provider Type    Lui Zaldivar, PT DPT Physical Therapist          Therapy Education  Given: HEP  Program: Reinforced  How Provided: Verbal  Provided to: Patient  Level of Understanding: Verbalized              Time Calculation:   Start Time: 1015  Stop Time: 1053  Time Calculation (min): 38 min    Therapy Charges for Today     Code Description Service Date Service Provider Modifiers Qty    02262121477 HC PT THER PROC EA 15 MIN 9/14/2018 Lui Romero, PT DPT GP 2    41615010213 HC PT MANUAL THERAPY EA 15 MIN 9/14/2018 Lui Romero, PT DPT GP 1                    Lui Romero, PT, DPT, CHT  9/14/2018

## 2018-09-17 ENCOUNTER — DOCUMENTATION (OUTPATIENT)
Dept: FAMILY MEDICINE CLINIC | Facility: CLINIC | Age: 43
End: 2018-09-17

## 2018-09-17 ENCOUNTER — OFFICE VISIT (OUTPATIENT)
Dept: FAMILY MEDICINE CLINIC | Facility: CLINIC | Age: 43
End: 2018-09-17

## 2018-09-17 ENCOUNTER — HOSPITAL ENCOUNTER (OUTPATIENT)
Dept: ULTRASOUND IMAGING | Facility: HOSPITAL | Age: 43
Discharge: HOME | End: 2018-09-17
Admitting: NURSE PRACTITIONER

## 2018-09-17 VITALS
HEIGHT: 68 IN | DIASTOLIC BLOOD PRESSURE: 88 MMHG | OXYGEN SATURATION: 98 % | HEART RATE: 88 BPM | BODY MASS INDEX: 31.16 KG/M2 | RESPIRATION RATE: 20 BRPM | WEIGHT: 205.6 LBS | TEMPERATURE: 99 F | SYSTOLIC BLOOD PRESSURE: 136 MMHG

## 2018-09-17 DIAGNOSIS — K21.9 GASTROESOPHAGEAL REFLUX DISEASE, ESOPHAGITIS PRESENCE NOT SPECIFIED: ICD-10-CM

## 2018-09-17 DIAGNOSIS — S80.12XA CONTUSION OF LEFT LOWER LEG, INITIAL ENCOUNTER: ICD-10-CM

## 2018-09-17 DIAGNOSIS — R60.0 UNILATERAL EDEMA OF LOWER EXTREMITY: Primary | ICD-10-CM

## 2018-09-17 DIAGNOSIS — B35.6 FUNGAL INFECTION OF THE GROIN: ICD-10-CM

## 2018-09-17 PROCEDURE — 93971 EXTREMITY STUDY: CPT

## 2018-09-17 PROCEDURE — 99213 OFFICE O/P EST LOW 20 MIN: CPT | Performed by: NURSE PRACTITIONER

## 2018-09-17 RX ORDER — PANTOPRAZOLE SODIUM 40 MG/1
40 TABLET, DELAYED RELEASE ORAL DAILY
Qty: 30 TABLET | Refills: 1 | Status: SHIPPED | OUTPATIENT
Start: 2018-09-17 | End: 2018-10-17

## 2018-09-17 NOTE — PROGRESS NOTES
Subjective   Aquiles Najera is a 42 y.o. male.     Mr. Najera presents today with multiple medical complaints. Patient developed unexplained edema to his left lower leg with bruising and a small mass to his distal anterior tibia. Patient denies injury or trauma. No trauma or history of blood clots.   Patient also states his prevacid is not controlling his GERD symptoms and his insurance will not pay for a higher dosage.   He also reports he is still waiting on a PA to cover his QVAR. He is continuing his Flovent inhaler at this time.   Patient also reports rash to left groin x 3 weeks. Has tried multiple home remedies and only corn starch has provided mild relief.          The following portions of the patient's history were reviewed and updated as appropriate: allergies, current medications, past family history, past medical history, past social history, past surgical history and problem list.    Review of Systems   Constitutional: Negative for activity change, appetite change, diaphoresis, unexpected weight gain and unexpected weight loss.   HENT: Negative for congestion, sore throat and voice change.    Respiratory: Negative for cough, chest tightness, shortness of breath and wheezing.    Cardiovascular: Positive for leg swelling. Negative for chest pain and palpitations.   Gastrointestinal: Positive for GERD (uncontrolled at present time). Negative for abdominal pain and vomiting.   Genitourinary: Negative for dysuria and hematuria.   Musculoskeletal: Negative for arthralgias and myalgias.   Skin: Positive for rash (reports rash to left groin. ) and bruise (bruise to distal left anterior tibia with no trauma  reported. ).   Neurological: Negative for dizziness, facial asymmetry, weakness and confusion.   Hematological: Negative for adenopathy. Does not bruise/bleed easily.       Objective   Physical Exam   Constitutional: He is oriented to person, place, and time. He appears well-developed and  well-nourished. No distress.   HENT:   Head: Normocephalic and atraumatic.   Right Ear: External ear normal.   Left Ear: External ear normal.   Mouth/Throat: Oropharynx is clear and moist.   Eyes: Conjunctivae are normal.   Neck: Normal range of motion.   Cardiovascular: Normal rate, regular rhythm, normal heart sounds and intact distal pulses.    Pulmonary/Chest: Effort normal and breath sounds normal. No respiratory distress. He has no wheezes. He has no rales.   Abdominal: Soft. Bowel sounds are normal. He exhibits no distension and no mass. There is no tenderness. There is no rebound and no guarding. No hernia.   Musculoskeletal: Normal range of motion. He exhibits edema (trace LLE) and tenderness (LLE).   Neurological: He is alert and oriented to person, place, and time. No cranial nerve deficit or sensory deficit. Coordination normal.   Skin: Skin is warm, dry and intact. Bruising and rash noted. He is not diaphoretic. There is erythema. No pallor.        Psychiatric: He has a normal mood and affect. His behavior is normal. Judgment and thought content normal.   Nursing note and vitals reviewed.        Assessment/Plan   Aquiles was seen today for heartburn, ankle pain and rash.    Diagnoses and all orders for this visit:    Unilateral edema of lower extremity  -     Cancel: Duplex Venous Lower Extremity - Left CAR; Future  -      venous doppler lower extremity left (duplex)    Contusion of left lower leg, initial encounter  -     Cancel: Duplex Venous Lower Extremity - Left CAR; Future    Gastroesophageal reflux disease, esophagitis presence not specified    Fungal infection of the groin    Other orders  -     pantoprazole (PROTONIX) 40 MG EC tablet; Take 1 tablet by mouth Daily for 30 days.  -     beclomethasone (QVAR) 80 MCG/ACT inhaler; Inhale 1 puff 2 (Two) Times a Day.  -     butenafine (LOTRIMIN ULTRA) 1 % cream; Apply  topically to the appropriate area as directed Daily for 14 days.    1. Unilateral  edema of lower extremity/ Contusion of left lower leg-  US left lower extremity. Will call with results. POC pending US results.     2. Fungal infection of left groin- Mentax 1% cream, apply to area twice daily x 2 weeks.     3. GERD- DC prevacid. Protonix 40 mg po daily.     4. Follow up in 1 week if there is no improvement in symptoms or sooner as needed.             This document has been electronically signed by ERIC Oleary on September 17, 2018 1:31 PM

## 2018-09-17 NOTE — PROGRESS NOTES
No clot per radiology report. Continue to monitor and if symptoms do not start to improve within 3 days or worsen call for follow up. Not adding any new medication at this time related to stomach/reflux issues.

## 2018-09-18 ENCOUNTER — HOSPITAL ENCOUNTER (OUTPATIENT)
Dept: PHYSICAL THERAPY | Facility: HOSPITAL | Age: 43
Setting detail: THERAPIES SERIES
Discharge: HOME | End: 2018-09-18

## 2018-09-18 DIAGNOSIS — G89.29 CHRONIC RIGHT SHOULDER PAIN: ICD-10-CM

## 2018-09-18 DIAGNOSIS — M25.511 CHRONIC RIGHT SHOULDER PAIN: ICD-10-CM

## 2018-09-18 DIAGNOSIS — M54.12 CERVICAL RADICULITIS: Primary | ICD-10-CM

## 2018-09-18 PROCEDURE — 97140 MANUAL THERAPY 1/> REGIONS: CPT | Performed by: PHYSICAL THERAPIST

## 2018-09-18 PROCEDURE — 97110 THERAPEUTIC EXERCISES: CPT | Performed by: PHYSICAL THERAPIST

## 2018-09-18 NOTE — THERAPY PROGRESS REPORT/RE-CERT
Outpatient Physical Therapy Ortho Progress Note  [facility]     Patient Name: Aquiles Najera  : 1975  MRN: [id number]  Today's Date: 2018      Visit Date: 2018  Attendance: 10/11 (10)   Subjective Improvement: 95-96%  Next MD Appt: none scheduled  Recert Date: 10/9/18          Past Medical History:   Diagnosis Date   • Abdominal pain    • Acute exacerbation of chronic obstructive airways disease (CMS/HCC)    • Allergic rhinitis    • Chest pain     unspecified   • Chronic cough    • Conjunctivitis    • Dyslipidemia    • Dyspnea    • Gastroesophageal reflux disease    • Pain     Pain in left ankle and joints of left foot      • Pain     pain in left leg   • Tobacco dependence syndrome    • Umbilical hernia without obstruction or gangrene    • Viral gastroenteritis         Past Surgical History:   Procedure Laterality Date   • INCISION AND DRAINAGE ABSCESS  2009    Irrigation and debridement of facial soft tissue injuries with closure of complex lacerations and repair of maxillary dental alveolar fracture   • UMBILICAL HERNIA REPAIR N/A 2017    Procedure: OPEN UMBILICAL AND EPIGASTRIC HERNIA REPAIR WITH MESH;  Surgeon: Sal Herbert MD;  Location: [facility];  Service:        Visit Dx:     ICD-10-CM ICD-9-CM   1. Cervical radiculitis M54.12 723.4   2. Chronic right shoulder pain M25.511 719.41    G89.29 338.29                 PT Ortho     Row Name 18 0900       Subjective Comments    Subjective Comments Patient notes slight pain in the R levator scapula insertion region today. No pain in the posterior shoulder today. Still waiting to hear about his job start. Increasing his workout at home. Able to progress his workout. 95-96% subjective improvement. Medication change: D/C Prevacid and add Protonix  -SS       Precautions and Contraindications    Precautions hx of chest pains in the past  -SS    Contraindications Graves Disease  -SS       Subjective Pain     Able to rate subjective pain? yes  -    Pre-Treatment Pain Level 2  -SS    Post-Treatment Pain Level 0  -       Posture/Observations    Posture/Observations Comments TTP R levator scapula insertion and IS/teres minor tendon in posterior shoulder.   -       General ROM    GENERAL ROM COMMENTS Shoulder AROM B WNLs; sore with R shoulder end-range flexion and abduction.  -       MMT (Manual Muscle Testing)    General MMT Comments B shoulder strength 5/5 all planes with mild pain R levator scapula insertion during R abduction testing. Middle trapezius - B 4+/5. Lower trapezius - B 4/5.  -      User Key  (r) = Recorded By, (t) = Taken By, (c) = Cosigned By    Initials Name Provider Type    Lui Zaldivar, PT DPT Physical Therapist                      Therapy Education  Given: HEP  Program: Reinforced  How Provided: Verbal  Provided to: Patient  Level of Understanding: Verbalized           PT OP Goals     Row Name 09/18/18 0900          STG 1 STG deferred  -          LTG Date to Achieve 10/09/18  -    LTG 1 pt will be independent w/ HEP  -    LTG 1 Progress Met  -SS    LTG 2 Pt will report subjective improvement >50%  -    LTG 2 Progress Met  -SS    LTG 3 pt will tolerate ADLs and work activities w/ <2/10 pain   -    LTG 3 Progress Met  -    LTG 4 Independent with self-management  -    LTG 4 Progress New  -          PT Goal Re-Cert Due Date 10/09/18  -      User Key  (r) = Recorded By, (t) = Taken By, (c) = Cosigned By    Initials Name Provider Type    Lui Zaldivar, PT DPT Physical Therapist                PT Assessment/Plan     Row Name 09/18/18 1000          Functional Limitations Performance in work activities  -    Impairments Pain  -    Assessment Comments Patient is significantly improved with dry needling and initiation of increased scapular muscle strengthening. Twitch response with dry needling. Independent with HEP.  -    Rehab Potential Fair  -     Patient/caregiver participated in establishment of treatment plan and goals Yes  -SS    Patient would benefit from skilled therapy intervention Yes  -SS          PT Frequency Other (comment)   PRN  -SS    PT Plan Comments Patient to work on HEP. Return to clinic on PRN basis.  -SS      User Key  (r) = Recorded By, (t) = Taken By, (c) = Cosigned By    Initials Name Provider Type    Lui Zaldivar, PT DPT Physical Therapist                  Exercises     Row Name 09/18/18 0900          Subjective Comments Patient notes slight pain in the R levator scapula insertion region today. No pain in the posterior shoulder today. Still waiting to hear about his job start. Increasing his workout at home. Able to progress his workout. 95-96% subjective improvement. Medication change: D/C Prevacid and add Protonix  -SS          Able to rate subjective pain? yes  -SS    Pre-Treatment Pain Level 2  -SS    Post-Treatment Pain Level 0  -SS          Exercise Name 1 Posterior shoulder stretch - arm low  -SS    Cueing 1 Verbal;Tactile  -SS    Sets 1 3  -SS    Time 1 30 sec hold  -SS          Exercise Name 2 Sleeper stretch  -SS    Cueing 2 Verbal;Demo  -SS    Sets 2 3  -SS    Time 2 30 sec hold  -SS      User Key  (r) = Recorded By, (t) = Taken By, (c) = Cosigned By    Initials Name Provider Type    Lui Zaldivar, PT DPT Physical Therapist           Manual Rx (last 36 hours)      Manual Treatments     Row Name 09/18/18 0900             Manual Rx 1    Manual Rx 1 Location R levator scapula  -SS      Manual Rx 1 Type dry needle  -SS         Manual Rx 2    Manual Rx 2 Location R infraspinatus  -SS      Manual Rx 2 Type dry needle  -SS         Manual Rx 3    Manual Rx 3 Location R UT  -SS      Manual Rx 3 Type dry needle  -SS        User Key  (r) = Recorded By, (t) = Taken By, (c) = Cosigned By    Initials Name Provider Type    Lui Zaldivar, PT DPT Physical Therapist                      Outcome Measure  Options: Neck Disability Index (NDI)  Neck Disability Index  Section 1 - Pain Intensity: The pain is very mild at the moment.  Section 2 - Personal Care: I can look after myself normally without causing extra pain.  Section 3 - Lifting: I can lift heavy weights, but it gives me extra pain.  Section 4 - Work: I can do as much work as I want.  Section 5 - Headaches: I have no headaches at all.  Section 6 - Concentration: I can concentrate fully without difficulty.  Section 7 - Sleeping: I have no trouble sleeping.  Section 8 - Driving: I can drive as long as I want with slight neck pain.  Section 9 - Reading: I can read as much as I want with slight neck pain.  Section 10 - Recreation: I have some neck pain with all recreational activities.  Neck Disability Index Score: 5      Time Calculation:         Start Time: 0932  Stop Time: 1000  Time Calculation (min): 28 min     Therapy Charges for Today     Code Description Service Date Service Provider Modifiers Qty    32530789839 HC PT THER PROC EA 15 MIN 9/18/2018 Lui Romero, PT DPT GP 1    14905477941 HC PT MANUAL THERAPY EA 15 MIN 9/18/2018 Lui Romero, PT DPT GP 1                   Lui Romero, PT, DPT, CHT  9/18/2018

## 2018-09-20 ENCOUNTER — OFFICE VISIT (OUTPATIENT)
Dept: ENDOCRINOLOGY | Facility: CLINIC | Age: 43
End: 2018-09-20

## 2018-09-20 VITALS
SYSTOLIC BLOOD PRESSURE: 124 MMHG | WEIGHT: 210 LBS | HEIGHT: 68 IN | DIASTOLIC BLOOD PRESSURE: 86 MMHG | BODY MASS INDEX: 31.83 KG/M2 | HEART RATE: 79 BPM

## 2018-09-20 DIAGNOSIS — E05.00 GRAVES DISEASE: Primary | ICD-10-CM

## 2018-09-20 DIAGNOSIS — E05.90 HYPERTHYROIDISM: ICD-10-CM

## 2018-09-20 DIAGNOSIS — M79.662 PAIN IN LEFT LOWER LEG: Primary | ICD-10-CM

## 2018-09-20 PROCEDURE — 99213 OFFICE O/P EST LOW 20 MIN: CPT | Performed by: NURSE PRACTITIONER

## 2018-09-20 RX ORDER — METHIMAZOLE 5 MG/1
TABLET ORAL
Qty: 45 TABLET | Refills: 5 | Status: SHIPPED | OUTPATIENT
Start: 2018-09-20 | End: 2019-03-11 | Stop reason: SDUPTHER

## 2018-09-20 NOTE — PROGRESS NOTES
Subjective    Aquiles Najera is a 42 y.o. male. he is here today for follow-up.    History of Present Illness         42 year old male presents for follow up      Reason - hyperthyroidism due to Graves         Duration  - since July 2018      Timing - constant      Quality - not controlled     Severity - moderate      Context - routine lab work      Location/size -       PROCEDURE: Neck sonogram     COMPARISON: No comparison     HISTORY: Goiter     FINDINGS: Realtime grayscale and color-flow imaging is obtained  of the thyroid gland. The thyroid is normal in size with  diffusely heterogeneous echotexture. The right lobe measures 5.1  x 1.6 x 0.8 cm.  The left lobe measures 4.9 x 1.3 x 1.6 cm.  The  isthmus measures 0.2 cm in thickness.       There is a hypoechoic solid nodule in the inferior aspect of the  right lobe, measuring 0.9 x 0.4 x 0.8 cm.  The nodule is oriented  wider than tall and lacks internal echogenic foci.  Based on ACR  TIRADS criteria, no follow up imaging is recommended.     IMPRESSION:  CONCLUSION:    Heterogeneous thyroid, possibly due to thyroiditis.     Electronically signed by:  Dejon Clement MD  8/14/2018 9:20 AM CDT  Workstation: [id number]   Imaging        Quantity -      Lab Results   Component Value Date    TSH 0.210 (L) 09/13/2018    X5BSVBH 103.0 09/13/2018    THYROIDAB >600 (H) 08/10/2018        Symptoms - fatigue, hair thinning, hot flashes         Alleviating Factors - methimazole      Aggravating Factors - none                  Evaluation history:  TSH   Date Value Ref Range Status   09/13/2018 0.210 (L) 0.460 - 4.680 mIU/mL Final     Free T4   Date Value Ref Range Status   09/13/2018 0.73 (L) 0.78 - 2.19 ng/dL Final     T3, Free   Date Value Ref Range Status   07/16/2018 3.4 2.0 - 4.4 pg/mL Final       Current medications:  Current Outpatient Prescriptions   Medication Sig Dispense Refill   • albuterol (VENTOLIN HFA) 108 (90 Base) MCG/ACT inhaler Inhale 2 puffs Every 4 (Four)  Hours As Needed for Wheezing. 18 g 3   • aspirin 81 MG chewable tablet Chew 1 tablet Daily. 90 tablet 6   • beclomethasone (QVAR) 80 MCG/ACT inhaler Inhale 1 puff 2 (Two) Times a Day. 1 inhaler 6   • butenafine (LOTRIMIN ULTRA) 1 % cream Apply  topically to the appropriate area as directed Daily for 14 days. 30 g 1   • FLONASE ALLERGY RELIEF 50 MCG/ACT nasal spray      • ipratropium-albuterol (DUONEB) 0.5-2.5 mg/mL nebulizer Take 3 mL by nebulization Every 4 (Four) Hours As Needed for Wheezing. 360 mL 3   • loratadine (CLARITIN) 10 MG tablet Take 1 tablet by mouth Daily. 30 tablet 11   • methIMAzole (TAPAZOLE) 10 MG tablet Take 1 tablet by mouth Daily. 30 tablet 2   • nitroglycerin (NITROSTAT) 0.4 MG SL tablet Place 1 tablet under the tongue Every 5 (Five) Minutes As Needed for chest pain. Take no more than 3 doses in 15 minutes. 30 tablet 12   • pantoprazole (PROTONIX) 40 MG EC tablet Take 1 tablet by mouth Daily for 30 days. 30 tablet 1   • polyethylene glycol (MIRALAX) powder Take 17 g by mouth Daily. 765 g 5   • pravastatin (PRAVACHOL) 40 MG tablet Take 1 tablet by mouth Daily. 90 tablet 6   • promethazine-dextromethorphan (PROMETHAZINE-DM) 6.25-15 MG/5ML syrup Take 5 mL by mouth 4 (Four) Times a Day As Needed for Cough. 160 mL 0     No current facility-administered medications for this visit.        The following portions of the patient's history were reviewed and updated as appropriate:   Past Medical History:   Diagnosis Date   • Abdominal pain    • Acute exacerbation of chronic obstructive airways disease (CMS/HCC)    • Allergic rhinitis    • Chest pain     unspecified   • Chronic cough    • Conjunctivitis    • Dyslipidemia    • Dyspnea    • Gastroesophageal reflux disease    • Pain     Pain in left ankle and joints of left foot      • Pain     pain in left leg   • Tobacco dependence syndrome    • Umbilical hernia without obstruction or gangrene    • Viral gastroenteritis      Past Surgical History:    Procedure Laterality Date   • INCISION AND DRAINAGE ABSCESS  06/09/2009    Irrigation and debridement of facial soft tissue injuries with closure of complex lacerations and repair of maxillary dental alveolar fracture   • UMBILICAL HERNIA REPAIR N/A 6/2/2017    Procedure: OPEN UMBILICAL AND EPIGASTRIC HERNIA REPAIR WITH MESH;  Surgeon: Sal Herbert MD;  Location: [facility];  Service:      Family History   Problem Relation Age of Onset   • Stroke Mother    • Alcohol abuse Father    • Cirrhosis Father    • No Known Problems Sister    • No Known Problems Brother    • Anxiety disorder Son    • Coronary artery disease Neg Hx    • Hypertension Neg Hx    • Thyroid disease Neg Hx        Allergies   Allergen Reactions   • Colace [Docusate] Hives     Social History     Social History   • Marital status:      Social History Main Topics   • Smoking status: Former Smoker     Years: 20.00     Types: Cigarettes     Quit date: 03/2017   • Smokeless tobacco: Former User   • Alcohol use No   • Drug use: No      Comment: 3/2017 last time   • Sexual activity: Defer     Other Topics Concern   • Not on file       Review of Systems  Review of Systems   Constitutional: Positive for fatigue and unexpected weight change. Negative for activity change, appetite change and diaphoresis.   HENT: Negative for facial swelling, sneezing, sore throat, tinnitus, trouble swallowing and voice change.    Eyes: Negative for photophobia, pain, discharge, redness, itching and visual disturbance.   Respiratory: Negative for apnea, cough, choking, chest tightness and shortness of breath.    Cardiovascular: Negative for chest pain, palpitations and leg swelling.   Gastrointestinal: Negative for abdominal distention, abdominal pain, constipation, diarrhea, nausea and vomiting.   Endocrine: Negative for cold intolerance, heat intolerance, polydipsia, polyphagia and polyuria.   Genitourinary: Negative for difficulty urinating, dysuria,  "frequency, hematuria and urgency.   Musculoskeletal: Negative for arthralgias, back pain, gait problem, joint swelling, myalgias, neck pain and neck stiffness.   Skin: Negative for color change, pallor, rash and wound.   Neurological: Negative for dizziness, tremors, weakness, light-headedness, numbness and headaches.   Hematological: Negative for adenopathy. Does not bruise/bleed easily.   Psychiatric/Behavioral: Negative for behavioral problems, confusion and sleep disturbance.        Objective    /86 (BP Location: Right arm, Patient Position: Sitting, Cuff Size: Adult)   Pulse 79   Ht 172.7 cm (68\")   Wt 95.3 kg (210 lb)   BMI 31.93 kg/m²   Physical Exam   Constitutional: He is oriented to person, place, and time. He appears well-developed and well-nourished. No distress.   HENT:   Head: Normocephalic and atraumatic.   Right Ear: External ear normal.   Left Ear: External ear normal.   Nose: Nose normal.   Eyes: Pupils are equal, round, and reactive to light. Conjunctivae and EOM are normal.   Neck: Normal range of motion. Neck supple. No tracheal deviation present. No thyromegaly present.   Cardiovascular: Normal rate, regular rhythm and normal heart sounds.    No murmur heard.  Pulmonary/Chest: Effort normal and breath sounds normal. No respiratory distress. He has no wheezes.   Abdominal: Soft. Bowel sounds are normal. There is no tenderness. There is no rebound and no guarding.   Musculoskeletal: Normal range of motion. He exhibits no edema, tenderness or deformity.   Neurological: He is alert and oriented to person, place, and time. No cranial nerve deficit.   Skin: Skin is warm and dry. No rash noted.   Psychiatric: He has a normal mood and affect. His behavior is normal. Judgment and thought content normal.       Lab Review  Lab Results   Component Value Date    TSH 0.210 (L) 09/13/2018     Lab Results   Component Value Date    FREET4 0.73 (L) 09/13/2018        Assessment/Plan      1. Graves " disease    2. Hyperthyroidism    . This diagnosis was discussed and reviewed with the patient including the advantages of drug therapy.     1. Orders placed during this encounter include:  No orders of the defined types were placed in this encounter.      Medications prescribed:  Outpatient Encounter Prescriptions as of 9/20/2018   Medication Sig Dispense Refill   • albuterol (VENTOLIN HFA) 108 (90 Base) MCG/ACT inhaler Inhale 2 puffs Every 4 (Four) Hours As Needed for Wheezing. 18 g 3   • aspirin 81 MG chewable tablet Chew 1 tablet Daily. 90 tablet 6   • beclomethasone (QVAR) 80 MCG/ACT inhaler Inhale 1 puff 2 (Two) Times a Day. 1 inhaler 6   • butenafine (LOTRIMIN ULTRA) 1 % cream Apply  topically to the appropriate area as directed Daily for 14 days. 30 g 1   • FLONASE ALLERGY RELIEF 50 MCG/ACT nasal spray      • ipratropium-albuterol (DUONEB) 0.5-2.5 mg/mL nebulizer Take 3 mL by nebulization Every 4 (Four) Hours As Needed for Wheezing. 360 mL 3   • loratadine (CLARITIN) 10 MG tablet Take 1 tablet by mouth Daily. 30 tablet 11   • methIMAzole (TAPAZOLE) 10 MG tablet Take 1 tablet by mouth Daily. 30 tablet 2   • nitroglycerin (NITROSTAT) 0.4 MG SL tablet Place 1 tablet under the tongue Every 5 (Five) Minutes As Needed for chest pain. Take no more than 3 doses in 15 minutes. 30 tablet 12   • pantoprazole (PROTONIX) 40 MG EC tablet Take 1 tablet by mouth Daily for 30 days. 30 tablet 1   • polyethylene glycol (MIRALAX) powder Take 17 g by mouth Daily. 765 g 5   • pravastatin (PRAVACHOL) 40 MG tablet Take 1 tablet by mouth Daily. 90 tablet 6   • promethazine-dextromethorphan (PROMETHAZINE-DM) 6.25-15 MG/5ML syrup Take 5 mL by mouth 4 (Four) Times a Day As Needed for Cough. 160 mL 0     No facility-administered encounter medications on file as of 9/20/2018.        Hyperthyroidism due to Graves disease  Positive hashimoto's         Component      Latest Ref Rng & Units 8/10/2018   T3, Total      97.0 - 169.0 ng/dl 220.0  (H)   Free T4      0.78 - 2.19 ng/dL 1.30   TSH Baseline      0.460 - 4.680 mIU/mL <0.020 (L)   Thyrotropin Receptor Antibody      0.00 - 1.75 IU/L <0.50   Thyroid Stimulating Immunoglobulin      0.00 - 0.55 IU/L 1.01 (H)   Thyroid Peroxidase Antibody      0 - 34 IU/mL >600 (H)      ultrasound showed no nodules      Methimazole 10 mg daily started    Side effects including possible liver affection and aplastic anemia discussed. Stop medication and call the office or go to the ER if fever, sore throat, RUQ pain, and yellowing of the skin         Component      Latest Ref Rng & Units 9/13/2018   T3, Total      97.0 - 169.0 ng/dl 103.0   Free T4      0.78 - 2.19 ng/dL 0.73 (L)   TSH Baseline      0.460 - 4.680 mIU/mL 0.210 (L)        Levels have improved     Discussed decreasing to 7.5 since T4 is now low and T3 dropped dramatically    Will stay on the 10 mg for fear of becoming hyperthyroid again    Will repeat labs in 4 weeks    Preventive Care:    Non smoker                4. Return in about 6 weeks (around 11/1/2018) for Recheck.

## 2018-10-03 ENCOUNTER — TELEPHONE (OUTPATIENT)
Dept: FAMILY MEDICINE CLINIC | Facility: CLINIC | Age: 43
End: 2018-10-03

## 2018-10-03 DIAGNOSIS — M54.12 CERVICAL RADICULITIS: Primary | ICD-10-CM

## 2018-10-05 ENCOUNTER — TELEPHONE (OUTPATIENT)
Dept: FAMILY MEDICINE CLINIC | Facility: CLINIC | Age: 43
End: 2018-10-05

## 2018-10-05 NOTE — TELEPHONE ENCOUNTER
Pt called stating that he is needing a PA for his physical therapy and his insurance hasn't received on as of today.  Please call [phone].

## 2018-10-15 ENCOUNTER — TELEPHONE (OUTPATIENT)
Dept: FAMILY MEDICINE CLINIC | Facility: CLINIC | Age: 43
End: 2018-10-15

## 2018-10-15 NOTE — TELEPHONE ENCOUNTER
He called about getting his physical therapy extended at [facility]-said is for his shoulders-something about getting knots in muscles and they do dry needle therapy.He talked to his ins co and was told to have his provider to put in a PA for it. He can be reached at [phone].

## 2018-10-16 ENCOUNTER — TELEPHONE (OUTPATIENT)
Dept: FAMILY MEDICINE CLINIC | Facility: CLINIC | Age: 43
End: 2018-10-16

## 2018-10-16 DIAGNOSIS — J44.9 CHRONIC OBSTRUCTIVE PULMONARY DISEASE, UNSPECIFIED COPD TYPE (HCC): ICD-10-CM

## 2018-10-17 RX ORDER — ALBUTEROL SULFATE 90 UG/1
2 AEROSOL, METERED RESPIRATORY (INHALATION) EVERY 4 HOURS PRN
Qty: 18 INHALER | Refills: 3 | Status: SHIPPED | OUTPATIENT
Start: 2018-10-17 | End: 2019-02-11 | Stop reason: SDUPTHER

## 2018-10-23 ENCOUNTER — TELEPHONE (OUTPATIENT)
Dept: FAMILY MEDICINE CLINIC | Facility: CLINIC | Age: 43
End: 2018-10-23

## 2018-10-23 NOTE — TELEPHONE ENCOUNTER
Patient has called and said that in order for his insurance to cover more PT he needs a PA done. He states that it has to states that the Therapy is working and that it is helping with his mobility. And that the Dr believes it will benefit him to continue. Any questions please call him at [phone]

## 2018-10-25 ENCOUNTER — DOCUMENTATION (OUTPATIENT)
Dept: FAMILY MEDICINE CLINIC | Facility: CLINIC | Age: 43
End: 2018-10-25

## 2018-10-25 NOTE — PROGRESS NOTES
Mr. Najera has improved ROM and less pain to his neck and upper extremities since starting PT. Patients symptoms have not yet returned to prior maximum function and additional therapy would be beneficial.

## 2018-10-26 ENCOUNTER — APPOINTMENT (OUTPATIENT)
Dept: LAB | Facility: HOSPITAL | Age: 43
End: 2018-10-26

## 2018-10-26 DIAGNOSIS — M54.12 CERVICAL RADICULITIS: Primary | ICD-10-CM

## 2018-10-26 LAB
ALBUMIN SERPL-MCNC: 4.1 G/DL (ref 3.4–4.8)
ALBUMIN/GLOB SERPL: 1.3 G/DL (ref 1.1–1.8)
ALP SERPL-CCNC: 117 U/L (ref 38–126)
ALT SERPL W P-5'-P-CCNC: 49 U/L (ref 21–72)
ANION GAP SERPL CALCULATED.3IONS-SCNC: 8 MMOL/L (ref 5–15)
AST SERPL-CCNC: 60 U/L (ref 17–59)
BASOPHILS # BLD AUTO: 0.03 10*3/MM3 (ref 0–0.2)
BASOPHILS NFR BLD AUTO: 0.5 % (ref 0–2)
BILIRUB SERPL-MCNC: 0.6 MG/DL (ref 0.2–1.3)
BUN BLD-MCNC: 19 MG/DL (ref 7–21)
BUN/CREAT SERPL: 16.1 (ref 7–25)
CALCIUM SPEC-SCNC: 9.1 MG/DL (ref 8.4–10.2)
CHLORIDE SERPL-SCNC: 105 MMOL/L (ref 95–110)
CO2 SERPL-SCNC: 25 MMOL/L (ref 22–31)
CREAT BLD-MCNC: 1.18 MG/DL (ref 0.7–1.3)
DEPRECATED RDW RBC AUTO: 39.3 FL (ref 35.1–43.9)
EOSINOPHIL # BLD AUTO: 0.05 10*3/MM3 (ref 0–0.7)
EOSINOPHIL NFR BLD AUTO: 0.9 % (ref 0–7)
ERYTHROCYTE [DISTWIDTH] IN BLOOD BY AUTOMATED COUNT: 13.1 % (ref 11.5–14.5)
GFR SERPL CREATININE-BSD FRML MDRD: 68 ML/MIN/1.73 (ref 63–147)
GLOBULIN UR ELPH-MCNC: 3.2 GM/DL (ref 2.3–3.5)
GLUCOSE BLD-MCNC: 92 MG/DL (ref 60–100)
HCT VFR BLD AUTO: 41.1 % (ref 39–49)
HGB BLD-MCNC: 14.1 G/DL (ref 13.7–17.3)
IMM GRANULOCYTES # BLD: 0.03 10*3/MM3 (ref 0–0.02)
IMM GRANULOCYTES NFR BLD: 0.5 % (ref 0–0.5)
LYMPHOCYTES # BLD AUTO: 2.55 10*3/MM3 (ref 0.6–4.2)
LYMPHOCYTES NFR BLD AUTO: 44 % (ref 10–50)
MCH RBC QN AUTO: 28.1 PG (ref 26.5–34)
MCHC RBC AUTO-ENTMCNC: 34.3 G/DL (ref 31.5–36.3)
MCV RBC AUTO: 81.9 FL (ref 80–98)
MONOCYTES # BLD AUTO: 0.38 10*3/MM3 (ref 0–0.9)
MONOCYTES NFR BLD AUTO: 6.6 % (ref 0–12)
NEUTROPHILS # BLD AUTO: 2.76 10*3/MM3 (ref 2–8.6)
NEUTROPHILS NFR BLD AUTO: 47.5 % (ref 37–80)
NRBC BLD MANUAL-RTO: 0 /100 WBC (ref 0–0)
PLATELET # BLD AUTO: 201 10*3/MM3 (ref 150–450)
PMV BLD AUTO: 11.2 FL (ref 8–12)
POTASSIUM BLD-SCNC: 4.4 MMOL/L (ref 3.5–5.1)
PROT SERPL-MCNC: 7.3 G/DL (ref 6.3–8.6)
RBC # BLD AUTO: 5.02 10*6/MM3 (ref 4.37–5.74)
SODIUM BLD-SCNC: 138 MMOL/L (ref 137–145)
T3 SERPL-MCNC: 118 NG/DL (ref 97–169)
T4 FREE SERPL-MCNC: 0.94 NG/DL (ref 0.78–2.19)
TSH SERPL DL<=0.05 MIU/L-ACNC: 1.9 MIU/ML (ref 0.46–4.68)
WBC NRBC COR # BLD: 5.8 10*3/MM3 (ref 3.2–9.8)

## 2018-10-26 PROCEDURE — 85025 COMPLETE CBC W/AUTO DIFF WBC: CPT | Performed by: NURSE PRACTITIONER

## 2018-10-26 PROCEDURE — 84480 ASSAY TRIIODOTHYRONINE (T3): CPT | Performed by: NURSE PRACTITIONER

## 2018-10-26 PROCEDURE — 36415 COLL VENOUS BLD VENIPUNCTURE: CPT | Performed by: NURSE PRACTITIONER

## 2018-10-26 PROCEDURE — 84439 ASSAY OF FREE THYROXINE: CPT | Performed by: NURSE PRACTITIONER

## 2018-10-26 PROCEDURE — 84443 ASSAY THYROID STIM HORMONE: CPT | Performed by: NURSE PRACTITIONER

## 2018-10-26 PROCEDURE — 80053 COMPREHEN METABOLIC PANEL: CPT | Performed by: NURSE PRACTITIONER

## 2018-10-30 ENCOUNTER — TELEPHONE (OUTPATIENT)
Dept: FAMILY MEDICINE CLINIC | Facility: CLINIC | Age: 43
End: 2018-10-30

## 2018-10-30 DIAGNOSIS — E78.5 DYSLIPIDEMIA: ICD-10-CM

## 2018-10-30 RX ORDER — PRAVASTATIN SODIUM 40 MG
40 TABLET ORAL DAILY
Qty: 90 TABLET | Refills: 6 | Status: SHIPPED | OUTPATIENT
Start: 2018-10-30 | End: 2018-10-31 | Stop reason: SDUPTHER

## 2018-10-31 DIAGNOSIS — E78.5 DYSLIPIDEMIA: ICD-10-CM

## 2018-10-31 RX ORDER — PRAVASTATIN SODIUM 40 MG
40 TABLET ORAL DAILY
Qty: 90 TABLET | Refills: 3 | Status: SHIPPED | OUTPATIENT
Start: 2018-10-31 | End: 2019-11-11 | Stop reason: SDUPTHER

## 2018-11-05 RX ORDER — PANTOPRAZOLE SODIUM 40 MG/1
40 TABLET, DELAYED RELEASE ORAL DAILY
Qty: 30 TABLET | Refills: 3 | Status: SHIPPED | OUTPATIENT
Start: 2018-11-05 | End: 2019-03-10 | Stop reason: SDUPTHER

## 2018-11-08 ENCOUNTER — TELEPHONE (OUTPATIENT)
Dept: FAMILY MEDICINE CLINIC | Facility: CLINIC | Age: 43
End: 2018-11-08

## 2018-11-08 NOTE — TELEPHONE ENCOUNTER
Encouraged Aquiles Najera to contact Physical Therapy regarding insurance approval for continuation of PT. Explained to Mr. Najera that the therapist in the  PT department will evaluate him to determine the number of treatments/visits he will need which should determine ICD code for insurance. Patient verbalized understanding.

## 2018-11-08 NOTE — TELEPHONE ENCOUNTER
SUJEY DAI SAID HE WAS APPROVED FOR THERAPY BUT HE NEEDS TO KNOW HOW MANY VISITS HE WAS APPROVED FOR AS THEY NEED TO PUT IT IN SYSTEM WHEN BOOKED.CALL HIM BACK ASAP

## 2018-12-11 ENCOUNTER — TELEPHONE (OUTPATIENT)
Dept: FAMILY MEDICINE CLINIC | Facility: CLINIC | Age: 43
End: 2018-12-11

## 2018-12-11 ENCOUNTER — OFFICE VISIT (OUTPATIENT)
Dept: FAMILY MEDICINE CLINIC | Facility: CLINIC | Age: 43
End: 2018-12-11

## 2018-12-11 VITALS
OXYGEN SATURATION: 99 % | HEIGHT: 70 IN | RESPIRATION RATE: 20 BRPM | BODY MASS INDEX: 31.65 KG/M2 | HEART RATE: 99 BPM | SYSTOLIC BLOOD PRESSURE: 138 MMHG | WEIGHT: 221.1 LBS | DIASTOLIC BLOOD PRESSURE: 90 MMHG

## 2018-12-11 DIAGNOSIS — E66.9 CLASS 1 OBESITY WITH BODY MASS INDEX (BMI) OF 31.0 TO 31.9 IN ADULT, UNSPECIFIED OBESITY TYPE, UNSPECIFIED WHETHER SERIOUS COMORBIDITY PRESENT: ICD-10-CM

## 2018-12-11 DIAGNOSIS — Z23 NEED FOR INFLUENZA VACCINATION: ICD-10-CM

## 2018-12-11 DIAGNOSIS — R10.33 PERIUMBILICAL ABDOMINAL PAIN: Primary | ICD-10-CM

## 2018-12-11 DIAGNOSIS — F41.9 ANXIETY: ICD-10-CM

## 2018-12-11 DIAGNOSIS — K59.00 CONSTIPATION, UNSPECIFIED CONSTIPATION TYPE: ICD-10-CM

## 2018-12-11 DIAGNOSIS — Z00.00 ENCOUNTER FOR PREVENTATIVE ADULT HEALTH CARE EXAMINATION: ICD-10-CM

## 2018-12-11 PROCEDURE — 99214 OFFICE O/P EST MOD 30 MIN: CPT | Performed by: NURSE PRACTITIONER

## 2018-12-11 PROCEDURE — 90471 IMMUNIZATION ADMIN: CPT | Performed by: NURSE PRACTITIONER

## 2018-12-11 PROCEDURE — 90674 CCIIV4 VAC NO PRSV 0.5 ML IM: CPT | Performed by: NURSE PRACTITIONER

## 2018-12-11 RX ORDER — TRIAMCINOLONE ACETONIDE 1 MG/G
1 CREAM TOPICAL 2 TIMES DAILY
COMMUNITY
End: 2018-12-11 | Stop reason: SDUPTHER

## 2018-12-11 RX ORDER — CALCIUM POLYCARBOPHIL 625 MG 625 MG/1
625 TABLET ORAL DAILY
Qty: 30 TABLET | Refills: 1 | Status: SHIPPED | OUTPATIENT
Start: 2018-12-11 | End: 2021-08-24 | Stop reason: ALTCHOICE

## 2018-12-11 RX ORDER — BECLOMETHASONE DIPROPIONATE HFA 80 UG/1
AEROSOL, METERED RESPIRATORY (INHALATION)
Refills: 6 | COMMUNITY
Start: 2018-11-14 | End: 2018-12-28 | Stop reason: DRUGHIGH

## 2018-12-11 RX ORDER — TRIAMCINOLONE ACETONIDE 1 MG/G
1 CREAM TOPICAL 2 TIMES DAILY
Qty: 80 G | Refills: 1 | Status: SHIPPED | OUTPATIENT
Start: 2018-12-11 | End: 2019-05-11 | Stop reason: SDUPTHER

## 2018-12-11 RX ORDER — BUPROPION HYDROCHLORIDE 150 MG/1
150 TABLET ORAL DAILY
Qty: 30 TABLET | Refills: 1 | Status: SHIPPED | OUTPATIENT
Start: 2018-12-11 | End: 2019-02-06 | Stop reason: SDUPTHER

## 2018-12-11 NOTE — PROGRESS NOTES
"Subjective   Aquiles Najera is a 43 y.o. male.     Mr Najera is a 43-year-old male who presents today for progressively worsening umbilical pain. Patient states he has pain superior to his umbilicus and feels a small tender mass. He has concern that this might be a hernia. He had previously had this repaired surgically with mesh and had no pain until about a month ago. Palpation makes pain worse. Bowel movement relieves pain somewhat. Patient states he typically has no pain unless he has some sort of pressure/palpation to the area. He states that his bowel movements are \"normal\" however, he does have chronic issues with constipation. He takes MiraLAX daily. He is also progressively gaining weight since starting treatment for his hyperthyroidism. Weight gain has caused increased abdominal girth.             The following portions of the patient's history were reviewed and updated as appropriate: allergies, current medications, past family history, past medical history, past social history, past surgical history and problem list.    Review of Systems   Constitutional: Negative for activity change, appetite change, fatigue, unexpected weight gain and unexpected weight loss.   HENT: Negative for congestion, sore throat, trouble swallowing and voice change.    Eyes: Negative.    Respiratory: Negative for cough, chest tightness, shortness of breath and wheezing.    Cardiovascular: Negative for chest pain, palpitations and leg swelling.   Gastrointestinal: Positive for abdominal distention, abdominal pain and constipation. Negative for diarrhea, nausea and vomiting.        Superior, umbilical pain with reported mass. Increased abdominal girth secondary to weight gain. Reports intermittent issues with constipation. Takes MiraLAX daily.   Endocrine: Negative.    Genitourinary: Negative for dysuria, hematuria and urgency.   Musculoskeletal: Negative for arthralgias and myalgias.   Skin: Negative for rash. " "  Neurological: Negative for dizziness, weakness, light-headedness and headache.   Hematological: Negative.    Psychiatric/Behavioral: The patient is nervous/anxious.         Reports intermittent anxiety and \"short tempered\".       Objective   Physical Exam   Constitutional: He is oriented to person, place, and time. He appears well-developed and well-nourished. No distress.   HENT:   Head: Normocephalic and atraumatic.   Eyes: Conjunctivae are normal.   Neck: Normal range of motion.   Cardiovascular: Normal rate, regular rhythm, normal heart sounds and intact distal pulses. Exam reveals no gallop and no friction rub.   No murmur heard.  Pulmonary/Chest: Effort normal and breath sounds normal. No respiratory distress. He has no wheezes. He has no rales.   Abdominal: Soft. Normal appearance and bowel sounds are normal. He exhibits no distension and no mass. There is tenderness in the periumbilical area. There is no rebound and no guarding. No hernia.       Musculoskeletal: Normal range of motion. He exhibits no edema or tenderness.   Neurological: He is alert and oriented to person, place, and time.   Skin: Skin is warm and dry. No rash noted. He is not diaphoretic. No erythema. No pallor.   Psychiatric: He has a normal mood and affect. His behavior is normal. Judgment and thought content normal.   Nursing note and vitals reviewed.        Assessment/Plan   Aquiles was seen today for abdominal pain.    Diagnoses and all orders for this visit:    Periumbilical abdominal pain    Need for influenza vaccination  -     Flucelvax Quad (Vial) =>4 yrs ([id number])    Encounter for preventative adult health care examination  -     Hemoglobin A1c; Future  -     Basic Metabolic Panel; Future    Constipation, unspecified constipation type    Class 1 obesity with body mass index (BMI) of 31.0 to 31.9 in adult, unspecified obesity type, unspecified whether serious comorbidity present    Anxiety    Other orders  -     calcium " polycarbophil (FIBERCON) 625 MG tablet; Take 1 tablet by mouth Daily.  -     buPROPion XL (WELLBUTRIN XL) 150 MG 24 hr tablet; Take 1 tablet by mouth Daily.      1. Maxine-umbilical abdominal pain-patient wishes to treat constipation and see if this helps to alleviate his pain as opposed to being referred back to general surgery for further evaluation. We will add fiber and increase water to diet to see if this helps constipation and periumbilical pain. Instructed patient that if pain did not improve, he'll be referred back to general surgery. Patient verbalized understanding and agreed with plan of care.    2. Constipation-FiberCon 625 mg 1 tablet daily by mouth. Increase water intake.    3. Anxiety. Wellbutrin 150 mg 1 tablet daily. Patient instructed to stop if symptoms worsened. Patient instructed to present to ER if he develops suicidal or homicidal ideations. Patient verbalized understanding of instruction.    4. Obesity-instructed. Low-carb, low-fat diet. Instructed in cardio exercise 3-5 times a week for 30 minutes each.    5. Follow-up in one month or sooner if needed.            This document has been electronically signed by ERIC Oleary on December 11, 2018 1:03 PM

## 2018-12-11 NOTE — TELEPHONE ENCOUNTER
SUJEY DAI WAS SEEN TODAY AND HAD ASKED FOR REFILL ON THE TRIAMCINALONE CREAM TO BE SENT TO [facility]...STILL NOT DONE..PLEASE CK ON THIS AND CALL HIM BACK  [phone]

## 2018-12-28 ENCOUNTER — LAB (OUTPATIENT)
Dept: LAB | Facility: HOSPITAL | Age: 43
End: 2018-12-28

## 2018-12-28 DIAGNOSIS — Z00.00 ENCOUNTER FOR PREVENTATIVE ADULT HEALTH CARE EXAMINATION: ICD-10-CM

## 2018-12-28 DIAGNOSIS — E78.00 ELEVATED CHOLESTEROL: ICD-10-CM

## 2018-12-28 LAB
ANION GAP SERPL CALCULATED.3IONS-SCNC: 10 MMOL/L (ref 5–15)
ARTICHOKE IGE QN: 90 MG/DL (ref 1–129)
BUN BLD-MCNC: 17 MG/DL (ref 7–21)
BUN/CREAT SERPL: 14.7 (ref 7–25)
CALCIUM SPEC-SCNC: 8.7 MG/DL (ref 8.4–10.2)
CHLORIDE SERPL-SCNC: 100 MMOL/L (ref 95–110)
CHOLEST SERPL-MCNC: 168 MG/DL (ref 0–199)
CO2 SERPL-SCNC: 27 MMOL/L (ref 22–31)
CREAT BLD-MCNC: 1.16 MG/DL (ref 0.7–1.3)
GFR SERPL CREATININE-BSD FRML MDRD: 69 ML/MIN/1.73 (ref 63–147)
GLUCOSE BLD-MCNC: 93 MG/DL (ref 60–100)
HBA1C MFR BLD: 5.7 % (ref 4–5.6)
HDLC SERPL-MCNC: 44 MG/DL (ref 60–200)
LDLC/HDLC SERPL: 2.34 {RATIO} (ref 0–3.55)
POTASSIUM BLD-SCNC: 4.1 MMOL/L (ref 3.5–5.1)
SODIUM BLD-SCNC: 137 MMOL/L (ref 137–145)
TRIGL SERPL-MCNC: 106 MG/DL (ref 20–199)

## 2018-12-28 PROCEDURE — 80048 BASIC METABOLIC PNL TOTAL CA: CPT

## 2018-12-28 PROCEDURE — 36415 COLL VENOUS BLD VENIPUNCTURE: CPT

## 2018-12-28 PROCEDURE — 80061 LIPID PANEL: CPT

## 2018-12-28 PROCEDURE — 83036 HEMOGLOBIN GLYCOSYLATED A1C: CPT

## 2019-01-09 ENCOUNTER — DOCUMENTATION (OUTPATIENT)
Dept: FAMILY MEDICINE CLINIC | Facility: CLINIC | Age: 44
End: 2019-01-09

## 2019-01-10 DIAGNOSIS — J30.9 ALLERGIC RHINITIS: ICD-10-CM

## 2019-01-10 RX ORDER — POLYETHYLENE GLYCOL 3350 17 G/17G
POWDER, FOR SOLUTION ORAL
Qty: 527 G | Refills: 5 | Status: SHIPPED | OUTPATIENT
Start: 2019-01-10 | End: 2022-01-05 | Stop reason: SDUPTHER

## 2019-01-10 RX ORDER — LORATADINE 10 MG/1
10 TABLET ORAL DAILY
Qty: 30 TABLET | Refills: 11 | Status: SHIPPED | OUTPATIENT
Start: 2019-01-10 | End: 2023-02-27 | Stop reason: SDUPTHER

## 2019-01-28 ENCOUNTER — DOCUMENTATION (OUTPATIENT)
Dept: PHYSICAL THERAPY | Facility: HOSPITAL | Age: 44
End: 2019-01-28

## 2019-02-06 RX ORDER — BUPROPION HYDROCHLORIDE 150 MG/1
TABLET ORAL
Qty: 30 TABLET | Refills: 1 | Status: SHIPPED | OUTPATIENT
Start: 2019-02-06 | End: 2019-02-08 | Stop reason: SDUPTHER

## 2019-02-08 ENCOUNTER — LAB (OUTPATIENT)
Dept: LAB | Facility: HOSPITAL | Age: 44
End: 2019-02-08

## 2019-02-08 ENCOUNTER — OFFICE VISIT (OUTPATIENT)
Dept: FAMILY MEDICINE CLINIC | Facility: CLINIC | Age: 44
End: 2019-02-08

## 2019-02-08 VITALS
WEIGHT: 210.3 LBS | RESPIRATION RATE: 20 BRPM | HEART RATE: 83 BPM | SYSTOLIC BLOOD PRESSURE: 130 MMHG | DIASTOLIC BLOOD PRESSURE: 98 MMHG | HEIGHT: 70 IN | BODY MASS INDEX: 30.11 KG/M2 | OXYGEN SATURATION: 98 %

## 2019-02-08 DIAGNOSIS — R10.33 PERIUMBILICAL PAIN: ICD-10-CM

## 2019-02-08 DIAGNOSIS — R19.00 RIGHT FLANK MASS: Primary | ICD-10-CM

## 2019-02-08 DIAGNOSIS — Z00.00 ENCOUNTER FOR SCREENING AND PREVENTATIVE CARE: ICD-10-CM

## 2019-02-08 DIAGNOSIS — K59.00 CONSTIPATION, UNSPECIFIED CONSTIPATION TYPE: ICD-10-CM

## 2019-02-08 DIAGNOSIS — E66.9 CLASS 1 OBESITY WITH BODY MASS INDEX (BMI) OF 30.0 TO 30.9 IN ADULT, UNSPECIFIED OBESITY TYPE, UNSPECIFIED WHETHER SERIOUS COMORBIDITY PRESENT: ICD-10-CM

## 2019-02-08 DIAGNOSIS — R25.2 CRAMPS, MUSCLE, GENERAL: ICD-10-CM

## 2019-02-08 LAB
ALBUMIN SERPL-MCNC: 5 G/DL (ref 3.4–4.8)
ALBUMIN/GLOB SERPL: 1.8 G/DL (ref 1.1–1.8)
ALP SERPL-CCNC: 87 U/L (ref 38–126)
ALT SERPL W P-5'-P-CCNC: 26 U/L (ref 21–72)
ANION GAP SERPL CALCULATED.3IONS-SCNC: 10 MMOL/L (ref 5–15)
AST SERPL-CCNC: 25 U/L (ref 17–59)
BASOPHILS # BLD AUTO: 0.02 10*3/MM3 (ref 0–0.2)
BASOPHILS NFR BLD AUTO: 0.3 % (ref 0–2)
BILIRUB SERPL-MCNC: 0.4 MG/DL (ref 0.2–1.3)
BILIRUB UR QL STRIP: NEGATIVE
BUN BLD-MCNC: 17 MG/DL (ref 7–21)
BUN/CREAT SERPL: 16.8 (ref 7–25)
CALCIUM SPEC-SCNC: 9.9 MG/DL (ref 8.4–10.2)
CHLORIDE SERPL-SCNC: 104 MMOL/L (ref 95–110)
CLARITY UR: CLEAR
CO2 SERPL-SCNC: 25 MMOL/L (ref 22–31)
COLOR UR: YELLOW
CREAT BLD-MCNC: 1.01 MG/DL (ref 0.7–1.3)
DEPRECATED RDW RBC AUTO: 39.2 FL (ref 35.1–43.9)
EOSINOPHIL # BLD AUTO: 0.01 10*3/MM3 (ref 0–0.7)
EOSINOPHIL NFR BLD AUTO: 0.1 % (ref 0–7)
ERYTHROCYTE [DISTWIDTH] IN BLOOD BY AUTOMATED COUNT: 13.1 % (ref 11.5–14.5)
GFR SERPL CREATININE-BSD FRML MDRD: 81 ML/MIN/1.73 (ref 63–147)
GLOBULIN UR ELPH-MCNC: 2.8 GM/DL (ref 2.3–3.5)
GLUCOSE BLD-MCNC: 71 MG/DL (ref 60–100)
GLUCOSE UR STRIP-MCNC: NEGATIVE MG/DL
HCT VFR BLD AUTO: 46.2 % (ref 39–49)
HGB BLD-MCNC: 16.2 G/DL (ref 13.7–17.3)
HGB UR QL STRIP.AUTO: NEGATIVE
IMM GRANULOCYTES # BLD AUTO: 0.03 10*3/MM3 (ref 0–0.02)
IMM GRANULOCYTES NFR BLD AUTO: 0.4 % (ref 0–0.5)
KETONES UR QL STRIP: NEGATIVE
LEUKOCYTE ESTERASE UR QL STRIP.AUTO: NEGATIVE
LYMPHOCYTES # BLD AUTO: 2.18 10*3/MM3 (ref 0.6–4.2)
LYMPHOCYTES NFR BLD AUTO: 30.6 % (ref 10–50)
MAGNESIUM SERPL-MCNC: 2.3 MG/DL (ref 1.6–2.3)
MCH RBC QN AUTO: 28.5 PG (ref 26.5–34)
MCHC RBC AUTO-ENTMCNC: 35.1 G/DL (ref 31.5–36.3)
MCV RBC AUTO: 81.3 FL (ref 80–98)
MONOCYTES # BLD AUTO: 0.41 10*3/MM3 (ref 0–0.9)
MONOCYTES NFR BLD AUTO: 5.8 % (ref 0–12)
NEUTROPHILS # BLD AUTO: 4.48 10*3/MM3 (ref 2–8.6)
NEUTROPHILS NFR BLD AUTO: 62.8 % (ref 37–80)
NITRITE UR QL STRIP: NEGATIVE
PH UR STRIP.AUTO: 6.5 [PH] (ref 5–9)
PLATELET # BLD AUTO: 232 10*3/MM3 (ref 150–450)
PMV BLD AUTO: 11 FL (ref 8–12)
POTASSIUM BLD-SCNC: 4.2 MMOL/L (ref 3.5–5.1)
PROT SERPL-MCNC: 7.8 G/DL (ref 6.3–8.6)
PROT UR QL STRIP: NEGATIVE
PSA SERPL-MCNC: 0.92 NG/ML (ref 0–4)
RBC # BLD AUTO: 5.68 10*6/MM3 (ref 4.37–5.74)
SODIUM BLD-SCNC: 139 MMOL/L (ref 137–145)
SP GR UR STRIP: 1.01 (ref 1–1.03)
TSH SERPL DL<=0.05 MIU/L-ACNC: 4.12 MIU/ML (ref 0.46–4.68)
UROBILINOGEN UR QL STRIP: NORMAL
WBC NRBC COR # BLD: 7.13 10*3/MM3 (ref 3.2–9.8)

## 2019-02-08 PROCEDURE — 99214 OFFICE O/P EST MOD 30 MIN: CPT | Performed by: NURSE PRACTITIONER

## 2019-02-08 PROCEDURE — 36415 COLL VENOUS BLD VENIPUNCTURE: CPT

## 2019-02-08 PROCEDURE — 80053 COMPREHEN METABOLIC PANEL: CPT

## 2019-02-08 PROCEDURE — G0103 PSA SCREENING: HCPCS

## 2019-02-08 PROCEDURE — 83735 ASSAY OF MAGNESIUM: CPT

## 2019-02-08 PROCEDURE — 85025 COMPLETE CBC W/AUTO DIFF WBC: CPT

## 2019-02-08 PROCEDURE — 84443 ASSAY THYROID STIM HORMONE: CPT

## 2019-02-08 PROCEDURE — 81003 URINALYSIS AUTO W/O SCOPE: CPT

## 2019-02-08 RX ORDER — BUPROPION HYDROCHLORIDE 150 MG/1
150 TABLET ORAL DAILY
Qty: 30 TABLET | Refills: 3 | Status: SHIPPED | OUTPATIENT
Start: 2019-02-08 | End: 2019-04-12 | Stop reason: SDUPTHER

## 2019-02-08 NOTE — PROGRESS NOTES
"Subjective   Aquiles Najera is a 43 y.o. male.     Mr. Najera is a 43-year-old male presents today for six-month follow-up for routine labs and medication refills.  He has multiple acute and chronic issues reported today.  Patient continues to have constipation despite having adequate fluid intake and taking multiple stool softeners.  He states on occasion he will have a \"normal bowel movement\".  However he reports most of the time he will go 2-3 times a day and feels like he is not emptying completely.  He does have abdominal pain in the periumbilical area.  This is at the site of an old hernia repair.  While he has pain in this area he states that it is stable and has not worsened since last exam.  Patient also reports for the first time today that he has a mass on his right lateral abdomen/flank.  He states is been there \"for years\".  However it is becoming more concerning to him because it is increasing in size and causing more pain.  He reports having these masses and a other areas of his body and has had one removed on his face previously.  In previous visits he is also reported widespread muscle cramps.  He does exercise routinely however he states in the past this has never given him excessive muscle cramps.  He has lost approximately 11 pounds since last visit.  This has been very intentional as he is focused on a low-carb high-protein diet, exercise and is taking Wellbutrin 150 mg to assist in this effort.  He denies any side effects from Wellbutrin and wishes to continue taking this is a weight loss aid.         The following portions of the patient's history were reviewed and updated as appropriate: allergies, current medications, past family history, past medical history, past social history, past surgical history and problem list.    Review of Systems   Constitutional: Negative for activity change, appetite change, fatigue, unexpected weight gain and unexpected weight loss.   HENT: Negative for " congestion, sore throat, trouble swallowing and voice change.    Eyes: Negative.    Respiratory: Negative for cough, chest tightness, shortness of breath and wheezing.    Cardiovascular: Negative for chest pain, palpitations and leg swelling.   Gastrointestinal: Positive for abdominal pain and constipation. Negative for diarrhea, nausea and vomiting.   Endocrine: Negative.    Genitourinary: Negative for dysuria, hematuria and urgency.   Musculoskeletal: Positive for myalgias (reports generalized widespread muscle cramps). Negative for arthralgias.   Skin: Negative for rash.   Neurological: Negative for dizziness, weakness, light-headedness and headache.   Hematological: Negative.    Psychiatric/Behavioral: Negative.        Objective   Physical Exam   Constitutional: He is oriented to person, place, and time. He appears well-developed and well-nourished. No distress.   HENT:   Head: Normocephalic and atraumatic.   Eyes: Conjunctivae are normal.   Neck: Normal range of motion.   Cardiovascular: Normal rate, regular rhythm, normal heart sounds and intact distal pulses. Exam reveals no gallop and no friction rub.   No murmur heard.  Pulmonary/Chest: Effort normal and breath sounds normal. No respiratory distress. He has no wheezes. He has no rales.   Abdominal: Soft. Normal appearance and bowel sounds are normal. He exhibits mass (right flank). He exhibits no distension. There is tenderness in the periumbilical area. There is no rebound and no guarding. No hernia.       Musculoskeletal: Normal range of motion. He exhibits no edema or tenderness.   Neurological: He is alert and oriented to person, place, and time.   Skin: Skin is warm and dry. No rash noted. He is not diaphoretic. No erythema. No pallor.   Psychiatric: He has a normal mood and affect. His behavior is normal. Judgment and thought content normal.   Nursing note and vitals reviewed.        Assessment/Plan   Aquiles was seen today for follow-up and med  refill.    Diagnoses and all orders for this visit:    Right flank mass  -     CT Abdomen Pelvis With Contrast    Constipation, unspecified constipation type  -     CT Abdomen Pelvis With Contrast    Periumbilical pain  -     CT Abdomen Pelvis With Contrast    Class 1 obesity with body mass index (BMI) of 30.0 to 30.9 in adult, unspecified obesity type, unspecified whether serious comorbidity present    Encounter for screening and preventative care  -     TSH; Future  -     CBC & Differential; Future  -     Comprehensive Metabolic Panel; Future  -     PSA Screen; Future  -     Magnesium; Future  -     Urinalysis With Culture If Indicated - Urine, Clean Catch; Future    Cramps, muscle, general    Other orders  -     buPROPion XL (WELLBUTRIN XL) 150 MG 24 hr tablet; Take 1 tablet by mouth Daily.    1.  Right flank mass-CT abdomen and pelvis with contrast.  Will call with results.  Plan of care pending CT results.    2.  Constipation- continue stool softeners as prescribed.  Continue increased fluid intake.  CT abdomen pelvis with contrast.  Will call with results.    3.  Periumbilical pain-CT abdomen pelvis with contrast.  Will call with results.    4.  Obesity- continue with diet and exercise.  Refill Wellbutrin 150 mg 1 tablet p.o. daily.    5.  Generalized muscle cramps-routine blood work.  Will call with results.    6.  Health maintenance-routine labs.  Will call with results.    7.  Follow-up in 3 months or sooner if needed.            This document has been electronically signed by ERIC Oleary on February 8, 2019 4:59 PM

## 2019-02-11 DIAGNOSIS — J44.9 CHRONIC OBSTRUCTIVE PULMONARY DISEASE, UNSPECIFIED COPD TYPE (HCC): ICD-10-CM

## 2019-02-11 RX ORDER — LACTOBACILLUS COMBINATION NO.4 3B CELL
CAPSULE ORAL
Qty: 15.8 ML | Refills: 8 | Status: SHIPPED | OUTPATIENT
Start: 2019-02-11 | End: 2022-11-04

## 2019-02-11 RX ORDER — LACTOBACILLUS COMBINATION NO.4 3B CELL
CAPSULE ORAL
Qty: 15.8 ML | Refills: 8 | OUTPATIENT
Start: 2019-02-11

## 2019-02-12 RX ORDER — ALBUTEROL SULFATE 90 UG/1
AEROSOL, METERED RESPIRATORY (INHALATION)
Qty: 18 INHALER | Refills: 3 | Status: SHIPPED | OUTPATIENT
Start: 2019-02-12 | End: 2019-04-18 | Stop reason: SDUPTHER

## 2019-02-21 ENCOUNTER — HOSPITAL ENCOUNTER (OUTPATIENT)
Dept: CT IMAGING | Facility: HOSPITAL | Age: 44
Discharge: HOME | End: 2019-02-21
Admitting: NURSE PRACTITIONER

## 2019-02-21 PROCEDURE — 74177 CT ABD & PELVIS W/CONTRAST: CPT

## 2019-02-21 PROCEDURE — 25010000002 IOPAMIDOL 61 % SOLUTION: Performed by: NURSE PRACTITIONER

## 2019-02-21 RX ADMIN — IOPAMIDOL 90 ML: 612 INJECTION, SOLUTION INTRAVENOUS at 12:35

## 2019-02-22 DIAGNOSIS — M54.12 CERVICAL RADICULITIS: ICD-10-CM

## 2019-02-22 DIAGNOSIS — R19.00 RIGHT FLANK MASS: Primary | ICD-10-CM

## 2019-02-22 DIAGNOSIS — R10.33 PERIUMBILICAL PAIN: ICD-10-CM

## 2019-02-22 NOTE — PROGRESS NOTES
No mass visualized on CT per radiology report.  I will refer to general surgery for further evaluation.  Does he have a preference of general surgeon?

## 2019-02-28 ENCOUNTER — HOSPITAL ENCOUNTER (OUTPATIENT)
Dept: PHYSICAL THERAPY | Facility: HOSPITAL | Age: 44
Setting detail: THERAPIES SERIES
Discharge: HOME | End: 2019-02-28

## 2019-02-28 DIAGNOSIS — M54.12 CERVICAL RADICULITIS: Primary | ICD-10-CM

## 2019-02-28 PROCEDURE — 97162 PT EVAL MOD COMPLEX 30 MIN: CPT | Performed by: PHYSICAL THERAPIST

## 2019-03-01 NOTE — THERAPY EVALUATION
Outpatient Physical Therapy Ortho Initial Evaluation  [facility]     Patient Name: Aquiles Najera  : 1975  MRN: [id number]  Today's Date: 2019      Visit Date: 2019  Attendance:  (20/yr)  Subjective Improvement: n/a  Next MD Appt: 5/10/19  Recert Date: 3/21/19    Therapy Diagnosis: Cervical radiculitis         Past Medical History:   Diagnosis Date   • Abdominal pain    • Acute exacerbation of chronic obstructive airways disease (CMS/HCC)    • Allergic rhinitis    • Chest pain     unspecified   • Chronic cough    • Conjunctivitis    • Dyslipidemia    • Dyspnea    • Gastroesophageal reflux disease    • Pain     Pain in left ankle and joints of left foot      • Pain     pain in left leg   • Tobacco dependence syndrome    • Umbilical hernia without obstruction or gangrene    • Viral gastroenteritis         Past Surgical History:   Procedure Laterality Date   • INCISION AND DRAINAGE ABSCESS  2009    Irrigation and debridement of facial soft tissue injuries with closure of complex lacerations and repair of maxillary dental alveolar fracture   • UMBILICAL HERNIA REPAIR N/A 2017    Procedure: OPEN UMBILICAL AND EPIGASTRIC HERNIA REPAIR WITH MESH;  Surgeon: Sal Herbert MD;  Location: [facility];  Service:        Current Outpatient Medications:   •  albuterol sulfate  (90 Base) MCG/ACT inhaler, TAKE 2 PUFFS BY MOUTH EVERY 4 HOURS AS NEEDED FOR WHEEZE, Disp: 18 inhaler, Rfl: 3  •  aspirin 81 MG chewable tablet, Chew 1 tablet Daily., Disp: 90 tablet, Rfl: 6  •  beclomethasone (QVAR) 80 MCG/ACT inhaler, Inhale 1 puff 2 (Two) Times a Day., Disp: 1 inhaler, Rfl: 11  •  buPROPion XL (WELLBUTRIN XL) 150 MG 24 hr tablet, Take 1 tablet by mouth Daily., Disp: 30 tablet, Rfl: 3  •  calcium polycarbophil (FIBERCON) 625 MG tablet, Take 1 tablet by mouth Daily., Disp: 30 tablet, Rfl: 1  •  [facility] FLUTICASONE PROPIONATE 50 MCG/ACT nasal spray, PUT 1 SPRAY IN EACH NOSTRIL DAILY,  Disp: 15.8 mL, Rfl: 8  •  ipratropium-albuterol (DUONEB) 0.5-2.5 mg/mL nebulizer, Take 3 mL by nebulization Every 4 (Four) Hours As Needed for Wheezing., Disp: 360 mL, Rfl: 3  •  loratadine (CLARITIN) 10 MG tablet, TAKE 1 TABLET BY MOUTH DAILY., Disp: 30 tablet, Rfl: 11  •  methIMAzole (TAPAZOLE) 5 MG tablet, Take 1.5 tabs a day, Disp: 45 tablet, Rfl: 5  •  nitroglycerin (NITROSTAT) 0.4 MG SL tablet, Place 1 tablet under the tongue Every 5 (Five) Minutes As Needed for chest pain. Take no more than 3 doses in 15 minutes., Disp: 30 tablet, Rfl: 12  •  pantoprazole (PROTONIX) 40 MG EC tablet, Take 1 tablet by mouth Daily., Disp: 30 tablet, Rfl: 3  •  polyethylene glycol (MIRALAX) powder, TAKE 17 G (1 CAPFUL) BY MOUTH DAILY., Disp: 527 g, Rfl: 5  •  pravastatin (PRAVACHOL) 40 MG tablet, Take 1 tablet by mouth Daily., Disp: 90 tablet, Rfl: 3  •  triamcinolone (KENALOG) 0.1 % cream, Apply 1 application topically to the appropriate area as directed 2 (Two) Times a Day., Disp: 80 g, Rfl: 1    Allergies   Allergen Reactions   • Colace [Docusate] Hives       Visit Dx:     ICD-10-CM ICD-9-CM   1. Cervical radiculitis M54.12 723.4       Patient History     Row Name 02/28/19 0800          Chief Complaint  Pain;Tinglings;Numbness  -SS    Type of Pain  Neck pain R scapular  -SS    Date Current Problem(s) Began  -- chronic  -SS    Brief Description of Current Complaint  Patient is noting pain in the right superior scapular region and occasional numbness and tingling in the R RF & SF. He notes that he feels the numbness and tingling more with movement or lying on the right side. Pain is more localized in the region of the medial superior scapular angle. Patient was seen last year for the same symptoms but more intense and diffuse. Overall, he is better than he was last year but symptoms are continuing. 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 He noticed that his symptoms got worse the more of the  "work that he did. He has been continuing the HEP.  male with children.   -SS    Patient/Caregiver Goals  -- \"I'd prefer to get this problem taken care of.\"  -SS    Current Tobacco Use  no  -SS    Smoking Status  former  -SS    Patient's Rating of General Health  Good  -SS    Hand Dominance  right-handed  -SS    Occupation/sports/leisure activities  Unemployed. Hobbies: weightlifting  -SS    What clinical tests have you had for this problem?  -- none recent  -SS          Pain Location  Neck;Shoulder R scapular region  -SS    Pain at Present  5  -SS    Pain at Best  3 over past 1 month  -SS    Pain at Worst  8;9;10 over past 1 month  -SS    Pain Frequency  Constant/continuous  -SS    Pain Description  Dull;Aching  -SS    What Performance Factors Make the Current Problem(s) WORSE?  movement, weightlifting, working with arms overhead  -SS    What Performance Factors Make the Current Problem(s) BETTER?  dry needling, rest  -SS    Is your sleep disturbed?  Yes  -SS    Is medication used to assist with sleep?  No  -SS    Difficulties at work?  unable  -SS    Difficulties with ADL's?  pain  -SS    Difficulties with recreational activities?  pain  -SS          Any falls in the past year:  No  -SS    Does patient have a fear of falling  No  -SS          Primary Language  English  -SS          Are you being hurt, hit, or frightened by anyone at home or in your life?  No  -SS    Are you being neglected by a caregiver  No  -SS      User Key  (r) = Recorded By, (t) = Taken By, (c) = Cosigned By    Initials Name Provider Type    SS Lui Romero, PT DPT Physical Therapist          PT Ortho     Row Name 02/28/19 0800       Subjective Comments    Subjective Comments  see Therapy Patient History  -SS       Precautions and Contraindications    Precautions/Limitations  no known precautions/limitations  -SS       Subjective Pain    Able to rate subjective pain?  yes  -SS    Pre-Treatment Pain Level  5  -SS    " "Post-Treatment Pain Level  3  -       Posture/Observations    Alignment Options  Forward head;Cervical lordosis;Thoracic kyphosis;Rounded shoulders;Scapular elevation;Lumbar lordosis  -SS    Forward Head  Mild  -SS    Cervical Lordosis  Normal  -SS    Thoracic Kyphosis  Normal  -SS    Rounded Shoulders  Bilateral: R > L  -SS    Scapular Elevation  Normal  -SS    Lumbar lordosis  Normal  -SS       Cervical/Thoracic Special Tests    Cervical/Thoracic Special Tests Comments  Tinel's at cubital tunnel and canal of Guyon radiate symptoms to medial-superior angle of the scapula. Median and ulnar neurotension tests negative.No change in symptoms with manual cervical distraction or compression. TTP and increased tone R UT, levator scapula. TTP R lower cervical spine and in area of brachial plexus. Mid-cervicals L sidebent.  -SS       Head/Neck/Trunk    Head/Neck/Trunk Comments  Cervical AROM grossly WNLs all planes. Pain/stretching R posterolateral cervical spine to medial-superior R scapula with each motion.  -SS       Right Upper Ext    Rt Upper Extremity Comments   Shoulder AROM grossly WNLs; \"feel it\" R medial-superior scapula region  -SS       Left Upper Ext    Lt Upper Extremity Comments   Shoulder AROM grossly WNLs.  -SS       MMT (Manual Muscle Testing)    Rt Upper Ext  Rt Shoulder WNL;Rt Scapula WNL increased pain with testing shoulder and scapula; myotomes 5/5  -SS    Lt Upper Ext  Lt Shoulder WNL;Lt Scapula WNL  -SS       Sensation    Additional Comments  Paraesthesia R C8. Light touch at R T1 is also noted at medial-superior angle of the R scapula.  -      User Key  (r) = Recorded By, (t) = Taken By, (c) = Cosigned By    Initials Name Provider Type    SS Lui Romero, PT DPT Physical Therapist                      Therapy Education  Education Details: therapy plan  How Provided: Verbal  Provided to: Patient  Level of Understanding: Verbalized     PT OP Goals     Row Name 02/28/19 0800          STG " Date to Achieve  -- deferred  -          LTG Date to Achieve  03/28/19  -    LTG 1  Independent with HEP  -    LTG 2  Resolution of referred/radiating pain  -    LTG 3  Resume weightlifting and PLOF  -    LTG 4  NDI score to be </= 20%.  -          PT Goal Re-Cert Due Date  03/21/19  -      User Key  (r) = Recorded By, (t) = Taken By, (c) = Cosigned By    Initials Name Provider Type     Lui Romero, PT DPT Physical Therapist          PT Assessment/Plan     Row Name 02/28/19 0800          Functional Limitations  Limitations in community activities;Limitations in functional capacity and performance;Performance in leisure activities;Performance in work activities  -    Impairments  Pain;Joint mobility  -    Assessment Comments  Patient has continued pain in the R scapular region that may be related to trigger point or referred from cervical spine. [place] noted from cervical spine with joint mobilization. His cervical alignment was corrected and he had decreased tension in his cervical and periscapular musculature after mobilization.   -    Rehab Potential  Good barrier: chronicity  -    Patient/caregiver participated in establishment of treatment plan and goals  Yes  -    Patient would benefit from skilled therapy intervention  Yes  -SS          PT Frequency  2x/week  -    Predicted Duration of Therapy Intervention (Therapy Eval)  3-4 weeks with further TBA  -    PT Plan Comments  Assess response to joint mobilization. Manual therapy including joint mobilization, MFR, dry needling. Heat/ice with or without IFC estim as needed for pain.   -      User Key  (r) = Recorded By, (t) = Taken By, (c) = Cosigned By    Initials Name Provider Type    Lui Zaldivar, PT DPT Physical Therapist                 Manual Rx (last 36 hours)      Manual Treatments     Row Name 02/28/19 0700             Manual Rx 1    Manual Rx 1 Location  cervical spine  -      Manual Rx 1 Type   joint mobilization  -SS      Manual Rx 1 Grade  4/5  -SS        User Key  (r) = Recorded By, (t) = Taken By, (c) = Cosigned By    Initials Name Provider Type     Lui Romero PT DPT Physical Therapist                      Outcome Measure Options: Neck Disability Index (NDI)  Neck Disability Index  Section 1 - Pain Intensity: The pain is moderate at the moment.  Section 2 - Personal Care: I can look after myself normally, but it causes extra pain.  Section 3 - Lifting: I can lift heavy weights, but it gives me extra pain.  Section 5 - Headaches: I have slight headaches that come infrequently.  Section 6 - Concentration: I can concentrate fully with slight difficulty.  Section 7 - Sleeping: My sleep is mildly disturbed for up to 1-2 hours.  Section 8 - Driving: I can drive as long as I want with slight neck pain.  Section 9 - Reading: I can read as much as I want with moderate neck pain.  Section 10 - Recreation: I have neck pain with most recreational activities.  Neck Disability Index Score: 14  Neck Disability Index Comments: 14/45 = 31.1      Time Calculation:         Start Time: 0808  Stop Time: 0846  Time Calculation (min): 38 min     Therapy Charges for Today     Code Description Service Date Service Provider Modifiers Qty    [id number] HC PT EVAL MOD COMPLEXITY 3 2/28/2019 Lui Romero, PT DPT GP 1                   Lui Romero PT, DPT, T  2/28/2019

## 2019-03-04 ENCOUNTER — HOSPITAL ENCOUNTER (OUTPATIENT)
Dept: PHYSICAL THERAPY | Facility: HOSPITAL | Age: 44
Setting detail: THERAPIES SERIES
Discharge: HOME | End: 2019-03-04

## 2019-03-04 DIAGNOSIS — M54.12 CERVICAL RADICULITIS: Primary | ICD-10-CM

## 2019-03-04 PROCEDURE — 97140 MANUAL THERAPY 1/> REGIONS: CPT | Performed by: PHYSICAL THERAPIST

## 2019-03-04 NOTE — THERAPY TREATMENT NOTE
Outpatient Physical Therapy Ortho Treatment Note  [facility]     Patient Name: Aquiles Najera  : 1975  MRN: [id number]  Today's Date: 3/4/2019      Visit Date: 2019  Attendance:  (20/yr)  Subjective Improvement: not assessed this date  Next MD Appt: 5/10/19  Recert Date: 3/21/19     Therapy Diagnosis: Cervical radiculitis      Visit Dx:    ICD-10-CM ICD-9-CM   1. Cervical radiculitis M54.12 723.4       Patient Active Problem List   Diagnosis   • Umbilical hernia without obstruction or gangrene   • Tobacco dependence syndrome   • Gastroesophageal reflux disease   • Dyslipidemia   • Chest pain   • Allergic rhinitis   • COPD (chronic obstructive pulmonary disease) - presumed   • Constipation   • Cocaine abuse in remission (CMS/HCC)   • Pre-syncope   • Essential hypertension   • Abnormal thyroid function test   • Enlarged thyroid   • Acute bronchitis   • Graves disease   • Hyperthyroidism        Past Medical History:   Diagnosis Date   • Abdominal pain    • Acute exacerbation of chronic obstructive airways disease (CMS/HCC)    • Allergic rhinitis    • Chest pain     unspecified   • Chronic cough    • Conjunctivitis    • Dyslipidemia    • Dyspnea    • Gastroesophageal reflux disease    • Pain     Pain in left ankle and joints of left foot      • Pain     pain in left leg   • Tobacco dependence syndrome    • Umbilical hernia without obstruction or gangrene    • Viral gastroenteritis         Past Surgical History:   Procedure Laterality Date   • INCISION AND DRAINAGE ABSCESS  2009    Irrigation and debridement of facial soft tissue injuries with closure of complex lacerations and repair of maxillary dental alveolar fracture   • UMBILICAL HERNIA REPAIR N/A 2017    Procedure: OPEN UMBILICAL AND EPIGASTRIC HERNIA REPAIR WITH MESH;  Surgeon: Sal Herbert MD;  Location: [facility];  Service:        PT Ortho     Row Name 19 0900       Subjective Comments    Subjective  Comments  Pain was better for the rest of the day after his evaluation on 2/28. On 3/1, pain came back more localized along the R medial scapular border.   -SS       Precautions and Contraindications    Precautions/Limitations  no known precautions/limitations  -SS       Subjective Pain    Able to rate subjective pain?  yes  -SS    Pre-Treatment Pain Level  5  -SS    Post-Treatment Pain Level  3  -SS       Cervical/Thoracic Special Tests    Cervical/Thoracic Special Tests Comments  Tinel's at cubital tunnel radiated up to his medial scapular border. Tinel's at canal of Guyon negative. Increased tone periscapular muscles at the R medial scapular border, mid and lower thoracic paraspinals, and levator scapula..  -SS      User Key  (r) = Recorded By, (t) = Taken By, (c) = Cosigned By    Initials Name Provider Type    Lui Zaldivar, PT DPT Physical Therapist                      PT Assessment/Plan     Row Name 03/04/19 0900          Functional Limitations  Limitations in community activities;Limitations in functional capacity and performance;Performance in leisure activities;Performance in work activities  -    Impairments  Pain;Joint mobility  -    Assessment Comments  Multiple twitch responses to dry needling this date. Decreased pain and muscle tension after treatment.  -SS    Rehab Potential  Good barrier: chronicity  -SS    Patient/caregiver participated in establishment of treatment plan and goals  Yes  -SS    Patient would benefit from skilled therapy intervention  Yes  -SS          PT Frequency  2x/week  -    Predicted Duration of Therapy Intervention (Therapy Eval)  3-4 weeks with further to be determined  -    PT Plan Comments  Continue POC. Assess response to treatment.   -      User Key  (r) = Recorded By, (t) = Taken By, (c) = Cosigned By    Initials Name Provider Type    Lui Zaldivar, PT DPT Physical Therapist                    Manual Rx (last 36 hours)      Manual  Treatments     Row Name 03/04/19 0900          Manual Rx 1 Location  mid and lower thoracic paraspinals  -SS    Manual Rx 1 Type  dry needling  -SS          Manual Rx 2 Location  R levator scapula  -SS    Manual Rx 2 Type  dry needling  -SS          Manual Rx 3 Location  R rhomboids  -SS    Manual Rx 3 Type  dry needling  -SS          Manual Rx 4 Location  mid and lower thoracic paraspinals  -SS    Manual Rx 4 Type  MFR  -SS      User Key  (r) = Recorded By, (t) = Taken By, (c) = Cosigned By    Initials Name Provider Type    Lui Zaldivar, PT DPT Physical Therapist          PT OP Goals     Row Name 03/04/19 0900          STG Date to Achieve  -- deferred  -SS          LTG Date to Achieve  03/28/19  -SS    LTG 1  Independent with HEP  -SS    LTG 1 Progress  Ongoing  -SS    LTG 2  Resolution of referred/radiating pain  -SS    LTG 2 Progress  Ongoing  -SS    LTG 3  Resume weightlifting and PLOF  -SS    LTG 3 Progress  Ongoing  -SS    LTG 4  NDI score to be </= 20%.  -SS    LTG 4 Progress  Ongoing  -SS          PT Goal Re-Cert Due Date  03/21/19  -      User Key  (r) = Recorded By, (t) = Taken By, (c) = Cosigned By    Initials Name Provider Type    Lui Zaldivar, PT DPT Physical Therapist          Therapy Education  Education Details: post-treatment instruction  How Provided: Verbal  Provided to: Patient  Level of Understanding: Verbalized      Time Calculation:   Start Time: 0930  Stop Time: 1000  Time Calculation (min): 30 min    Therapy Charges for Today     Code Description Service Date Service Provider Modifiers Qty    73253236664 HC PT MANUAL THERAPY EA 15 MIN 3/4/2019 Lui Romero, PT DPT GP 2                    Lui Romero, PT, DPT, CHT  3/4/2019

## 2019-03-08 ENCOUNTER — HOSPITAL ENCOUNTER (OUTPATIENT)
Dept: PHYSICAL THERAPY | Facility: HOSPITAL | Age: 44
Setting detail: THERAPIES SERIES
Discharge: HOME | End: 2019-03-08

## 2019-03-08 DIAGNOSIS — M54.12 CERVICAL RADICULITIS: Primary | ICD-10-CM

## 2019-03-08 PROCEDURE — 97140 MANUAL THERAPY 1/> REGIONS: CPT | Performed by: PHYSICAL THERAPIST

## 2019-03-08 NOTE — THERAPY TREATMENT NOTE
Outpatient Physical Therapy Ortho Treatment Note  [facility]     Patient Name: Aquiles Najera  : 1975  MRN: [id number]  Today's Date: 3/8/2019      Visit Date: 2019  Attendance: 3/3 (20/yr)  Subjective Improvement: 15-20%  Next MD Appt: 5/10/19  Recert Date: 3/21/19     Therapy Diagnosis: Cervical radiculitis      Visit Dx:    ICD-10-CM ICD-9-CM   1. Cervical radiculitis M54.12 723.4            Past Medical History:   Diagnosis Date   • Abdominal pain    • Acute exacerbation of chronic obstructive airways disease (CMS/HCC)    • Allergic rhinitis    • Chest pain     unspecified   • Chronic cough    • Conjunctivitis    • Dyslipidemia    • Dyspnea    • Gastroesophageal reflux disease    • Pain     Pain in left ankle and joints of left foot      • Pain     pain in left leg   • Tobacco dependence syndrome    • Umbilical hernia without obstruction or gangrene    • Viral gastroenteritis         Past Surgical History:   Procedure Laterality Date   • INCISION AND DRAINAGE ABSCESS  2009    Irrigation and debridement of facial soft tissue injuries with closure of complex lacerations and repair of maxillary dental alveolar fracture   • UMBILICAL HERNIA REPAIR N/A 2017    Procedure: OPEN UMBILICAL AND EPIGASTRIC HERNIA REPAIR WITH MESH;  Surgeon: Sal Herbert MD;  Location: [facility];  Service:        PT Ortho     Row Name 19 0800       Subjective Comments    Subjective Comments  Starting to feel better in the area around the medial scapular border, but upper trap region has been more aggravated and is tender to touch.   -SS       Precautions and Contraindications    Precautions/Limitations  no known precautions/limitations  -SS       Subjective Pain    Able to rate subjective pain?  yes  -SS    Pre-Treatment Pain Level  7  -SS    Post-Treatment Pain Level  -- 3.5  -SS       Cervical/Thoracic Special Tests    Cervical/Thoracic Special Tests Comments  Taut band R rhomboid.  TTP with increased tone R UT.  -SS      User Key  (r) = Recorded By, (t) = Taken By, (c) = Cosigned By    Initials Name Provider Type    Lui Zaldivar, PT DPT Physical Therapist                      PT Assessment/Plan     Row Name 03/08/19 0800          Functional Limitations  Limitations in community activities;Limitations in functional capacity and performance;Performance in leisure activities;Performance in work activities  -    Impairments  Pain;Joint mobility  -    Assessment Comments  Significant twitch response with dry needling. Rhomboid and upper trap are looser and less tender to palpation after treatment.  -    Rehab Potential  Good barrier: chronicity  -SS    Patient/caregiver participated in establishment of treatment plan and goals  Yes  -SS    Patient would benefit from skilled therapy intervention  Yes  -SS          PT Frequency  2x/week  -SS    Predicted Duration of Therapy Intervention (Therapy Eval)  3-4 weeks with further TBA  -    PT Plan Comments  Continue POC  -SS      User Key  (r) = Recorded By, (t) = Taken By, (c) = Cosigned By    Initials Name Provider Type    Lui Zaldivar, PT DPT Physical Therapist                Manual Rx (last 36 hours)      Manual Treatments     Row Name 03/08/19 0900             Manual Rx 1    Manual Rx 1 Location  R rhomboid  -SS      Manual Rx 1 Type  dry needling  -SS         Manual Rx 2    Manual Rx 2 Location  R upper trapezius  -SS      Manual Rx 2 Type  dry needling  -SS        User Key  (r) = Recorded By, (t) = Taken By, (c) = Cosigned By    Initials Name Provider Type    Lui Zaldivar, PT DPT Physical Therapist          PT OP Goals     Row Name 03/08/19 0800          STG Date to Achieve  -- deferred  -SS          LTG Date to Achieve  03/28/19  -SS    LTG 1  Independent with HEP  -SS    LTG 1 Progress  Ongoing  -SS    LTG 2  Resolution of referred/radiating pain  -SS    LTG 2 Progress  Ongoing  -SS    LTG 3  Resume  weightlifting and PLOF  -    LTG 3 Progress  Ongoing  -SS    LTG 4  NDI score to be </= 20%.  -    LTG 4 Progress  Ongoing  -          PT Goal Re-Cert Due Date  03/21/19  -      User Key  (r) = Recorded By, (t) = Taken By, (c) = Cosigned By    Initials Name Provider Type    SS Lui Romero, PT DPT Physical Therapist                         Time Calculation:   Start Time: 0847  Stop Time: 0905  Time Calculation (min): 18 min    Therapy Charges for Today     Code Description Service Date Service Provider Modifiers Qty    50824848615 HC PT MANUAL THERAPY EA 15 MIN 3/8/2019 Lui Romero, PT DPT GP 1                    Lui Romero, PT, DPT, CHT  3/8/2019

## 2019-03-11 ENCOUNTER — HOSPITAL ENCOUNTER (OUTPATIENT)
Dept: PHYSICAL THERAPY | Facility: HOSPITAL | Age: 44
Setting detail: THERAPIES SERIES
Discharge: HOME | End: 2019-03-11

## 2019-03-11 DIAGNOSIS — M54.12 CERVICAL RADICULITIS: Primary | ICD-10-CM

## 2019-03-11 PROCEDURE — 97140 MANUAL THERAPY 1/> REGIONS: CPT | Performed by: PHYSICAL THERAPIST

## 2019-03-11 RX ORDER — METHIMAZOLE 5 MG/1
TABLET ORAL
Qty: 45 TABLET | Refills: 5 | Status: SHIPPED | OUTPATIENT
Start: 2019-03-11 | End: 2019-05-10 | Stop reason: SDUPTHER

## 2019-03-11 RX ORDER — PANTOPRAZOLE SODIUM 40 MG/1
TABLET, DELAYED RELEASE ORAL
Qty: 30 TABLET | Refills: 3 | Status: SHIPPED | OUTPATIENT
Start: 2019-03-11 | End: 2019-07-07 | Stop reason: SDUPTHER

## 2019-03-11 NOTE — THERAPY TREATMENT NOTE
"    Outpatient Physical Therapy Ortho Treatment Note  [facility]     Patient Name: Aquiles Najera  : 1975  MRN: [id number]  Today's Date: 3/11/2019      Visit Date: 2019  Attendance: / (20/yr)  Subjective Improvement: 70-75%  Next MD Appt: 5/10/19  Recert Date: 3/21/19     Therapy Diagnosis: Cervical radiculitis      Visit Dx:    ICD-10-CM ICD-9-CM   1. Cervical radiculitis M54.12 723.4            Past Medical History:   Diagnosis Date   • Abdominal pain    • Acute exacerbation of chronic obstructive airways disease (CMS/HCC)    • Allergic rhinitis    • Chest pain     unspecified   • Chronic cough    • Conjunctivitis    • Dyslipidemia    • Dyspnea    • Gastroesophageal reflux disease    • Pain     Pain in left ankle and joints of left foot      • Pain     pain in left leg   • Tobacco dependence syndrome    • Umbilical hernia without obstruction or gangrene    • Viral gastroenteritis         Past Surgical History:   Procedure Laterality Date   • INCISION AND DRAINAGE ABSCESS  2009    Irrigation and debridement of facial soft tissue injuries with closure of complex lacerations and repair of maxillary dental alveolar fracture   • UMBILICAL HERNIA REPAIR N/A 2017    Procedure: OPEN UMBILICAL AND EPIGASTRIC HERNIA REPAIR WITH MESH;  Surgeon: Sal Herbert MD;  Location: [facility];  Service:        PT Ortho     Row Name 19 0900       Subjective Comments    Subjective Comments  Soreness seems \"to be working its way up to the top of my shoulder.\" Not bothering him to do rowing motion and scapular adduction. Was sore the day after last session. 70-75% subjective improvement.   -SS       Precautions and Contraindications    Precautions/Limitations  no known precautions/limitations  -SS       Subjective Pain    Able to rate subjective pain?  yes  -SS    Pre-Treatment Pain Level  -- 3.5/10  -SS    Post-Treatment Pain Level  2 \"maybe\"  -SS       Cervical/Thoracic Special " Tests    Cervical/Thoracic Special Tests Comments  Taut band R rhomboid. TTP taut band and R UT with compression.   -      User Key  (r) = Recorded By, (t) = Taken By, (c) = Cosigned By    Initials Name Provider Type    Lui Zaldivar, PT DPT Physical Therapist                      PT Assessment/Plan     Row Name 03/11/19 0900          Functional Limitations  Limitations in community activities;Limitations in functional capacity and performance;Performance in leisure activities;Performance in work activities  -    Impairments  Pain;Joint mobility  -    Assessment Comments  Twitch response noted lower thoracic paraspinals. Muscle relaxation noted all treated muscles compared to pre-treatment.  -    Rehab Potential  Good barrier: chronicity  -    Patient/caregiver participated in establishment of treatment plan and goals  Yes  -SS    Patient would benefit from skilled therapy intervention  Yes  -SS          PT Frequency  2x/week  -    Predicted Duration of Therapy Intervention (Therapy Eval)  3-4 weeks with further  -    PT Plan Comments  Continue POC. Continue to assess response to treatment.  -      User Key  (r) = Recorded By, (t) = Taken By, (c) = Cosigned By    Initials Name Provider Type    Lui Zaldivar, PT DPT Physical Therapist                                Manual Rx (last 36 hours)      Manual Treatments     Row Name 03/11/19 0900          Manual Rx 1 Location  R rhomboid  -SS    Manual Rx 1 Type  dry needling  -SS          Manual Rx 2 Location  R lower thoracic and upper lumbar paraspinals  -SS    Manual Rx 2 Type  dry needling  -SS          Manual Rx 3 Location  R UT  -SS    Manual Rx 3 Type  dry needling  -SS          Manual Rx 4 Location  R UT and thoracolumbar paraspinals  -SS    Manual Rx 4 Type  MFR  -SS    Manual Rx 4 Grade  5 mins  -      User Key  (r) = Recorded By, (t) = Taken By, (c) = Cosigned By    Initials Name Provider Type    Lui Zaldivar  Louis, PT DPT Physical Therapist          PT OP Goals     Row Name 03/11/19 0900          STG Date to Achieve  -- deferred  -SS          LTG Date to Achieve  03/28/19  -    LTG 1  Independent with HEP  -SS    LTG 1 Progress  Ongoing  -SS    LTG 2  Resolution of referred/radiating pain  -SS    LTG 2 Progress  Ongoing  -SS    LTG 3  Resume weightlifting and PLOF  -SS    LTG 3 Progress  Ongoing  -SS    LTG 4  NDI score to be </= 20%.  -    LTG 4 Progress  Ongoing  -SS          PT Goal Re-Cert Due Date  03/21/19  -      User Key  (r) = Recorded By, (t) = Taken By, (c) = Cosigned By    Initials Name Provider Type    SS Lui Romero, PT DPT Physical Therapist                         Time Calculation:   Start Time: 0928  Stop Time: 0953  Time Calculation (min): 25 min    Therapy Charges for Today     Code Description Service Date Service Provider Modifiers Qty    27548636036 HC PT MANUAL THERAPY EA 15 MIN 3/11/2019 Lui Romero, PT DPT GP 2                    Lui Romero, PT, DPT, CHT  3/11/2019

## 2019-03-14 ENCOUNTER — HOSPITAL ENCOUNTER (OUTPATIENT)
Dept: PHYSICAL THERAPY | Facility: HOSPITAL | Age: 44
Setting detail: THERAPIES SERIES
Discharge: HOME | End: 2019-03-14

## 2019-03-14 DIAGNOSIS — M54.12 CERVICAL RADICULITIS: Primary | ICD-10-CM

## 2019-03-14 PROCEDURE — 97140 MANUAL THERAPY 1/> REGIONS: CPT | Performed by: PHYSICAL THERAPIST

## 2019-03-15 NOTE — THERAPY TREATMENT NOTE
"    Outpatient Physical Therapy Ortho Treatment Note  [facility]     Patient Name: Aquiles Najera  : 1975  MRN: [id number]  Today's Date: 3/14/2019      Visit Date: 2019  Attendance:  (20/yr)  Subjective Improvement: 80%  Next MD Appt: 5/10/19  Recert Date: 3/21/19     Therapy Diagnosis: Cervical radiculitis      Visit Dx:    ICD-10-CM ICD-9-CM   1. Cervical radiculitis M54.12 723.4            Past Medical History:   Diagnosis Date   • Abdominal pain    • Acute exacerbation of chronic obstructive airways disease (CMS/HCC)    • Allergic rhinitis    • Chest pain     unspecified   • Chronic cough    • Conjunctivitis    • Dyslipidemia    • Dyspnea    • Gastroesophageal reflux disease    • Pain     Pain in left ankle and joints of left foot      • Pain     pain in left leg   • Tobacco dependence syndrome    • Umbilical hernia without obstruction or gangrene    • Viral gastroenteritis         Past Surgical History:   Procedure Laterality Date   • INCISION AND DRAINAGE ABSCESS  2009    Irrigation and debridement of facial soft tissue injuries with closure of complex lacerations and repair of maxillary dental alveolar fracture   • UMBILICAL HERNIA REPAIR N/A 2017    Procedure: OPEN UMBILICAL AND EPIGASTRIC HERNIA REPAIR WITH MESH;  Surgeon: Sal Herbert MD;  Location: [facility];  Service:        PT Ortho     Row Name 19 0900       Subjective Comments    Subjective Comments  Right side is feeling \"pretty decent.\" Some soreness in R UT but not bad. L UT is hurting today. Unsure why the L UT has become painful. 80% subjective improvement.  -SS       Precautions and Contraindications    Precautions/Limitations  no known precautions/limitations  -SS       Subjective Pain    Able to rate subjective pain?  yes  -SS    Pre-Treatment Pain Level  -- R 1/10, L 4/10  -SS    Post-Treatment Pain Level  -- \"sore\"  -SS       Cervical/Thoracic Special Tests    Cervical/Thoracic Special " Tests Comments  Minor taut band R rhomboid that is slightly TTP. Slight TTP R UT. Increased tone and TTP L UT. Trigger point L rhomboid.  -      User Key  (r) = Recorded By, (t) = Taken By, (c) = Cosigned By    Initials Name Provider Type    Lui Zaldivar, PT DPT Physical Therapist                      PT Assessment/Plan     Row Name 03/14/19 0900          Functional Limitations  Limitations in community activities;Limitations in functional capacity and performance;Performance in leisure activities;Performance in work activities  -    Impairments  Pain;Joint mobility  -    Assessment Comments  Significant twitch response L rhomboid/lower trap and upper trap. Decreased muscle tone/tension with dry needling.   -    Rehab Potential  Good barrier: chronicity  -    Patient/caregiver participated in establishment of treatment plan and goals  Yes  -SS    Patient would benefit from skilled therapy intervention  Yes  -SS          PT Frequency  2x/week  -    Predicted Duration of Therapy Intervention (Therapy Eval)  3-4 weeks with further to be determined  -    PT Plan Comments  Continue manual.   -      User Key  (r) = Recorded By, (t) = Taken By, (c) = Cosigned By    Initials Name Provider Type    Lui Zaldivar, PT DPT Physical Therapist                  Manual Rx (last 36 hours)      Manual Treatments     Row Name 03/14/19 2100          Manual Rx 1 Location  L rhomboid/lower trap  -SS    Manual Rx 1 Type  dry needling  -SS          Manual Rx 2 Location  L UT  -SS    Manual Rx 2 Type  dry needling  -SS          Manual Rx 3 Location  B thoracic paraspinals  -SS    Manual Rx 3 Type  MFR  -SS          Manual Rx 4 Location  B UT  -SS    Manual Rx 4 Type  MFR  -SS      User Key  (r) = Recorded By, (t) = Taken By, (c) = Cosigned By    Initials Name Provider Type    Lui Zaldivar, PT DPT Physical Therapist          PT OP Goals     Row Name 03/14/19 0900          PT Short Term  Goals    STG Date to Achieve  -- deferred  -SS        Long Term Goals    LTG Date to Achieve  03/28/19  -     LTG 1  Independent with HEP  -SS     LTG 1 Progress  Ongoing  -SS     LTG 2  Resolution of referred/radiating pain  -SS     LTG 2 Progress  Ongoing  -SS     LTG 3  Resume weightlifting and PLOF  -SS     LTG 3 Progress  Ongoing  -SS     LTG 4  NDI score to be </= 20%.  -     LTG 4 Progress  Ongoing  -SS        Time Calculation    PT Goal Re-Cert Due Date  03/21/19  -       User Key  (r) = Recorded By, (t) = Taken By, (c) = Cosigned By    Initials Name Provider Type    SS Lui Romero, PT DPT Physical Therapist                         Time Calculation:   Start Time: 0929  Stop Time: 1000  Time Calculation (min): 31 min    Therapy Charges for Today     Code Description Service Date Service Provider Modifiers Qty    [id number] HC PT MANUAL THERAPY EA 15 MIN 3/14/2019 Lui Romero, PT DPT GP 2                    Lui Romero, PT, DPT, CHT  3/14/2019

## 2019-03-18 ENCOUNTER — HOSPITAL ENCOUNTER (OUTPATIENT)
Dept: PHYSICAL THERAPY | Facility: HOSPITAL | Age: 44
Setting detail: THERAPIES SERIES
Discharge: HOME | End: 2019-03-18

## 2019-03-18 DIAGNOSIS — M54.12 CERVICAL RADICULITIS: Primary | ICD-10-CM

## 2019-03-18 PROCEDURE — 97140 MANUAL THERAPY 1/> REGIONS: CPT | Performed by: PHYSICAL THERAPIST

## 2019-03-18 NOTE — THERAPY TREATMENT NOTE
"    Outpatient Physical Therapy Ortho Treatment Note  [facility]     Patient Name: Aquiles Najera  : 1975  MRN: [id number]  Today's Date: 3/18/2019      Visit Date: 2019  Attendance:  (20/yr)  Subjective Improvement: 90%  Next MD Appt: 5/10/19  Recert Date: 3/21/19     Therapy Diagnosis: Cervical radiculitis      Visit Dx:    ICD-10-CM ICD-9-CM   1. Cervical radiculitis M54.12 723.4            Past Medical History:   Diagnosis Date   • Abdominal pain    • Acute exacerbation of chronic obstructive airways disease (CMS/HCC)    • Allergic rhinitis    • Chest pain     unspecified   • Chronic cough    • Conjunctivitis    • Dyslipidemia    • Dyspnea    • Gastroesophageal reflux disease    • Pain     Pain in left ankle and joints of left foot      • Pain     pain in left leg   • Tobacco dependence syndrome    • Umbilical hernia without obstruction or gangrene    • Viral gastroenteritis         Past Surgical History:   Procedure Laterality Date   • INCISION AND DRAINAGE ABSCESS  2009    Irrigation and debridement of facial soft tissue injuries with closure of complex lacerations and repair of maxillary dental alveolar fracture   • UMBILICAL HERNIA REPAIR N/A 2017    Procedure: OPEN UMBILICAL AND EPIGASTRIC HERNIA REPAIR WITH MESH;  Surgeon: Sal Herbert MD;  Location: [facility];  Service:        PT Ortho     Row Name 19 1000       Subjective Comments    Subjective Comments  Overall feeling better. L is not bothering him at all. \"Deep\" pain R thoracic paraspinals. Feels some pain/stiffness in the upper- to mid-thoracics when fully abducting the right shoulder.  -SS       Precautions and Contraindications    Precautions/Limitations  no known precautions/limitations  -SS       Subjective Pain    Able to rate subjective pain?  yes  -SS    Pre-Treatment Pain Level  4  -SS    Post-Treatment Pain Level  -- \"Slight, very slight\"  -SS       Cervical/Thoracic Special Tests    " Cervical/Thoracic Special Tests Comments  TrP R iliocostalis thoracis. Decreased mid-thoracic joint mobility.  -      User Key  (r) = Recorded By, (t) = Taken By, (c) = Cosigned By    Initials Name Provider Type    Lui Zaldivar, PT DPT Physical Therapist                      PT Assessment/Plan     Row Name 03/18/19 0900          Functional Limitations  Limitations in community activities;Limitations in functional capacity and performance;Performance in leisure activities;Performance in work activities  -    Impairments  Pain;Joint mobility  -    Assessment Comments  Cavitation noted with thoracic joint mobilization. Minimal to no stiffness in upper- to mid-thoracics after joint mobilization.   -SS    Rehab Potential  Good barrier: chronicity  -SS    Patient/caregiver participated in establishment of treatment plan and goals  Yes  -SS    Patient would benefit from skilled therapy intervention  Yes  -SS          PT Frequency  2x/week  -SS    Predicted Duration of Therapy Intervention (Therapy Eval)  3-4 weeks with further TBA  -SS    PT Plan Comments  Continue POC. Reassess next.  -      User Key  (r) = Recorded By, (t) = Taken By, (c) = Cosigned By    Initials Name Provider Type    Lui Zaldivar, PT DPT Physical Therapist                                Manual Rx (last 36 hours)      Manual Treatments     Row Name 03/18/19 0900          Manual Rx 1 Location  R iliocostalis thoracis  -SS    Manual Rx 1 Type  dry needling  -SS          Manual Rx 2 Location  R thoracic paraspinals  -SS    Manual Rx 2 Type  MFR  -SS          Manual Rx 3 Location  Thoracic spine   -SS    Manual Rx 3 Type  joint mobilization  -SS    Manual Rx 3 Grade  4  -      User Key  (r) = Recorded By, (t) = Taken By, (c) = Cosigned By    Initials Name Provider Type    Lui Zaldivar, PT DPT Physical Therapist          PT OP Goals     Row Name 03/18/19 0900       PT Short Term Goals    STG Date to Achieve   -- deferred  -       Long Term Goals    LTG Date to Achieve  03/28/19  -    LTG 1  Independent with HEP  -SS    LTG 1 Progress  Ongoing  -SS    LTG 2  Resolution of referred/radiating pain  -SS    LTG 2 Progress  Ongoing  -SS    LTG 3  Resume weightlifting and PLOF  -SS    LTG 3 Progress  Ongoing  -SS    LTG 4  NDI score to be </= 20%.  -    LTG 4 Progress  Ongoing  -       Time Calculation    PT Goal Re-Cert Due Date  03/21/19  -      User Key  (r) = Recorded By, (t) = Taken By, (c) = Cosigned By    Initials Name Provider Type     Lui Romero, PT DPT Physical Therapist                         Time Calculation:   Start Time: 0944  Stop Time: 1007  Time Calculation (min): 23 min    Therapy Charges for Today     Code Description Service Date Service Provider Modifiers Qty    75986887049 HC PT MANUAL THERAPY EA 15 MIN 3/18/2019 Lui Romero, PT DPT GP 2                    Lui Romero, PT, DPT, CHT  3/18/2019

## 2019-03-19 ENCOUNTER — OFFICE VISIT (OUTPATIENT)
Dept: SURGERY | Facility: CLINIC | Age: 44
End: 2019-03-19

## 2019-03-19 VITALS
TEMPERATURE: 98 F | SYSTOLIC BLOOD PRESSURE: 130 MMHG | HEIGHT: 70 IN | DIASTOLIC BLOOD PRESSURE: 80 MMHG | WEIGHT: 215 LBS | HEART RATE: 86 BPM | BODY MASS INDEX: 30.78 KG/M2

## 2019-03-19 DIAGNOSIS — D17.1 LIPOMA OF TORSO: Primary | ICD-10-CM

## 2019-03-19 PROCEDURE — 99213 OFFICE O/P EST LOW 20 MIN: CPT | Performed by: SURGERY

## 2019-03-19 NOTE — PROGRESS NOTES
Chief Complaint   Patient presents with   • Abdominal Pain     Periumbilical pain and mass right flank.        HPI  Painful right flank mass. Minor umbilical pain. The mass is increasingly tender. No cellulitis noted.  Past Medical History:   Diagnosis Date   • Abdominal pain    • Acute exacerbation of chronic obstructive airways disease (CMS/HCC)    • Allergic rhinitis    • Chest pain     unspecified   • Chronic cough    • Conjunctivitis    • Dyslipidemia    • Dyspnea    • Gastroesophageal reflux disease    • Pain     Pain in left ankle and joints of left foot      • Pain     pain in left leg   • Tobacco dependence syndrome    • Umbilical hernia without obstruction or gangrene    • Viral gastroenteritis        Past Surgical History:   Procedure Laterality Date   • INCISION AND DRAINAGE ABSCESS  06/09/2009    Irrigation and debridement of facial soft tissue injuries with closure of complex lacerations and repair of maxillary dental alveolar fracture   • UMBILICAL HERNIA REPAIR N/A 6/2/2017    Procedure: OPEN UMBILICAL AND EPIGASTRIC HERNIA REPAIR WITH MESH;  Surgeon: Sal Herbert MD;  Location: [facility];  Service:          Current Outpatient Medications:   •  albuterol sulfate  (90 Base) MCG/ACT inhaler, TAKE 2 PUFFS BY MOUTH EVERY 4 HOURS AS NEEDED FOR WHEEZE, Disp: 18 inhaler, Rfl: 3  •  aspirin 81 MG chewable tablet, Chew 1 tablet Daily., Disp: 90 tablet, Rfl: 6  •  beclomethasone (QVAR) 80 MCG/ACT inhaler, Inhale 1 puff 2 (Two) Times a Day., Disp: 1 inhaler, Rfl: 11  •  buPROPion XL (WELLBUTRIN XL) 150 MG 24 hr tablet, Take 1 tablet by mouth Daily., Disp: 30 tablet, Rfl: 3  •  calcium polycarbophil (FIBERCON) 625 MG tablet, Take 1 tablet by mouth Daily., Disp: 30 tablet, Rfl: 1  •  [employer] FLUTICASONE PROPIONATE 50 MCG/ACT nasal spray, PUT 1 SPRAY IN EACH NOSTRIL DAILY, Disp: 15.8 mL, Rfl: 8  •  ipratropium-albuterol (DUONEB) 0.5-2.5 mg/mL nebulizer, Take 3 mL by nebulization Every 4 (Four) Hours  As Needed for Wheezing., Disp: 360 mL, Rfl: 3  •  loratadine (CLARITIN) 10 MG tablet, TAKE 1 TABLET BY MOUTH DAILY., Disp: 30 tablet, Rfl: 11  •  methIMAzole (TAPAZOLE) 5 MG tablet, TAKE 1 1/2 TABS BY MOUTH A DAY, Disp: 45 tablet, Rfl: 5  •  nitroglycerin (NITROSTAT) 0.4 MG SL tablet, Place 1 tablet under the tongue Every 5 (Five) Minutes As Needed for chest pain. Take no more than 3 doses in 15 minutes., Disp: 30 tablet, Rfl: 12  •  pantoprazole (PROTONIX) 40 MG EC tablet, TAKE 1 TABLET BY MOUTH EVERY DAY, Disp: 30 tablet, Rfl: 3  •  pravastatin (PRAVACHOL) 40 MG tablet, Take 1 tablet by mouth Daily., Disp: 90 tablet, Rfl: 3  •  triamcinolone (KENALOG) 0.1 % cream, Apply 1 application topically to the appropriate area as directed 2 (Two) Times a Day., Disp: 80 g, Rfl: 1  •  polyethylene glycol (MIRALAX) powder, TAKE 17 G (1 CAPFUL) BY MOUTH DAILY., Disp: 527 g, Rfl: 5    Allergies   Allergen Reactions   • Colace [Docusate] Hives       Family History   Problem Relation Age of Onset   • Stroke Mother    • Alcohol abuse Father    • Cirrhosis Father    • No Known Problems Sister    • No Known Problems Brother    • Anxiety disorder Son    • Coronary artery disease Neg Hx    • Hypertension Neg Hx    • Thyroid disease Neg Hx        Social History     Socioeconomic History   • Marital status:      Spouse name: Not on file   • Number of children: Not on file   • Years of education: Not on file   • Highest education level: Not on file   Tobacco Use   • Smoking status: Former Smoker     Years: 20.00     Types: Cigarettes     Last attempt to quit: 2017     Years since quittin.0   • Smokeless tobacco: Former User   Substance and Sexual Activity   • Alcohol use: No   • Drug use: No     Types: Cocaine, Marijuana     Comment: 3/2017 last time   • Sexual activity: Defer       Review of Systems   Constitutional: Negative for activity change, appetite change, chills and fever.   HENT: Negative for hearing loss,  nosebleeds and trouble swallowing.    Cardiovascular: Negative for chest pain, palpitations and leg swelling.   Gastrointestinal: Positive for abdominal pain. Negative for abdominal distention, anal bleeding, blood in stool, constipation, diarrhea, nausea, rectal pain and vomiting.   Endocrine: Negative for cold intolerance, heat intolerance, polydipsia and polyuria.   Genitourinary: Negative for decreased urine volume, difficulty urinating, dysuria, enuresis, frequency, hematuria and urgency.   Musculoskeletal: Negative for arthralgias, back pain, gait problem, myalgias and neck pain.   Skin: Negative for pallor, rash and wound.   Allergic/Immunologic: Negative for immunocompromised state.   Neurological: Negative for dizziness, seizures, weakness, light-headedness, numbness and headaches.   Psychiatric/Behavioral: Negative for agitation and behavioral problems. The patient is not nervous/anxious.        Physical Exam   Abdominal: Soft. Bowel sounds are normal. He exhibits no distension and no mass. There is no tenderness. There is no rebound and no guarding. No hernia.   Skin: Skin is warm and dry. No rash noted. No erythema. No pallor.              ASSESSMENT    Aquiles was seen today for abdominal pain.    Diagnoses and all orders for this visit:    Lipoma of torso        PLAN    1. Excision in office              This document has been electronically signed by Norberto Harrell MD on March 21, 2019 8:15 PM

## 2019-03-19 NOTE — PATIENT INSTRUCTIONS

## 2019-03-21 PROBLEM — D17.1 LIPOMA OF TORSO: Status: ACTIVE | Noted: 2019-03-21

## 2019-03-22 ENCOUNTER — HOSPITAL ENCOUNTER (OUTPATIENT)
Dept: PHYSICAL THERAPY | Facility: HOSPITAL | Age: 44
Setting detail: THERAPIES SERIES
Discharge: HOME | End: 2019-03-22

## 2019-03-22 ENCOUNTER — PROCEDURE VISIT (OUTPATIENT)
Dept: SURGERY | Facility: CLINIC | Age: 44
End: 2019-03-22

## 2019-03-22 VITALS
BODY MASS INDEX: 30.46 KG/M2 | SYSTOLIC BLOOD PRESSURE: 118 MMHG | TEMPERATURE: 97.7 F | HEIGHT: 70 IN | WEIGHT: 212.8 LBS | DIASTOLIC BLOOD PRESSURE: 70 MMHG | HEART RATE: 86 BPM

## 2019-03-22 DIAGNOSIS — D17.1 LIPOMA OF TORSO: Primary | ICD-10-CM

## 2019-03-22 DIAGNOSIS — M54.12 CERVICAL RADICULITIS: Primary | ICD-10-CM

## 2019-03-22 PROCEDURE — 88304 TISSUE EXAM BY PATHOLOGIST: CPT | Performed by: PATHOLOGY

## 2019-03-22 PROCEDURE — 97140 MANUAL THERAPY 1/> REGIONS: CPT | Performed by: PHYSICAL THERAPIST

## 2019-03-22 PROCEDURE — 21930 EXC BACK LES SC < 3 CM: CPT | Performed by: SURGERY

## 2019-03-22 PROCEDURE — 88304 TISSUE EXAM BY PATHOLOGIST: CPT | Performed by: SURGERY

## 2019-03-22 NOTE — PATIENT INSTRUCTIONS

## 2019-03-22 NOTE — THERAPY PROGRESS REPORT/RE-CERT
"    Outpatient Physical Therapy Ortho Progress Note  [facility]     Patient Name: Aquiles Najera  : 1975  MRN: [id number]  Today's Date: 3/22/2019      Visit Date: 2019  Attendance:  (20/yr)  Subjective Improvement: 90%  Next MD Appt: 5/10/19  Recert Date: 19     Therapy Diagnosis: Cervical radiculitis    Changes in Medications: none noted  Changes in MD Orders: none noted  Number of Work Days Lost: none recent         Past Medical History:   Diagnosis Date   • Abdominal pain    • Acute exacerbation of chronic obstructive airways disease (CMS/HCC)    • Allergic rhinitis    • Chest pain     unspecified   • Chronic cough    • Conjunctivitis    • Dyslipidemia    • Dyspnea    • Gastroesophageal reflux disease    • Pain     Pain in left ankle and joints of left foot      • Pain     pain in left leg   • Tobacco dependence syndrome    • Umbilical hernia without obstruction or gangrene    • Viral gastroenteritis         Past Surgical History:   Procedure Laterality Date   • INCISION AND DRAINAGE ABSCESS  2009    Irrigation and debridement of facial soft tissue injuries with closure of complex lacerations and repair of maxillary dental alveolar fracture   • UMBILICAL HERNIA REPAIR N/A 2017    Procedure: OPEN UMBILICAL AND EPIGASTRIC HERNIA REPAIR WITH MESH;  Surgeon: Sal Herbert MD;  Location: [facility];  Service:        Visit Dx:     ICD-10-CM ICD-9-CM   1. Cervical radiculitis M54.12 723.4             PT Ortho     Row Name 19 0900       Subjective Comments    Subjective Comments  Has an appointment with Dr. Harrell this morning at 1020. \"It's rough this morning.\" Pain across the entire trap region today. Was sore for 2 days after last therapy session. Loaded a trailer yesterday with muscle soreness after.   -SS       Precautions and Contraindications    Precautions/Limitations  no known precautions/limitations  -SS       Subjective Pain    Able to rate subjective " pain?  yes  -SS    Pre-Treatment Pain Level  4  -SS    Post-Treatment Pain Level  2  -SS       Cervical/Thoracic Special Tests    Cervical/Thoracic Special Tests Comments  Increased tone with TTP B levator scapula. TTP B UT and rhomboids. No trigger points noted in thoracic paraspinals.   -      User Key  (r) = Recorded By, (t) = Taken By, (c) = Cosigned By    Initials Name Provider Type    Lui Zaldivar, PT DPT Physical Therapist              Therapy Education  Education Details: recommendations for weight training  How Provided: Verbal  Provided to: Patient  Level of Understanding: Verbalized     PT OP Goals     Row Name 03/22/19 0900          STG Date to Achieve  -- deferred  -          LTG Date to Achieve  04/12/19  -    LTG 1  Independent with HEP  -    LTG 1 Progress  Ongoing  -SS    LTG 2  Resolution of referred/radiating pain  -    LTG 2 Progress  Ongoing  -SS    LTG 3  Resume weightlifting and PLOF  -    LTG 3 Progress  Ongoing  -SS    LTG 4  NDI score to be </= 20%.  -    LTG 4 Progress  Met  -          PT Goal Re-Cert Due Date  04/12/19  -      User Key  (r) = Recorded By, (t) = Taken By, (c) = Cosigned By    Initials Name Provider Type    Lui Zaldivar, PT DPT Physical Therapist          PT Assessment/Plan     Row Name 03/22/19 0900          Functional Limitations  Limitations in community activities;Limitations in functional capacity and performance;Performance in leisure activities;Performance in work activities  -    Impairments  Pain;Joint mobility  -    Assessment Comments  Patient had appointment that he needed to leave to get to this date. He is continuing to improve, but is still painful in places. He verbalizes understanding of strengthening recommendations made this date.  -    Rehab Potential  Good barrier: chronicity  -    Patient/caregiver participated in establishment of treatment plan and goals  Yes  -    Patient would benefit from  "skilled therapy intervention  Yes  -SS          PT Frequency  2x/week  -SS    Predicted Duration of Therapy Intervention (Therapy Eval)  2-3 additional weeks with further as needed  -    PT Plan Comments  Dry needle, strengthening.  -      User Key  (r) = Recorded By, (t) = Taken By, (c) = Cosigned By    Initials Name Provider Type    Lui Zaldivar, PT DPT Physical Therapist            Exercises     Row Name 03/22/19 0900          Subjective Comments  Has an appointment with Dr. Harrell this morning at 1020. \"It's rough this morning.\" Pain across the entire trap region today. Was sore for 2 days after last therapy session. Loaded a trailer yesterday with muscle soreness after.   -SS          Able to rate subjective pain?  yes  -SS    Pre-Treatment Pain Level  4  -SS    Post-Treatment Pain Level  2  -          Exercise Name 1  Recheck  -          Exercise Name 2  review exercise routine and provide guidance for home workout program  -          Exercise Name 3  see Manual  -      User Key  (r) = Recorded By, (t) = Taken By, (c) = Cosigned By    Initials Name Provider Type    Lui Zaldivar, PT DPT Physical Therapist           Manual Rx (last 36 hours)      Manual Treatments     Row Name 03/22/19 0900             Manual Rx 1    Manual Rx 1 Location  B levator scapula  -      Manual Rx 1 Type  dry needling  -        User Key  (r) = Recorded By, (t) = Taken By, (c) = Cosigned By    Initials Name Provider Type    Lui Zaldivar, PT DPT Physical Therapist                      Outcome Measure Options: Neck Disability Index (NDI)  Neck Disability Index  Section 1 - Pain Intensity: The pain is moderate at the moment.  Section 2 - Personal Care: I can look after myself normally without causing extra pain.  Section 3 - Lifting: I can lift heavy weights, but it gives me extra pain.  Section 4 - Work: I can only do my usual work, but no more  Section 5 - Headaches: I have slight " headaches that come infrequently.  Section 6 - Concentration: I can concentrate fully with slight difficulty.  Section 7 - Sleeping: My sleep is slightly disturbed for less than 1 hour.  Section 8 - Driving: I can drive as long as I want with slight neck pain.  Section 9 - Reading: I can read as much as I want with slight neck pain.  Neck Disability Index Score: 9  Neck Disability Index Comments: 9/45 = 20%      Time Calculation:     Start Time: 0935  Stop Time: 1005  Time Calculation (min): 30 min     Therapy Charges for Today     Code Description Service Date Service Provider Modifiers Qty    67318705522 HC PT MANUAL THERAPY EA 15 MIN 3/22/2019 Lui Romero, PT DPT GP 2                   Lui Romero, PT, DPT, CHT  3/22/2019

## 2019-03-25 ENCOUNTER — APPOINTMENT (OUTPATIENT)
Dept: PHYSICAL THERAPY | Facility: HOSPITAL | Age: 44
End: 2019-03-25

## 2019-03-25 LAB
LAB AP CASE REPORT: NORMAL
PATH REPORT.FINAL DX SPEC: NORMAL
PATH REPORT.GROSS SPEC: NORMAL

## 2019-03-25 NOTE — PROGRESS NOTES
Chief Complaint   Patient presents with   • Mass     Mass Right flank.        HPI  Here for excision of a painful lipoma from the right flank  Past Medical History:   Diagnosis Date   • Abdominal pain    • Acute exacerbation of chronic obstructive airways disease (CMS/HCC)    • Allergic rhinitis    • Chest pain     unspecified   • Chronic cough    • Conjunctivitis    • Dyslipidemia    • Dyspnea    • Gastroesophageal reflux disease    • Pain     Pain in left ankle and joints of left foot      • Pain     pain in left leg   • Tobacco dependence syndrome    • Umbilical hernia without obstruction or gangrene    • Viral gastroenteritis        Past Surgical History:   Procedure Laterality Date   • INCISION AND DRAINAGE ABSCESS  06/09/2009    Irrigation and debridement of facial soft tissue injuries with closure of complex lacerations and repair of maxillary dental alveolar fracture   • UMBILICAL HERNIA REPAIR N/A 6/2/2017    Procedure: OPEN UMBILICAL AND EPIGASTRIC HERNIA REPAIR WITH MESH;  Surgeon: Sal Herbert MD;  Location: [facility];  Service:          Current Outpatient Medications:   •  albuterol sulfate  (90 Base) MCG/ACT inhaler, TAKE 2 PUFFS BY MOUTH EVERY 4 HOURS AS NEEDED FOR WHEEZE, Disp: 18 inhaler, Rfl: 3  •  aspirin 81 MG chewable tablet, Chew 1 tablet Daily., Disp: 90 tablet, Rfl: 6  •  beclomethasone (QVAR) 80 MCG/ACT inhaler, Inhale 1 puff 2 (Two) Times a Day., Disp: 1 inhaler, Rfl: 11  •  buPROPion XL (WELLBUTRIN XL) 150 MG 24 hr tablet, Take 1 tablet by mouth Daily., Disp: 30 tablet, Rfl: 3  •  calcium polycarbophil (FIBERCON) 625 MG tablet, Take 1 tablet by mouth Daily., Disp: 30 tablet, Rfl: 1  •  [employer] FLUTICASONE PROPIONATE 50 MCG/ACT nasal spray, PUT 1 SPRAY IN EACH NOSTRIL DAILY, Disp: 15.8 mL, Rfl: 8  •  ipratropium-albuterol (DUONEB) 0.5-2.5 mg/mL nebulizer, Take 3 mL by nebulization Every 4 (Four) Hours As Needed for Wheezing., Disp: 360 mL, Rfl: 3  •  loratadine (CLARITIN) 10  MG tablet, TAKE 1 TABLET BY MOUTH DAILY., Disp: 30 tablet, Rfl: 11  •  methIMAzole (TAPAZOLE) 5 MG tablet, TAKE 1 1/2 TABS BY MOUTH A DAY, Disp: 45 tablet, Rfl: 5  •  nitroglycerin (NITROSTAT) 0.4 MG SL tablet, Place 1 tablet under the tongue Every 5 (Five) Minutes As Needed for chest pain. Take no more than 3 doses in 15 minutes., Disp: 30 tablet, Rfl: 12  •  pantoprazole (PROTONIX) 40 MG EC tablet, TAKE 1 TABLET BY MOUTH EVERY DAY, Disp: 30 tablet, Rfl: 3  •  polyethylene glycol (MIRALAX) powder, TAKE 17 G (1 CAPFUL) BY MOUTH DAILY., Disp: 527 g, Rfl: 5  •  pravastatin (PRAVACHOL) 40 MG tablet, Take 1 tablet by mouth Daily., Disp: 90 tablet, Rfl: 3  •  triamcinolone (KENALOG) 0.1 % cream, Apply 1 application topically to the appropriate area as directed 2 (Two) Times a Day., Disp: 80 g, Rfl: 1    Allergies   Allergen Reactions   • Colace [Docusate] Hives       Family History   Problem Relation Age of Onset   • Stroke Mother    • Alcohol abuse Father    • Cirrhosis Father    • No Known Problems Sister    • No Known Problems Brother    • Anxiety disorder Son    • Coronary artery disease Neg Hx    • Hypertension Neg Hx    • Thyroid disease Neg Hx        Social History     Socioeconomic History   • Marital status:      Spouse name: Not on file   • Number of children: Not on file   • Years of education: Not on file   • Highest education level: Not on file   Tobacco Use   • Smoking status: Former Smoker     Years: 20.00     Types: Cigarettes     Last attempt to quit: 2017     Years since quittin.0   • Smokeless tobacco: Former User   Substance and Sexual Activity   • Alcohol use: No   • Drug use: No     Types: Cocaine, Marijuana     Comment: 3/2017 last time   • Sexual activity: Defer           Physical Exam    2 cm lipoma right flank.    Procedure    Pre-op dx: 2 cm lipoma right flank  Post-op dx: Same  Procedure: Excision of lipoma from right flank with primary closure  Surgeon: Julio Cesar  Assistant:  None  EBL: 5 cc  Findings: As above  Complications: None  Drains: None  Anesthesia: 10 cc 1% xylocaine with epinephrine  Indications: See above  Informed consent; Risks of bleeding, infection, poor wound healing, open wounds, risk of local anesthesia are explained  Procedure: The patient is brought to the OR and place supine on the operating table. The area is prepped with betadine and locally anesthetized. An incision is made with a 15 blade knife and the lesion is excised. Wound is closed with interrrupted 3-0 vicryl and 4-0 nylon. Appropriate dressings are applied. Tolerated well.      ASSESSMENT    Aquiles was seen today for mass.    Diagnoses and all orders for this visit:    Lipoma of torso        PLAN    1.  Recheck in 1 week for suture removal and path check            This document has been electronically signed by Norberto Harrell MD on March 25, 2019 8:34 AM

## 2019-03-28 ENCOUNTER — APPOINTMENT (OUTPATIENT)
Dept: PHYSICAL THERAPY | Facility: HOSPITAL | Age: 44
End: 2019-03-28

## 2019-04-01 ENCOUNTER — APPOINTMENT (OUTPATIENT)
Dept: PHYSICAL THERAPY | Facility: HOSPITAL | Age: 44
End: 2019-04-01

## 2019-04-04 ENCOUNTER — HOSPITAL ENCOUNTER (OUTPATIENT)
Dept: PHYSICAL THERAPY | Facility: HOSPITAL | Age: 44
Setting detail: THERAPIES SERIES
Discharge: HOME | End: 2019-04-04

## 2019-04-04 DIAGNOSIS — M54.12 CERVICAL RADICULITIS: Primary | ICD-10-CM

## 2019-04-04 PROCEDURE — 97140 MANUAL THERAPY 1/> REGIONS: CPT | Performed by: PHYSICAL THERAPIST

## 2019-04-04 NOTE — THERAPY TREATMENT NOTE
"    Outpatient Physical Therapy Ortho Treatment Note  [facility]     Patient Name: Aquiles Najera  : 1975  MRN: [id number]  Today's Date: 2019      Visit Date: 2019  Attendance:  (20/yr)  Subjective Improvement: 90%  Next MD Appt: 5/10/19  Recert Date: 19     Therapy Diagnosis: Cervical radiculitis      Visit Dx:    ICD-10-CM ICD-9-CM   1. Cervical radiculitis M54.12 723.4            Past Medical History:   Diagnosis Date   • Abdominal pain    • Acute exacerbation of chronic obstructive airways disease (CMS/HCC)    • Allergic rhinitis    • Chest pain     unspecified   • Chronic cough    • Conjunctivitis    • Dyslipidemia    • Dyspnea    • Gastroesophageal reflux disease    • Pain     Pain in left ankle and joints of left foot      • Pain     pain in left leg   • Tobacco dependence syndrome    • Umbilical hernia without obstruction or gangrene    • Viral gastroenteritis         Past Surgical History:   Procedure Laterality Date   • INCISION AND DRAINAGE ABSCESS  2009    Irrigation and debridement of facial soft tissue injuries with closure of complex lacerations and repair of maxillary dental alveolar fracture   • UMBILICAL HERNIA REPAIR N/A 2017    Procedure: OPEN UMBILICAL AND EPIGASTRIC HERNIA REPAIR WITH MESH;  Surgeon: Sal Herbert MD;  Location: [facility];  Service:        PT Ortho     Row Name 19 1500       Subjective Comments    Subjective Comments  Patient started working at [employer] in [place], [place], beginning of last week. Tired because he has to leave the house early in the morning to make work. Shoulder pain \"ain't bad.\" Feeling L upper trap more than anywhere else. Some pain R UT/levator scapula. Work is a repetitive movement with R UE more than anything. \"I can tell that I'm a lot better because I can set there and do it all day.\"  -SS       Precautions and Contraindications    Precautions/Limitations  no known precautions/limitations  -SS "       Subjective Pain    Able to rate subjective pain?  yes  -SS    Pre-Treatment Pain Level  3  -SS    Post-Treatment Pain Level  0  -SS       Cervical/Thoracic Special Tests    Cervical/Thoracic Special Tests Comments  Trigger point left upper trap, tender point/latent trigger point R levator scapula.  -      User Key  (r) = Recorded By, (t) = Taken By, (c) = Cosigned By    Initials Name Provider Type     Lui Romero, PT DPT Physical Therapist                      PT Assessment/Plan     Row Name 04/04/19 1600          Functional Limitations  Limitations in community activities;Limitations in functional capacity and performance;Performance in leisure activities;Performance in work activities  -    Impairments  Pain;Joint mobility  -    Assessment Comments  Significant twitches L>R UT with dry needling. Adverse muscle tension relieved after treatment.  -    Rehab Potential  Good barrier: chronicity  -    Patient/caregiver participated in establishment of treatment plan and goals  Yes  -SS    Patient would benefit from skilled therapy intervention  Yes  -SS          PT Frequency  2x/week  -    Predicted Duration of Therapy Intervention (Therapy Eval)  2-3 additional weeks with further to be determined  -    PT Plan Comments  Continue POC  -      User Key  (r) = Recorded By, (t) = Taken By, (c) = Cosigned By    Initials Name Provider Type     Lui Romero, PT DPT Physical Therapist                            Manual Rx (last 36 hours)      Manual Treatments     Row Name 04/04/19 1600          Manual Rx 1 Location  L UT  -SS    Manual Rx 1 Type  dry needling  -SS          Manual Rx 2 Location  L cervical paraspinals  -SS    Manual Rx 2 Type  dry needling  -SS          Manual Rx 3 Location  R UT  -SS    Manual Rx 3 Type  dry needling  -SS          Manual Rx 4 Location  B UT and cervical paraspinals  -SS    Manual Rx 4 Type  MFR   -SS    Manual Rx 4 Duration  5 mins  -      User  Key  (r) = Recorded By, (t) = Taken By, (c) = Cosigned By    Initials Name Provider Type    Lui Zaldivar, PT DPT Physical Therapist          PT OP Goals     Row Name 04/04/19 1600          STG Date to Achieve  -- deferred  -SS          LTG Date to Achieve  04/12/19  -SS    LTG 1  Independent with HEP  -SS    LTG 1 Progress  Ongoing  -SS    LTG 2  Resolution of referred/radiating pain  -SS    LTG 2 Progress  Ongoing  -SS    LTG 3  Resume weightlifting and PLOF  -SS    LTG 3 Progress  Ongoing  -SS    LTG 4  NDI score to be </= 20%.  -    LTG 4 Progress  Met  -          PT Goal Re-Cert Due Date  04/12/19  -         User Key  (r) = Recorded By, (t) = Taken By, (c) = Cosigned By    Initials Name Provider Type    Lui Zaldivar, PT DPT Physical Therapist                         Time Calculation:   Start Time: 1557  Stop Time: 1620  Time Calculation (min): 23 min  Therapy Charges for Today     Code Description Service Date Service Provider Modifiers Qty    57265599303 HC PT MANUAL THERAPY EA 15 MIN 4/4/2019 Lui Romero, PT DPT GP 2                    Lui Romero, PT, DPT, CHT  4/4/2019

## 2019-04-08 ENCOUNTER — HOSPITAL ENCOUNTER (OUTPATIENT)
Dept: PHYSICAL THERAPY | Facility: HOSPITAL | Age: 44
Setting detail: THERAPIES SERIES
Discharge: HOME | End: 2019-04-08

## 2019-04-08 DIAGNOSIS — M54.12 CERVICAL RADICULITIS: Primary | ICD-10-CM

## 2019-04-08 PROCEDURE — 97140 MANUAL THERAPY 1/> REGIONS: CPT | Performed by: PHYSICAL THERAPIST

## 2019-04-08 NOTE — THERAPY TREATMENT NOTE
Outpatient Physical Therapy Ortho Treatment Note  [facility]     Patient Name: Aquiles Najera  : 1975  MRN: [id number]  Today's Date: 2019      Visit Date: 2019  Attendance:  (20/yr)  Subjective Improvement: 90%  Next MD Appt: 5/10/19  Recert Date: 19     Therapy Diagnosis: Cervical radiculitis      Visit Dx:    ICD-10-CM ICD-9-CM   1. Cervical radiculitis M54.12 723.4            Past Medical History:   Diagnosis Date   • Abdominal pain    • Acute exacerbation of chronic obstructive airways disease (CMS/HCC)    • Allergic rhinitis    • Chest pain     unspecified   • Chronic cough    • Conjunctivitis    • Dyslipidemia    • Dyspnea    • Gastroesophageal reflux disease    • Pain     Pain in left ankle and joints of left foot      • Pain     pain in left leg   • Tobacco dependence syndrome    • Umbilical hernia without obstruction or gangrene    • Viral gastroenteritis         Past Surgical History:   Procedure Laterality Date   • INCISION AND DRAINAGE ABSCESS  2009    Irrigation and debridement of facial soft tissue injuries with closure of complex lacerations and repair of maxillary dental alveolar fracture   • UMBILICAL HERNIA REPAIR N/A 2017    Procedure: OPEN UMBILICAL AND EPIGASTRIC HERNIA REPAIR WITH MESH;  Surgeon: Sal Herbert MD;  Location: [facility];  Service:        PT Ortho     Row Name 19 1600       Subjective Comments    Subjective Comments  Pain mainly in B cervical paraspinals. Some soreness in the B UT.  -SS       Precautions and Contraindications    Precautions/Limitations  no known precautions/limitations  -SS       Subjective Pain    Able to rate subjective pain?  yes  -SS    Pre-Treatment Pain Level  2  -SS    Post-Treatment Pain Level  1  -SS       Cervical/Thoracic Special Tests    Cervical/Thoracic Special Tests Comments  Increased tone and TTP B mid- to upper-cervical paraspinals and L UT at cervicothoracic junction..   -SS       User Key  (r) = Recorded By, (t) = Taken By, (c) = Cosigned By    Initials Name Provider Type     Lui Romero, PT DPT Physical Therapist                      PT Assessment/Plan     Row Name 04/08/19 1600          Functional Limitations  Limitations in community activities;Limitations in functional capacity and performance;Performance in leisure activities;Performance in work activities  -    Impairments  Pain;Joint mobility  -    Assessment Comments  Twitch responses and muscle relaxation to dry needling. Improved cervical joint mobility post-mobilization.  -    Rehab Potential  Good barrier: chronicity  -    Patient/caregiver participated in establishment of treatment plan and goals  Yes  -SS    Patient would benefit from skilled therapy intervention  Yes  -SS          PT Frequency  2x/week  -    Predicted Duration of Therapy Intervention (Therapy Eval)  2-3 additional weeks  -    PT Plan Comments  Continue POC. Recheck next.  -      User Key  (r) = Recorded By, (t) = Taken By, (c) = Cosigned By    Initials Name Provider Type    Lui Zaldivar, PT DPT Physical Therapist                            Manual Rx (last 36 hours)      Manual Treatments     Row Name 04/08/19 1500          Manual Rx 1 Location  L UT at CT junction  -SS    Manual Rx 1 Type  dry needle  -SS          Manual Rx 2 Location  R UT at CT junction  -SS    Manual Rx 2 Type  dry needle  -SS          Manual Rx 3 Location  B cervical paraspinals  -SS    Manual Rx 3 Type  dry needle  -SS          Manual Rx 4 Location  cervical spine  -SS    Manual Rx 4 Type  joint mobilization  -SS    Manual Rx 4 Grade  4  -      User Key  (r) = Recorded By, (t) = Taken By, (c) = Cosigned By    Initials Name Provider Type     Lui Romero, PT DPT Physical Therapist          PT OP Goals     Row Name 04/08/19 1600          STG Date to Achieve  -- deferred  -SS          LTG Date to Achieve  04/12/19  -    LTG 1   Independent with HEP  -    LTG 1 Progress  Ongoing  -    LTG 2  Resolution of referred/radiating pain  -    LTG 2 Progress  Ongoing  -    LTG 3  Resume weightlifting and PLOF  -    LTG 3 Progress  Ongoing  -    LTG 4  NDI score to be </= 20%.  -    LTG 4 Progress  Met  -          PT Goal Re-Cert Due Date  04/12/19  -      User Key  (r) = Recorded By, (t) = Taken By, (c) = Cosigned By    Initials Name Provider Type     Lui Romero, PT DPT Physical Therapist                         Time Calculation:   Start Time: 1618  Stop Time: 1641  Time Calculation (min): 23 min  Therapy Charges for Today     Code Description Service Date Service Provider Modifiers Qty    73726409632 HC PT MANUAL THERAPY EA 15 MIN 4/8/2019 Lui Romero, PT DPT GP 2                    Lui Romero, PT, DPT, CHT  4/8/2019

## 2019-04-11 ENCOUNTER — HOSPITAL ENCOUNTER (OUTPATIENT)
Dept: PHYSICAL THERAPY | Facility: HOSPITAL | Age: 44
Setting detail: THERAPIES SERIES
Discharge: HOME | End: 2019-04-11

## 2019-04-11 DIAGNOSIS — M54.12 CERVICAL RADICULITIS: Primary | ICD-10-CM

## 2019-04-11 DIAGNOSIS — J44.9 CHRONIC OBSTRUCTIVE PULMONARY DISEASE, UNSPECIFIED COPD TYPE (HCC): ICD-10-CM

## 2019-04-11 PROCEDURE — 97140 MANUAL THERAPY 1/> REGIONS: CPT | Performed by: PHYSICAL THERAPIST

## 2019-04-11 NOTE — THERAPY PROGRESS REPORT/RE-CERT
"    Outpatient Physical Therapy Ortho Progress Note  [facility]     Patient Name: Aquiles Najera  : 1975  MRN: [id number]  Today's Date: 2019      Visit Date: 2019  Attendance: 9/10 (20/yr)  Subjective Improvement: 90%  Next MD Appt: 5/10/19  Recert Date: 19     Therapy Diagnosis: Cervical radiculitis    Changes in Medications: none  Changes in MD Orders: none noted  Number of Work Days Lost: none         Past Medical History:   Diagnosis Date   • Abdominal pain    • Acute exacerbation of chronic obstructive airways disease (CMS/HCC)    • Allergic rhinitis    • Chest pain     unspecified   • Chronic cough    • Conjunctivitis    • Dyslipidemia    • Dyspnea    • Gastroesophageal reflux disease    • Pain     Pain in left ankle and joints of left foot      • Pain     pain in left leg   • Tobacco dependence syndrome    • Umbilical hernia without obstruction or gangrene    • Viral gastroenteritis         Past Surgical History:   Procedure Laterality Date   • INCISION AND DRAINAGE ABSCESS  2009    Irrigation and debridement of facial soft tissue injuries with closure of complex lacerations and repair of maxillary dental alveolar fracture   • UMBILICAL HERNIA REPAIR N/A 2017    Procedure: OPEN UMBILICAL AND EPIGASTRIC HERNIA REPAIR WITH MESH;  Surgeon: Sal Herbert MD;  Location: [facility];  Service:        Visit Dx:     ICD-10-CM ICD-9-CM   1. Cervical radiculitis M54.12 723.4             PT Ortho     Row Name 19 1600       Subjective Comments    Subjective Comments  Pain is in the upper cervical spine and in \"spots\" in the shoulders. Pain R cervicothoracic junction and L middle trapezius. Not hurting him to move his neck around today. \"I don't consider it a pain, more of an annoyance.\" Difficult for him to get to the clinic at 1600 but can get here at 1615. No medication changes. \"Almost 90\"% subjective improvement.   -SS       Precautions and Contraindications " "   Precautions/Limitations  no known precautions/limitations  -       Subjective Pain    Able to rate subjective pain?  yes  -    Pre-Treatment Pain Level  -- \"maybe a 2, if that\"  -SS    Post-Treatment Pain Level  0  -       Cervical/Thoracic Special Tests    Cervical/Thoracic Special Tests Comments  Increased tone with TTP L>R UT and mid-cervical paraspinals.  -       Head/Neck/Trunk    Head/Neck/Trunk Comments  CROM WNLs with \"an annoyance\" in cervical paraspinals with flexion/extension and contralateral UT with lateral flexion.  -      User Key  (r) = Recorded By, (t) = Taken By, (c) = Cosigned By    Initials Name Provider Type    Lui Zaldivar, PT DPT Physical Therapist            PT OP Goals     Row Name 04/11/19 1600          STG Date to Achieve  -- deferred  -          LTG Date to Achieve  05/02/19  -    LTG 1  Independent with HEP  -    LTG 1 Progress  Ongoing  -SS    LTG 2  Resolution of referred/radiating pain  -    LTG 2 Progress  Ongoing  -SS    LTG 3  Resume weightlifting and PLOF  -    LTG 3 Progress  Ongoing  -SS    LTG 4  NDI score to be </= 20%.  -    LTG 4 Progress  Met  -          PT Goal Re-Cert Due Date  05/02/19  -      User Key  (r) = Recorded By, (t) = Taken By, (c) = Cosigned By    Initials Name Provider Type    Lui Zaldivar, PT DPT Physical Therapist          PT Assessment/Plan     Row Name 04/11/19 1600          Functional Limitations  Limitations in community activities;Limitations in functional capacity and performance;Performance in leisure activities;Performance in work activities  -    Impairments  Pain;Joint mobility  -    Assessment Comments  Twitch response B UT and L levator scapula. Pain and symptoms are progressively improving.   -    Rehab Potential  Good barrier: chronicity  -    Patient/caregiver participated in establishment of treatment plan and goals  Yes  -    Patient would benefit from skilled therapy " intervention  Yes  -SS          PT Frequency  2x/week  -SS    Predicted Duration of Therapy Intervention (Therapy Eval)  2-3 weeks  -SS    PT Plan Comments  Continue dry needling, MFR, joint mobilization  -SS      User Key  (r) = Recorded By, (t) = Taken By, (c) = Cosigned By    Initials Name Provider Type    SS Lui Romero, PT DPT Physical Therapist                   Outcome Measure Options: Neck Disability Index (NDI)  Neck Disability Index  Section 1 - Pain Intensity: The pain is very mild at the moment.  Section 2 - Personal Care: I can look after myself normally, but it causes extra pain.  Section 3 - Lifting: I can lift heavy weights, but it gives me extra pain.  Section 4 - Work: I can only do my usual work, but no more  Section 5 - Headaches: I have slight headaches that come infrequently.  Section 6 - Concentration: I can concentrate fully without difficulty.  Section 7 - Sleeping: My sleep is slightly disturbed for less than 1 hour.  Section 8 - Driving: I can drive as long as I want with slight neck pain.  Section 9 - Reading: I can read as much as I want with slight neck pain.  Section 10 - Recreation: I have some neck pain with all recreational activities.  Neck Disability Index Score: 9      Time Calculation:     Start Time: 1607  Stop Time: 1633  Time Calculation (min): 26 min     Therapy Charges for Today     Code Description Service Date Service Provider Modifiers Qty    16237387411 HC PT MANUAL THERAPY EA 15 MIN 4/11/2019 Lui Romero, PT DPT GP 2          PT G-Codes  Outcome Measure Options: Neck Disability Index (NDI)  Neck Disability Index Score: 9         Lui Romero PT, DPT, CHT  4/11/2019

## 2019-04-12 RX ORDER — IPRATROPIUM BROMIDE AND ALBUTEROL SULFATE 2.5; .5 MG/3ML; MG/3ML
3 SOLUTION RESPIRATORY (INHALATION) EVERY 4 HOURS PRN
Qty: 360 ML | Refills: 1 | OUTPATIENT
Start: 2019-04-12

## 2019-04-12 RX ORDER — BUPROPION HYDROCHLORIDE 150 MG/1
TABLET ORAL
Qty: 30 TABLET | Refills: 1 | Status: SHIPPED | OUTPATIENT
Start: 2019-04-12 | End: 2020-04-15 | Stop reason: SDUPTHER

## 2019-04-15 ENCOUNTER — HOSPITAL ENCOUNTER (OUTPATIENT)
Dept: PHYSICAL THERAPY | Facility: HOSPITAL | Age: 44
Setting detail: THERAPIES SERIES
Discharge: HOME | End: 2019-04-15

## 2019-04-15 DIAGNOSIS — M54.12 CERVICAL RADICULITIS: Primary | ICD-10-CM

## 2019-04-15 PROCEDURE — 97140 MANUAL THERAPY 1/> REGIONS: CPT | Performed by: PHYSICAL THERAPIST

## 2019-04-15 NOTE — THERAPY TREATMENT NOTE
"    Outpatient Physical Therapy Ortho Treatment Note  [facility]     Patient Name: Aquiles Najera  : 1975  MRN: [id number]  Today's Date: 4/15/2019      Visit Date: 04/15/2019  Attendance: 10/11 (20/yr)  Subjective Improvement: \"in the 90 percents\"  Next MD Appt: 5/10/19  Recert Date: 19     Therapy Diagnosis: Cervical radiculitis      Visit Dx:    ICD-10-CM ICD-9-CM   1. Cervical radiculitis M54.12 723.4            Past Medical History:   Diagnosis Date   • Abdominal pain    • Acute exacerbation of chronic obstructive airways disease (CMS/HCC)    • Allergic rhinitis    • Chest pain     unspecified   • Chronic cough    • Conjunctivitis    • Dyslipidemia    • Dyspnea    • Gastroesophageal reflux disease    • Pain     Pain in left ankle and joints of left foot      • Pain     pain in left leg   • Tobacco dependence syndrome    • Umbilical hernia without obstruction or gangrene    • Viral gastroenteritis         Past Surgical History:   Procedure Laterality Date   • INCISION AND DRAINAGE ABSCESS  2009    Irrigation and debridement of facial soft tissue injuries with closure of complex lacerations and repair of maxillary dental alveolar fracture   • UMBILICAL HERNIA REPAIR N/A 2017    Procedure: OPEN UMBILICAL AND EPIGASTRIC HERNIA REPAIR WITH MESH;  Surgeon: Sal Herbert MD;  Location: [facility];  Service:        PT Ortho     Row Name 04/15/19 1600       Subjective Comments    Subjective Comments  Sore in the R shoulder and trapezius region from moving a refrigerator from bed of truck to deck. States that the refrigerator moved a bit unexpectedly and was sitting on his superior shoulder/UT as he tried to keep it from falling on him.  His neck seems to be popping more today due to being in a weird position when the refrigerator shifted and is settling out. Was feeling \"alright\" prior to the incident. Subjective improvement \"in the 90 percents.\"  -SS       Precautions and " "Contraindications    Precautions/Limitations  no known precautions/limitations  -       Subjective Pain    Able to rate subjective pain?  yes  -SS    Pre-Treatment Pain Level  6  -SS    Post-Treatment Pain Level  3 \"if that\"  -       Cervical/Thoracic Special Tests    Cervical/Thoracic Special Tests Comments  TTP R medial scapular border and paraspinal musculature. Area feels swollen. Question are of increased tone along R 5th rib.  -      User Key  (r) = Recorded By, (t) = Taken By, (c) = Cosigned By    Initials Name Provider Type    Lui Zaldivar, PT DPT Physical Therapist                      PT Assessment/Plan     Row Name 04/15/19 1600          PT Assessment    Functional Limitations  Limitations in community activities;Limitations in functional capacity and performance;Performance in leisure activities;Performance in work activities  -     Impairments  Pain;Joint mobility  -     Assessment Comments  No twitch response or muscle relaxation with dry needling. Multiple joint cavitations in thoracic spine with joint mobilization. Patient had symptomatic relief with joint mobilization.   -     Rehab Potential  Good barrier: chronicity  -     Patient/caregiver participated in establishment of treatment plan and goals  Yes  -     Patient would benefit from skilled therapy intervention  Yes  -SS        PT Plan    PT Frequency  2x/week  -     Predicted Duration of Therapy Intervention (Therapy Eval)  2-3 weeks  -     PT Plan Comments  Assess on 4/18/19. If doing well like he currently is, will place on PRN. Continue manual therapy.  -       User Key  (r) = Recorded By, (t) = Taken By, (c) = Cosigned By    Initials Name Provider Type    Lui Zaldivar, PT DPT Physical Therapist                            Manual Rx (last 36 hours)      Manual Treatments     Row Name 04/15/19 1500             Manual Rx 1    Manual Rx 1 Location  R thoracic paraspinal/medial scapular border  - "      Manual Rx 1 Type  dry needle  -SS         Manual Rx 2    Manual Rx 2 Location  R thoracic paraspinal/medial scapular border  -SS      Manual Rx 2 Type  MFR  -SS         Manual Rx 3    Manual Rx 3 Location  thoracic spine  -SS      Manual Rx 3 Type  joint mobilization   -SS      Manual Rx 3 Grade  4  -        User Key  (r) = Recorded By, (t) = Taken By, (c) = Cosigned By    Initials Name Provider Type     Lui Romero, PT DPT Physical Therapist          PT OP Goals     Row Name 04/15/19 1600          PT Short Term Goals    STG Date to Achieve  -- deferred  -SS        Long Term Goals    LTG Date to Achieve  05/02/19  -SS     LTG 1  Independent with HEP  -SS     LTG 1 Progress  Ongoing  -SS     LTG 2  Resolution of referred/radiating pain  -SS     LTG 2 Progress  Ongoing  -SS     LTG 3  Resume weightlifting and PLOF  -SS     LTG 3 Progress  Ongoing  -SS     LTG 4  NDI score to be </= 20%.  -SS     LTG 4 Progress  Met  -        Time Calculation    PT Goal Re-Cert Due Date  05/02/19  -       User Key  (r) = Recorded By, (t) = Taken By, (c) = Cosigned By    Initials Name Provider Type     Liu Romero, PT DPT Physical Therapist                         Time Calculation:   Start Time: 1622  Stop Time: 1645  Time Calculation (min): 23 min  Therapy Charges for Today     Code Description Service Date Service Provider Modifiers Qty    62511438806 HC PT MANUAL THERAPY EA 15 MIN 4/15/2019 Lui Romero, PT DPT GP 2                    Lui Romero, PT, DPT, CHT  4/15/2019

## 2019-04-18 ENCOUNTER — HOSPITAL ENCOUNTER (OUTPATIENT)
Dept: PHYSICAL THERAPY | Facility: HOSPITAL | Age: 44
Setting detail: THERAPIES SERIES
Discharge: HOME | End: 2019-04-18

## 2019-04-18 DIAGNOSIS — J44.9 CHRONIC OBSTRUCTIVE PULMONARY DISEASE, UNSPECIFIED COPD TYPE (HCC): ICD-10-CM

## 2019-04-18 DIAGNOSIS — M54.12 CERVICAL RADICULITIS: Primary | ICD-10-CM

## 2019-04-18 PROCEDURE — 97140 MANUAL THERAPY 1/> REGIONS: CPT | Performed by: PHYSICAL THERAPIST

## 2019-04-18 RX ORDER — IPRATROPIUM BROMIDE AND ALBUTEROL SULFATE 2.5; .5 MG/3ML; MG/3ML
3 SOLUTION RESPIRATORY (INHALATION) EVERY 4 HOURS PRN
Qty: 360 ML | Refills: 3 | Status: SHIPPED | OUTPATIENT
Start: 2019-04-18 | End: 2021-04-01 | Stop reason: SDUPTHER

## 2019-04-18 RX ORDER — ALBUTEROL SULFATE 90 UG/1
2 AEROSOL, METERED RESPIRATORY (INHALATION) EVERY 4 HOURS PRN
Qty: 18 INHALER | Refills: 3 | Status: SHIPPED | OUTPATIENT
Start: 2019-04-18 | End: 2021-08-24 | Stop reason: ALTCHOICE

## 2019-04-18 NOTE — THERAPY TREATMENT NOTE
"    Outpatient Physical Therapy Ortho Treatment Note  [facility]     Patient Name: Aquiles Najera  : 1975  MRN: [id number]  Today's Date: 2019      Visit Date: 2019  Attendance: 10/11 (20/yr)  Subjective Improvement: \"in the early \"  Next MD Appt: 5/10/19  Recert Date: 19     Therapy Diagnosis: Cervical radiculitis      Visit Dx:    ICD-10-CM ICD-9-CM   1. Cervical radiculitis M54.12 723.4       Patient Active Problem List   Diagnosis   • Umbilical hernia without obstruction or gangrene   • Tobacco dependence syndrome   • Gastroesophageal reflux disease   • Dyslipidemia   • Chest pain   • Allergic rhinitis   • COPD (chronic obstructive pulmonary disease) - presumed   • Constipation   • Cocaine abuse in remission (CMS/HCC)   • Pre-syncope   • Essential hypertension   • Abnormal thyroid function test   • Enlarged thyroid   • Acute bronchitis   • Graves disease   • Hyperthyroidism   • Lipoma of torso        Past Medical History:   Diagnosis Date   • Abdominal pain    • Acute exacerbation of chronic obstructive airways disease (CMS/HCC)    • Allergic rhinitis    • Chest pain     unspecified   • Chronic cough    • Conjunctivitis    • Dyslipidemia    • Dyspnea    • Gastroesophageal reflux disease    • Pain     Pain in left ankle and joints of left foot      • Pain     pain in left leg   • Tobacco dependence syndrome    • Umbilical hernia without obstruction or gangrene    • Viral gastroenteritis         Past Surgical History:   Procedure Laterality Date   • INCISION AND DRAINAGE ABSCESS  2009    Irrigation and debridement of facial soft tissue injuries with closure of complex lacerations and repair of maxillary dental alveolar fracture   • UMBILICAL HERNIA REPAIR N/A 2017    Procedure: OPEN UMBILICAL AND EPIGASTRIC HERNIA REPAIR WITH MESH;  Surgeon: Sal Herbert MD;  Location: [facility];  Service:        PT Ortho     Row Name 19 1600       Subjective " "Comments    Subjective Comments  Still a little tender from refrigerator falling on him. Joint mobilization last therapy session really helped his pain from the refrigerator. Sore in the posterior cervical spine and UT/lev scap regions. Improvement \"in the early 90s.\"  -       Precautions and Contraindications    Precautions/Limitations  no known precautions/limitations  -       Subjective Pain    Able to rate subjective pain?  yes  -    Pre-Treatment Pain Level  2  -SS    Post-Treatment Pain Level  -- \"very little\"  -       Cervical/Thoracic Special Tests    Cervical/Thoracic Special Tests Comments  Increased tone R middle trap with trigger point. TTP L upper thoracic facet column.  -       Head/Neck/Trunk    Head/Neck/Trunk Comments  CROM WNLs; strain on L UT with B lateral flexion, strain/pinch with L rotation, and pain at CT junction with flexion and extension.  -      User Key  (r) = Recorded By, (t) = Taken By, (c) = Cosigned By    Initials Name Provider Type     Lui Romero, PT DPT Physical Therapist            PT Assessment/Plan     Row Name 04/18/19 1600          PT Assessment    Functional Limitations  Limitations in community activities;Limitations in functional capacity and performance;Performance in leisure activities;Performance in work activities  -     Impairments  Pain;Joint mobility  -     Assessment Comments  Cavitation with thoracic joint mobilization. Twitch response L middle/upper trapezius. Decreased adverse muscle tension post-treatment this date.  -     Rehab Potential  Good barrier: chronicity  -     Patient/caregiver participated in establishment of treatment plan and goals  Yes  -     Patient would benefit from skilled therapy intervention  Yes  -SS        PT Plan    PT Frequency  2x/week  -     Predicted Duration of Therapy Intervention (Therapy Eval)  2-3 weeks  -     PT Plan Comments  Will see on 4/25. If doing well, PRN.  -       User Key  (r) " = Recorded By, (t) = Taken By, (c) = Cosigned By    Initials Name Provider Type    Lui Zaldivar, PT DPT Physical Therapist                  Manual Rx (last 36 hours)      Manual Treatments     Row Name 04/18/19 1700             Manual Rx 1    Manual Rx 1 Location  C7/T1 B  -SS      Manual Rx 1 Type  joint mobilization  -SS      Manual Rx 1 Grade  5  -SS         Manual Rx 2    Manual Rx 2 Location  thoracic spine  -SS      Manual Rx 2 Type  joint mobilization  -SS      Manual Rx 2 Grade  4  -SS         Manual Rx 3    Manual Rx 3 Location  cervical spine  -SS      Manual Rx 3 Type  joint mobilization  -SS      Manual Rx 3 Grade  4  -SS         Manual Rx 4    Manual Rx 4 Location  L middle/upper trapezius  -SS      Manual Rx 4 Type  dry needle  -SS         Manual Rx 5    Manual Rx 5 Location  L middle/upper trapezius & cervical paraspinals  -SS      Manual Rx 5 Type  MFR  -SS      Manual Rx 5 Grade  3 mins  -SS        User Key  (r) = Recorded By, (t) = Taken By, (c) = Cosigned By    Initials Name Provider Type    Lui Zaldivar, PT DPT Physical Therapist          PT OP Goals     Row Name 04/18/19 1600          PT Short Term Goals    STG Date to Achieve  -- deferred  -SS        Long Term Goals    LTG Date to Achieve  05/02/19  -SS     LTG 1  Independent with HEP  -SS     LTG 1 Progress  Ongoing  -SS     LTG 2  Resolution of referred/radiating pain  -SS     LTG 2 Progress  Ongoing  -SS     LTG 3  Resume weightlifting and PLOF  -SS     LTG 3 Progress  Ongoing  -SS     LTG 4  NDI score to be </= 20%.  -SS     LTG 4 Progress  Met  -SS        Time Calculation    PT Goal Re-Cert Due Date  05/02/19  -       User Key  (r) = Recorded By, (t) = Taken By, (c) = Cosigned By    Initials Name Provider Type    Lui Zaldivar, PT DPT Physical Therapist                         Time Calculation:   Start Time: 1618  Stop Time: 1645  Time Calculation (min): 27 min  Therapy Charges for Today     Code  Description Service Date Service Provider Modifiers Qty    19224538872 HC PT MANUAL THERAPY EA 15 MIN 4/18/2019 Lui Romero, PT DPT GP 2                    Lui Romero, PT, DPT, CHT  4/18/2019

## 2019-04-25 ENCOUNTER — HOSPITAL ENCOUNTER (OUTPATIENT)
Dept: PHYSICAL THERAPY | Facility: HOSPITAL | Age: 44
Setting detail: THERAPIES SERIES
Discharge: HOME | End: 2019-04-25

## 2019-04-25 DIAGNOSIS — M54.12 CERVICAL RADICULITIS: Primary | ICD-10-CM

## 2019-04-25 PROCEDURE — 97140 MANUAL THERAPY 1/> REGIONS: CPT | Performed by: PHYSICAL THERAPIST

## 2019-04-25 NOTE — THERAPY PROGRESS REPORT/RE-CERT
Outpatient Physical Therapy Ortho Progress Note  [facility]     Patient Name: Aquiles Najera  : 1975  MRN: [id number]  Today's Date: 2019      Visit Date: 2019  Attendance:  (20/yr)  Subjective Improvement: 95%  Next MD Appt: 5/10/19  Recert Date: 19     Therapy Diagnosis: Cervical radiculitis    Changes in Medications: none noted  Changes in MD Orders: none noted  Number of Work Days Lost: none noted       Past Medical History:   Diagnosis Date   • Abdominal pain    • Acute exacerbation of chronic obstructive airways disease (CMS/HCC)    • Allergic rhinitis    • Chest pain     unspecified   • Chronic cough    • Conjunctivitis    • Dyslipidemia    • Dyspnea    • Gastroesophageal reflux disease    • Pain     Pain in left ankle and joints of left foot      • Pain     pain in left leg   • Tobacco dependence syndrome    • Umbilical hernia without obstruction or gangrene    • Viral gastroenteritis         Past Surgical History:   Procedure Laterality Date   • INCISION AND DRAINAGE ABSCESS  2009    Irrigation and debridement of facial soft tissue injuries with closure of complex lacerations and repair of maxillary dental alveolar fracture   • UMBILICAL HERNIA REPAIR N/A 2017    Procedure: OPEN UMBILICAL AND EPIGASTRIC HERNIA REPAIR WITH MESH;  Surgeon: Sal Herbert MD;  Location: [facility];  Service:        Visit Dx:     ICD-10-CM ICD-9-CM   1. Cervical radiculitis M54.12 723.4             PT Ortho     Row Name 19 1600       Subjective Comments    Subjective Comments  Patient reports that he is sore a bit in his shoulders and in the midback. He has resumed working out. He has also been moved to a different machine at work. Machine at work has him holding a aftab at shoulder height most of the day. He was able to work out his chest and back without much aggravation to his CT junction. 95% subjective improvement. No medication changes.  -SS        "Precautions and Contraindications    Precautions/Limitations  no known precautions/limitations  -SS       Subjective Pain    Able to rate subjective pain?  yes  -SS    Pre-Treatment Pain Level  -- \"maybe a 2\"  -SS    Post-Treatment Pain Level  -- \"maybe not quite a 2\"  -SS       Cervical/Thoracic Special Tests    Cervical/Thoracic Special Tests Comments  TTP in mid-thoracic spine B but no increased muscle tension. UT and lev scap muscle tension WNLs. No trigger points noted this date.  -       Head/Neck/Trunk    Head/Neck/Trunk Comments  CROM WNLs; pulls on upper cervical muscles with flexion, extension, and B rotation; pulls diagonally across shoulders/CT junction with lateral flexion.  -      User Key  (r) = Recorded By, (t) = Taken By, (c) = Cosigned By    Initials Name Provider Type    Lui Zaldivar, PT DPT Physical Therapist                            PT OP Goals     Row Name 04/25/19 1600          PT Short Term Goals    STG Date to Achieve  -- deferred  -SS        Long Term Goals    LTG Date to Achieve  05/23/19  -     LTG 1  Independent with HEP  -SS     LTG 1 Progress  Ongoing  -SS     LTG 2  Resolution of referred/radiating pain  -SS     LTG 2 Progress  Met  -SS     LTG 3  Resume weightlifting and PLOF  -SS     LTG 3 Progress  Met  -SS     LTG 4  NDI score to be </= 20%.  -     LTG 4 Progress  Met  -SS        Time Calculation    PT Goal Re-Cert Due Date  05/16/19  -       User Key  (r) = Recorded By, (t) = Taken By, (c) = Cosigned By    Initials Name Provider Type    Lui Zaldivar, PT DPT Physical Therapist          PT Assessment/Plan     Row Name 04/25/19 1600          PT Assessment    Functional Limitations  Performance in leisure activities;Performance in work activities  -     Impairments  Pain;Joint mobility  -     Assessment Comments  Cavitation in thoracic spine. Sore from change in job and resuming weightlifting. No trigger points of muscle spasm noted this " date.   -SS     Rehab Potential  Good barrier: chronicity  -SS     Patient/caregiver participated in establishment of treatment plan and goals  Yes  -SS     Patient would benefit from skilled therapy intervention  Yes  -SS        PT Plan    PT Frequency  Other (comment) PRN  -SS     PT Plan Comments  Patient to work on self-management. Will see PRN.  -       User Key  (r) = Recorded By, (t) = Taken By, (c) = Cosigned By    Initials Name Provider Type     Lui Romero, PT DPT Physical Therapist                 Manual Rx (last 36 hours)      Manual Treatments     Row Name 04/25/19 1500             Manual Rx 1    Manual Rx 1 Location  cervical spine  -SS      Manual Rx 1 Type  joint mobilization  -SS      Manual Rx 1 Grade  4  -SS         Manual Rx 2    Manual Rx 2 Location  thoracic spine  -SS      Manual Rx 2 Type  joint mobilization  -SS      Manual Rx 2 Grade  4  -SS        User Key  (r) = Recorded By, (t) = Taken By, (c) = Cosigned By    Initials Name Provider Type     Lui Romero, PT DPT Physical Therapist                                Time Calculation:     Start Time: 1612  Stop Time: 1634  Time Calculation (min): 22 min     Therapy Charges for Today     Code Description Service Date Service Provider Modifiers Qty    83605695124 HC PT MANUAL THERAPY EA 15 MIN 4/25/2019 Lui Romero, PT DPT GP 1                    Lui Romero, PT, DPT, CHT  4/25/2019

## 2019-05-06 ENCOUNTER — TELEPHONE (OUTPATIENT)
Dept: ENDOCRINOLOGY | Facility: CLINIC | Age: 44
End: 2019-05-06

## 2019-05-06 NOTE — TELEPHONE ENCOUNTER
Pt says he can not make it to an appointment until after 4 as he working in [place] now. I explained we don't have appointments after 4. He insists he has to be seen. Can you guys see him at 4 or after?

## 2019-05-06 NOTE — TELEPHONE ENCOUNTER
Jamie,    Patient has follow up on Friday. He can be here at 4 due to job in [place] and he gets off at 230. I can book him at 345 and note that he will be little late if that is okay with you.  He states that he does not want to loose you as his PCP.

## 2019-05-07 NOTE — TELEPHONE ENCOUNTER
We can do that.  Just make sure he is here by 4.  Let him know that I may not be able to do this every time but this week will work out okay.

## 2019-05-10 ENCOUNTER — OFFICE VISIT (OUTPATIENT)
Dept: FAMILY MEDICINE CLINIC | Facility: CLINIC | Age: 44
End: 2019-05-10

## 2019-05-10 VITALS
SYSTOLIC BLOOD PRESSURE: 110 MMHG | HEIGHT: 70 IN | OXYGEN SATURATION: 99 % | DIASTOLIC BLOOD PRESSURE: 82 MMHG | RESPIRATION RATE: 20 BRPM | HEART RATE: 88 BPM | WEIGHT: 207.2 LBS | BODY MASS INDEX: 29.66 KG/M2

## 2019-05-10 DIAGNOSIS — J44.9 CHRONIC OBSTRUCTIVE PULMONARY DISEASE, UNSPECIFIED COPD TYPE (HCC): ICD-10-CM

## 2019-05-10 DIAGNOSIS — R73.03 PREDIABETES: Primary | ICD-10-CM

## 2019-05-10 DIAGNOSIS — E05.90 HYPERTHYROIDISM: ICD-10-CM

## 2019-05-10 DIAGNOSIS — E78.5 HYPERLIPIDEMIA, UNSPECIFIED HYPERLIPIDEMIA TYPE: ICD-10-CM

## 2019-05-10 PROCEDURE — 99213 OFFICE O/P EST LOW 20 MIN: CPT | Performed by: NURSE PRACTITIONER

## 2019-05-10 RX ORDER — METHIMAZOLE 5 MG/1
TABLET ORAL
Qty: 45 TABLET | Refills: 5 | Status: SHIPPED | OUTPATIENT
Start: 2019-05-10 | End: 2019-06-03

## 2019-05-10 RX ORDER — FLUTICASONE PROPIONATE AND SALMETEROL 113; 14 UG/1; UG/1
1 POWDER, METERED RESPIRATORY (INHALATION) 2 TIMES DAILY
Qty: 1 EACH | Refills: 11 | Status: SHIPPED | OUTPATIENT
Start: 2019-05-10 | End: 2019-05-16 | Stop reason: ALTCHOICE

## 2019-05-10 NOTE — PROGRESS NOTES
Subjective   Aquiles Najera is a 43 y.o. male.     Mr. Najera is a 43-year-old male who presents today for follow-up related to hyperlipidemia, hyperthyroidism, COPD and prediabetes.  Patient exercises regularly and follows a low-carb low-fat diet.  He also takes pravastatin 40 mg p.o. daily for the management of his hyperlipidemia.  He denies any symptoms characteristic of hyper or hypoglycemia.  He takes Qvar daily for the maintenance of his COPD.  While he does not have an increase usage of his rescue inhaler he does state that he does not feel that his Qvar opens him up very well.  He is interested in other treatment options at this time.  He has no acute complaints today.         The following portions of the patient's history were reviewed and updated as appropriate: allergies, current medications, past family history, past medical history, past social history, past surgical history and problem list.    Review of Systems   Constitutional: Negative for activity change, appetite change, fatigue, unexpected weight gain and unexpected weight loss.   HENT: Negative for congestion, sore throat, trouble swallowing and voice change.    Eyes: Negative.    Respiratory: Negative for cough, chest tightness, shortness of breath and wheezing.    Cardiovascular: Negative for chest pain, palpitations and leg swelling.   Gastrointestinal: Negative for abdominal pain, diarrhea, nausea and vomiting.   Endocrine: Negative.    Genitourinary: Negative for dysuria, hematuria and urgency.   Musculoskeletal: Negative for arthralgias and myalgias.   Skin: Negative for rash.   Neurological: Negative for dizziness, weakness, light-headedness and headache.   Hematological: Negative.    Psychiatric/Behavioral: Negative.        Objective   Physical Exam   Constitutional: He is oriented to person, place, and time. He appears well-developed and well-nourished. No distress.   HENT:   Head: Normocephalic and atraumatic.   Eyes:  Conjunctivae are normal.   Neck: Normal range of motion.   Cardiovascular: Normal rate, regular rhythm, normal heart sounds and intact distal pulses. Exam reveals no gallop and no friction rub.   No murmur heard.  Pulmonary/Chest: Effort normal and breath sounds normal. No respiratory distress. He has no wheezes. He has no rales.   Abdominal: Soft. Bowel sounds are normal. He exhibits no distension and no mass. There is no tenderness. There is no rebound and no guarding. No hernia.   Musculoskeletal: Normal range of motion. He exhibits no edema or tenderness.   Neurological: He is alert and oriented to person, place, and time.   Skin: Skin is warm and dry. No rash noted. He is not diaphoretic. No erythema. No pallor.   Psychiatric: He has a normal mood and affect. His behavior is normal. Judgment and thought content normal.   Nursing note and vitals reviewed.        Assessment/Plan   Aquiles was seen today for follow-up.    Diagnoses and all orders for this visit:    Prediabetes  -     Hemoglobin A1c; Future    Hyperthyroidism  -     T4, Free; Future  -     TSH; Future  -     T3, free; Future    Hyperlipidemia, unspecified hyperlipidemia type  -     Lipid Panel; Future    Chronic obstructive pulmonary disease, unspecified COPD type (CMS/[facility])    Other orders  -     Fluticasone-Salmeterol 113-14 MCG/ACT aerosol powder ; Inhale 1 puff 2 (Two) Times a Day.  -     methIMAzole (TAPAZOLE) 5 MG tablet; TAKE 1 1/2 TABS BY MOUTH A DAY    1.  Prediabetes-hemoglobin A1c.  Will call the results.  Continue with low-carb diet and exercise.    2.  Hypothyroidism-TSH, T4, T3.  Will call with results.  Refill methimazole 5 mg 1/2 tablets p.o. daily.    3.  Hyperlipidemia- lipid panel.  Will call with results.  Continue with low-fat diet and exercise.    4.  COPD-discontinue Qvar.  Will start Advair 113-14 mcg per ACT 1 puff twice a day.  We will continue to monitor.    5.  Follow-up in 6 months or sooner if needed.            This  document has been electronically signed by ERIC Oleary on May 10, 2019 4:52 PM

## 2019-05-13 RX ORDER — TRIAMCINOLONE ACETONIDE 1 MG/G
1 CREAM TOPICAL 2 TIMES DAILY
Qty: 80 G | Refills: 1 | Status: SHIPPED | OUTPATIENT
Start: 2019-05-13 | End: 2021-11-11

## 2019-05-14 ENCOUNTER — TELEPHONE (OUTPATIENT)
Dept: FAMILY MEDICINE CLINIC | Facility: CLINIC | Age: 44
End: 2019-05-14

## 2019-05-14 NOTE — TELEPHONE ENCOUNTER
Patient has called and wants to talk to you. He states that his Fluticasone-Salmeterol 113-14 MCG/ACT aerosol powder  Is not going to work. He wants you to call him at [phone]

## 2019-05-14 NOTE — TELEPHONE ENCOUNTER
"Jamie,    Pt. Called stating he cannot use this inhaler due to powder form. He states that if feels like \"its choking me.\"  He wants to know if you can prescribe an aerosol/mist form?    Please advise,  Thanks so much.  "

## 2019-05-16 NOTE — TELEPHONE ENCOUNTER
I sent in a prescription to [facility] to his pharmacy.  It is aerosolized so hopefully he will tolerate this better.

## 2019-05-20 ENCOUNTER — TELEPHONE (OUTPATIENT)
Dept: FAMILY MEDICINE CLINIC | Facility: CLINIC | Age: 44
End: 2019-05-20

## 2019-05-21 DIAGNOSIS — M54.12 CERVICAL RADICULITIS: Primary | ICD-10-CM

## 2019-05-30 ENCOUNTER — HOSPITAL ENCOUNTER (OUTPATIENT)
Dept: PHYSICAL THERAPY | Facility: HOSPITAL | Age: 44
Setting detail: THERAPIES SERIES
Discharge: HOME | End: 2019-05-30

## 2019-05-30 DIAGNOSIS — M54.12 CERVICAL RADICULITIS: Primary | ICD-10-CM

## 2019-05-30 DIAGNOSIS — M54.50 ACUTE LOW BACK PAIN WITHOUT SCIATICA, UNSPECIFIED BACK PAIN LATERALITY: Primary | ICD-10-CM

## 2019-05-30 PROCEDURE — 97140 MANUAL THERAPY 1/> REGIONS: CPT | Performed by: PHYSICAL THERAPIST

## 2019-05-30 NOTE — THERAPY PROGRESS REPORT/RE-CERT
"    Outpatient Physical Therapy Ortho Progress Note  [facility]     Patient Name: Aquiles Najera  : 1975  MRN: [id number]  Today's Date: 2019      Visit Date: 2019  Attendance:  (20/yr)  Subjective Improvement: \"in the 90s\"  Next MD Appt: 19  Recert Date: 19     Therapy Diagnosis: Cervical radiculitis     Changes in Medications: none noted  Changes in MD Orders: none noted  Number of Work Days Lost: none noted           Past Medical History:   Diagnosis Date   • Abdominal pain    • Acute exacerbation of chronic obstructive airways disease (CMS/HCC)    • Allergic rhinitis    • Chest pain     unspecified   • Chronic cough    • Conjunctivitis    • Dyslipidemia    • Dyspnea    • Gastroesophageal reflux disease    • Pain     Pain in left ankle and joints of left foot      • Pain     pain in left leg   • Tobacco dependence syndrome    • Umbilical hernia without obstruction or gangrene    • Viral gastroenteritis         Past Surgical History:   Procedure Laterality Date   • INCISION AND DRAINAGE ABSCESS  2009    Irrigation and debridement of facial soft tissue injuries with closure of complex lacerations and repair of maxillary dental alveolar fracture   • UMBILICAL HERNIA REPAIR N/A 2017    Procedure: OPEN UMBILICAL AND EPIGASTRIC HERNIA REPAIR WITH MESH;  Surgeon: Sal Herbert MD;  Location: [facility] [place];  Service:        Visit Dx:     ICD-10-CM ICD-9-CM   1. Cervical radiculitis M54.12 723.4             PT Ortho     Row Name 19 1600       Subjective Comments    Subjective Comments  Neck muscles are feeling better for the most part. However, approximately  3 weeks ago, he rolled his neck to pop his neck and he had pain that ran down the right side of his cervical spine and across the UT region into his shoulder. He had lateral flexed to the left and then twisted his head forward and rotated to right. Heat and ice help only while using it. Constant " "pain. No lost work time. Continues to have improvement \"in the 90s\". Feels like he has pressure that he can't relieve in T2/T3 area.  -       Precautions and Contraindications    Precautions/Limitations  no known precautions/limitations  -       Subjective Pain    Able to rate subjective pain?  yes  -SS    Pre-Treatment Pain Level  6  -SS    Post-Treatment Pain Level  -- \"less\"  -SS       Cervical/Thoracic Special Tests    Cervical/Thoracic Special Tests Comments  TTP R upper cervical spine. L rotation noted C3.  -      User Key  (r) = Recorded By, (t) = Taken By, (c) = Cosigned By    Initials Name Provider Type    Lui Zaldivar, PT DPT Physical Therapist              PT OP Goals     Row Name 05/30/19 1600          PT Short Term Goals    STG Date to Achieve  -- deferred  -SS        Long Term Goals    LTG Date to Achieve  06/20/19  -     LTG 1  Independent with HEP  -     LTG 1 Progress  Ongoing  -     LTG 2  Resolution of referred/radiating pain  -     LTG 2 Progress  Met  -     LTG 3  Resume weightlifting and PLOF  -     LTG 3 Progress  Met  -     LTG 4  NDI score to be </= 20%.  -     LTG 4 Progress  Met  -        Time Calculation    PT Goal Re-Cert Due Date  06/20/19  -       User Key  (r) = Recorded By, (t) = Taken By, (c) = Cosigned By    Initials Name Provider Type    Lui Zaldivar, PT DPT Physical Therapist          PT Assessment/Plan     Row Name 05/30/19 1600          PT Assessment    Functional Limitations  Performance in leisure activities;Performance in work activities  -     Impairments  Pain;Joint mobility  -     Assessment Comments  Improved motion and cervical joint mobility post-manual treatment this date. Cervical alignment corrected. Cavitation noted in mid-thoracic spine. Recommended chiropractic evaluation if continues to have cervical pain from this incident.  -     Rehab Potential  Good  -     Patient/caregiver participated in " establishment of treatment plan and goals  Yes  -SS     Patient would benefit from skilled therapy intervention  Yes  -SS        PT Plan    PT Plan Comments  PRN at this time. Manual therapy.  -SS       User Key  (r) = Recorded By, (t) = Taken By, (c) = Cosigned By    Initials Name Provider Type    Lui Zaldivar, PT DPT Physical Therapist               Time Calculation:     Start Time: 1610  Stop Time: 1635  Time Calculation (min): 25 min     Therapy Charges for Today     Code Description Service Date Service Provider Modifiers Qty    03809507516 HC PT MANUAL THERAPY EA 15 MIN 5/30/2019 Lui Romero, PT DPT GP 2                    Lui Romero, PT, DPT, CHT  5/30/2019

## 2019-06-01 ENCOUNTER — LAB (OUTPATIENT)
Dept: LAB | Facility: HOSPITAL | Age: 44
End: 2019-06-01

## 2019-06-01 DIAGNOSIS — E05.90 HYPERTHYROIDISM: ICD-10-CM

## 2019-06-01 DIAGNOSIS — E78.5 HYPERLIPIDEMIA, UNSPECIFIED HYPERLIPIDEMIA TYPE: ICD-10-CM

## 2019-06-01 DIAGNOSIS — R73.03 PREDIABETES: ICD-10-CM

## 2019-06-01 DIAGNOSIS — M54.50 ACUTE LOW BACK PAIN WITHOUT SCIATICA, UNSPECIFIED BACK PAIN LATERALITY: ICD-10-CM

## 2019-06-01 LAB
ALBUMIN SERPL-MCNC: 4.2 G/DL (ref 3.5–5.2)
ALBUMIN/GLOB SERPL: 1.4 G/DL
ALP SERPL-CCNC: 77 U/L (ref 39–117)
ALT SERPL W P-5'-P-CCNC: 28 U/L (ref 1–41)
ANION GAP SERPL CALCULATED.3IONS-SCNC: 13.9 MMOL/L
AST SERPL-CCNC: 32 U/L (ref 1–40)
BILIRUB SERPL-MCNC: 0.5 MG/DL (ref 0.2–1.2)
BILIRUB UR QL STRIP: NEGATIVE
BUN BLD-MCNC: 12 MG/DL (ref 6–20)
BUN/CREAT SERPL: 11 (ref 7–25)
CALCIUM SPEC-SCNC: 9.1 MG/DL (ref 8.6–10.5)
CHLORIDE SERPL-SCNC: 101 MMOL/L (ref 98–107)
CHOLEST SERPL-MCNC: 202 MG/DL (ref 0–200)
CLARITY UR: CLEAR
CO2 SERPL-SCNC: 23.1 MMOL/L (ref 22–29)
COLOR UR: YELLOW
CREAT BLD-MCNC: 1.09 MG/DL (ref 0.76–1.27)
GFR SERPL CREATININE-BSD FRML MDRD: 75 ML/MIN/1.73
GLOBULIN UR ELPH-MCNC: 2.9 GM/DL
GLUCOSE BLD-MCNC: 96 MG/DL (ref 65–99)
GLUCOSE UR STRIP-MCNC: NEGATIVE MG/DL
HBA1C MFR BLD: 5.39 % (ref 4.8–5.6)
HDLC SERPL-MCNC: 48 MG/DL (ref 40–60)
HGB UR QL STRIP.AUTO: NEGATIVE
KETONES UR QL STRIP: NEGATIVE
LDLC SERPL CALC-MCNC: 101 MG/DL (ref 0–100)
LDLC/HDLC SERPL: 2.11 {RATIO}
LEUKOCYTE ESTERASE UR QL STRIP.AUTO: NEGATIVE
NITRITE UR QL STRIP: NEGATIVE
PH UR STRIP.AUTO: 7.5 [PH] (ref 5–8)
POTASSIUM BLD-SCNC: 4.8 MMOL/L (ref 3.5–5.2)
PROT SERPL-MCNC: 7.1 G/DL (ref 6–8.5)
PROT UR QL STRIP: NEGATIVE
SODIUM BLD-SCNC: 138 MMOL/L (ref 136–145)
SP GR UR STRIP: 1.02 (ref 1–1.03)
T3FREE SERPL-MCNC: 2.78 PG/ML (ref 2–4.4)
T4 FREE SERPL-MCNC: 1.1 NG/DL (ref 0.93–1.7)
TRIGL SERPL-MCNC: 263 MG/DL (ref 0–150)
TSH SERPL DL<=0.05 MIU/L-ACNC: 5.55 MIU/ML (ref 0.27–4.2)
UROBILINOGEN UR QL STRIP: NORMAL
VLDLC SERPL-MCNC: 52.6 MG/DL (ref 5–40)

## 2019-06-01 PROCEDURE — 84481 FREE ASSAY (FT-3): CPT

## 2019-06-01 PROCEDURE — 84439 ASSAY OF FREE THYROXINE: CPT

## 2019-06-01 PROCEDURE — 80053 COMPREHEN METABOLIC PANEL: CPT

## 2019-06-01 PROCEDURE — 80061 LIPID PANEL: CPT

## 2019-06-01 PROCEDURE — 81003 URINALYSIS AUTO W/O SCOPE: CPT

## 2019-06-01 PROCEDURE — 84443 ASSAY THYROID STIM HORMONE: CPT

## 2019-06-01 PROCEDURE — 36415 COLL VENOUS BLD VENIPUNCTURE: CPT

## 2019-06-01 PROCEDURE — 83036 HEMOGLOBIN GLYCOSYLATED A1C: CPT

## 2019-06-03 RX ORDER — METHIMAZOLE 5 MG/1
TABLET ORAL
Qty: 30 TABLET | Refills: 5 | Status: SHIPPED | OUTPATIENT
Start: 2019-06-03 | End: 2020-04-15 | Stop reason: SDUPTHER

## 2019-06-03 NOTE — PROGRESS NOTES
TSH slightly elevated, 5.55.  I decreased his methimazole to 5 mg daily instead of 7-1/2 daily.  Also noted he has no follow-up with endocrinology.  He needs to schedule follow-up visit.  Slight elevation in cholesterol and triglycerides.  Reinforce low-fat diet.  Follow-up as scheduled.

## 2019-07-01 ENCOUNTER — TELEPHONE (OUTPATIENT)
Dept: FAMILY MEDICINE CLINIC | Facility: CLINIC | Age: 44
End: 2019-07-01

## 2019-07-08 RX ORDER — PANTOPRAZOLE SODIUM 40 MG/1
TABLET, DELAYED RELEASE ORAL
Qty: 30 TABLET | Refills: 3 | Status: SHIPPED | OUTPATIENT
Start: 2019-07-08 | End: 2020-04-15 | Stop reason: SDUPTHER

## 2019-08-27 DIAGNOSIS — M54.12 CERVICAL RADICULITIS: Primary | ICD-10-CM

## 2019-09-09 ENCOUNTER — HOSPITAL ENCOUNTER (OUTPATIENT)
Dept: PHYSICAL THERAPY | Facility: HOSPITAL | Age: 44
Setting detail: THERAPIES SERIES
Discharge: HOME | End: 2019-09-09

## 2019-09-09 DIAGNOSIS — M54.12 CERVICAL RADICULITIS: Primary | ICD-10-CM

## 2019-09-09 PROCEDURE — 97161 PT EVAL LOW COMPLEX 20 MIN: CPT | Performed by: PHYSICAL THERAPIST

## 2019-09-09 PROCEDURE — 97140 MANUAL THERAPY 1/> REGIONS: CPT | Performed by: PHYSICAL THERAPIST

## 2019-09-09 NOTE — THERAPY EVALUATION
Outpatient Physical Therapy Ortho Initial Evaluation  [facility]     Patient Name: Aquiles Najera  : 1975  MRN: [id number]  Today's Date: 2019      Visit Date: 2019  Attendance:  (/yr)  Subjective Improvement: n/a  Next MD Appt: 19  Recert Date: 19    Therapy Diagnosis: L>R cervical and upper thoracic pain and dysfunction         Past Medical History:   Diagnosis Date   • Abdominal pain    • Acute exacerbation of chronic obstructive airways disease (CMS/HCC)    • Allergic rhinitis    • Chest pain     unspecified   • Chronic cough    • Conjunctivitis    • Dyslipidemia    • Dyspnea    • Gastroesophageal reflux disease    • Pain     Pain in left ankle and joints of left foot      • Pain     pain in left leg   • Tobacco dependence syndrome    • Umbilical hernia without obstruction or gangrene    • Viral gastroenteritis         Past Surgical History:   Procedure Laterality Date   • INCISION AND DRAINAGE ABSCESS  2009    Irrigation and debridement of facial soft tissue injuries with closure of complex lacerations and repair of maxillary dental alveolar fracture   • UMBILICAL HERNIA REPAIR N/A 2017    Procedure: OPEN UMBILICAL AND EPIGASTRIC HERNIA REPAIR WITH MESH;  Surgeon: Sal Herbert MD;  Location: [facility];  Service:        Current Outpatient Medications:   •  albuterol sulfate  (90 Base) MCG/ACT inhaler, Inhale 2 puffs Every 4 (Four) Hours As Needed for Wheezing., Disp: 18 inhaler, Rfl: 3  •  aspirin 81 MG chewable tablet, Chew 1 tablet Daily., Disp: 90 tablet, Rfl: 6  •  buPROPion XL (WELLBUTRIN XL) 150 MG 24 hr tablet, TAKE 1 TABLET BY MOUTH EVERY DAY, Disp: 30 tablet, Rfl: 1  •  calcium polycarbophil (FIBERCON) 625 MG tablet, Take 1 tablet by mouth Daily., Disp: 30 tablet, Rfl: 1  •  [employer] FLUTICASONE PROPIONATE 50 MCG/ACT nasal spray, PUT 1 SPRAY IN EACH NOSTRIL DAILY, Disp: 15.8 mL, Rfl: 8  •  ipratropium-albuterol (DUONEB) 0.5-2.5  mg/3 ml nebulizer, Take 3 mL by nebulization Every 4 (Four) Hours As Needed for Wheezing., Disp: 360 mL, Rfl: 3  •  loratadine (CLARITIN) 10 MG tablet, TAKE 1 TABLET BY MOUTH DAILY., Disp: 30 tablet, Rfl: 11  •  methIMAzole (TAPAZOLE) 5 MG tablet, TAKE 1 TABS BY MOUTH A DAY, Disp: 30 tablet, Rfl: 5  •  mometasone-formoterol (DULERA) 100-5 MCG/ACT inhaler, Inhale 2 puffs 2 (Two) Times a Day., Disp: 8.8 g, Rfl: 11  •  nitroglycerin (NITROSTAT) 0.4 MG SL tablet, Place 1 tablet under the tongue Every 5 (Five) Minutes As Needed for chest pain. Take no more than 3 doses in 15 minutes., Disp: 30 tablet, Rfl: 12  •  pantoprazole (PROTONIX) 40 MG EC tablet, TAKE 1 TABLET BY MOUTH EVERY DAY, Disp: 30 tablet, Rfl: 3  •  polyethylene glycol (MIRALAX) powder, TAKE 17 G (1 CAPFUL) BY MOUTH DAILY., Disp: 527 g, Rfl: 5  •  pravastatin (PRAVACHOL) 40 MG tablet, Take 1 tablet by mouth Daily., Disp: 90 tablet, Rfl: 3  •  triamcinolone (KENALOG) 0.1 % cream, APPLY 1 APPLICATION TOPICALLY TO THE APPROPRIATE AREA AS DIRECTED 2 (TWO) TIMES A DAY., Disp: 80 g, Rfl: 1    Allergies   Allergen Reactions   • Colace [Docusate] Hives       Visit Dx:     ICD-10-CM ICD-9-CM   1. Cervical radiculitis M54.12 723.4         Patient History     Row Name 09/09/19 1600             History    Chief Complaint  Pain;Numbness;Tightness  -      Type of Pain  Neck pain;Back pain upper back  -      Date Current Problem(s) Began  02/20/18  -      Brief Description of Current Complaint  Patient has a history of right shoulder arthroscopy and cervical issues. He has been seen for cervical radiculitis that has been successfully treated with therapy. Dry needling was successful in helping to manage his symptoms. Currently having pain in the L>R trapezius muscle. Intermittent tingling in the right middle and ring fingers when he is touched in the R upper/middle trap region. Pain more on the left currently. Constant level of pain in the left shoulder. Continues  "to weightlift. \"It's a constant, nagging pain.\" Chiropractic care for neck in August that helped loosen his neck up, but the chiropractor would not needle below his upper trap region.  male with children.  -SS      Patient/Caregiver Goals  Relieve pain  -SS      Current Tobacco Use  no  -SS      Smoking Status  former  -SS      Patient's Rating of General Health  Good  -SS      Hand Dominance  right-handed  -SS      Occupation/sports/leisure activities  [employer] - coil assembly. Hobbies: weightlifting  -SS      What clinical tests have you had for this problem?  X-ray at chiropractor's office  -SS      Results of Clinical Tests  essentially negative  -SS         Pain     Pain Location  Neck;Back neck and upper thoracic/trapezius pain  -SS      Pain at Present  7  -SS      Pain at Best  2 over past 30 days  -SS      Pain at Worst  10 over past 30 days  -SS      Pain Frequency  Constant/continuous  -SS      Pain Description  Dull \"constant annoyance\"  -SS      What Performance Factors Make the Current Problem(s) WORSE?  general pain  -SS      What Performance Factors Make the Current Problem(s) BETTER?  dry needling  -SS      Is your sleep disturbed?  Yes  -SS      Is medication used to assist with sleep?  Yes  -SS      Difficulties at work?  pain  -SS      Difficulties with ADL's?  pain  -SS      Difficulties with recreational activities?  pain  -SS         Fall Risk Assessment    Any falls in the past year:  Yes  -SS      Number of falls reported in the last 12 months  1  -SS      Factors that contributed to the fall:  -- carrying something heavy and overbalanced  -SS      Does patient have a fear of falling  No  -SS         Daily Activities    Primary Language  English  -SS         Safety    Are you being hurt, hit, or frightened by anyone at home or in your life?  No  -SS      Are you being neglected by a caregiver  No  -SS        User Key  (r) = Recorded By, (t) = Taken By, (c) = Cosigned By    Initials Name " Provider Type    Lui Zaldivar, PT DPT Physical Therapist          PT Ortho     Row Name 09/09/19 1600       Subjective Comments    Subjective Comments  see Therapy Patient History  -SS       Precautions and Contraindications    Precautions/Limitations  no known precautions/limitations  -SS       Subjective Pain    Able to rate subjective pain?  yes  -SS    Pre-Treatment Pain Level  7  -SS    Post-Treatment Pain Level  3  -SS       Cervical/Thoracic Special Tests    Cervical/Thoracic Special Tests Comments  Increased muscle tension with trigger point L upper and middle trapezius.   -SS       General ROM    RT Upper Ext  Comments  -SS    LT Upper Ext  Comment  -SS       Head/Neck/Trunk    Head/Neck/Trunk Comments  Cervical ROM WNLs all planes with increased pain during extension, left lateral flexion, left rotation.   -SS       Right Upper Ext    Rt Upper Extremity Comments   R shoulder AROM WNLs w/o complaint.  -SS       Left Upper Ext    Lt Upper Extremity Comments   L shoulder AROM WNLs with pain during abduction>flexion and end-range internal and external rotation.  -      User Key  (r) = Recorded By, (t) = Taken By, (c) = Cosigned By    Initials Name Provider Type    Lui Zaldivar, PT DPT Physical Therapist          Therapy Education  Education Details: therapy plan  How Provided: Verbal  Provided to: Patient  Level of Understanding: Verbalized     PT OP Goals     Row Name 09/09/19 1600          PT Short Term Goals    STG Date to Achieve  09/30/19  -     STG 1  Note a >/= 25% subjective improvement.   -     STG 2  Pain at end of workday to be </= 5/10 consistently.  -        Long Term Goals    LTG Date to Achieve  11/04/19  -     LTG 1  Cervical and shoulder AROM WNLs with minimal to no neck pain.  -     LTG 2  Minimal shoulder and neck pain and muscle tension at rest.  -        Time Calculation    PT Goal Re-Cert Due Date  09/30/19  -       User Key  (r) = Recorded By,  (t) = Taken By, (c) = Cosigned By    Initials Name Provider Type     Lui Romero, PT DPT Physical Therapist          PT Assessment/Plan     Row Name 09/09/19 1700          PT Assessment    Functional Limitations  Limitations in functional capacity and performance  -     Impairments  Pain;Impaired muscle power  -     Assessment Comments  Significant twitch responses at UT and lower trapezius during dry needling. Muscle relaxation noted with treatment.  -     Rehab Potential  Good barrier: chronicity  -     Patient/caregiver participated in establishment of treatment plan and goals  Yes  -SS     Patient would benefit from skilled therapy intervention  Yes  -SS        PT Plan    PT Frequency  1x/week;2x/week  -     Predicted Duration of Therapy Intervention (Therapy Eval)  4-6 weeks with further to be determined  -     PT Plan Comments  dry needling, joint mobilization, MFR  -       User Key  (r) = Recorded By, (t) = Taken By, (c) = Cosigned By    Initials Name Provider Type    Lui Zaldivar, PT DPT Physical Therapist                 Manual Rx (last 36 hours)      Manual Treatments     Row Name 09/09/19 1700             Manual Rx 1    Manual Rx 1 Location  L UT  -SS      Manual Rx 1 Type  dry needle  -SS         Manual Rx 2    Manual Rx 2 Location  L levator scapula  -SS      Manual Rx 2 Type  dry needle  -SS         Manual Rx 3    Manual Rx 3 Location  L subscapularis  -SS      Manual Rx 3 Type  dry needle  -SS         Manual Rx 4    Manual Rx 4 Location  L lower trapezius  -SS      Manual Rx 4 Type  dry needle  -SS         Manual Rx 5    Manual Rx 5 Location  L cervical paraspinals  -SS      Manual Rx 5 Type  dry needle  -SS         Manual Rx 6    Manual Rx 6 Location  L UT at CT junction  -SS      Manual Rx 6 Type  dry needle  -SS        User Key  (r) = Recorded By, (t) = Taken By, (c) = Cosigned By    Initials Name Provider Type    SS Lui Romero, PT DPT Physical  Therapist           Outcome Measure Options: Neck Disability Index (NDI)  Neck Disability Index  Section 1 - Pain Intensity: The pain is moderate at the moment.  Section 2 - Personal Care: It is painful to look after myself, and I am slow and careful.  Section 3 - Lifting: I can lift heavy weights, but it gives me extra pain.  Section 4 - Work: I can do most of my usual work, but no more  Section 5 - Headaches: I have no headaches at all.  Section 6 - Concentration: I can concentrate fully with slight difficulty.  Section 7 - Sleeping: I have no trouble sleeping.  Section 8 - Driving: I can drive as long as I want with moderate neck pain.  Section 9 - Reading: I can read as much as I want with moderate neck pain.  Section 10 - Recreation: I have some neck pain with all recreational activities.  Neck Disability Index Score: 13      Time Calculation:     Start Time: 1621  Stop Time: 1653  Time Calculation (min): 32 min     Therapy Charges for Today     Code Description Service Date Service Provider Modifiers Qty    22860378596 HC PT MANUAL THERAPY EA 15 MIN 9/9/2019 Lui Romero, PT DPT GP 1    02089315498 HC PT EVAL LOW COMPLEXITY 1 9/9/2019 Lui Romero, PT DPT GP 1                   Lui Romero, PT, DPT, CHT  9/9/2019

## 2019-09-16 ENCOUNTER — HOSPITAL ENCOUNTER (OUTPATIENT)
Dept: PHYSICAL THERAPY | Facility: HOSPITAL | Age: 44
Setting detail: THERAPIES SERIES
Discharge: HOME | End: 2019-09-16

## 2019-09-16 DIAGNOSIS — M54.12 CERVICAL RADICULITIS: Primary | ICD-10-CM

## 2019-09-16 PROCEDURE — 97140 MANUAL THERAPY 1/> REGIONS: CPT | Performed by: PHYSICAL THERAPIST

## 2019-09-16 NOTE — THERAPY TREATMENT NOTE
"    Outpatient Physical Therapy Ortho Treatment Note  [facility]     Patient Name: Aquiles Najera  : 1975  MRN: [id number]  Today's Date: 2019      Visit Date: 2019  Attendance:  (25/yr)  Subjective Improvement: 50-60%  Next MD Appt: 19  Recert Date: 19     Therapy Diagnosis: L>R cervical and upper thoracic pain and dysfunction     Visit Dx:    ICD-10-CM ICD-9-CM   1. Cervical radiculitis M54.12 723.4            Past Medical History:   Diagnosis Date   • Abdominal pain    • Acute exacerbation of chronic obstructive airways disease (CMS/HCC)    • Allergic rhinitis    • Chest pain     unspecified   • Chronic cough    • Conjunctivitis    • Dyslipidemia    • Dyspnea    • Gastroesophageal reflux disease    • Pain     Pain in left ankle and joints of left foot      • Pain     pain in left leg   • Tobacco dependence syndrome    • Umbilical hernia without obstruction or gangrene    • Viral gastroenteritis         Past Surgical History:   Procedure Laterality Date   • INCISION AND DRAINAGE ABSCESS  2009    Irrigation and debridement of facial soft tissue injuries with closure of complex lacerations and repair of maxillary dental alveolar fracture   • UMBILICAL HERNIA REPAIR N/A 2017    Procedure: OPEN UMBILICAL AND EPIGASTRIC HERNIA REPAIR WITH MESH;  Surgeon: Sal Herbert MD;  Location: [facility];  Service:        PT Ortho     Row Name 19 1600       Subjective Comments    Subjective Comments  Noticed that his right side was more flared up this morning. Left side feels \"rough.\" Notices that the bird dog exercise aggravates the L scapular region. Pain right upper trap and supraspinatus region. 50-60% subjective improvement.  -SS       Precautions and Contraindications    Precautions/Limitations  no known precautions/limitations  -SS       Subjective Pain    Able to rate subjective pain?  yes  -SS    Pre-Treatment Pain Level  5  -SS    Post-Treatment Pain " Level  5  -SS       Cervical/Thoracic Special Tests    Cervical/Thoracic Special Tests Comments  Increased muscle tension and TTP R UT, R supraspinatus, L thoracic paraspinals.  -SS      User Key  (r) = Recorded By, (t) = Taken By, (c) = Cosigned By    Initials Name Provider Type    Lui Zaldivar, PT DPT Physical Therapist            PT Assessment/Plan     Row Name 09/16/19 1600          PT Assessment    Functional Limitations  Limitations in functional capacity and performance  -     Impairments  Pain;Impaired muscle power  -     Assessment Comments  Muscle relaxation noted with dry needling.  -SS     Rehab Potential  Good barrier: chronicity  -     Patient/caregiver participated in establishment of treatment plan and goals  Yes  -SS     Patient would benefit from skilled therapy intervention  Yes  -SS        PT Plan    PT Frequency  1x/week;2x/week  -     Predicted Duration of Therapy Intervention (Therapy Eval)  4-6 weeks with further to be determined  -     PT Plan Comments  Continue POC.  -       User Key  (r) = Recorded By, (t) = Taken By, (c) = Cosigned By    Initials Name Provider Type    Lui Zaldivar, PT DPT Physical Therapist            Manual Rx (last 36 hours)      Manual Treatments     Row Name 09/16/19 1500             Manual Rx 1    Manual Rx 1 Location  L thoracic paraspinals  -SS      Manual Rx 1 Type  dry needle   -SS         Manual Rx 2    Manual Rx 2 Location  R supraspinatus  -SS      Manual Rx 2 Type  dry needle  -SS         Manual Rx 3    Manual Rx 3 Location  R UT  -SS      Manual Rx 3 Type  dry needle  -SS         Manual Rx 4    Manual Rx 4 Location  L thoracic paraspinals  -SS      Manual Rx 4 Type  dry needle  -SS        User Key  (r) = Recorded By, (t) = Taken By, (c) = Cosigned By    Initials Name Provider Type    Lui Zaldivar, PT DPT Physical Therapist          PT OP Goals     Row Name 09/16/19 1600          PT Short Term Goals    STG  Date to Achieve  09/30/19  -     STG 1  Note a >/= 25% subjective improvement.   -     STG 1 Progress  Ongoing  -     STG 2  Pain at end of workday to be </= 5/10 consistently.  -     STG 2 Progress  Ongoing  -        Long Term Goals    LTG Date to Achieve  11/04/19  -     LTG 1  Cervical and shoulder AROM WNLs with minimal to no neck pain.  -     LTG 1 Progress  Ongoing  -     LTG 2  Minimal shoulder and neck pain and muscle tension at rest.  -     LTG 2 Progress  Ongoing  -        Time Calculation    PT Goal Re-Cert Due Date  09/30/19  -       User Key  (r) = Recorded By, (t) = Taken By, (c) = Cosigned By    Initials Name Provider Type     Lui Romero, PT DPT Physical Therapist                         Time Calculation:   Start Time: 1615  Stop Time: 1642  Time Calculation (min): 27 min  Therapy Charges for Today     Code Description Service Date Service Provider Modifiers Qty    65224005743 HC PT MANUAL THERAPY EA 15 MIN 9/16/2019 Lui Romero, PT DPT GP 2                    Lui Romero, PT, DPT, CHT  9/16/2019

## 2019-09-23 ENCOUNTER — HOSPITAL ENCOUNTER (OUTPATIENT)
Dept: PHYSICAL THERAPY | Facility: HOSPITAL | Age: 44
Setting detail: THERAPIES SERIES
Discharge: HOME | End: 2019-09-23

## 2019-09-23 DIAGNOSIS — M54.12 CERVICAL RADICULITIS: Primary | ICD-10-CM

## 2019-09-23 PROCEDURE — 97140 MANUAL THERAPY 1/> REGIONS: CPT | Performed by: PHYSICAL THERAPIST

## 2019-09-23 NOTE — THERAPY TREATMENT NOTE
"    Outpatient Physical Therapy Ortho Treatment Note  [facility]     Patient Name: Aquiles Najera  : 1975  MRN: [id number]  Today's Date: 2019      Visit Date: 2019  Attendance: 3/3 (25/yr)  Subjective Improvement: 50-60%  Next MD Appt: 19  Recert Date: 19     Therapy Diagnosis: L>R cervical and upper thoracic pain and dysfunction    Visit Dx:    ICD-10-CM ICD-9-CM   1. Cervical radiculitis M54.12 723.4            Past Medical History:   Diagnosis Date   • Abdominal pain    • Acute exacerbation of chronic obstructive airways disease (CMS/HCC)    • Allergic rhinitis    • Chest pain     unspecified   • Chronic cough    • Conjunctivitis    • Dyslipidemia    • Dyspnea    • Gastroesophageal reflux disease    • Pain     Pain in left ankle and joints of left foot      • Pain     pain in left leg   • Tobacco dependence syndrome    • Umbilical hernia without obstruction or gangrene    • Viral gastroenteritis         Past Surgical History:   Procedure Laterality Date   • INCISION AND DRAINAGE ABSCESS  2009    Irrigation and debridement of facial soft tissue injuries with closure of complex lacerations and repair of maxillary dental alveolar fracture   • UMBILICAL HERNIA REPAIR N/A 2017    Procedure: OPEN UMBILICAL AND EPIGASTRIC HERNIA REPAIR WITH MESH;  Surgeon: Sal Herbert MD;  Location: [facility];  Service:        PT Ortho     Row Name 19 1600       Subjective Comments    Subjective Comments  \"Ain't bad. A lot of issues up in my neck and skull.\" Took an assessment test at [facility] and found that holding his arms out at shoulder height causes pain in the posterior shoulder and SS/IS region. L thoracic paraspinals doing better. 50-60% subjective improvement.   -SS       Precautions and Contraindications    Precautions/Limitations  no known precautions/limitations  -SS       Subjective Pain    Able to rate subjective pain?  yes  -SS    Pre-Treatment Pain " Level  5  -SS    Post-Treatment Pain Level  -- 3.5/10  -SS       Cervical/Thoracic Special Tests    Cervical/Thoracic Special Tests Comments  Increased muscle tension and TTP B suboccipital musculature, L UT, L infraspinatus.  -      User Key  (r) = Recorded By, (t) = Taken By, (c) = Cosigned By    Initials Name Provider Type     Lui Romero, PT DPT Physical Therapist            PT Assessment/Plan     Row Name 09/23/19 1600          PT Assessment    Functional Limitations  Limitations in functional capacity and performance  -     Impairments  Pain;Impaired muscle power  -     Assessment Comments  Muscle relaxation, twitch response, and decreased pain post-treatment.   -     Rehab Potential  Good barrier: chronicity  -     Patient/caregiver participated in establishment of treatment plan and goals  Yes  -     Patient would benefit from skilled therapy intervention  Yes  -SS        PT Plan    PT Frequency  1x/week;2x/week  -     Predicted Duration of Therapy Intervention (Therapy Eval)  4-6 weeks with further to be determined  -     PT Plan Comments  Continue manual therapy  -       User Key  (r) = Recorded By, (t) = Taken By, (c) = Cosigned By    Initials Name Provider Type     Lui Romero, PT DPT Physical Therapist            Manual Rx (last 36 hours)      Manual Treatments     Row Name 09/23/19 1700             Manual Rx 1    Manual Rx 1 Location  B suboccipital muscles  -SS      Manual Rx 1 Type  dry needle  -SS         Manual Rx 2    Manual Rx 2 Location  L longissimus thoracis  -SS      Manual Rx 2 Type  dry needle  -SS         Manual Rx 3    Manual Rx 3 Location  L infraspinatus  -SS      Manual Rx 3 Type  dry needle  -SS         Manual Rx 4    Manual Rx 4 Location  L levator scapula  -SS      Manual Rx 4 Type  dry needle  -SS         Manual Rx 5    Manual Rx 5 Location  L UT  -SS      Manual Rx 5 Type  dry needle  -SS        User Key  (r) = Recorded By, (t) = Taken  By, (c) = Cosigned By    Initials Name Provider Type     Lui Romero, PT DPT Physical Therapist          PT OP Goals     Row Name 09/23/19 1600          PT Short Term Goals    STG Date to Achieve  09/30/19  -     STG 1  Note a >/= 25% subjective improvement.   -SS     STG 1 Progress  Met  -     STG 2  Pain at end of workday to be </= 5/10 consistently.  -     STG 2 Progress  Ongoing  -        Long Term Goals    LTG Date to Achieve  11/04/19  -     LTG 1  Cervical and shoulder AROM WNLs with minimal to no neck pain.  -     LTG 1 Progress  Ongoing  -     LTG 2  Minimal shoulder and neck pain and muscle tension at rest.  -     LTG 2 Progress  Ongoing  -        Time Calculation    PT Goal Re-Cert Due Date  09/30/19  -       User Key  (r) = Recorded By, (t) = Taken By, (c) = Cosigned By    Initials Name Provider Type     Lui Romero, PT DPT Physical Therapist          Time Calculation:   Start Time: 1618  Stop Time: 1645  Time Calculation (min): 27 min  Therapy Charges for Today     Code Description Service Date Service Provider Modifiers Qty    84813991827 HC PT MANUAL THERAPY EA 15 MIN 9/23/2019 Lui Romero, PT DPT GP 2                    Lui Romero, PT, DPT, CHT  9/23/2019

## 2019-09-30 ENCOUNTER — APPOINTMENT (OUTPATIENT)
Dept: PHYSICAL THERAPY | Facility: HOSPITAL | Age: 44
End: 2019-09-30

## 2019-09-30 ENCOUNTER — HOSPITAL ENCOUNTER (OUTPATIENT)
Dept: PHYSICAL THERAPY | Facility: HOSPITAL | Age: 44
Setting detail: THERAPIES SERIES
Discharge: HOME | End: 2019-09-30

## 2019-09-30 DIAGNOSIS — M54.12 CERVICAL RADICULITIS: Primary | ICD-10-CM

## 2019-09-30 PROCEDURE — 97140 MANUAL THERAPY 1/> REGIONS: CPT | Performed by: PHYSICAL THERAPIST

## 2019-10-01 NOTE — THERAPY PROGRESS REPORT/RE-CERT
"    Outpatient Physical Therapy Ortho Re-Evaluation  [facility]     Patient Name: Aquiles Najera  : 1975  MRN: [id number]  Today's Date: 2019      Visit Date: 2019  Attendance: 4/4 (25/yr)  Subjective Improvement: 82%  Next MD Appt: 19  Recert Date: 10/21/19     Therapy Diagnosis: L>R cervical and upper thoracic pain and dysfunction     Changes in Medications: none  Changes in MD Orders: none  Number of Work Days Lost: none       Past Medical History:   Diagnosis Date   • Abdominal pain    • Acute exacerbation of chronic obstructive airways disease (CMS/HCC)    • Allergic rhinitis    • Chest pain     unspecified   • Chronic cough    • Conjunctivitis    • Dyslipidemia    • Dyspnea    • Gastroesophageal reflux disease    • Pain     Pain in left ankle and joints of left foot      • Pain     pain in left leg   • Tobacco dependence syndrome    • Umbilical hernia without obstruction or gangrene    • Viral gastroenteritis         Past Surgical History:   Procedure Laterality Date   • INCISION AND DRAINAGE ABSCESS  2009    Irrigation and debridement of facial soft tissue injuries with closure of complex lacerations and repair of maxillary dental alveolar fracture   • UMBILICAL HERNIA REPAIR N/A 2017    Procedure: OPEN UMBILICAL AND EPIGASTRIC HERNIA REPAIR WITH MESH;  Surgeon: Sal Herbert MD;  Location: [facility];  Service:        Visit Dx:     ICD-10-CM ICD-9-CM   1. Cervical radiculitis M54.12 723.4             PT Ortho     Row Name 19 1700       Subjective Comments    Subjective Comments  \"I'm not in a lot of pain. It's just annoying.\" Pain at base of skull and deep in L scapula. Can feel it more in the L scapular region when he extends his cervical spine. \"I'm a lot better. We're roughly 82\"% improved. No medication changes.   -SS       Precautions and Contraindications    Precautions/Limitations  no known precautions/limitations  -SS       Subjective Pain " "   Able to rate subjective pain?  yes  -    Pre-Treatment Pain Level  3  -SS    Post-Treatment Pain Level  -- 1.5-2/10  -SS       Cervical/Thoracic Special Tests    Cervical/Thoracic Special Tests Comments  TTP B suboccipital muscles, L infraspinatus and teres minor muscle bellies. Increased tension B suboccipital muscles and L thoracic paraspinals. Decreased thoracic joint mobility.  -SS       Head/Neck/Trunk    Head/Neck/Trunk Comments  CROM WNLs. Increased pain in L upper thoracic spine with extension, L rotation, and L lateral flexion.   -SS       Right Upper Ext    Rt Upper Extremity Comments   WNLs  -SS       Left Upper Ext    Lt Upper Extremity Comments   WNLs; \"I feel it but nothing like when I turn or lift my head.\"  -      User Key  (r) = Recorded By, (t) = Taken By, (c) = Cosigned By    Initials Name Provider Type    Lui Zaldivar, PT DPT Physical Therapist            PT OP Goals     Row Name 09/30/19 1700          PT Short Term Goals    STG Date to Achieve  09/30/19  -     STG 1  Note a >/= 25% subjective improvement.   -SS     STG 1 Progress  Met  -     STG 2  Pain at end of workday to be </= 5/10 consistently.  -SS     STG 2 Progress  Ongoing  -        Long Term Goals    LTG Date to Achieve  11/04/19  -     LTG 1  Cervical and shoulder AROM WNLs with minimal to no neck pain.  -SS     LTG 1 Progress  Ongoing  -     LTG 2  Minimal shoulder and neck pain and muscle tension at rest.  -     LTG 2 Progress  Ongoing  -        Time Calculation    PT Goal Re-Cert Due Date  10/21/19  -       User Key  (r) = Recorded By, (t) = Taken By, (c) = Cosigned By    Initials Name Provider Type    Lui Zaldivar, PT DPT Physical Therapist          PT Assessment/Plan     Row Name 09/30/19 1700          PT Assessment    Functional Limitations  Limitations in functional capacity and performance  -     Impairments  Pain;Impaired muscle power  -     Assessment Comments  Twitch " response and muscle relaxation noted in response to dry needling. Multiple cavitations in thoracic spine.   -SS     Rehab Potential  Good barrier: chronicity  -SS     Patient/caregiver participated in establishment of treatment plan and goals  Yes  -SS     Patient would benefit from skilled therapy intervention  Yes  -SS        PT Plan    PT Frequency  1x/week  -SS     Predicted Duration of Therapy Intervention (Therapy Eval)  4-6 weeks with further to be determined  -SS     PT Plan Comments  Manual therapy to cervical and thoracic region.   -       User Key  (r) = Recorded By, (t) = Taken By, (c) = Cosigned By    Initials Name Provider Type    Lui Zaldivar, PT DPT Physical Therapist                 Manual Rx (last 36 hours)      Manual Treatments     Row Name 09/30/19 1700             Manual Rx 1    Manual Rx 1 Location  Thoracic Spine  -SS      Manual Rx 1 Type  joint mobilization  -SS      Manual Rx 1 Grade  4  -SS         Manual Rx 2    Manual Rx 2 Location  L thoracic and parascapular muscles  -SS      Manual Rx 2 Type  dry needle  -SS         Manual Rx 3    Manual Rx 3 Location  R suboccipital muscles  -SS      Manual Rx 3 Type  dry needle  -SS        User Key  (r) = Recorded By, (t) = Taken By, (c) = Cosigned By    Initials Name Provider Type    Lui Zaldivar, PT DPT Physical Therapist             Outcome Measure Options: Neck Disability Index (NDI)  Neck Disability Index  Section 1 - Pain Intensity: The pain is very mild at the moment.  Section 2 - Personal Care: I can look after myself normally without causing extra pain.  Section 3 - Lifting: I can lift heavy weights without causing extra pain  Section 4 - Work: I can do as much work as I want.  Section 5 - Headaches: I have no headaches at all.  Section 6 - Concentration: I can concentrate fully with slight difficulty.  Section 7 - Sleeping: My sleep is slightly disturbed for less than 1 hour.  Section 8 - Driving: I can drive  as long as I want with slight neck pain.  Section 9 - Reading: I can read as much as I want with slight neck pain.  Section 10 - Recreation: I have some neck pain with all recreational activities.  Neck Disability Index Score: 6      Time Calculation:     Start Time: 1702  Stop Time: 1733  Time Calculation (min): 31 min     Therapy Charges for Today     Code Description Service Date Service Provider Modifiers Qty    93231372069 HC PT MANUAL THERAPY EA 15 MIN 9/30/2019 Lui Romero, PT DPT GP 2                   Lui Romero, PT, DPT, CHT  9/30/2019

## 2019-10-14 ENCOUNTER — HOSPITAL ENCOUNTER (OUTPATIENT)
Dept: PHYSICAL THERAPY | Facility: HOSPITAL | Age: 44
Setting detail: THERAPIES SERIES
Discharge: HOME | End: 2019-10-14

## 2019-10-14 DIAGNOSIS — M54.12 CERVICAL RADICULITIS: Primary | ICD-10-CM

## 2019-10-14 PROCEDURE — 97140 MANUAL THERAPY 1/> REGIONS: CPT | Performed by: PHYSICAL THERAPIST

## 2019-10-15 NOTE — THERAPY TREATMENT NOTE
Outpatient Physical Therapy Ortho Treatment Note  [facility]     Patient Name: Aquiles Najera  : 1975  MRN: [id number]  Today's Date: 10/14/2019      Visit Date: 10/14/2019  Attendance:  (25/yr)  Subjective Improvement: 82%  Next MD Appt: 19  Recert Date: 10/21/19     Therapy Diagnosis: L>R cervical and upper thoracic pain and dysfunction    Visit Dx:    ICD-10-CM ICD-9-CM   1. Cervical radiculitis M54.12 723.4            Past Medical History:   Diagnosis Date   • Abdominal pain    • Acute exacerbation of chronic obstructive airways disease (CMS/HCC)    • Allergic rhinitis    • Chest pain     unspecified   • Chronic cough    • Conjunctivitis    • Dyslipidemia    • Dyspnea    • Gastroesophageal reflux disease    • Pain     Pain in left ankle and joints of left foot      • Pain     pain in left leg   • Tobacco dependence syndrome    • Umbilical hernia without obstruction or gangrene    • Viral gastroenteritis         Past Surgical History:   Procedure Laterality Date   • INCISION AND DRAINAGE ABSCESS  2009    Irrigation and debridement of facial soft tissue injuries with closure of complex lacerations and repair of maxillary dental alveolar fracture   • UMBILICAL HERNIA REPAIR N/A 2017    Procedure: OPEN UMBILICAL AND EPIGASTRIC HERNIA REPAIR WITH MESH;  Surgeon: Sal Herbert MD;  Location: [facility];  Service:        PT Ortho     Row Name 10/14/19 1600       Subjective Comments    Subjective Comments  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 Neck was very tight and developed headaches. States that he had neck pain and trouble moving his neck around Wednesday, Thursday, and Friday. Pain and stiffness were better when he got up in the morning. 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 Pain starts in occiput and follows levator scapula.  -SS       Precautions and Contraindications    Precautions/Limitations  no known  precautions/limitations  -       Subjective Pain    Able to rate subjective pain?  yes  -SS    Pre-Treatment Pain Level  4  -SS    Post-Treatment Pain Level  1  -SS       Cervical/Thoracic Special Tests    Cervical/Thoracic Special Tests Comments  Upper cervical spine L rotated. Adverse tension suboccipital musculature. No adverse tension B UT, middle trap, levator scapulae.  -      User Key  (r) = Recorded By, (t) = Taken By, (c) = Cosigned By    Initials Name Provider Type     Lui Romero, PT DPT Physical Therapist          PT Assessment/Plan     Row Name 10/14/19 1600          PT Assessment    Functional Limitations  Limitations in functional capacity and performance  -     Impairments  Pain;Impaired muscle power  -     Assessment Comments  Cervical alignment corrected with correction of suboccipital muscle adverse tension. Patient reports that his neck feels looser after treatment. Dry needling not indicated this date.  -     Rehab Potential  Good barrier: chronicity  -     Patient/caregiver participated in establishment of treatment plan and goals  Yes  -SS     Patient would benefit from skilled therapy intervention  Yes  -SS        PT Plan    PT Frequency  1x/week  -     Predicted Duration of Therapy Intervention (Therapy Eval)  4-6 weeks with further to be determined  -     PT Plan Comments  Manual therapy to cervical and thoracic region.   -       User Key  (r) = Recorded By, (t) = Taken By, (c) = Cosigned By    Initials Name Provider Type    Lui Zaldivar, PT DPT Physical Therapist                Manual Rx (last 36 hours)      Manual Treatments     Row Name 10/14/19 1500             Manual Rx 1    Manual Rx 1 Location  Cervical spine  -SS      Manual Rx 1 Type  ME R rotation correction  -         Manual Rx 2    Manual Rx 2 Location  Cervical spine  -SS      Manual Rx 2 Type  MFR  -        User Key  (r) = Recorded By, (t) = Taken By, (c) = Cosigned By     Initials Name Provider Type     Lui Romero, PT DPT Physical Therapist          PT OP Goals     Row Name 10/14/19 1600          PT Short Term Goals    STG Date to Achieve  10/21/19  -     STG 1  Note a >/= 25% subjective improvement.   -     STG 1 Progress  Met  -     STG 2  Pain at end of workday to be </= 5/10 consistently.  -     STG 2 Progress  Ongoing  -        Long Term Goals    LTG Date to Achieve  11/04/19  -     LTG 1  Cervical and shoulder AROM WNLs with minimal to no neck pain.  -     LTG 1 Progress  Ongoing  -     LTG 2  Minimal shoulder and neck pain and muscle tension at rest.  -     LTG 2 Progress  Ongoing  -        Time Calculation    PT Goal Re-Cert Due Date  10/21/19  -       User Key  (r) = Recorded By, (t) = Taken By, (c) = Cosigned By    Initials Name Provider Type     Lui Romero, PT DPT Physical Therapist          Time Calculation:   Start Time: 1615  Stop Time: 1641  Time Calculation (min): 26 min  Therapy Charges for Today     Code Description Service Date Service Provider Modifiers Qty    60939250312 HC PT MANUAL THERAPY EA 15 MIN 10/14/2019 Lui Romero, PT DPT GP 2                    Lui Romero, PT, DPT, CHT  10/14/2019

## 2019-10-24 ENCOUNTER — APPOINTMENT (OUTPATIENT)
Dept: PHYSICAL THERAPY | Facility: HOSPITAL | Age: 44
End: 2019-10-24

## 2019-10-28 ENCOUNTER — HOSPITAL ENCOUNTER (OUTPATIENT)
Dept: PHYSICAL THERAPY | Facility: HOSPITAL | Age: 44
Setting detail: THERAPIES SERIES
Discharge: HOME | End: 2019-10-28

## 2019-10-28 DIAGNOSIS — M54.12 CERVICAL RADICULITIS: Primary | ICD-10-CM

## 2019-10-28 PROCEDURE — 97140 MANUAL THERAPY 1/> REGIONS: CPT | Performed by: PHYSICAL THERAPIST

## 2019-10-29 NOTE — THERAPY PROGRESS REPORT/RE-CERT
Outpatient Physical Therapy Ortho Progress Note  [facility]     Patient Name: Aquiles Najera  : 1975  MRN: [id number]  Today's Date: 10/28/2019      Visit Date: 10/28/2019  Attendance:  (/yr)  Subjective Improvement: 80-85%  Next MD Appt: 19  Recert Date: 19     Therapy Diagnosis: L>R cervical and upper thoracic pain and dysfunction    Changes in Medications: none  Changes in MD Orders: none  Number of Work Days Lost: none     Past Medical History:   Diagnosis Date   • Abdominal pain    • Acute exacerbation of chronic obstructive airways disease (CMS/HCC)    • Allergic rhinitis    • Chest pain     unspecified   • Chronic cough    • Conjunctivitis    • Dyslipidemia    • Dyspnea    • Gastroesophageal reflux disease    • Pain     Pain in left ankle and joints of left foot      • Pain     pain in left leg   • Tobacco dependence syndrome    • Umbilical hernia without obstruction or gangrene    • Viral gastroenteritis         Past Surgical History:   Procedure Laterality Date   • INCISION AND DRAINAGE ABSCESS  2009    Irrigation and debridement of facial soft tissue injuries with closure of complex lacerations and repair of maxillary dental alveolar fracture   • UMBILICAL HERNIA REPAIR N/A 2017    Procedure: OPEN UMBILICAL AND EPIGASTRIC HERNIA REPAIR WITH MESH;  Surgeon: Sal Herbert MD;  Location: [facility];  Service:        Visit Dx:     ICD-10-CM ICD-9-CM   1. Cervical radiculitis M54.12 723.4             PT Ortho     Row Name 10/28/19 1600       Subjective Comments    Subjective Comments  Pain L upper thoracic/CT junction, R UT, and R>L levator scapula distal insertion. 80-85% subjective improvement.  -SS       Precautions and Contraindications    Precautions/Limitations  no known precautions/limitations  -SS       Subjective Pain    Able to rate subjective pain?  yes  -SS    Pre-Treatment Pain Level  4  -SS    Post-Treatment Pain Level  2  -SS        Cervical/Thoracic Special Tests    Cervical/Thoracic Special Tests Comments  TTP R UT and B levator scapula distal insertion. TrP R UT and B levator scapula. L shoulder protracted.   -SS       Head/Neck/Trunk    Head/Neck/Trunk Comments  CROM WNLs but rotation slightly reduced with pain CT junction.   -      User Key  (r) = Recorded By, (t) = Taken By, (c) = Cosigned By    Initials Name Provider Type     Lui Romero, PT DPT Physical Therapist              PT OP Goals     Row Name 10/28/19 1600          PT Short Term Goals    STG Date to Achieve  -- further STGs deferred  -SS     STG 1  Note a >/= 25% subjective improvement.   -SS     STG 1 Progress  Met  -     STG 2  Pain at end of workday to be </= 5/10 consistently.  -SS     STG 2 Progress  Met  -        Long Term Goals    LTG Date to Achieve  11/25/19  -     LTG 1  Cervical and shoulder AROM WNLs with minimal to no neck pain.  -     LTG 1 Progress  Ongoing  -     LTG 2  Minimal shoulder and neck pain and muscle tension at rest.  -     LTG 2 Progress  Ongoing  -        Time Calculation    PT Goal Re-Cert Due Date  11/18/19  -       User Key  (r) = Recorded By, (t) = Taken By, (c) = Cosigned By    Initials Name Provider Type     Lui Romero, PT DPT Physical Therapist          PT Assessment/Plan     Row Name 10/28/19 1600          PT Assessment    Functional Limitations  Limitations in functional capacity and performance  -     Impairments  Pain;Impaired muscle power  -     Assessment Comments  Cavitation noted with joint mobilization. Twitch response and muscle relaxation in response to treatment.  -     Rehab Potential  Good barrier: chronicity  -     Patient/caregiver participated in establishment of treatment plan and goals  Yes  -SS     Patient would benefit from skilled therapy intervention  Yes  -SS        PT Plan    PT Frequency  1x/week  -SS     Predicted Duration of Therapy Intervention (Therapy Eval)   3-4 weeks  -     PT Plan Comments  Manual therapy  -SS       User Key  (r) = Recorded By, (t) = Taken By, (c) = Cosigned By    Initials Name Provider Type    Lui Zaldivar, MALORIE DPT Physical Therapist            OP Exercises     Row Name 10/28/19 1600             Subjective Comments    Subjective Comments  Pain L upper thoracic/CT junction, R UT, and R>L levator scapula distal insertion. 80-85% subjective improvement.  -SS         Subjective Pain    Able to rate subjective pain?  yes  -SS      Pre-Treatment Pain Level  4  -SS      Post-Treatment Pain Level  2  -SS         Exercise 1    Exercise Name 1  Joint mobilization -- see Manual  -SS         Exercise 2    Exercise Name 2  cervical distraction with rotation - B  -SS      Cueing 2  Verbal;Tactile  -SS      Sets 2  2  -SS      Reps 2  10  -SS      Additional Comments  first set to R performed by LAWSON Jackson PT, DPT  -         Exercise 3    Exercise Name 3  dry needle -- see Manual  -SS        User Key  (r) = Recorded By, (t) = Taken By, (c) = Cosigned By    Initials Name Provider Type     Lui Romero, PT DPT Physical Therapist           Manual Rx (last 36 hours)      Manual Treatments     Row Name 10/28/19 1500             Manual Rx 1    Manual Rx 1 Location  Cervical spine/CT junction  -      Manual Rx 1 Type  Joint mobilization by LAWSON Jackson PT, DPT  -      Manual Rx 1 Grade  4  -SS         Manual Rx 2    Manual Rx 2 Location  B UT, lev scapula  -SS      Manual Rx 2 Type  dry needling  -SS        User Key  (r) = Recorded By, (t) = Taken By, (c) = Cosigned By    Initials Name Provider Type     Lui Romero, PT DPT Physical Therapist             Time Calculation:     Start Time: 1620  Stop Time: 1647  Time Calculation (min): 27 min     Therapy Charges for Today     Code Description Service Date Service Provider Modifiers Qty    [id number] HC PT MANUAL THERAPY EA 15 MIN 10/28/2019 Lui Romero, PT DPT GP 2                     Lui Romero, PT, DPT, CHT  10/28/2019

## 2019-11-04 ENCOUNTER — APPOINTMENT (OUTPATIENT)
Dept: PHYSICAL THERAPY | Facility: HOSPITAL | Age: 44
End: 2019-11-04

## 2019-11-07 ENCOUNTER — HOSPITAL ENCOUNTER (OUTPATIENT)
Dept: PHYSICAL THERAPY | Facility: HOSPITAL | Age: 44
Setting detail: THERAPIES SERIES
Discharge: HOME | End: 2019-11-07

## 2019-11-07 DIAGNOSIS — M54.12 CERVICAL RADICULITIS: Primary | ICD-10-CM

## 2019-11-07 PROCEDURE — 97140 MANUAL THERAPY 1/> REGIONS: CPT | Performed by: PHYSICAL THERAPIST

## 2019-11-07 NOTE — THERAPY TREATMENT NOTE
"    Outpatient Physical Therapy Ortho Treatment Note  [facility]     Patient Name: Aquiles Najera  : 1975  MRN: [id number]  Today's Date: 2019      Visit Date: 2019  Attendance:  (25/yr)  Subjective Improvement: 80-85%  Next MD Appt: 19  Recert Date: 19     Therapy Diagnosis: L>R cervical and upper thoracic pain and dysfunction     Visit Dx:    ICD-10-CM ICD-9-CM   1. Cervical radiculitis M54.12 723.4            Past Medical History:   Diagnosis Date   • Abdominal pain    • Acute exacerbation of chronic obstructive airways disease (CMS/HCC)    • Allergic rhinitis    • Chest pain     unspecified   • Chronic cough    • Conjunctivitis    • Dyslipidemia    • Dyspnea    • Gastroesophageal reflux disease    • Pain     Pain in left ankle and joints of left foot      • Pain     pain in left leg   • Tobacco dependence syndrome    • Umbilical hernia without obstruction or gangrene    • Viral gastroenteritis         Past Surgical History:   Procedure Laterality Date   • INCISION AND DRAINAGE ABSCESS  2009    Irrigation and debridement of facial soft tissue injuries with closure of complex lacerations and repair of maxillary dental alveolar fracture   • UMBILICAL HERNIA REPAIR N/A 2017    Procedure: OPEN UMBILICAL AND EPIGASTRIC HERNIA REPAIR WITH MESH;  Surgeon: Sal Herbert MD;  Location: [facility];  Service:        PT Ortho     Row Name 19 1600       Subjective Comments    Subjective Comments  Patient felt some better after last therapy session. Continued soreness in cervical paraspinals and L CT junction region.  -SS       Precautions and Contraindications    Precautions/Limitations  no known precautions/limitations  -SS       Subjective Pain    Able to rate subjective pain?  yes  -SS    Pre-Treatment Pain Level  4  -SS    Post-Treatment Pain Level  -- \"I feel better.\"  -SS       Posture/Observations    Posture/Observations Comments  Slight left " "hypomobile first rib, tone of right LS and scalenes with tenderness. Pain with left rotation AROM.   -       Cervical/Thoracic Special Tests    Cervical/Thoracic Special Tests Comments  Trigger point B UT.  -      User Key  (r) = Recorded By, (t) = Taken By, (c) = Cosigned By    Initials Name Provider Type    Lui Zaldivar, PT DPT Physical Therapist                      PT Assessment/Plan     Row Name 11/07/19 1600          PT Assessment    Functional Limitations  Limitations in functional capacity and performance  -     Impairments  Pain;Impaired muscle power  -     Assessment Comments  Cavitation noted with joint mobilizations. Muscle twitch responses noted with dry needling.   -     Rehab Potential  Good barrier: chronicity  -     Patient/caregiver participated in establishment of treatment plan and goals  Yes  -     Patient would benefit from skilled therapy intervention  Yes  -SS        PT Plan    Predicted Duration of Therapy Intervention (Therapy Eval)  1x every 2 weeks x 3-4 visits  -     PT Plan Comments  Manual therapy, MFR. Dry needle thoracic paraspinals and periscapular muscles next as well.  -       User Key  (r) = Recorded By, (t) = Taken By, (c) = Cosigned By    Initials Name Provider Type     Lui Romero, PT DPT Physical Therapist            OP Exercises     Row Name 11/07/19 1600             Subjective Comments    Subjective Comments  Patient felt some better after last therapy session. Continued soreness in cervical paraspinals and L CT junction region.  -         Subjective Pain    Able to rate subjective pain?  yes  -      Pre-Treatment Pain Level  4  -SS      Post-Treatment Pain Level  -- \"I feel better.\"  -         Exercise 1    Exercise Name 1  MWM for left rotation   -      Sets 1  1  -SS      Reps 1  5  -SS         Exercise 2    Exercise Name 2  cervical distraction with rotation   -      Sets 2  1  -SS      Reps 2  8  -SS      Additional " "Comments  to left   -SS         Exercise 3    Exercise Name 3  CT joint mobe 5 in sitting   -      Time 3  30\"  -         Exercise 4    Exercise Name 4  grade 2 mobe on left  -SS      Sets 4  1  -SS      Reps 4  5  -SS        User Key  (r) = Recorded By, (t) = Taken By, (c) = Cosigned By    Initials Name Provider Type    Lui Zaldivar, PT DPT Physical Therapist             Manual Rx (last 36 hours)      Manual Treatments     Row Name 11/07/19 1600             Manual Rx 1    Manual Rx 1 Location  Cervical spine/CT junction  -SS      Manual Rx 1 Type  Joint mobilization by LAWSON Jackson, PT, DPT  -SS      Manual Rx 1 Grade  4  -SS         Manual Rx 2    Manual Rx 2 Type  MWM cervical spine by LAWSON Jackson, PT, DPT  -SS         Manual Rx 3    Manual Rx 3 Location  B UT, lev scapula  -SS      Manual Rx 3 Type  dry needling  -SS         Manual Rx 4    Manual Rx 4 Location  R scalene  -SS      Manual Rx 4 Type  dry needling  -SS        User Key  (r) = Recorded By, (t) = Taken By, (c) = Cosigned By    Initials Name Provider Type    Lui Zaldivar, PT DPT Physical Therapist          PT OP Goals     Row Name 11/07/19 1600          PT Short Term Goals    STG Date to Achieve  -- further STGs deferred  -        Long Term Goals    LTG Date to Achieve  11/25/19 further to be determined  -     LTG 1  Cervical and shoulder AROM WNLs with minimal to no neck pain.  -     LTG 1 Progress  Ongoing  -     LTG 2  Minimal shoulder and neck pain and muscle tension at rest.  -     LTG 2 Progress  Ongoing  -        Time Calculation    PT Goal Re-Cert Due Date  11/18/19  -       User Key  (r) = Recorded By, (t) = Taken By, (c) = Cosigned By    Initials Name Provider Type    Lui Zaldivar, PT DPT Physical Therapist                         Time Calculation:   Start Time: 1600  Stop Time: 1633  Time Calculation (min): 33 min  Therapy Charges for Today     Code Description Service Date Service Provider " Modifiers Qty    [id number] HC PT MANUAL THERAPY EA 15 MIN 11/7/2019 Lui Romero, PT DPT GP 2                    Lui Romero, PT, DPT, CHT  11/7/2019

## 2019-11-11 ENCOUNTER — OFFICE VISIT (OUTPATIENT)
Dept: FAMILY MEDICINE CLINIC | Facility: CLINIC | Age: 44
End: 2019-11-11

## 2019-11-11 VITALS
OXYGEN SATURATION: 98 % | WEIGHT: 218.2 LBS | DIASTOLIC BLOOD PRESSURE: 80 MMHG | BODY MASS INDEX: 31.24 KG/M2 | RESPIRATION RATE: 20 BRPM | SYSTOLIC BLOOD PRESSURE: 118 MMHG | HEIGHT: 70 IN | HEART RATE: 84 BPM

## 2019-11-11 DIAGNOSIS — E78.2 MIXED HYPERLIPIDEMIA: ICD-10-CM

## 2019-11-11 DIAGNOSIS — E78.5 DYSLIPIDEMIA: ICD-10-CM

## 2019-11-11 DIAGNOSIS — M54.50 ACUTE BILATERAL LOW BACK PAIN WITHOUT SCIATICA: ICD-10-CM

## 2019-11-11 DIAGNOSIS — I10 ESSENTIAL HYPERTENSION: Primary | ICD-10-CM

## 2019-11-11 DIAGNOSIS — R10.33 PERIUMBILICAL ABDOMINAL PAIN: ICD-10-CM

## 2019-11-11 DIAGNOSIS — E05.90 HYPERTHYROIDISM: ICD-10-CM

## 2019-11-11 DIAGNOSIS — R63.5 UNEXPLAINED WEIGHT GAIN: ICD-10-CM

## 2019-11-11 DIAGNOSIS — R73.03 PREDIABETES: ICD-10-CM

## 2019-11-11 PROCEDURE — 99214 OFFICE O/P EST MOD 30 MIN: CPT | Performed by: NURSE PRACTITIONER

## 2019-11-11 RX ORDER — PRAVASTATIN SODIUM 40 MG
40 TABLET ORAL DAILY
Qty: 90 TABLET | Refills: 3 | Status: SHIPPED | OUTPATIENT
Start: 2019-11-11 | End: 2020-02-09

## 2019-11-11 RX ORDER — PRAVASTATIN SODIUM 40 MG
40 TABLET ORAL NIGHTLY
Qty: 30 TABLET | Refills: 0 | Status: SHIPPED | OUTPATIENT
Start: 2019-11-11 | End: 2019-12-11

## 2019-11-11 NOTE — PROGRESS NOTES
Subjective   Aquiles Najera is a 44 y.o. male.     Mr. Montoya is a 44-year-old male who presents today for follow-up related to hypertension, hyperthyroidism, hyperlipidemia, prediabetes, periumbilical pain, unexplained weight gain and low back pain.  Blood pressure today is 118/80.  He denies chest pain, shortness of breath or palpitations.  He follows a low-fat diet for the management of hyperlipidemia.  He denies any symptoms characteristic of hyper or hypoglycemia.  Patient reports continued periumbilical pain 3 years after hernia repair.  CT of the abdomen and pelvis from earlier this year was unremarkable for any acute process.  Patient reports increase in low back pain over the last 2 to 3 months.  The pain typically occurs while lying in bed at night.  He has tried multiple different mattresses with no improvement in his symptoms.  He denies any pain shooting down his legs.  Patient also reports unexplained weight gain.  He reports gaining at least 10 pounds with no change in his activity or diet.         The following portions of the patient's history were reviewed and updated as appropriate: allergies, current medications, past family history, past medical history, past social history, past surgical history and problem list.    Review of Systems   Constitutional: Positive for unexpected weight gain. Negative for activity change, appetite change, fatigue and unexpected weight loss.   HENT: Negative for congestion, sore throat, trouble swallowing and voice change.    Eyes: Negative.    Respiratory: Negative for cough, chest tightness, shortness of breath and wheezing.    Cardiovascular: Negative for chest pain, palpitations and leg swelling.   Gastrointestinal: Positive for abdominal pain (periumbilical). Negative for diarrhea, nausea and vomiting.   Endocrine: Negative.    Genitourinary: Negative for dysuria, hematuria and urgency.   Musculoskeletal: Positive for back pain. Negative for arthralgias  and myalgias.   Skin: Negative for rash.   Neurological: Negative for dizziness, weakness, light-headedness and headache.   Hematological: Negative.    Psychiatric/Behavioral: Negative.        Objective   Physical Exam   Constitutional: He is oriented to person, place, and time. He appears well-developed and well-nourished. No distress.   HENT:   Head: Normocephalic and atraumatic.   Eyes: Conjunctivae are normal.   Neck: Normal range of motion. Neck supple. No thyromegaly present.   Cardiovascular: Normal rate, regular rhythm, normal heart sounds and intact distal pulses. Exam reveals no gallop and no friction rub.   No murmur heard.  Pulmonary/Chest: Effort normal and breath sounds normal. No stridor. No respiratory distress. He has no wheezes. He has no rales.   Abdominal: Soft. Normal appearance and bowel sounds are normal. He exhibits no distension and no mass. There is tenderness in the periumbilical area. There is no rebound and no guarding. No hernia.   Musculoskeletal: Normal range of motion. He exhibits no edema or tenderness.        Lumbar back: He exhibits pain. He exhibits normal range of motion, no tenderness, no bony tenderness, no swelling, no edema, no deformity and no spasm.   Neurological: He is alert and oriented to person, place, and time.   Skin: Skin is warm and dry. No rash noted. He is not diaphoretic. No erythema. No pallor.   Psychiatric: He has a normal mood and affect. His behavior is normal. Judgment and thought content normal.   Nursing note and vitals reviewed.        Assessment/Plan   Aquiles was seen today for follow-up.    Diagnoses and all orders for this visit:    Essential hypertension    Hyperthyroidism  -     TSH; Future  -     T4, Free; Future    Mixed hyperlipidemia  -     Lipid Panel; Future    Prediabetes  -     Hemoglobin A1c; Future    Unexplained weight gain  -     Cortisol, Free; Future    Acute bilateral low back pain without sciatica  -     XR Spine Lumbar Complete  4+VW; Future    Dyslipidemia  -     pravastatin (PRAVACHOL) 40 MG tablet; Take 1 tablet by mouth Daily for 90 days.    Periumbilical abdominal pain  -     Ambulatory Referral to General Surgery    Other orders  -     pravastatin (PRAVACHOL) 40 MG tablet; Take 1 tablet by mouth Every Night for 30 days.  -     butenafine (LOTRIMIN ULTRA) 1 % cream; Apply  topically to the appropriate area as directed Daily.    1.  Essential hypertension-controlled.  No change in therapy at this time.    2.  Hypothyroidism-TSH and T4.  Will call with results.    3.  Mixed hyperlipidemia-lipid panel.  Will call with results.  90-day refill of pravastatin sent to mail order pharmacy.  30-day refill sent to local pharmacy.  Reinforced low-fat diet and routine exercise.    4.  Prediabetes-hemoglobin A 1C.  Will call with results.  Reinforced low-carb diet.    5.  Unexplained weight gain-cortisol free.  Will call the results.  Suggested calorie counting with a 1700-calorie diet daily.  We will continue to monitor.    6.  Acute bilateral low back pain without sciatica-x-ray lumbar spine.  Will call the results.    7.  Periumbilical abdominal pain-referral to general surgery for further evaluation.    8.  Follow-up in 6 months or sooner if needed.            This document has been electronically signed by ERIC Oleary on November 11, 2019 5:40 PM

## 2019-11-21 ENCOUNTER — HOSPITAL ENCOUNTER (OUTPATIENT)
Dept: PHYSICAL THERAPY | Facility: HOSPITAL | Age: 44
Setting detail: THERAPIES SERIES
Discharge: HOME | End: 2019-11-21

## 2019-11-21 DIAGNOSIS — M54.12 CERVICAL RADICULITIS: Primary | ICD-10-CM

## 2019-11-21 DIAGNOSIS — M54.6 CHRONIC THORACIC BACK PAIN, UNSPECIFIED BACK PAIN LATERALITY: ICD-10-CM

## 2019-11-21 DIAGNOSIS — G89.29 CHRONIC THORACIC BACK PAIN, UNSPECIFIED BACK PAIN LATERALITY: ICD-10-CM

## 2019-11-21 PROCEDURE — 97140 MANUAL THERAPY 1/> REGIONS: CPT | Performed by: PHYSICAL THERAPIST

## 2019-11-22 NOTE — THERAPY PROGRESS REPORT/RE-CERT
"    Outpatient Physical Therapy Ortho Progress Note  [facility]     Patient Name: Aquiles Najera  : 1975  MRN: [id number]  Today's Date: 2019      Visit Date: 2019  Attendance:  (25/yr)  Subjective Improvement: \"I really don't know how to answer that.\"  Next MD Appt: 20  Recert Date: 19     Therapy Diagnosis: L>R cervical and upper thoracic pain and dysfunction    Changes in Medications: none noted  Changes in MD Orders: none noted  Number of Work Days Lost: none     Past Medical History:   Diagnosis Date   • Abdominal pain    • Acute exacerbation of chronic obstructive airways disease (CMS/HCC)    • Allergic rhinitis    • Chest pain     unspecified   • Chronic cough    • Conjunctivitis    • Dyslipidemia    • Dyspnea    • Gastroesophageal reflux disease    • Pain     Pain in left ankle and joints of left foot      • Pain     pain in left leg   • Tobacco dependence syndrome    • Umbilical hernia without obstruction or gangrene    • Viral gastroenteritis         Past Surgical History:   Procedure Laterality Date   • INCISION AND DRAINAGE ABSCESS  2009    Irrigation and debridement of facial soft tissue injuries with closure of complex lacerations and repair of maxillary dental alveolar fracture   • UMBILICAL HERNIA REPAIR N/A 2017    Procedure: OPEN UMBILICAL AND EPIGASTRIC HERNIA REPAIR WITH MESH;  Surgeon: Sal Herbert MD;  Location: [facility];  Service:        Visit Dx:     ICD-10-CM ICD-9-CM   1. Cervical radiculitis M54.12 723.4             PT Ortho     Row Name 19 1600       Subjective Comments    Subjective Comments  Patient reports that he felt great after his last therapy session. He did dumbbell shoulder shrugs, which irritated his neck > upper traps. States did lower weight for him but higher repetitions with the shrugs. His neck did not bother him while working on the shrugs. No medication changes noted by patient. When asked about his " "subjective improvement, he states \"I really don't know how to answer that.\"  -SS       Precautions and Contraindications    Precautions/Limitations  no known precautions/limitations  -SS       Subjective Pain    Able to rate subjective pain?  yes  -SS    Pre-Treatment Pain Level  5  -SS    Post-Treatment Pain Level  2  -SS       Cervical/Thoracic Special Tests    Cervical/Thoracic Special Tests Comments  Increased muscle tension with TTP L>R UT and cervical paraspinals. TTP B upper thoracic spine.   -SS       Head/Neck/Trunk    Head/Neck/Trunk Comments  Decreased L cervical rotation. Pain upper L thoracic facets with flexion, extension, and L lateral flexion of cervical spine. Pain \"in a line\" in upper thoracic spine with L cervical rotation.  -      User Key  (r) = Recorded By, (t) = Taken By, (c) = Cosigned By    Initials Name Provider Type    Lui Zaldivar, PT DPT Physical Therapist            PT OP Goals     Row Name 11/21/19 1600          PT Short Term Goals    STG Date to Achieve  -- further STGs deferred  -SS        Long Term Goals    LTG Date to Achieve  12/31/19 further to be determined  -     LTG 1  Cervical and shoulder AROM WNLs with minimal to no neck pain.  -SS     LTG 1 Progress  Ongoing  -SS     LTG 2  Minimal shoulder and neck pain and muscle tension at rest.  -     LTG 2 Progress  Ongoing  -SS        Time Calculation    PT Goal Re-Cert Due Date  12/12/19  -       User Key  (r) = Recorded By, (t) = Taken By, (c) = Cosigned By    Initials Name Provider Type    Lui Zaldivar, PT DPT Physical Therapist          PT Assessment/Plan     Row Name 11/21/19 1600          PT Assessment    Functional Limitations  Limitations in functional capacity and performance  -     Impairments  Pain;Impaired muscle power  -     Assessment Comments  Cavitation noted with joint mobilization. Multiple twitch responses with dry needling B. Patient feels \"much better\" at conclusion of " treatment this date. We discussed decreasing the weight he uses in shrugs to 20# B and keeping reps between 20 and 30.  -     Rehab Potential  Good barrier: chronicity  -SS     Patient/caregiver participated in establishment of treatment plan and goals  Yes  -SS     Patient would benefit from skilled therapy intervention  Yes  -SS        PT Plan    Predicted Duration of Therapy Intervention (Therapy Eval)  1x every 2 weeks for 3 additional visits.  -SS     PT Plan Comments  Manual therapy  -SS       User Key  (r) = Recorded By, (t) = Taken By, (c) = Cosigned By    Initials Name Provider Type    Lui Zaldivar, PT DPT Physical Therapist                 Manual Rx (last 36 hours)      Manual Treatments     Row Name 11/21/19 1500             Manual Rx 1    Manual Rx 1 Location  Upper thoracic spine  -SS      Manual Rx 1 Type  joint mobilization  -SS      Manual Rx 1 Grade  4  -SS         Manual Rx 2    Manual Rx 2 Location  B UT/MT  -SS      Manual Rx 2 Type  dry needle  -SS         Manual Rx 3    Manual Rx 3 Location  L mid-thoracic paraspinals  -SS      Manual Rx 3 Type  dry needle  -SS         Manual Rx 4    Manual Rx 4 Location  R scalene  -SS      Manual Rx 4 Type  dry needle  -SS        User Key  (r) = Recorded By, (t) = Taken By, (c) = Cosigned By    Initials Name Provider Type     Lui Romero, PT DPT Physical Therapist               Time Calculation:     Start Time: 1603  Stop Time: 1644  Time Calculation (min): 41 min     Therapy Charges for Today     Code Description Service Date Service Provider Modifiers Qty    88228598547 HC PT MANUAL THERAPY EA 15 MIN 11/21/2019 Lui Romero, PT DPT GP 3                    Lui Romero PT, DPT, CHT  11/21/2019

## 2019-11-25 ENCOUNTER — OFFICE VISIT (OUTPATIENT)
Dept: SURGERY | Facility: CLINIC | Age: 44
End: 2019-11-25

## 2019-11-25 VITALS
DIASTOLIC BLOOD PRESSURE: 82 MMHG | HEART RATE: 77 BPM | WEIGHT: 215 LBS | HEIGHT: 70 IN | TEMPERATURE: 97.8 F | BODY MASS INDEX: 30.78 KG/M2 | SYSTOLIC BLOOD PRESSURE: 114 MMHG

## 2019-11-25 DIAGNOSIS — R10.33 UMBILICAL PAIN: Primary | ICD-10-CM

## 2019-11-25 PROCEDURE — 99213 OFFICE O/P EST LOW 20 MIN: CPT | Performed by: SURGERY

## 2019-11-25 NOTE — PATIENT INSTRUCTIONS

## 2019-12-05 ENCOUNTER — HOSPITAL ENCOUNTER (OUTPATIENT)
Dept: GENERAL RADIOLOGY | Facility: HOSPITAL | Age: 44
Discharge: HOME | End: 2019-12-05

## 2019-12-05 ENCOUNTER — HOSPITAL ENCOUNTER (OUTPATIENT)
Dept: GENERAL RADIOLOGY | Facility: HOSPITAL | Age: 44
Discharge: HOME | End: 2019-12-05
Admitting: NURSE PRACTITIONER

## 2019-12-05 ENCOUNTER — HOSPITAL ENCOUNTER (OUTPATIENT)
Dept: PHYSICAL THERAPY | Facility: HOSPITAL | Age: 44
Setting detail: THERAPIES SERIES
Discharge: HOME | End: 2019-12-05

## 2019-12-05 DIAGNOSIS — M54.12 CERVICAL RADICULITIS: ICD-10-CM

## 2019-12-05 DIAGNOSIS — M54.50 ACUTE BILATERAL LOW BACK PAIN WITHOUT SCIATICA: ICD-10-CM

## 2019-12-05 DIAGNOSIS — M54.12 CERVICAL RADICULITIS: Primary | ICD-10-CM

## 2019-12-05 DIAGNOSIS — G89.29 CHRONIC THORACIC BACK PAIN, UNSPECIFIED BACK PAIN LATERALITY: ICD-10-CM

## 2019-12-05 DIAGNOSIS — M54.6 CHRONIC THORACIC BACK PAIN, UNSPECIFIED BACK PAIN LATERALITY: ICD-10-CM

## 2019-12-05 PROCEDURE — 72072 X-RAY EXAM THORAC SPINE 3VWS: CPT

## 2019-12-05 PROCEDURE — 72050 X-RAY EXAM NECK SPINE 4/5VWS: CPT

## 2019-12-05 PROCEDURE — 72110 X-RAY EXAM L-2 SPINE 4/>VWS: CPT

## 2019-12-05 PROCEDURE — 97140 MANUAL THERAPY 1/> REGIONS: CPT | Performed by: PHYSICAL THERAPIST

## 2019-12-05 NOTE — THERAPY TREATMENT NOTE
"    Outpatient Physical Therapy Ortho Treatment Note  [facility]     Patient Name: Aquiles Najera  : 1975  MRN: [id number]  Today's Date: 2019      Visit Date: 2019  Attendance:  (25/yr)  Subjective Improvement: \"I really don't know how to answer that.\"  Next MD Appt: 20  Recert Date: 19     Therapy Diagnosis: L>R cervical and upper thoracic pain and dysfunction     Visit Dx:    ICD-10-CM ICD-9-CM   1. Cervical radiculitis M54.12 723.4            Past Medical History:   Diagnosis Date   • Abdominal pain    • Acute exacerbation of chronic obstructive airways disease (CMS/HCC)    • Allergic rhinitis    • Chest pain     unspecified   • Chronic cough    • Conjunctivitis    • Dyslipidemia    • Dyspnea    • Gastroesophageal reflux disease    • Pain     Pain in left ankle and joints of left foot      • Pain     pain in left leg   • Tobacco dependence syndrome    • Umbilical hernia without obstruction or gangrene    • Viral gastroenteritis         Past Surgical History:   Procedure Laterality Date   • INCISION AND DRAINAGE ABSCESS  2009    Irrigation and debridement of facial soft tissue injuries with closure of complex lacerations and repair of maxillary dental alveolar fracture   • UMBILICAL HERNIA REPAIR N/A 2017    Procedure: OPEN UMBILICAL AND EPIGASTRIC HERNIA REPAIR WITH MESH;  Surgeon: Sal Herbert MD;  Location: [facility];  Service:        PT Ortho     Row Name 19 1500       Subjective Comments    Subjective Comments  Patient reports pain is worse on the right side of his cervical spine and levator scapula than the left this date. Has yet to get his x-rays due to a miscommunication. He has not been doing shrugs. Has only done 2 workouts since his last therapy session 2 weeks ago. Getting tired of hurting. Unable to rate improvement. Thinks that he may be getting worse. Pain worsened Tuesday; was better before then. When comes back, pain comes " "back \"harder and harder.\" No change in activity.  -       Precautions and Contraindications    Precautions/Limitations  no known precautions/limitations  -       Subjective Pain    Able to rate subjective pain?  yes  -SS    Pre-Treatment Pain Level  5  -SS    Post-Treatment Pain Level  -- 2.5-3/10  -       Cervical/Thoracic Special Tests    Cervical/Thoracic Special Tests Comments  Increased muscle tension and TTP B UT, R>L. TTP R>L cervical spine and R levator scapula.   -      User Key  (r) = Recorded By, (t) = Taken By, (c) = Cosigned By    Initials Name Provider Type     Lui Romero, PT DPT Physical Therapist              PT Assessment/Plan     Row Name 12/05/19 1600          PT Assessment    Functional Limitations  Limitations in functional capacity and performance  -     Impairments  Pain;Impaired muscle power  -     Assessment Comments  Multiple twitches with dry needling. Muscle relaxation noted. Patient reports decreased pain. He notes increased stress at present due to trying to find a house closer to work.  -     Rehab Potential  Good barrier: chronicity  -     Patient/caregiver participated in establishment of treatment plan and goals  Yes  -     Patient would benefit from skilled therapy intervention  Yes  -SS        PT Plan    Predicted Duration of Therapy Intervention (Therapy Eval)  1x every 2 weeks for 3 additional visits.  -     PT Plan Comments  Recheck next. Continue manual therapy  -       User Key  (r) = Recorded By, (t) = Taken By, (c) = Cosigned By    Initials Name Provider Type    Lui Zaldivar, PT DPT Physical Therapist              Manual Rx (last 36 hours)      Manual Treatments     Row Name 12/05/19 1700 12/05/19 1500          Manual Rx 1    Manual Rx 1 Location  B UT, MT, Scalene  -  Cervical spine   -     Manual Rx 1 Type  dry needle  -  joint mobilization  -     Manual Rx 1 Grade  --  3  -        Manual Rx 2    Manual Rx 2 " Location  B cervical paraspinals  -SS  --     Manual Rx 2 Type  dry needle  -SS  --        Manual Rx 3    Manual Rx 3 Location  R lower trap  -SS  --     Manual Rx 3 Type  dry needle  -SS  --       User Key  (r) = Recorded By, (t) = Taken By, (c) = Cosigned By    Initials Name Provider Type    Lui Zaldivar, PT DPT Physical Therapist          PT OP Goals     Row Name 12/05/19 1500       PT Short Term Goals    STG Date to Achieve  -- further STGs deferred  -SS       Long Term Goals    LTG Date to Achieve  12/31/19 further to be determined  -SS    LTG 1  Cervical and shoulder AROM WNLs with minimal to no neck pain.  -SS    LTG 1 Progress  Ongoing  -SS    LTG 2  Minimal shoulder and neck pain and muscle tension at rest.  -SS    LTG 2 Progress  Ongoing  -SS       Time Calculation    PT Goal Re-Cert Due Date  12/12/19  -SS      User Key  (r) = Recorded By, (t) = Taken By, (c) = Cosigned By    Initials Name Provider Type    Lui Zaldivar, PT DPT Physical Therapist                         Time Calculation:   Start Time: 1554  Stop Time: 1630  Time Calculation (min): 36 min  Therapy Charges for Today     Code Description Service Date Service Provider Modifiers Qty    67499391219 HC PT MANUAL THERAPY EA 15 MIN 12/5/2019 Lui Romero, PT DPT GP 2                    Lui Romero, PT, DPT, CHT  12/5/2019

## 2019-12-19 ENCOUNTER — HOSPITAL ENCOUNTER (OUTPATIENT)
Dept: PHYSICAL THERAPY | Facility: HOSPITAL | Age: 44
Setting detail: THERAPIES SERIES
Discharge: HOME | End: 2019-12-19

## 2019-12-19 DIAGNOSIS — M54.12 CERVICAL RADICULITIS: Primary | ICD-10-CM

## 2019-12-19 PROCEDURE — 97140 MANUAL THERAPY 1/> REGIONS: CPT | Performed by: PHYSICAL THERAPIST

## 2019-12-19 NOTE — THERAPY PROGRESS REPORT/RE-CERT
"    Outpatient Physical Therapy Ortho Progress Note  [facility]     Patient Name: Aquiles Najera  : 1975  MRN: [id number]  Today's Date: 2019      Visit Date: 2019  Attendance: 10/10 (25/yr)  Subjective Improvement: \"I really don't know how to answer that.\"  Next MD Appt: 20  Recert Date: 1/10/20     Therapy Diagnosis: L>R cervical and upper thoracic pain and dysfunction     Changes in Medications: none noted  Changes in MD Orders: none noted  Number of Work Days Lost: none          Past Medical History:   Diagnosis Date   • Abdominal pain    • Acute exacerbation of chronic obstructive airways disease (CMS/HCC)    • Allergic rhinitis    • Chest pain     unspecified   • Chronic cough    • Conjunctivitis    • Dyslipidemia    • Dyspnea    • Gastroesophageal reflux disease    • Pain     Pain in left ankle and joints of left foot      • Pain     pain in left leg   • Tobacco dependence syndrome    • Umbilical hernia without obstruction or gangrene    • Viral gastroenteritis         Past Surgical History:   Procedure Laterality Date   • INCISION AND DRAINAGE ABSCESS  2009    Irrigation and debridement of facial soft tissue injuries with closure of complex lacerations and repair of maxillary dental alveolar fracture   • UMBILICAL HERNIA REPAIR N/A 2017    Procedure: OPEN UMBILICAL AND EPIGASTRIC HERNIA REPAIR WITH MESH;  Surgeon: Sal Herbert MD;  Location: [facility];  Service:        Visit Dx:     ICD-10-CM ICD-9-CM   1. Cervical radiculitis M54.12 723.4             PT Ortho     Row Name 19 1600       Subjective Comments    Subjective Comments  Increased pain 2 days ago that is better today. Ached at that time. Feels irritated in the neck. Popping his neck makes it feel better. Feels tight in suboccipital region and SCMs. Has not done as much weightlifting recently.  -SS       Precautions and Contraindications    Precautions/Limitations  no known " "precautions/limitations  -SS       Subjective Pain    Able to rate subjective pain?  yes  -SS    Pre-Treatment Pain Level  4  -SS    Post-Treatment Pain Level  -- \"I feel better.\"  -SS       Cervical/Thoracic Special Tests    Cervical/Thoracic Special Tests Comments  Trigger points R>L UT, R suboccipital muscles, and R SCM.  -      User Key  (r) = Recorded By, (t) = Taken By, (c) = Cosigned By    Initials Name Provider Type    Lui Zaldivar, PT DPT Physical Therapist               PT OP Goals     Row Name 12/19/19 1600          PT Short Term Goals    STG Date to Achieve  -- further STGs deferred  -SS        Long Term Goals    LTG Date to Achieve  01/09/20 further to be determined  -SS     LTG 1  Cervical and shoulder AROM WNLs with minimal to no neck pain.  -SS     LTG 1 Progress  Ongoing  -SS     LTG 2  Minimal shoulder and neck pain and muscle tension at rest.  -     LTG 2 Progress  Ongoing  -SS        Time Calculation    PT Goal Re-Cert Due Date  01/09/20  -       User Key  (r) = Recorded By, (t) = Taken By, (c) = Cosigned By    Initials Name Provider Type    Lui Zaldivar, PT DPT Physical Therapist          PT Assessment/Plan     Row Name 12/19/19 1600          PT Assessment    Functional Limitations  Limitations in functional capacity and performance  -     Impairments  Pain;Impaired muscle power  -     Assessment Comments  Multiple twitches with dry needling. Muscle relaxation and decreased pain post-treatment.   -     Rehab Potential  Good barrier: chronicity  -     Patient/caregiver participated in establishment of treatment plan and goals  Yes  -     Patient would benefit from skilled therapy intervention  Yes  -SS        PT Plan    PT Plan Comments  PRN at this time  -       User Key  (r) = Recorded By, (t) = Taken By, (c) = Cosigned By    Initials Name Provider Type    Lui Zaldivar, PT DPT Physical Therapist                 Manual Rx (last 36 hours) "      Manual Treatments     Row Name 12/19/19 1600             Manual Rx 1    Manual Rx 1 Location  B UT  -SS      Manual Rx 1 Type  dry needle  -SS         Manual Rx 2    Manual Rx 2 Location  R suboccipital muscles  -SS      Manual Rx 2 Type  dry needle  -SS         Manual Rx 3    Manual Rx 3 Location  R SCM  -SS      Manual Rx 3 Type  dry needle  -SS        User Key  (r) = Recorded By, (t) = Taken By, (c) = Cosigned By    Initials Name Provider Type     Lui Romero, PT DPT Physical Therapist           Time Calculation:     Start Time: 1609  Stop Time: 1644  Time Calculation (min): 35 min     Therapy Charges for Today     Code Description Service Date Service Provider Modifiers Qty    13262132246 HC PT MANUAL THERAPY EA 15 MIN 12/19/2019 Lui Romero, PT DPT GP 2                    Lui Romero, PT, DPT, T  12/19/2019

## 2020-01-17 ENCOUNTER — TELEPHONE (OUTPATIENT)
Dept: FAMILY MEDICINE CLINIC | Facility: CLINIC | Age: 45
End: 2020-01-17

## 2020-01-20 NOTE — TELEPHONE ENCOUNTER
Patient was called to let him know that our office has not received any documents regarding inhaler.

## 2020-02-25 ENCOUNTER — TELEPHONE (OUTPATIENT)
Dept: FAMILY MEDICINE CLINIC | Facility: CLINIC | Age: 45
End: 2020-02-25

## 2020-02-27 DIAGNOSIS — M54.12 CERVICAL RADICULITIS: Primary | ICD-10-CM

## 2020-02-27 DIAGNOSIS — M54.6 CHRONIC THORACIC BACK PAIN, UNSPECIFIED BACK PAIN LATERALITY: ICD-10-CM

## 2020-02-27 DIAGNOSIS — G89.29 CHRONIC THORACIC BACK PAIN, UNSPECIFIED BACK PAIN LATERALITY: ICD-10-CM

## 2020-03-04 ENCOUNTER — TELEPHONE (OUTPATIENT)
Dept: FAMILY MEDICINE CLINIC | Facility: CLINIC | Age: 45
End: 2020-03-04

## 2020-03-16 ENCOUNTER — TREATMENT (OUTPATIENT)
Dept: PHYSICAL THERAPY | Facility: CLINIC | Age: 45
End: 2020-03-16

## 2020-03-16 DIAGNOSIS — G89.29 CHRONIC NECK PAIN: Primary | ICD-10-CM

## 2020-03-16 DIAGNOSIS — M54.2 CHRONIC NECK PAIN: Primary | ICD-10-CM

## 2020-03-16 PROCEDURE — PTSPVT PR CUSTOM PT TREATMENT OF SELF PAY PATIENT: Performed by: PHYSICAL THERAPIST

## 2020-03-16 NOTE — PROGRESS NOTES
Physical Therapy Initial Evaluation and Plan of Care      Patient: Aquiles Najera   : 1975  Diagnosis/ICD-10 Code:  Chronic neck pain [M54.2, G89.29]  Referring practitioner: No ref. provider found  Date of Initial Visit: 3/16/2020  Today's Date: 3/16/2020  Patient seen for 1 sessions    Next MD appt: TBSHIRA .  Recertification: 2020           Subjective Questionnaire: NDI:1150 = 22%      Subjective Evaluation    History of Present Illness  Onset date: Chronic, years.  Mechanism of injury: 2018 had R shoulder surgery and had increase in muscle tension since then.    Subjective comment: He reports that he saw two therapist prior to [facility] and was referred for DN which worked and helped. He reports he had to discontinue seeing [facility] in December due to insurance issues. He reports in December he moved and is now 40 miles away, so that is why he is there now.  Patient Occupation: Work at [employer] in [place], work leader Pain  Current pain rating: 3  At best pain rating: 3  At worst pain ratin  Location: neck  Quality: tight, pressure and pulling  Relieving factors: heat (DN; biofreeze)  Progression: no change    Social Support  Lives with: alone    Hand dominance: right    Diagnostic Tests  X-ray: abnormal (Mild DDD)    Treatments  Previous treatment: physical therapy, medication and massage  Patient Goals  Patient goals for therapy: decreased pain and increased motion  Patient goal: Patient wishes to get the pain completely gone           Objective       Static Posture     Head  Forward.    Shoulders  Rounded.    Thoracic Spine  Hyperkyphosis.    Postural Observations  Seated posture: fair  Standing posture: fair  Correction of posture: makes symptoms better        Palpation   Left   Hypertonic in the levator scapulae.   Tenderness of the levator scapulae, suboccipitals and upper trapezius.   Trigger point to levator scapulae and upper trapezius.     Right Tenderness of the levator scapulae,  suboccipitals and upper trapezius.   Trigger point to upper trapezius.     Neurological Testing     Sensation   Cervical/Thoracic   Left   Intact: light touch, pin prick and sharp/dull discrimination    Right   Intact: light touch, pin prick and sharp/dull discrimination    Active Range of Motion   Cervical/Thoracic Spine   Cervical  Subcranial protraction: WFL   Subcranial retraction: WFL   Flexion: 55 degrees   Extension: 55 degrees   Left lateral flexion: 25 degrees   Right lateral flexion: 38 degrees   Left rotation: 70 degrees   Right rotation: 60 degrees     Strength/Myotome Testing   Cervical Spine   Neck extension: 5  Neck flexion: 5    Left   Normal strength  Neck lateral flexion (C3): 5    Right   Normal strength  Neck lateral flexion (C3): 5    Left Shoulder     Planes of Motion   Flexion: 5   Abduction: 5     Right Shoulder     Planes of Motion   Flexion: 5   Abduction: 5     Left Elbow   Flexion: 5  Extension: 5    Right Elbow   Flexion: 5    Tests   Cervical     Left   Positive active compression (Wilmot).   Negative alar ligament integrity, cervical distraction, Spurling's sign and transverse ligament.     Right   Positive active compression (Wilmot).   Negative alar ligament integrity, cervical distraction, Spurling's sign and transverse ligament.     Additional Tests Details  Negative vertebral artery B.    Ambulation     Comments   FWB, non-antalgic gait, no distress, no assistive device, no significant gait deviation, normal arm swing with gait.         Assessment & Plan     Assessment  Impairments: abnormal or restricted ROM, activity intolerance and pain with function  Assessment details: Responded well to DN to C-spine and with point stimulation. Patient does exhibit significant tightness in his B suboccipitals and would possibly benefit form that with a different therapist at a later date.  Prognosis: fair  Prognosis details: Barriers to Rehab: Include significant or possible  arthritic/degenerative changes that have occurred within the spine, The chronicity of this issue.    Safety Issues: None noted.    Functional Limitations: carrying objects, lifting, sleeping, pulling, pushing, uncomfortable because of pain, sitting and reaching overhead  Goals  Plan Goals: Short Term Goals:  1) I with HEP and have additions/changes by next re-certification.    2) Patient to be more aware of posture and posture correction technique.    3) AROM B cervical SB >= 35°.    4) AROM B cervical ROT >=75°.    5) Patient to have no adverse effects to DN done every 5-7 days.    Long Term Goals:  1) AROM for the cervical spine all WNL, no increase in pain.    2) Patient able to control s/s with there ex.    3) NDI score of <= 14%.    4) I with final HEP.    5) D/C with a final HEP and free 30 day fitness formula memembership.      Plan  Therapy options: will be seen for skilled physical therapy services  Other planned modality interventions: Dry needling  Planned therapy interventions: flexibility, joint mobilization, soft tissue mobilization and spinal/joint mobilization  Duration in visits: 8  Treatment plan discussed with: patient  Plan details: DN to improve ROM and decrease pain.        Visit Diagnoses:    ICD-10-CM ICD-9-CM   1. Chronic neck pain M54.2 723.1    G89.29 338.29       Timed:  Manual Therapy:         mins  22674;  Therapeutic Exercise:         mins  93840;     Neuromuscular Esthela:        mins  73702;    Therapeutic Activity:          mins  35519;     Gait Training:           mins  59571;     Ultrasound:          mins  01390;    Electrical Stimulation:         mins  91921 ( );    Untimed:  Electrical Stimulation:         mins  31782 ( );  Mechanical Traction:         mins  39808;   Dry Needlin_  mins  Self pay;        Timed Treatment:      mins   Total Treatment:     45   mins    PT SIGNATURE: Cari Moore, PT DPT, CSCS   DATE TREATMENT INITIATED: 3/16/2020

## 2020-04-03 ENCOUNTER — TELEPHONE (OUTPATIENT)
Dept: PHYSICAL THERAPY | Facility: CLINIC | Age: 45
End: 2020-04-03

## 2020-04-03 DIAGNOSIS — M54.12 CERVICAL RADICULITIS: ICD-10-CM

## 2020-04-03 DIAGNOSIS — G89.29 CHRONIC NECK PAIN: Primary | ICD-10-CM

## 2020-04-03 DIAGNOSIS — M54.2 CHRONIC NECK PAIN: Primary | ICD-10-CM

## 2020-04-03 DIAGNOSIS — G89.29 CHRONIC RIGHT SHOULDER PAIN: ICD-10-CM

## 2020-04-03 DIAGNOSIS — M25.511 CHRONIC RIGHT SHOULDER PAIN: ICD-10-CM

## 2020-04-03 NOTE — TELEPHONE ENCOUNTER
Was called by clinical staff to cancel appoints due to pandemic (COVID-19) and patient hung up angerily on staff, no other phone calls were made to patient. Priot to him hanging up on staff he was instructed to follow back up with his provider.

## 2020-04-14 ENCOUNTER — TELEPHONE (OUTPATIENT)
Dept: FAMILY MEDICINE CLINIC | Facility: CLINIC | Age: 45
End: 2020-04-14

## 2020-04-14 NOTE — TELEPHONE ENCOUNTER
Patient called in requesting a call from Annalise. He stated that it is easier for him to talk to her since she knows him.    Please call him at [phone]

## 2020-04-15 DIAGNOSIS — J44.9 CHRONIC OBSTRUCTIVE PULMONARY DISEASE, UNSPECIFIED COPD TYPE (HCC): ICD-10-CM

## 2020-04-15 RX ORDER — PRAVASTATIN SODIUM 40 MG
40 TABLET ORAL DAILY
Qty: 30 TABLET | Refills: 0 | Status: SHIPPED | OUTPATIENT
Start: 2020-04-15 | End: 2020-04-16 | Stop reason: SDUPTHER

## 2020-04-15 RX ORDER — METHIMAZOLE 5 MG/1
TABLET ORAL
Qty: 30 TABLET | Refills: 0 | Status: SHIPPED | OUTPATIENT
Start: 2020-04-15 | End: 2020-04-16 | Stop reason: SDUPTHER

## 2020-04-15 RX ORDER — BUPROPION HYDROCHLORIDE 150 MG/1
150 TABLET ORAL DAILY
Qty: 30 TABLET | Refills: 0 | Status: SHIPPED | OUTPATIENT
Start: 2020-04-15 | End: 2020-04-16 | Stop reason: SDUPTHER

## 2020-04-15 RX ORDER — PANTOPRAZOLE SODIUM 40 MG/1
40 TABLET, DELAYED RELEASE ORAL DAILY
Qty: 30 TABLET | Refills: 0 | Status: SHIPPED | OUTPATIENT
Start: 2020-04-15 | End: 2020-04-16 | Stop reason: SDUPTHER

## 2020-04-16 RX ORDER — METHIMAZOLE 5 MG/1
TABLET ORAL
Qty: 90 TABLET | Refills: 0 | Status: SHIPPED | OUTPATIENT
Start: 2020-04-16 | End: 2020-04-21 | Stop reason: SDUPTHER

## 2020-04-16 RX ORDER — PANTOPRAZOLE SODIUM 40 MG/1
40 TABLET, DELAYED RELEASE ORAL DAILY
Qty: 90 TABLET | Refills: 0 | Status: SHIPPED | OUTPATIENT
Start: 2020-04-16 | End: 2020-04-21 | Stop reason: SDUPTHER

## 2020-04-16 RX ORDER — PRAVASTATIN SODIUM 40 MG
40 TABLET ORAL DAILY
Qty: 90 TABLET | Refills: 0 | Status: SHIPPED | OUTPATIENT
Start: 2020-04-16 | End: 2020-04-21 | Stop reason: SDUPTHER

## 2020-04-16 RX ORDER — BUPROPION HYDROCHLORIDE 150 MG/1
150 TABLET ORAL DAILY
Qty: 90 TABLET | Refills: 0 | Status: SHIPPED | OUTPATIENT
Start: 2020-04-16 | End: 2020-04-21 | Stop reason: SDUPTHER

## 2020-04-21 DIAGNOSIS — J44.9 CHRONIC OBSTRUCTIVE PULMONARY DISEASE, UNSPECIFIED COPD TYPE (HCC): ICD-10-CM

## 2020-04-21 RX ORDER — METHIMAZOLE 5 MG/1
TABLET ORAL
Qty: 90 TABLET | Refills: 0 | Status: SHIPPED | OUTPATIENT
Start: 2020-04-21 | End: 2020-07-27

## 2020-04-21 RX ORDER — PRAVASTATIN SODIUM 40 MG
40 TABLET ORAL DAILY
Qty: 90 TABLET | Refills: 0 | Status: SHIPPED | OUTPATIENT
Start: 2020-04-21 | End: 2020-07-27

## 2020-04-21 RX ORDER — BUPROPION HYDROCHLORIDE 150 MG/1
150 TABLET ORAL DAILY
Qty: 90 TABLET | Refills: 0 | Status: SHIPPED | OUTPATIENT
Start: 2020-04-21 | End: 2020-07-27

## 2020-04-21 RX ORDER — PANTOPRAZOLE SODIUM 40 MG/1
40 TABLET, DELAYED RELEASE ORAL DAILY
Qty: 90 TABLET | Refills: 0 | Status: SHIPPED | OUTPATIENT
Start: 2020-04-21 | End: 2020-07-27

## 2020-05-07 ENCOUNTER — TELEPHONE (OUTPATIENT)
Dept: FAMILY MEDICINE CLINIC | Facility: CLINIC | Age: 45
End: 2020-05-07

## 2020-05-07 NOTE — TELEPHONE ENCOUNTER
Patient was called. No answer; a message was left to return call back as to where he is going for therapy.

## 2020-05-07 NOTE — TELEPHONE ENCOUNTER
Patient requested a callback from clinical staff. Advised that he needs to get an order put in for him to go to therapy.     Please contact and advise.

## 2020-05-08 ENCOUNTER — OFFICE VISIT (OUTPATIENT)
Dept: FAMILY MEDICINE CLINIC | Facility: CLINIC | Age: 45
End: 2020-05-08

## 2020-05-08 VITALS — BODY MASS INDEX: 30.78 KG/M2 | WEIGHT: 215 LBS | HEIGHT: 70 IN

## 2020-05-08 DIAGNOSIS — I10 ESSENTIAL HYPERTENSION: Primary | ICD-10-CM

## 2020-05-08 DIAGNOSIS — E05.90 HYPERTHYROIDISM: ICD-10-CM

## 2020-05-08 DIAGNOSIS — M54.12 CERVICAL RADICULITIS: ICD-10-CM

## 2020-05-08 DIAGNOSIS — E66.09 CLASS 1 OBESITY DUE TO EXCESS CALORIES WITH SERIOUS COMORBIDITY AND BODY MASS INDEX (BMI) OF 30.0 TO 30.9 IN ADULT: ICD-10-CM

## 2020-05-08 DIAGNOSIS — K21.9 GASTROESOPHAGEAL REFLUX DISEASE WITHOUT ESOPHAGITIS: ICD-10-CM

## 2020-05-08 DIAGNOSIS — J44.9 CHRONIC OBSTRUCTIVE PULMONARY DISEASE, UNSPECIFIED COPD TYPE (HCC): ICD-10-CM

## 2020-05-08 DIAGNOSIS — E78.2 MIXED HYPERLIPIDEMIA: ICD-10-CM

## 2020-05-08 DIAGNOSIS — K59.04 CHRONIC IDIOPATHIC CONSTIPATION: ICD-10-CM

## 2020-05-08 DIAGNOSIS — R73.03 PREDIABETES: ICD-10-CM

## 2020-05-08 DIAGNOSIS — M54.12 CERVICAL RADICULITIS: Primary | ICD-10-CM

## 2020-05-08 PROCEDURE — 99442 PR PHYS/QHP TELEPHONE EVALUATION 11-20 MIN: CPT | Performed by: NURSE PRACTITIONER

## 2020-05-08 NOTE — PROGRESS NOTES
You have chosen to receive care through a telephone visit. Do you consent to use a telephone visit for your medical care today? YesSubjective   Aquiles Najera is a 44 y.o. male.     Mr. Najera is a 44-year-old male who presents today via telephone visit for follow-up related to hypertension, hyperlipidemia, hypothyroidism, COPD, cervical radiculopathy, GERD, constipation and obesity.  Patient has not checked his blood pressure today and does not do so on a routine basis.  He denies any chest pain, shortness of breath, palpitations, edema, dizziness, headaches.  He does take his medication as prescribed.  He continues to follow a low-fat diet and exercise routinely for the management of hyperlipidemia and obesity.  His weight has remained stable at 215 pounds.  He denies any increased shortness of breath, cough, sputum production, fever or chills.  He continues to see physical therapy and have dry needling for the management of cervical radiculopathy.  GERD and constipation controlled.       The following portions of the patient's history were reviewed and updated as appropriate: allergies, current medications, past family history, past medical history, past social history, past surgical history and problem list.    Review of Systems   Constitutional: Negative for activity change, appetite change, fatigue, unexpected weight gain and unexpected weight loss.   HENT: Negative for congestion, sore throat, trouble swallowing and voice change.    Eyes: Negative.    Respiratory: Negative for cough, chest tightness, shortness of breath and wheezing.    Cardiovascular: Negative for chest pain, palpitations and leg swelling.   Gastrointestinal: Negative for abdominal pain, diarrhea, nausea, vomiting and GERD.   Endocrine: Negative.    Genitourinary: Negative for dysuria, hematuria and urgency.   Musculoskeletal: Positive for neck pain (stable, continues with PT). Negative for arthralgias.   Skin: Negative for rash.    Neurological: Negative for dizziness, weakness, light-headedness and headache.   Hematological: Negative.    Psychiatric/Behavioral: Negative.        Objective   Physical Exam   Constitutional: He is oriented to person, place, and time. He is cooperative. He is easily aroused. No distress.   Physical exam limited due to limitations of telephone visit.   Pulmonary/Chest: Effort normal. No respiratory distress.   Neurological: He is alert, oriented to person, place, and time and easily aroused. He is not disoriented.   Psychiatric: He has a normal mood and affect. His speech is normal and behavior is normal. Judgment and thought content normal. He is not actively hallucinating. Cognition and memory are normal. He is attentive.         Assessment/Plan   Aquiles was seen today for follow-up.    Diagnoses and all orders for this visit:    Essential hypertension    Mixed hyperlipidemia    Hyperthyroidism    Prediabetes    Chronic obstructive pulmonary disease, unspecified COPD type (CMS/HCC)    Cervical radiculitis    Gastroesophageal reflux disease without esophagitis    Chronic idiopathic constipation    Class 1 obesity due to excess calories with serious comorbidity and body mass index (BMI) of 30.0 to 30.9 in adult    1.  Essential hypertension-instructed patient to monitor and log blood pressure readings.  Goal blood pressure less than 140/90.  Will continue to monitor.    2.  Mixed hyperlipidemia-lipid panel.  Will call results.  Continue low-fat diet.    3.  Hypothyroidism-TSH and T4.  Will call with results.  We will continue to monitor.    4.  Prediabetes-continue low-carb diet and exercise.  Hemoglobin A1c.  Will call results.    5.  COPD-stable with no increased cough, shortness of breath.    6.  Cervical radiculitis-stable.  Continue physical therapy.    7.  GERD without esophagitis- stable.  No change in therapy at this time.    8.  Chronic idiopathic constipation-controlled.  No change in therapy.  We will  continue to monitor.    9.  Class I obesity due to excess calories with serious comorbidity with a BMI of 30.9- stable.  Continue diet, weight loss efforts and exercise.    10.  Follow-up in 6 months or sooner if needed.            This document has been electronically signed by ERIC Oleary on May 8, 2020 13:24    This visit has been rescheduled as a phone visit to comply with patient safety concerns in accordance with CDC recommendations. The time that was spent in reviewing the patient's chart and addressing the patient's symptoms, diagnosis and treatment was 18 mins.

## 2020-07-20 ENCOUNTER — TELEPHONE (OUTPATIENT)
Dept: FAMILY MEDICINE CLINIC | Facility: CLINIC | Age: 45
End: 2020-07-20

## 2020-07-20 NOTE — TELEPHONE ENCOUNTER
Needs to speak with you about his medication(Dulera?) asap. Pt states the rx needs to go to the  not to a pharmacy. Please call pt back asap. Thank you

## 2020-07-20 NOTE — TELEPHONE ENCOUNTER
Mr Najera has called again stating he really needs to speak with you about his medication. Please call back asap. Thank you

## 2020-07-21 NOTE — TELEPHONE ENCOUNTER
Jamie,    Patient states he needs new prescription for Dulera faxed to [facility] as he is out of refills.     Thanks so much

## 2020-07-27 RX ORDER — METHIMAZOLE 5 MG/1
TABLET ORAL
Qty: 90 TABLET | Refills: 0 | Status: SHIPPED | OUTPATIENT
Start: 2020-07-27 | End: 2020-07-27

## 2020-07-27 RX ORDER — PRAVASTATIN SODIUM 40 MG
TABLET ORAL
Qty: 90 TABLET | Refills: 0 | Status: SHIPPED | OUTPATIENT
Start: 2020-07-27 | End: 2020-11-13

## 2020-07-27 RX ORDER — METHIMAZOLE 5 MG/1
TABLET ORAL
Qty: 90 TABLET | Refills: 0 | Status: SHIPPED | OUTPATIENT
Start: 2020-07-27 | End: 2020-11-09

## 2020-07-27 RX ORDER — BUPROPION HYDROCHLORIDE 150 MG/1
TABLET ORAL
Qty: 90 TABLET | Refills: 0 | Status: SHIPPED | OUTPATIENT
Start: 2020-07-27 | End: 2020-11-13

## 2020-07-27 RX ORDER — PANTOPRAZOLE SODIUM 40 MG/1
TABLET, DELAYED RELEASE ORAL
Qty: 90 TABLET | Refills: 0 | Status: SHIPPED | OUTPATIENT
Start: 2020-07-27 | End: 2020-11-13

## 2020-10-30 ENCOUNTER — DOCUMENTATION (OUTPATIENT)
Dept: PHYSICAL THERAPY | Facility: HOSPITAL | Age: 45
End: 2020-10-30

## 2020-10-30 DIAGNOSIS — G89.29 CHRONIC NECK PAIN: Primary | ICD-10-CM

## 2020-10-30 DIAGNOSIS — M54.2 CHRONIC NECK PAIN: Primary | ICD-10-CM

## 2020-10-30 DIAGNOSIS — M54.12 CERVICAL RADICULITIS: ICD-10-CM

## 2020-11-07 ENCOUNTER — LAB (OUTPATIENT)
Dept: LAB | Facility: HOSPITAL | Age: 45
End: 2020-11-07

## 2020-11-07 DIAGNOSIS — E78.2 MIXED HYPERLIPIDEMIA: ICD-10-CM

## 2020-11-07 DIAGNOSIS — R63.5 UNEXPLAINED WEIGHT GAIN: ICD-10-CM

## 2020-11-07 DIAGNOSIS — R73.03 PREDIABETES: ICD-10-CM

## 2020-11-07 DIAGNOSIS — E05.90 HYPERTHYROIDISM: ICD-10-CM

## 2020-11-07 LAB
CHOLEST SERPL-MCNC: 205 MG/DL (ref 0–200)
HBA1C MFR BLD: 5.67 % (ref 4.8–5.6)
HDLC SERPL-MCNC: 54 MG/DL (ref 40–60)
LDLC SERPL CALC-MCNC: 121 MG/DL (ref 0–100)
LDLC/HDLC SERPL: 2.17 {RATIO}
T4 FREE SERPL-MCNC: 1.07 NG/DL (ref 0.93–1.7)
TRIGL SERPL-MCNC: 170 MG/DL (ref 0–150)
TSH SERPL DL<=0.05 MIU/L-ACNC: 5.82 UIU/ML (ref 0.27–4.2)
VLDLC SERPL-MCNC: 30 MG/DL (ref 5–40)

## 2020-11-07 PROCEDURE — 36415 COLL VENOUS BLD VENIPUNCTURE: CPT

## 2020-11-07 PROCEDURE — 84443 ASSAY THYROID STIM HORMONE: CPT

## 2020-11-07 PROCEDURE — 80061 LIPID PANEL: CPT

## 2020-11-07 PROCEDURE — 84439 ASSAY OF FREE THYROXINE: CPT

## 2020-11-07 PROCEDURE — 82530 CORTISOL FREE: CPT

## 2020-11-07 PROCEDURE — 83036 HEMOGLOBIN GLYCOSYLATED A1C: CPT

## 2020-11-09 DIAGNOSIS — M54.12 CERVICAL RADICULITIS: Primary | ICD-10-CM

## 2020-11-09 RX ORDER — METHIMAZOLE 5 MG/1
2.5 TABLET ORAL DAILY
Qty: 90 TABLET | Refills: 0 | Status: SHIPPED | OUTPATIENT
Start: 2020-11-09 | End: 2021-03-04 | Stop reason: SDUPTHER

## 2020-11-09 NOTE — PROGRESS NOTES
TSH slightly elevated.  I am decreasing his methimazole to 2.5 mg (half tablet) daily.  New prescription sent to pharmacy.  Cholesterol slightly elevated.  He needs to make sure he is following a low-fat diet, exercising routinely and managing his weight.  Slight  elevation in hemoglobin A1c, 5.6.  Continue low-carb diet.  Cortisol pending.

## 2020-11-12 LAB — CORTIS F SERPL-MCNC: 1.01 UG/DL

## 2020-11-13 RX ORDER — BUPROPION HYDROCHLORIDE 150 MG/1
TABLET ORAL
Qty: 90 TABLET | Refills: 0 | Status: SHIPPED | OUTPATIENT
Start: 2020-11-13 | End: 2021-03-04 | Stop reason: SDUPTHER

## 2020-11-13 RX ORDER — PANTOPRAZOLE SODIUM 40 MG/1
TABLET, DELAYED RELEASE ORAL
Qty: 90 TABLET | Refills: 0 | Status: SHIPPED | OUTPATIENT
Start: 2020-11-13 | End: 2021-03-04 | Stop reason: SDUPTHER

## 2020-11-13 RX ORDER — PRAVASTATIN SODIUM 40 MG
TABLET ORAL
Qty: 90 TABLET | Refills: 0 | Status: SHIPPED | OUTPATIENT
Start: 2020-11-13 | End: 2021-03-04 | Stop reason: SDUPTHER

## 2020-11-18 ENCOUNTER — HOSPITAL ENCOUNTER (OUTPATIENT)
Dept: MRI IMAGING | Facility: HOSPITAL | Age: 45
Discharge: HOME | End: 2020-11-18
Admitting: NURSE PRACTITIONER

## 2020-11-18 DIAGNOSIS — M54.12 CERVICAL RADICULITIS: ICD-10-CM

## 2020-11-18 PROCEDURE — 72141 MRI NECK SPINE W/O DYE: CPT

## 2020-11-19 ENCOUNTER — TELEMEDICINE (OUTPATIENT)
Dept: FAMILY MEDICINE CLINIC | Facility: CLINIC | Age: 45
End: 2020-11-19

## 2020-11-19 DIAGNOSIS — R10.84 GENERALIZED ABDOMINAL PAIN: ICD-10-CM

## 2020-11-19 DIAGNOSIS — R14.0 ABDOMINAL DISTENSION: Primary | ICD-10-CM

## 2020-11-19 DIAGNOSIS — K21.9 GASTROESOPHAGEAL REFLUX DISEASE WITHOUT ESOPHAGITIS: ICD-10-CM

## 2020-11-19 PROCEDURE — 99443 PR PHYS/QHP TELEPHONE EVALUATION 21-30 MIN: CPT | Performed by: NURSE PRACTITIONER

## 2020-11-19 NOTE — PROGRESS NOTES
Subjective   Aquiles Najera is a 45 y.o. male.     Abdominal Pain  This is a chronic problem. The current episode started more than 1 month ago. The onset quality is gradual. The problem occurs daily. The problem has been waxing and waning. The pain is located in the generalized abdominal region. The pain is at a severity of 4/10. The pain is moderate. The quality of the pain is dull and aching. The abdominal pain does not radiate. Pertinent negatives include no anorexia, arthralgias, diarrhea, dysuria, flatus, frequency, hematochezia, hematuria, myalgias, nausea, vomiting or weight loss. The pain is aggravated by palpation. The pain is relieved by nothing. He has tried nothing for the symptoms. The treatment provided no relief. His past medical history is significant for GERD.   Heartburn  He complains of abdominal pain and heartburn. He reports no chest pain, no coughing, no early satiety, no globus sensation, no nausea, no sore throat or no wheezing. This is a chronic problem. The current episode started more than 1 year ago. The problem occurs occasionally. The problem has been waxing and waning. The heartburn is located in the substernum. The heartburn is of moderate intensity. The heartburn wakes him from sleep. The heartburn limits his activity. The heartburn changes with position. The symptoms are aggravated by certain foods. Pertinent negatives include no anemia, fatigue, muscle weakness, orthopnea or weight loss. He has tried a PPI for the symptoms. The treatment provided significant relief.        The following portions of the patient's history were reviewed and updated as appropriate: allergies, current medications, past family history, past medical history, past social history, past surgical history and problem list.    Review of Systems   Constitutional: Negative for activity change, appetite change, fatigue, unexpected weight gain and unexpected weight loss.   HENT: Negative for congestion,  sore throat, trouble swallowing and voice change.    Eyes: Negative.    Respiratory: Negative for cough, chest tightness, shortness of breath and wheezing.    Cardiovascular: Negative for chest pain, palpitations and leg swelling.   Gastrointestinal: Positive for abdominal distention (right sided. ), abdominal pain and GERD. Negative for anorexia, diarrhea, flatus, hematochezia, nausea and vomiting.   Endocrine: Negative.    Genitourinary: Negative for dysuria, frequency, hematuria and urgency.   Musculoskeletal: Negative for arthralgias, myalgias and muscle weakness.   Skin: Negative for rash.   Neurological: Negative for dizziness, weakness, light-headedness and headache.   Hematological: Negative.    Psychiatric/Behavioral: Negative.        Objective   Physical Exam  Constitutional:       Appearance: He is well-developed.      Comments: Physical exam limited due to limitations of telehealth.      Pulmonary:      Effort: Pulmonary effort is normal. No respiratory distress.   Neurological:      Mental Status: He is alert and oriented to person, place, and time.   Psychiatric:         Mood and Affect: Mood normal.         Behavior: Behavior normal.         Thought Content: Thought content normal.         Judgment: Judgment normal.           Assessment/Plan   Diagnoses and all orders for this visit:    1. Abdominal distension (Primary)  Comments:  right side    Orders:  -     CT Abdomen Pelvis Without Contrast; Future , will call with results. Symptoms stable.     2. Generalized abdominal pain  -     CT Abdomen Pelvis Without Contrast; Future, will call with results. Symptoms stable.     3. Gastroesophageal reflux disease without esophagitis   -stable. Continue PPI and trigger food avoidance.     4. Follow up in 3 weeks or sooner if needed.           This document has been electronically signed by ERIC Oleary on November 19, 2020 14:45 CST    Unable to complete visit using a video connection to the  patient. A phone visit was used to complete this visits. Total time of discussion was 21 minutes.

## 2020-11-20 ENCOUNTER — TELEPHONE (OUTPATIENT)
Dept: FAMILY MEDICINE CLINIC | Facility: CLINIC | Age: 45
End: 2020-11-20

## 2020-11-20 NOTE — PROGRESS NOTES
Mild degenerative changes per radiology report. Follow up as scheduled. Patient may want report faxed to his PT.

## 2020-11-25 ENCOUNTER — HOSPITAL ENCOUNTER (OUTPATIENT)
Dept: CT IMAGING | Facility: HOSPITAL | Age: 45
Discharge: HOME | End: 2020-11-25
Admitting: NURSE PRACTITIONER

## 2020-11-25 DIAGNOSIS — R10.84 GENERALIZED ABDOMINAL PAIN: ICD-10-CM

## 2020-11-25 DIAGNOSIS — R14.0 ABDOMINAL DISTENSION: ICD-10-CM

## 2020-11-25 PROCEDURE — 74176 CT ABD & PELVIS W/O CONTRAST: CPT

## 2020-11-30 NOTE — PROGRESS NOTES
Per radiology report, no structural or acute abnormalities noted. Fatty infiltration of the liver. Follow up as scheduled.

## 2020-12-02 NOTE — TELEPHONE ENCOUNTER
Patient states his therapist wants to do epidural injections and wants to know if you will write the order.

## 2020-12-04 DIAGNOSIS — M54.12 CERVICAL RADICULITIS: Primary | ICD-10-CM

## 2020-12-10 ENCOUNTER — OFFICE VISIT (OUTPATIENT)
Dept: FAMILY MEDICINE CLINIC | Facility: CLINIC | Age: 45
End: 2020-12-10

## 2020-12-10 VITALS
OXYGEN SATURATION: 98 % | BODY MASS INDEX: 30.94 KG/M2 | TEMPERATURE: 97.4 F | SYSTOLIC BLOOD PRESSURE: 104 MMHG | DIASTOLIC BLOOD PRESSURE: 80 MMHG | HEIGHT: 70 IN | RESPIRATION RATE: 20 BRPM | HEART RATE: 85 BPM | WEIGHT: 216.1 LBS

## 2020-12-10 DIAGNOSIS — R10.84 GENERALIZED ABDOMINAL PAIN: ICD-10-CM

## 2020-12-10 DIAGNOSIS — R14.0 ABDOMINAL DISTENSION: ICD-10-CM

## 2020-12-10 DIAGNOSIS — K21.9 GASTROESOPHAGEAL REFLUX DISEASE WITHOUT ESOPHAGITIS: ICD-10-CM

## 2020-12-10 DIAGNOSIS — Z23 NEED FOR IMMUNIZATION AGAINST INFLUENZA: ICD-10-CM

## 2020-12-10 DIAGNOSIS — E05.90 HYPERTHYROIDISM: ICD-10-CM

## 2020-12-10 DIAGNOSIS — K59.04 CHRONIC IDIOPATHIC CONSTIPATION: ICD-10-CM

## 2020-12-10 DIAGNOSIS — I10 ESSENTIAL HYPERTENSION: Primary | ICD-10-CM

## 2020-12-10 DIAGNOSIS — T14.8XXA BRUISING: ICD-10-CM

## 2020-12-10 DIAGNOSIS — M54.12 CERVICAL RADICULITIS: ICD-10-CM

## 2020-12-10 PROCEDURE — 90471 IMMUNIZATION ADMIN: CPT | Performed by: NURSE PRACTITIONER

## 2020-12-10 PROCEDURE — 90686 IIV4 VACC NO PRSV 0.5 ML IM: CPT | Performed by: NURSE PRACTITIONER

## 2020-12-10 PROCEDURE — 99214 OFFICE O/P EST MOD 30 MIN: CPT | Performed by: NURSE PRACTITIONER

## 2020-12-10 NOTE — PROGRESS NOTES
Subjective   Aquiles Najera is a 45 y.o. male.     Heartburn  He complains of abdominal pain and heartburn. He reports no chest pain, no coughing, no nausea, no sore throat or no wheezing. This is a chronic problem. The current episode started more than 1 year ago. The problem occurs frequently. The problem has been waxing and waning. The heartburn duration is an hour. The heartburn is located in the substernum and abdomen. The heartburn is of moderate intensity. The heartburn does not wake him from sleep. The heartburn limits his activity. The heartburn changes with position. Pertinent negatives include no anemia, fatigue, melena, muscle weakness, orthopnea or weight loss. Risk factors include obesity and lack of exercise. He has tried a PPI for the symptoms. The treatment provided mild relief.   Constipation  This is a chronic problem. The current episode started more than 1 year ago. The problem has been waxing and waning since onset. His stool frequency is 2 to 3 times per week. The stool is described as formed and firm. The patient is on a high fiber diet. He exercises regularly. There has been adequate water intake. Associated symptoms include abdominal pain. Pertinent negatives include no diarrhea, fecal incontinence, fever, flatus, melena, nausea, rectal pain, vomiting or weight loss. Risk factors include obesity. He has tried diet changes and laxatives for the symptoms. The treatment provided moderate relief.   Hypertension  This is a chronic problem. The current episode started more than 1 year ago. The problem is controlled. Associated symptoms include neck pain (chronic stable). Pertinent negatives include no chest pain, headaches, palpitations or shortness of breath. Risk factors for coronary artery disease include family history, dyslipidemia, obesity and male gender. Past treatments include lifestyle changes. Current antihypertension treatment includes lifestyle changes. The current treatment  provides significant improvement. There is no history of angina or kidney disease.   Neck Pain   This is a chronic problem. The current episode started more than 1 year ago. The problem occurs daily. The problem has been unchanged. The pain is associated with nothing. The pain is present in the midline. The quality of the pain is described as aching. The pain is moderate. The symptoms are aggravated by bending and twisting. The pain is same all the time. Pertinent negatives include no chest pain, fever, headaches, leg pain, numbness, pain with swallowing, syncope, tingling, trouble swallowing, weakness or weight loss. He has tried home exercises, NSAIDs, neck support and bed rest for the symptoms. The treatment provided mild relief.        The following portions of the patient's history were reviewed and updated as appropriate: allergies, current medications, past family history, past medical history, past social history, past surgical history and problem list.    Review of Systems   Constitutional: Negative for activity change, appetite change, fatigue, fever, unexpected weight gain and unexpected weight loss.   HENT: Negative for congestion, sore throat, trouble swallowing and voice change.    Eyes: Negative.    Respiratory: Negative for cough, chest tightness, shortness of breath and wheezing.    Cardiovascular: Negative for chest pain, palpitations, leg swelling and syncope.   Gastrointestinal: Positive for abdominal distention, abdominal pain, constipation and GERD. Negative for diarrhea, flatus, melena, nausea, rectal pain and vomiting.   Endocrine: Negative.  Negative for cold intolerance, heat intolerance, polydipsia, polyphagia and polyuria.   Genitourinary: Negative for dysuria, hematuria and urgency.   Musculoskeletal: Positive for neck pain (chronic stable). Negative for arthralgias, myalgias and muscle weakness.   Skin: Positive for bruise (reports increased bruising. takes baby aspirin daily. ).  Negative for rash.   Neurological: Negative for dizziness, tingling, weakness, light-headedness, numbness and headache.   Hematological: Negative.    Psychiatric/Behavioral: Negative.        Objective   Physical Exam  Vitals signs and nursing note reviewed.   Constitutional:       General: He is not in acute distress.     Appearance: Normal appearance. He is well-developed. He is obese. He is not ill-appearing, toxic-appearing or diaphoretic.   HENT:      Head: Normocephalic and atraumatic.   Eyes:      Conjunctiva/sclera: Conjunctivae normal.   Neck:      Musculoskeletal: Normal range of motion.   Cardiovascular:      Rate and Rhythm: Normal rate and regular rhythm.      Heart sounds: Normal heart sounds. No murmur. No friction rub. No gallop.    Pulmonary:      Effort: Pulmonary effort is normal. No respiratory distress.      Breath sounds: Normal breath sounds. No stridor. No wheezing, rhonchi or rales.   Abdominal:      General: Abdomen is protuberant. Bowel sounds are normal. There is no distension.      Palpations: Abdomen is soft. There is no mass.      Tenderness: There is generalized abdominal tenderness. There is no guarding or rebound.      Hernia: No hernia is present.   Musculoskeletal: Normal range of motion.         General: No tenderness.      Cervical back: He exhibits pain.   Skin:     General: Skin is warm and dry.      Coloration: Skin is not pale.      Findings: No erythema or rash.   Neurological:      Mental Status: He is alert and oriented to person, place, and time.   Psychiatric:         Attention and Perception: Attention and perception normal.         Mood and Affect: Mood normal.         Speech: Speech normal.         Behavior: Behavior normal. Behavior is cooperative.         Thought Content: Thought content normal. Thought content is not paranoid or delusional. Thought content does not include homicidal or suicidal ideation. Thought content does not include homicidal or suicidal plan.          Cognition and Memory: Cognition and memory normal.         Judgment: Judgment normal.           Assessment/Plan   Diagnoses and all orders for this visit:    1. Essential hypertension (Primary)   -Controlled.  We will continue to monitor.    2. Hyperthyroidism  -     TSH; Future  -     T4, Free; Future, will call with results.    3. Abdominal distension  -     Ambulatory Referral to Gastroenterology, follow-up as scheduled    4. Generalized abdominal pain  -     Ambulatory Referral to Gastroenterology, follow-up as scheduled    5. Need for immunization against influenza  -     FluLaval Quad >6 Months ([id number]), tolerated well with no adverse reaction.    6. Gastroesophageal reflux disease without esophagitis  -     Ambulatory Referral to Gastroenterology, follow-up as scheduled.  Continue Protonix as prescribed.  Continue avoid greasy spicy foods.    7. Chronic idiopathic constipation  -     Ambulatory Referral to Gastroenterology, follow-up as scheduled.  Continue MiraLAX and increase water intake/increase fiber intake.    8. Bruising  -     CBC & Differential; Future, no visible bruises on exam.  Will call with lab results.    9. Cervical radiculitis  -     Ambulatory Referral to Pain Management, follow-up as scheduled.  Continue physical therapy.    10.  Follow-up in 3 months or sooner if needed.            This document has been electronically signed by ERIC Oleary on December 10, 2020 15:55 CST

## 2020-12-15 ENCOUNTER — OFFICE VISIT (OUTPATIENT)
Dept: GASTROENTEROLOGY | Facility: CLINIC | Age: 45
End: 2020-12-15

## 2020-12-15 VITALS
BODY MASS INDEX: 30.98 KG/M2 | WEIGHT: 216.4 LBS | HEIGHT: 70 IN | SYSTOLIC BLOOD PRESSURE: 128 MMHG | HEART RATE: 79 BPM | DIASTOLIC BLOOD PRESSURE: 74 MMHG

## 2020-12-15 DIAGNOSIS — R10.84 GENERALIZED ABDOMINAL PAIN: ICD-10-CM

## 2020-12-15 DIAGNOSIS — K21.00 GASTROESOPHAGEAL REFLUX DISEASE WITH ESOPHAGITIS WITHOUT HEMORRHAGE: ICD-10-CM

## 2020-12-15 DIAGNOSIS — R14.0 ABDOMINAL DISTENSION: Primary | ICD-10-CM

## 2020-12-15 DIAGNOSIS — K21.9 GASTROESOPHAGEAL REFLUX DISEASE WITHOUT ESOPHAGITIS: ICD-10-CM

## 2020-12-15 DIAGNOSIS — K59.04 CHRONIC IDIOPATHIC CONSTIPATION: ICD-10-CM

## 2020-12-15 DIAGNOSIS — R10.84 GENERALIZED ABDOMINAL PAIN: Primary | ICD-10-CM

## 2020-12-15 PROCEDURE — 99214 OFFICE O/P EST MOD 30 MIN: CPT | Performed by: NURSE PRACTITIONER

## 2020-12-15 RX ORDER — DEXTROSE AND SODIUM CHLORIDE 5; .45 G/100ML; G/100ML
30 INJECTION, SOLUTION INTRAVENOUS CONTINUOUS PRN
Status: CANCELLED | OUTPATIENT
Start: 2020-12-15

## 2020-12-15 NOTE — PROGRESS NOTES
Chief Complaint   Patient presents with   • Abdominal Pain   • Heartburn   • Constipation       Subjective    Aquiles Najera is a 45 y.o. male. he is here today for follow-up.    History of Present Illness  45-year-old male presents to discuss chronic abdominal pain heartburn and constipation.  States constipation is well controlled with regimen of MiraLAX and fiber started by PCP so he would like to continue that.  States he always has abdominal pain he had hernia surgery and has followed up and been cleared by surgeon that pain is not related to that.  Describes it as constant burning that gets worse at times.  He has tried PPI for symptom along with dietary modifications with no relief.  States he has never had EGD and colonoscopy in the past.  Plan; schedule patient for EGD and colonoscopy due to chronic abdominal pain and reflux.  Follow-up after test return office sooner if needed     The following portions of the patient's history were reviewed and updated as appropriate:   Past Medical History:   Diagnosis Date   • Abdominal pain    • Acute exacerbation of chronic obstructive airways disease (CMS/HCC)    • Allergic    • Allergic rhinitis    • Arthritis    • Asthma    • Chest pain     unspecified   • Chronic constipation    • Chronic cough    • Conjunctivitis    • Dyslipidemia    • Dyspnea    • Gastroesophageal reflux disease    • Headache    • Pain     Pain in left ankle and joints of left foot      • Pain     pain in left leg   • Tobacco dependence syndrome    • Umbilical hernia without obstruction or gangrene    • Viral gastroenteritis      Past Surgical History:   Procedure Laterality Date   • INCISION AND DRAINAGE ABSCESS  06/09/2009    Irrigation and debridement of facial soft tissue injuries with closure of complex lacerations and repair of maxillary dental alveolar fracture   • UMBILICAL HERNIA REPAIR N/A 6/2/2017    Procedure: OPEN UMBILICAL AND EPIGASTRIC HERNIA REPAIR WITH MESH;  Surgeon:  Sal Herbert MD;  Location: [facility];  Service:      Family History   Problem Relation Age of Onset   • Stroke Mother    • Alcohol abuse Father    • Cirrhosis Father    • No Known Problems Sister    • No Known Problems Brother    • Anxiety disorder Son    • Coronary artery disease Neg Hx    • Hypertension Neg Hx    • Thyroid disease Neg Hx        Prior to Admission medications    Medication Sig Start Date End Date Taking? Authorizing Provider   albuterol sulfate  (90 Base) MCG/ACT inhaler Inhale 2 puffs Every 4 (Four) Hours As Needed for Wheezing.  Patient taking differently: Inhale 2 puffs Every 4 (Four) Hours As Needed for Wheezing. 4/18/19  Yes Jaylon Rg APRN   aspirin 81 MG chewable tablet Chew 1 tablet Daily. 2/2/18  Yes Margareth Romo MD   buPROPion XL (WELLBUTRIN XL) 150 MG 24 hr tablet TAKE ONE TABLET BY MOUTH DAILY 11/13/20  Yes Jaylon Rg APRN   butenafine (LOTRIMIN ULTRA) 1 % cream Apply  topically to the appropriate area as directed Daily. 11/11/19  Yes Jaylon Rg APRCHUNG   calcium polycarbophil (FIBERCON) 625 MG tablet Take 1 tablet by mouth Daily. 12/11/18  Yes Jaylon Rg APRN   [facility] FLUTICASONE PROPIONATE 50 MCG/ACT nasal spray PUT 1 SPRAY IN EACH NOSTRIL DAILY 2/11/19  Yes Jaylon Rg APRCHUNG   ipratropium-albuterol (DUONEB) 0.5-2.5 mg/3 ml nebulizer Take 3 mL by nebulization Every 4 (Four) Hours As Needed for Wheezing.  Patient taking differently: Take 3 mL by nebulization Every 4 (Four) Hours As Needed for Wheezing. 4/18/19  Yes Jaylon Rg APRN   loratadine (CLARITIN) 10 MG tablet TAKE 1 TABLET BY MOUTH DAILY. 1/10/19  Yes Margareth Romo MD   methIMAzole (TAPAZOLE) 5 MG tablet Take 0.5 tablets by mouth Daily. 11/9/20  Yes Jaylon Rg APRCHUNG   mometasone-formoterol (Dulera) 100-5 MCG/ACT inhaler Inhale 2 puffs 2 (Two) Times a Day. 7/21/20  Yes Jaylon Rg I, APRN   nitroglycerin (NITROSTAT) 0.4 MG SL  tablet Place 1 tablet under the tongue Every 5 (Five) Minutes As Needed for chest pain. Take no more than 3 doses in 15 minutes.  Patient taking differently: Place 1 tablet under the tongue Every 5 (Five) Minutes As Needed for chest pain. Take no more than 3 doses in 15 minutes. 11/29/16  Yes Dong Lozada MD   pantoprazole (PROTONIX) 40 MG EC tablet TAKE ONE TABLET BY MOUTH DAILY 11/13/20  Yes Jaylon Rg APRN   polyethylene glycol (MIRALAX) powder TAKE 17 G (1 CAPFUL) BY MOUTH DAILY. 1/10/19  Yes Jaylon Rg APRN   pravastatin (PRAVACHOL) 40 MG tablet TAKE ONE TABLET BY MOUTH DAILY 11/13/20  Yes Jaylon Rg APRN   triamcinolone (KENALOG) 0.1 % cream APPLY 1 APPLICATION TOPICALLY TO THE APPROPRIATE AREA AS DIRECTED 2 (TWO) TIMES A DAY. 5/13/19  Yes Jaylon Rg APRN     Allergies   Allergen Reactions   • Colace [Docusate] Hives     Social History     Socioeconomic History   • Marital status:      Spouse name: Not on file   • Number of children: Not on file   • Years of education: Not on file   • Highest education level: Not on file   Tobacco Use   • Smoking status: Former Smoker     Years: 20.00     Types: Cigarettes     Quit date: 03/2017     Years since quitting: 3.7   • Smokeless tobacco: Former User   Substance and Sexual Activity   • Alcohol use: No   • Drug use: No     Types: Cocaine(coke), Marijuana     Comment: 3/2017 last time   • Sexual activity: Defer       Review of Systems  Review of Systems   Constitutional: Positive for fatigue. Negative for activity change, appetite change, chills, diaphoresis, fever and unexpected weight change.   HENT: Negative for sore throat and trouble swallowing.    Respiratory: Negative for shortness of breath.    Gastrointestinal: Positive for abdominal distention, abdominal pain and constipation. Negative for anal bleeding, blood in stool, diarrhea, nausea, rectal pain and vomiting.   Musculoskeletal: Negative for arthralgias.   Skin:  "Negative for pallor.   Neurological: Negative for light-headedness.        /74 (BP Location: Left arm)   Pulse 79   Ht 177.8 cm (70\")   Wt 98.2 kg (216 lb 6.4 oz)   BMI 31.05 kg/m²     Objective    Physical Exam  Constitutional:       General: He is not in acute distress.     Appearance: Normal appearance. He is well-developed.   HENT:      Head: Normocephalic and atraumatic.   Neck:      Musculoskeletal: Normal range of motion and neck supple.      Thyroid: No thyromegaly.   Cardiovascular:      Rate and Rhythm: Normal rate and regular rhythm.      Heart sounds: Normal heart sounds.   Pulmonary:      Effort: Pulmonary effort is normal.      Breath sounds: Normal breath sounds. No wheezing, rhonchi or rales.   Abdominal:      General: Bowel sounds are normal. There is no distension.      Palpations: Abdomen is soft. Abdomen is not rigid. There is no fluid wave.      Tenderness: There is generalized abdominal tenderness. There is no guarding.      Hernia: No hernia is present.   Lymphadenopathy:      Cervical: No cervical adenopathy.   Skin:     General: Skin is warm and dry.      Coloration: Skin is not pale.      Findings: No rash.   Neurological:      Mental Status: He is alert and oriented to person, place, and time.   Psychiatric:         Speech: Speech normal.         Behavior: Behavior is cooperative.       Lab on 11/07/2020   Component Date Value Ref Range Status   • TSH 11/07/2020 5.820* 0.270 - 4.200 uIU/mL Final   • Free T4 11/07/2020 1.07  0.93 - 1.70 ng/dL Final   • Total Cholesterol 11/07/2020 205* 0 - 200 mg/dL Final   • Triglycerides 11/07/2020 170* 0 - 150 mg/dL Final   • HDL Cholesterol 11/07/2020 54  40 - 60 mg/dL Final   • LDL Cholesterol  11/07/2020 121* 0 - 100 mg/dL Final   • VLDL Cholesterol 11/07/2020 30  5 - 40 mg/dL Final   • LDL/HDL Ratio 11/07/2020 2.17   Final   • Cortisol, Free Dialysis, LCMS 11/07/2020 1.01  ug/dL Final    These tests were developed and their " performance  characteristics determined by [facility]. They have not been  cleared or approved by the Food and Drug Administration.  Reference Range:  8 AM 0.10  - 1.20  4 PM 0.042 - 0.872   • Hemoglobin A1C 11/07/2020 5.67* 4.80 - 5.60 % Final     Assessment/Plan      1. Generalized abdominal pain    2. Chronic idiopathic constipation    3. Gastroesophageal reflux disease with esophagitis without hemorrhage    .     Orders placed during this encounter include:  Orders Placed This Encounter   Procedures   • Follow Anesthesia Guidelines / Standing Orders     Standing Status:   Future   • Obtain Informed Consent     Standing Status:   Future     Order Specific Question:   Informed Consent Given For     Answer:   ESOPHAGOGASTRODUODENOSCOPY and Colonoscopy       ESOPHAGOGASTRODUODENOSCOPY (N/A), COLONOSCOPY (N/A)    Review and/or summary of lab tests, radiology, procedures, medications. Review and summary of old records and obtaining of history. The risks and benefits of my recommendations, as well as other treatment options were discussed with the patient today. Questions were answered.    New Medications Ordered This Visit   Medications   • polyethylene glycol (GoLYTELY) 236 g solution     Sig: Starting at noon on day prior to procedure, drink 8 ounces every 30 minutes until all gone or stools are clear. May add flavor packet.     Dispense:  4000 mL     Refill:  0       Follow-up: Return in about 4 weeks (around 1/12/2021) for Recheck, After test.          This document has been electronically signed by ERIC Sullivan on December 16, 2020 13:17 CST             Results for orders placed or performed in visit on 11/07/20   Cortisol, Free    Specimen: Blood   Result Value Ref Range    Cortisol, Free Dialysis, [facility] 1.01 ug/dL   TSH    Specimen: Blood   Result Value Ref Range    TSH 5.820 (H) 0.270 - 4.200 uIU/mL   T4, Free    Specimen: Blood   Result Value Ref Range    Free T4 1.07 0.93 - 1.70 ng/dL   Hemoglobin A1c     Specimen: Blood   Result Value Ref Range    Hemoglobin A1C 5.67 (H) 4.80 - 5.60 %   Lipid Panel    Specimen: Blood   Result Value Ref Range    Total Cholesterol 205 (H) 0 - 200 mg/dL    Triglycerides 170 (H) 0 - 150 mg/dL    HDL Cholesterol 54 40 - 60 mg/dL    LDL Cholesterol  121 (H) 0 - 100 mg/dL    VLDL Cholesterol 30 5 - 40 mg/dL    LDL/HDL Ratio 2.17    Results for orders placed or performed in visit on 06/01/19   Urinalysis With Culture If Indicated - Urine, Clean Catch    Specimen: Urine, Clean Catch   Result Value Ref Range    Color, UA Yellow Yellow, Straw    Appearance, UA Clear Clear    pH, UA 7.5 5.0 - 8.0    Specific Gravity, UA 1.021 1.005 - 1.030    Glucose, UA Negative Negative    Ketones, UA Negative Negative    Bilirubin, UA Negative Negative    Blood, UA Negative Negative    Protein, UA Negative Negative    Leuk Esterase, UA Negative Negative    Nitrite, UA Negative Negative    Urobilinogen, UA 1.0 E.U./dL 0.2 - 1.0 E.U./dL   T3, free    Specimen: Blood   Result Value Ref Range    T3, Free 2.78 2.00 - 4.40 pg/mL   TSH    Specimen: Blood   Result Value Ref Range    TSH 5.550 (H) 0.270 - 4.200 mIU/mL   T4, Free    Specimen: Blood   Result Value Ref Range    Free T4 1.10 0.93 - 1.70 ng/dL   Hemoglobin A1c    Specimen: Blood   Result Value Ref Range    Hemoglobin A1C 5.39 4.80 - 5.60 %   Lipid Panel    Specimen: Blood   Result Value Ref Range    Total Cholesterol 202 (H) 0 - 200 mg/dL    Triglycerides 263 (H) 0 - 150 mg/dL    HDL Cholesterol 48 40 - 60 mg/dL    LDL Cholesterol  101 (H) 0 - 100 mg/dL    VLDL Cholesterol 52.6 (H) 5 - 40 mg/dL    LDL/HDL Ratio 2.11    Comprehensive Metabolic Panel    Specimen: Blood   Result Value Ref Range    Glucose 96 65 - 99 mg/dL    BUN 12 6 - 20 mg/dL    Creatinine 1.09 0.76 - 1.27 mg/dL    Sodium 138 136 - 145 mmol/L    Potassium 4.8 3.5 - 5.2 mmol/L    Chloride 101 98 - 107 mmol/L    CO2 23.1 22.0 - 29.0 mmol/L    Calcium 9.1 8.6 - 10.5 mg/dL    Total Protein 7.1  "6.0 - 8.5 g/dL    Albumin 4.20 3.50 - 5.20 g/dL    ALT (SGPT) 28 1 - 41 U/L    AST (SGOT) 32 1 - 40 U/L    Alkaline Phosphatase 77 39 - 117 U/L    Total Bilirubin 0.5 0.2 - 1.2 mg/dL    eGFR Non African Amer 75 >60 mL/min/1.73    Globulin 2.9 gm/dL    A/G Ratio 1.4 g/dL    BUN/Creatinine Ratio 11.0 7.0 - 25.0    Anion Gap 13.9 mmol/L   Results for orders placed or performed in visit on 03/22/19   Tissue Pathology Exam    Specimen: Back, Middle; Tissue   Result Value Ref Range    Case Report       Surgical Pathology Report                         Case: [id number]                                  Authorizing Provider:  Norberto Harrell MD           Collected:           03/22/2019 10:52 AM          Ordering Location:     [facility]     Received:            03/22/2019 01:36 PM                                 GROUP GENERAL SURGERY                                                        Pathologist:           Jesus Dias MD                                                         Specimen:    Back, Middle                                                                               Final Diagnosis       MASS, MIDDLE BACK:   ANGIOLIPOMA.       Gross Description       The container is labeled \"middle back\" and has an ovoid yellow mass measuring 3.0 x 2.0 x 2.0 cm.  The cut surfaces are yellow.  Part of the specimen is embedded as 1A and 1B.       Results for orders placed or performed in visit on 02/08/19   PSA Screen    Specimen: Blood   Result Value Ref Range    PSA 0.920 0.000 - 4.000 ng/mL   Urinalysis With Culture If Indicated - Urine, Clean Catch    Specimen: Urine, Clean Catch   Result Value Ref Range    Color, UA Yellow Yellow, Straw, Dark Yellow, Kirsten    Appearance, UA Clear Clear    pH, UA 6.5 5.0 - 9.0    Specific Gravity, UA 1.009 1.003 - 1.030    Glucose, UA Negative Negative    Ketones, UA Negative Negative    Bilirubin, UA Negative Negative    Blood, UA Negative Negative    Protein, UA Negative " Negative    Leuk Esterase, UA Negative Negative    Nitrite, UA Negative Negative    Urobilinogen, UA 0.2 E.U./dL 0.2 - 1.0 E.U./dL   CBC Auto Differential    Specimen: Blood   Result Value Ref Range    WBC 7.13 3.20 - 9.80 10*3/mm3    RBC 5.68 4.37 - 5.74 10*6/mm3    Hemoglobin 16.2 13.7 - 17.3 g/dL    Hematocrit 46.2 39.0 - 49.0 %    MCV 81.3 80.0 - 98.0 fL    MCH 28.5 26.5 - 34.0 pg    MCHC 35.1 31.5 - 36.3 g/dL    RDW 13.1 11.5 - 14.5 %    RDW-SD 39.2 35.1 - 43.9 fl    MPV 11.0 8.0 - 12.0 fL    Platelets 232 150 - 450 10*3/mm3    Neutrophil % 62.8 37.0 - 80.0 %    Lymphocyte % 30.6 10.0 - 50.0 %    Monocyte % 5.8 0.0 - 12.0 %    Eosinophil % 0.1 0.0 - 7.0 %    Basophil % 0.3 0.0 - 2.0 %    Immature Grans % 0.4 0.0 - 0.5 %    Neutrophils, Absolute 4.48 2.00 - 8.60 10*3/mm3    Lymphocytes, Absolute 2.18 0.60 - 4.20 10*3/mm3    Monocytes, Absolute 0.41 0.00 - 0.90 10*3/mm3    Eosinophils, Absolute 0.01 0.00 - 0.70 10*3/mm3    Basophils, Absolute 0.02 0.00 - 0.20 10*3/mm3    Immature Grans, Absolute 0.03 (H) 0.00 - 0.02 10*3/mm3   TSH    Specimen: Blood   Result Value Ref Range    TSH 4.120 0.460 - 4.680 mIU/mL   Magnesium    Specimen: Blood   Result Value Ref Range    Magnesium 2.3 1.6 - 2.3 mg/dL   Comprehensive Metabolic Panel    Specimen: Blood   Result Value Ref Range    Glucose 71 60 - 100 mg/dL    BUN 17 7 - 21 mg/dL    Creatinine 1.01 0.70 - 1.30 mg/dL    Sodium 139 137 - 145 mmol/L    Potassium 4.2 3.5 - 5.1 mmol/L    Chloride 104 95 - 110 mmol/L    CO2 25.0 22.0 - 31.0 mmol/L    Calcium 9.9 8.4 - 10.2 mg/dL    Total Protein 7.8 6.3 - 8.6 g/dL    Albumin 5.00 (H) 3.40 - 4.80 g/dL    ALT (SGPT) 26 21 - 72 U/L    AST (SGOT) 25 17 - 59 U/L    Alkaline Phosphatase 87 38 - 126 U/L    Total Bilirubin 0.4 0.2 - 1.3 mg/dL    eGFR Non  Amer 81 63 - 147 mL/min/1.73    Globulin 2.8 2.3 - 3.5 gm/dL    A/G Ratio 1.8 1.1 - 1.8 g/dL    BUN/Creatinine Ratio 16.8 7.0 - 25.0    Anion Gap 10.0 5.0 - 15.0 mmol/L      *Note: Due to a large number of results and/or encounters for the requested time period, some results have not been displayed. A complete set of results can be found in Results Review.

## 2020-12-15 NOTE — PATIENT INSTRUCTIONS
Constipation, Adult  Constipation is when a person has fewer bowel movements in a week than normal, has difficulty having a bowel movement, or has stools that are dry, hard, or larger than normal. Constipation may be caused by an underlying condition. It may become worse with age if a person takes certain medicines and does not take in enough fluids.  Follow these instructions at home:  Eating and drinking    · Eat foods that have a lot of fiber, such as fresh fruits and vegetables, whole grains, and beans.  · Limit foods that are high in fat, low in fiber, or overly processed, such as french fries, hamburgers, cookies, candies, and soda.  · Drink enough fluid to keep your urine clear or pale yellow.  General instructions  · Exercise regularly or as told by your health care provider.  · Go to the restroom when you have the urge to go. Do not hold it in.  · Take over-the-counter and prescription medicines only as told by your health care provider. These include any fiber supplements.  · Practice pelvic floor retraining exercises, such as deep breathing while relaxing the lower abdomen and pelvic floor relaxation during bowel movements.  · Watch your condition for any changes.  · Keep all follow-up visits as told by your health care provider. This is important.  Contact a health care provider if:  · You have pain that gets worse.  · You have a fever.  · You do not have a bowel movement after 4 days.  · You vomit.  · You are not hungry.  · You lose weight.  · You are bleeding from the anus.  · You have thin, pencil-like stools.  Get help right away if:  · You have a fever and your symptoms suddenly get worse.  · You leak stool or have blood in your stool.  · Your abdomen is bloated.  · You have severe pain in your abdomen.  · You feel dizzy or you faint.  This information is not intended to replace advice given to you by your health care provider. Make sure you discuss any questions you have with your health care  provider.  Document Revised: 11/30/2018 Document Reviewed: 06/07/2017  Elsevier Patient Education © 2020 [employer]

## 2020-12-22 ENCOUNTER — TELEPHONE (OUTPATIENT)
Dept: FAMILY MEDICINE CLINIC | Facility: CLINIC | Age: 45
End: 2020-12-22

## 2020-12-22 NOTE — TELEPHONE ENCOUNTER
PATIENT CALLED STATING THERE ARE REFERRALS THAT ARE NEEDING TO BE FAXED TO THE OFFICES SO THE PATIENT CAN GET SCHEDULED. THE REFERRALS ARE FOR PAIN MEDICINE AND GASTROENTEROLOGY.    GOOD CALL BACK   [phone]

## 2020-12-28 ENCOUNTER — TELEPHONE (OUTPATIENT)
Dept: FAMILY MEDICINE CLINIC | Facility: CLINIC | Age: 45
End: 2020-12-28

## 2021-01-04 ENCOUNTER — TELEPHONE (OUTPATIENT)
Dept: FAMILY MEDICINE CLINIC | Facility: CLINIC | Age: 46
End: 2021-01-04

## 2021-01-04 DIAGNOSIS — M54.12 CERVICAL RADICULITIS: Primary | ICD-10-CM

## 2021-01-04 NOTE — TELEPHONE ENCOUNTER
.PATIENT WOULD LIKE TO TALK TO SOMEONE ABOUT THE REFERRAL FOR HIS NECK, PLEASE ADVISE.   BEST CALL BACK [phone]

## 2021-01-05 NOTE — TELEPHONE ENCOUNTER
Dr. Jae Velazquez  Is not at the location listed below. I will call pt to find out for sure the location of this doctor.

## 2021-01-07 ENCOUNTER — TELEPHONE (OUTPATIENT)
Dept: FAMILY MEDICINE CLINIC | Facility: CLINIC | Age: 46
End: 2021-01-07

## 2021-01-07 NOTE — TELEPHONE ENCOUNTER
PATIENT WOULD LIKE A CALL BACK TO DISCUSS HIS REFERRAL DR JASMINE GAVE HIM.    PLEASE CALL AND ADVISE  [phone] ([phone])

## 2021-01-08 ENCOUNTER — TELEPHONE (OUTPATIENT)
Dept: FAMILY MEDICINE CLINIC | Facility: CLINIC | Age: 46
End: 2021-01-08

## 2021-01-08 DIAGNOSIS — M54.12 CERVICAL RADICULITIS: Primary | ICD-10-CM

## 2021-01-20 ENCOUNTER — TELEPHONE (OUTPATIENT)
Dept: FAMILY MEDICINE CLINIC | Facility: CLINIC | Age: 46
End: 2021-01-20

## 2021-01-20 NOTE — TELEPHONE ENCOUNTER
PATIENT CALLED IN REQUESTING A CALL BACK AFTER WARM TRANSFER FAILED   PATIENT WOULD ONLY SAY HE NEEDED TO SPEAK TO DR JASMINE OR HIS NURSE  ABOUT HIS UPCOMING PROCEDURE AND TO TELL HER WHAT SHE NEEDS TO DO WITH HIS PRESCRIPTIONS    GOOD CALL BACK  [phone]

## 2021-01-21 ENCOUNTER — TELEPHONE (OUTPATIENT)
Dept: FAMILY MEDICINE CLINIC | Facility: CLINIC | Age: 46
End: 2021-01-21

## 2021-01-21 NOTE — TELEPHONE ENCOUNTER
Patient states the appt. Schedule on the 27 th Was for a follow up after the scope.  He states he hasn't done the scope yet because of his work schedule. Specific requirements to get COVID swab on a Sunday and scoped on a Wednesday and prefers not to go to [facility]. I will see if Karlie can reschedule precedure with Joel.

## 2021-01-21 NOTE — TELEPHONE ENCOUNTER
Inhaler sent to pharmacy requested.  Patient already has an appointment scheduled with GI.  Does he need another one?

## 2021-01-21 NOTE — TELEPHONE ENCOUNTER
Patient was called to let him know appt was rescheduled for Endoscopy for 02/17/2021 @ 2:30 arrive 15 mins early to register and COVID testing on 02/14/2021 between 9:00-10:00 A.M.

## 2021-01-21 NOTE — TELEPHONE ENCOUNTER
Patient would like his GI referral sent to [facility] and prescritpion for his Dulera sent to:    [place], [place] [address]  Fax# [phone]

## 2021-01-28 RX ORDER — SODIUM, POTASSIUM,MAG SULFATES 17.5-3.13G
1 SOLUTION, RECONSTITUTED, ORAL ORAL EVERY 12 HOURS
Qty: 177 ML | Refills: 0 | Status: SHIPPED | OUTPATIENT
Start: 2021-01-28 | End: 2021-08-02

## 2021-02-14 ENCOUNTER — LAB (OUTPATIENT)
Dept: LAB | Facility: HOSPITAL | Age: 46
End: 2021-02-14

## 2021-02-14 DIAGNOSIS — Z01.818 PREOP TESTING: Primary | ICD-10-CM

## 2021-02-14 PROCEDURE — U0004 COV-19 TEST NON-CDC HGH THRU: HCPCS

## 2021-02-14 PROCEDURE — C9803 HOPD COVID-19 SPEC COLLECT: HCPCS

## 2021-02-15 LAB — SARS-COV-2 ORF1AB RESP QL NAA+PROBE: NOT DETECTED

## 2021-02-16 DIAGNOSIS — R10.13 EPIGASTRIC PAIN: Primary | ICD-10-CM

## 2021-02-16 RX ORDER — DEXTROSE AND SODIUM CHLORIDE 5; .45 G/100ML; G/100ML
30 INJECTION, SOLUTION INTRAVENOUS CONTINUOUS PRN
Status: CANCELLED | OUTPATIENT
Start: 2021-02-17

## 2021-02-17 ENCOUNTER — HOSPITAL ENCOUNTER (OUTPATIENT)
Facility: HOSPITAL | Age: 46
Setting detail: HOSPITAL OUTPATIENT SURGERY
Discharge: HOME | End: 2021-02-17
Attending: INTERNAL MEDICINE | Admitting: INTERNAL MEDICINE

## 2021-02-17 ENCOUNTER — ANESTHESIA EVENT (OUTPATIENT)
Dept: GASTROENTEROLOGY | Facility: HOSPITAL | Age: 46
End: 2021-02-17

## 2021-02-17 ENCOUNTER — ANESTHESIA (OUTPATIENT)
Dept: GASTROENTEROLOGY | Facility: HOSPITAL | Age: 46
End: 2021-02-17

## 2021-02-17 VITALS
WEIGHT: 217.2 LBS | RESPIRATION RATE: 18 BRPM | BODY MASS INDEX: 32.92 KG/M2 | SYSTOLIC BLOOD PRESSURE: 113 MMHG | HEIGHT: 68 IN | OXYGEN SATURATION: 97 % | DIASTOLIC BLOOD PRESSURE: 73 MMHG | TEMPERATURE: 97.6 F | HEART RATE: 79 BPM

## 2021-02-17 DIAGNOSIS — K21.00 GASTROESOPHAGEAL REFLUX DISEASE WITH ESOPHAGITIS WITHOUT HEMORRHAGE: ICD-10-CM

## 2021-02-17 DIAGNOSIS — R10.13 EPIGASTRIC PAIN: ICD-10-CM

## 2021-02-17 DIAGNOSIS — R10.84 GENERALIZED ABDOMINAL PAIN: ICD-10-CM

## 2021-02-17 PROCEDURE — 25010000002 MIDAZOLAM PER 1 MG: Performed by: NURSE ANESTHETIST, CERTIFIED REGISTERED

## 2021-02-17 PROCEDURE — 43239 EGD BIOPSY SINGLE/MULTIPLE: CPT | Performed by: INTERNAL MEDICINE

## 2021-02-17 PROCEDURE — 25010000002 PROPOFOL 10 MG/ML EMULSION: Performed by: NURSE ANESTHETIST, CERTIFIED REGISTERED

## 2021-02-17 PROCEDURE — 45380 COLONOSCOPY AND BIOPSY: CPT | Performed by: INTERNAL MEDICINE

## 2021-02-17 RX ORDER — PROPOFOL 10 MG/ML
VIAL (ML) INTRAVENOUS AS NEEDED
Status: DISCONTINUED | OUTPATIENT
Start: 2021-02-17 | End: 2021-02-17 | Stop reason: SURG

## 2021-02-17 RX ORDER — MIDAZOLAM HYDROCHLORIDE 1 MG/ML
INJECTION INTRAMUSCULAR; INTRAVENOUS AS NEEDED
Status: DISCONTINUED | OUTPATIENT
Start: 2021-02-17 | End: 2021-02-17 | Stop reason: SURG

## 2021-02-17 RX ORDER — LIDOCAINE HYDROCHLORIDE 20 MG/ML
INJECTION, SOLUTION INTRAVENOUS AS NEEDED
Status: DISCONTINUED | OUTPATIENT
Start: 2021-02-17 | End: 2021-02-17 | Stop reason: SURG

## 2021-02-17 RX ORDER — DEXTROSE AND SODIUM CHLORIDE 5; .45 G/100ML; G/100ML
30 INJECTION, SOLUTION INTRAVENOUS CONTINUOUS PRN
Status: DISCONTINUED | OUTPATIENT
Start: 2021-02-17 | End: 2021-02-17 | Stop reason: HOSPADM

## 2021-02-17 RX ADMIN — PROPOFOL 100 MG: 10 INJECTION, EMULSION INTRAVENOUS at 12:31

## 2021-02-17 RX ADMIN — LIDOCAINE HYDROCHLORIDE 100 MG: 20 INJECTION, SOLUTION INTRAVENOUS at 12:31

## 2021-02-17 RX ADMIN — MIDAZOLAM HYDROCHLORIDE 2 MG: 2 INJECTION, SOLUTION INTRAMUSCULAR; INTRAVENOUS at 12:27

## 2021-02-17 RX ADMIN — DEXTROSE AND SODIUM CHLORIDE 30 ML/HR: 5; 450 INJECTION, SOLUTION INTRAVENOUS at 11:43

## 2021-02-17 RX ADMIN — PROPOFOL 60 MG: 10 INJECTION, EMULSION INTRAVENOUS at 12:38

## 2021-02-17 RX ADMIN — PROPOFOL 40 MG: 10 INJECTION, EMULSION INTRAVENOUS at 12:34

## 2021-02-17 NOTE — ANESTHESIA PREPROCEDURE EVALUATION
Anesthesia Evaluation     Patient summary reviewed and Nursing notes reviewed   NPO Solid Status: > 8 hours  NPO Liquid Status: > 4 hours           Airway   Mallampati: II  TM distance: >3 FB  Neck ROM: full  Dental - normal exam     Pulmonary - normal exam   (+) a smoker Former, asthma,  Cardiovascular - normal exam    (+) hyperlipidemia,       Neuro/Psych  (+) headaches, numbness,     GI/Hepatic/Renal/Endo    (+)  GERD poorly controlled,      Musculoskeletal     Abdominal  - normal exam   Substance History   (+) drug use      Comment: pastt cocaine use   OB/GYN          Other   arthritis,                      Anesthesia Plan    ASA 2     MAC     intravenous induction     Anesthetic plan, all risks, benefits, and alternatives have been provided, discussed and informed consent has been obtained with: patient.

## 2021-02-17 NOTE — ANESTHESIA POSTPROCEDURE EVALUATION
Patient: Aquiles Najera    Procedure Summary     Date: 02/17/21 Room / Location: [facility] ENDOSCOPY 1 / [facility] ENDOSCOPY    Anesthesia Start: 1230 Anesthesia Stop: 1241    Procedures:       ESOPHAGOGASTRODUODENOSCOPY (N/A )      COLONOSCOPY (N/A ) Diagnosis:       Generalized abdominal pain      Gastroesophageal reflux disease with esophagitis without hemorrhage      (Generalized abdominal pain [R10.84])      (Gastroesophageal reflux disease with esophagitis without hemorrhage [K21.00])    Surgeon: Moose Ruvalcaba MD Provider: Gaye Prater CRNA    Anesthesia Type: MAC ASA Status: 2          Anesthesia Type: MAC    Vitals  No vitals data found for the desired time range.          Post Anesthesia Care and Evaluation    Patient location during evaluation: bedside  Patient participation: complete - patient participated  Level of consciousness: sleepy but conscious  Pain score: 0  Pain management: adequate  Airway patency: patent  Anesthetic complications: No anesthetic complications  PONV Status: none  Cardiovascular status: hemodynamically stable  Respiratory status: spontaneous ventilation and room air  Hydration status: acceptable

## 2021-02-17 NOTE — H&P
No chief complaint on file.      Subjective    Aquiles Najera is a 45 y.o. male. he is here today for follow-up.    History of Present Illness  45-year-old male presents to discuss chronic abdominal pain heartburn and constipation.  States constipation is well controlled with regimen of MiraLAX and fiber started by PCP so he would like to continue that.  States he always has abdominal pain he had hernia surgery and has followed up and been cleared by surgeon that pain is not related to that.  Describes it as constant burning that gets worse at times.  He has tried PPI for symptom along with dietary modifications with no relief.  States he has never had EGD and colonoscopy in the past.  Plan; schedule patient for EGD and colonoscopy due to chronic abdominal pain and reflux.  Follow-up after test return office sooner if needed     The following portions of the patient's history were reviewed and updated as appropriate:   Past Medical History:   Diagnosis Date   • Abdominal pain    • Acute exacerbation of chronic obstructive airways disease (CMS/HCC)    • Allergic    • Allergic rhinitis    • Arthritis    • Asthma    • Chest pain     unspecified   • Chronic constipation    • Chronic cough    • Conjunctivitis    • Disease of thyroid gland    • Dyslipidemia    • Dyspnea    • Elevated cholesterol    • Gastroesophageal reflux disease    • Headache    • Pain     Pain in left ankle and joints of left foot      • Pain     pain in left leg   • Tobacco dependence syndrome    • Umbilical hernia without obstruction or gangrene    • Viral gastroenteritis      Past Surgical History:   Procedure Laterality Date   • INCISION AND DRAINAGE ABSCESS  06/09/2009    Irrigation and debridement of facial soft tissue injuries with closure of complex lacerations and repair of maxillary dental alveolar fracture   • SHOULDER ARTHROSCOPY     • UMBILICAL HERNIA REPAIR N/A 6/2/2017    Procedure: OPEN UMBILICAL AND EPIGASTRIC HERNIA REPAIR  WITH MESH;  Surgeon: Sal Herbert MD;  Location: [facility];  Service:      Family History   Problem Relation Age of Onset   • Stroke Mother    • Alcohol abuse Father    • Cirrhosis Father    • No Known Problems Sister    • No Known Problems Brother    • Anxiety disorder Son    • Coronary artery disease Neg Hx    • Hypertension Neg Hx    • Thyroid disease Neg Hx        Prior to Admission medications    Medication Sig Start Date End Date Taking? Authorizing Provider   albuterol sulfate  (90 Base) MCG/ACT inhaler Inhale 2 puffs Every 4 (Four) Hours As Needed for Wheezing.  Patient taking differently: Inhale 2 puffs Every 4 (Four) Hours As Needed for Wheezing. 4/18/19  Yes Jaylon Rg APRN   aspirin 81 MG chewable tablet Chew 1 tablet Daily. 2/2/18  Yes Margareth Romo MD   buPROPion XL (WELLBUTRIN XL) 150 MG 24 hr tablet TAKE ONE TABLET BY MOUTH DAILY 11/13/20  Yes Jaylon Rg APRN   butenafine (LOTRIMIN ULTRA) 1 % cream Apply  topically to the appropriate area as directed Daily. 11/11/19  Yes Jaylon Rg APRCHUNG   calcium polycarbophil (FIBERCON) 625 MG tablet Take 1 tablet by mouth Daily. 12/11/18  Yes Jaylon Rg APRN   [facility] FLUTICASONE PROPIONATE 50 MCG/ACT nasal spray PUT 1 SPRAY IN EACH NOSTRIL DAILY 2/11/19  Yes Jaylon Rg APRCHUNG   ipratropium-albuterol (DUONEB) 0.5-2.5 mg/3 ml nebulizer Take 3 mL by nebulization Every 4 (Four) Hours As Needed for Wheezing.  Patient taking differently: Take 3 mL by nebulization Every 4 (Four) Hours As Needed for Wheezing. 4/18/19  Yes Jaylon Rg APRN   loratadine (CLARITIN) 10 MG tablet TAKE 1 TABLET BY MOUTH DAILY. 1/10/19  Yes Margareth Romo MD   methIMAzole (TAPAZOLE) 5 MG tablet Take 0.5 tablets by mouth Daily. 11/9/20  Yes Jaylon Rg APRN   mometasone-formoterol (Dulera) 100-5 MCG/ACT inhaler Inhale 2 puffs 2 (Two) Times a Day. 7/21/20  Yes Jaylon Rg, ERIC   nitroglycerin  (NITROSTAT) 0.4 MG SL tablet Place 1 tablet under the tongue Every 5 (Five) Minutes As Needed for chest pain. Take no more than 3 doses in 15 minutes.  Patient taking differently: Place 1 tablet under the tongue Every 5 (Five) Minutes As Needed for chest pain. Take no more than 3 doses in 15 minutes. 11/29/16  Yes Dong Lozada MD   pantoprazole (PROTONIX) 40 MG EC tablet TAKE ONE TABLET BY MOUTH DAILY 11/13/20  Yes Jaylon Rg APRN   polyethylene glycol (MIRALAX) powder TAKE 17 G (1 CAPFUL) BY MOUTH DAILY. 1/10/19  Yes Jaylon Rg APRN   pravastatin (PRAVACHOL) 40 MG tablet TAKE ONE TABLET BY MOUTH DAILY 11/13/20  Yes Jaylon Rg APRN   triamcinolone (KENALOG) 0.1 % cream APPLY 1 APPLICATION TOPICALLY TO THE APPROPRIATE AREA AS DIRECTED 2 (TWO) TIMES A DAY. 5/13/19  Yes Jaylon Rg APRN     Allergies   Allergen Reactions   • Colace [Docusate] Hives     Social History     Socioeconomic History   • Marital status:      Spouse name: Not on file   • Number of children: Not on file   • Years of education: Not on file   • Highest education level: Not on file   Tobacco Use   • Smoking status: Former Smoker     Years: 20.00     Types: Cigarettes     Quit date: 03/2017     Years since quitting: 3.9   • Smokeless tobacco: Former User   Substance and Sexual Activity   • Alcohol use: No   • Drug use: No     Types: Cocaine(coke), Marijuana     Comment: 3/2017 last time   • Sexual activity: Defer       Review of Systems  Review of Systems   Constitutional: Positive for fatigue. Negative for activity change, appetite change, chills, diaphoresis, fever and unexpected weight change.   HENT: Negative for sore throat and trouble swallowing.    Respiratory: Negative for shortness of breath.    Gastrointestinal: Positive for abdominal distention, abdominal pain and constipation. Negative for anal bleeding, blood in stool, diarrhea, nausea, rectal pain and vomiting.   Musculoskeletal: Negative for  "arthralgias.   Skin: Negative for pallor.   Neurological: Negative for light-headedness.        Ht 172.7 cm (68\")   Wt 98.9 kg (218 lb)   BMI 33.15 kg/m²     Objective    Physical Exam  Constitutional:       General: He is not in acute distress.     Appearance: Normal appearance. He is well-developed.   HENT:      Head: Normocephalic and atraumatic.   Neck:      Musculoskeletal: Normal range of motion and neck supple.      Thyroid: No thyromegaly.   Cardiovascular:      Rate and Rhythm: Normal rate and regular rhythm.      Heart sounds: Normal heart sounds.   Pulmonary:      Effort: Pulmonary effort is normal.      Breath sounds: Normal breath sounds. No wheezing, rhonchi or rales.   Abdominal:      General: Bowel sounds are normal. There is no distension.      Palpations: Abdomen is soft. Abdomen is not rigid. There is no fluid wave.      Tenderness: There is generalized abdominal tenderness. There is no guarding.      Hernia: No hernia is present.   Lymphadenopathy:      Cervical: No cervical adenopathy.   Skin:     General: Skin is warm and dry.      Coloration: Skin is not pale.      Findings: No rash.   Neurological:      Mental Status: He is alert and oriented to person, place, and time.   Psychiatric:         Speech: Speech normal.         Behavior: Behavior is cooperative.       Lab on 11/07/2020   Component Date Value Ref Range Status   • TSH 11/07/2020 5.820* 0.270 - 4.200 uIU/mL Final   • Free T4 11/07/2020 1.07  0.93 - 1.70 ng/dL Final   • Total Cholesterol 11/07/2020 205* 0 - 200 mg/dL Final   • Triglycerides 11/07/2020 170* 0 - 150 mg/dL Final   • HDL Cholesterol 11/07/2020 54  40 - 60 mg/dL Final   • LDL Cholesterol  11/07/2020 121* 0 - 100 mg/dL Final   • VLDL Cholesterol 11/07/2020 30  5 - 40 mg/dL Final   • LDL/HDL Ratio 11/07/2020 2.17   Final   • Cortisol, Free Dialysis, LCMS 11/07/2020 1.01  ug/dL Final    These tests were developed and their performance  characteristics determined by [facility]. " They have not been  cleared or approved by the Food and Drug Administration.  Reference Range:  8 AM 0.10  - 1.20  4 PM 0.042 - 0.872   • Hemoglobin A1C 11/07/2020 5.67* 4.80 - 5.60 % Final     Assessment/Plan      No diagnosis found..     Orders placed during this encounter include:  Orders Placed This Encounter   Procedures   • Notify Anesthesia of Any Acute Changes in Patient Condition     Standing Status:   Standing     Number of Occurrences:   1     Order Specific Question:   Other     Answer:   Any Acute Changes in Patient Condition   • Notify Anesthesia for Unrelieved Pain     Standing Status:   Standing     Number of Occurrences:   1     Order Specific Question:   Other     Answer:   Unrelieved Pain   • Discharge patient from PACU when discharge criteria is met.     Standing Status:   Standing     Number of Occurrences:   1       ESOPHAGOGASTRODUODENOSCOPY (N/A), COLONOSCOPY (N/A)    Review and/or summary of lab tests, radiology, procedures, medications. Review and summary of old records and obtaining of history. The risks and benefits of my recommendations, as well as other treatment options were discussed with the patient today. Questions were answered.    No orders of the defined types were placed in this encounter.      Follow-up: No follow-ups on file.          This document has been electronically signed by Moose Ruvalcaba MD on February 17, 2021 11:34 CST             Results for orders placed or performed in visit on 02/14/21   COVID-19,APTIMA PANTHER,ANALILIA IN-HOUSE, NP/OP SWAB IN UTM/VTM/[facility] TRANSPORT MEDIA,24 HR TAT - Swab, Nasopharynx    Specimen: Nasopharynx; Swab   Result Value Ref Range    COVID19 Not Detected Not Detected - Ref. Range   Results for orders placed or performed in visit on 11/07/20   Cortisol, Free    Specimen: Blood   Result Value Ref Range    Cortisol, Free Dialysis, [facility] 1.01 ug/dL   TSH    Specimen: Blood   Result Value Ref Range    TSH 5.820 (H) 0.270 - 4.200 uIU/mL   T4,  Free    Specimen: Blood   Result Value Ref Range    Free T4 1.07 0.93 - 1.70 ng/dL   Hemoglobin A1c    Specimen: Blood   Result Value Ref Range    Hemoglobin A1C 5.67 (H) 4.80 - 5.60 %   Lipid Panel    Specimen: Blood   Result Value Ref Range    Total Cholesterol 205 (H) 0 - 200 mg/dL    Triglycerides 170 (H) 0 - 150 mg/dL    HDL Cholesterol 54 40 - 60 mg/dL    LDL Cholesterol  121 (H) 0 - 100 mg/dL    VLDL Cholesterol 30 5 - 40 mg/dL    LDL/HDL Ratio 2.17    Results for orders placed or performed in visit on 06/01/19   Urinalysis With Culture If Indicated - Urine, Clean Catch    Specimen: Urine, Clean Catch   Result Value Ref Range    Color, UA Yellow Yellow, Straw    Appearance, UA Clear Clear    pH, UA 7.5 5.0 - 8.0    Specific Gravity, UA 1.021 1.005 - 1.030    Glucose, UA Negative Negative    Ketones, UA Negative Negative    Bilirubin, UA Negative Negative    Blood, UA Negative Negative    Protein, UA Negative Negative    Leuk Esterase, UA Negative Negative    Nitrite, UA Negative Negative    Urobilinogen, UA 1.0 E.U./dL 0.2 - 1.0 E.U./dL   T3, free    Specimen: Blood   Result Value Ref Range    T3, Free 2.78 2.00 - 4.40 pg/mL   TSH    Specimen: Blood   Result Value Ref Range    TSH 5.550 (H) 0.270 - 4.200 mIU/mL   T4, Free    Specimen: Blood   Result Value Ref Range    Free T4 1.10 0.93 - 1.70 ng/dL   Hemoglobin A1c    Specimen: Blood   Result Value Ref Range    Hemoglobin A1C 5.39 4.80 - 5.60 %   Lipid Panel    Specimen: Blood   Result Value Ref Range    Total Cholesterol 202 (H) 0 - 200 mg/dL    Triglycerides 263 (H) 0 - 150 mg/dL    HDL Cholesterol 48 40 - 60 mg/dL    LDL Cholesterol  101 (H) 0 - 100 mg/dL    VLDL Cholesterol 52.6 (H) 5 - 40 mg/dL    LDL/HDL Ratio 2.11    Comprehensive Metabolic Panel    Specimen: Blood   Result Value Ref Range    Glucose 96 65 - 99 mg/dL    BUN 12 6 - 20 mg/dL    Creatinine 1.09 0.76 - 1.27 mg/dL    Sodium 138 136 - 145 mmol/L    Potassium 4.8 3.5 - 5.2 mmol/L    Chloride  "101 98 - 107 mmol/L    CO2 23.1 22.0 - 29.0 mmol/L    Calcium 9.1 8.6 - 10.5 mg/dL    Total Protein 7.1 6.0 - 8.5 g/dL    Albumin 4.20 3.50 - 5.20 g/dL    ALT (SGPT) 28 1 - 41 U/L    AST (SGOT) 32 1 - 40 U/L    Alkaline Phosphatase 77 39 - 117 U/L    Total Bilirubin 0.5 0.2 - 1.2 mg/dL    eGFR Non African Amer 75 >60 mL/min/1.73    Globulin 2.9 gm/dL    A/G Ratio 1.4 g/dL    BUN/Creatinine Ratio 11.0 7.0 - 25.0    Anion Gap 13.9 mmol/L   Results for orders placed or performed in visit on 03/22/19   Tissue Pathology Exam    Specimen: Back, Middle; Tissue   Result Value Ref Range    Case Report       Surgical Pathology Report                         Case: [id number]                                  Authorizing Provider:  Norberto Harrell MD           Collected:           03/22/2019 10:52 AM          Ordering Location:     [facility]     Received:            03/22/2019 01:36 PM                                 [facility] SURGERY                                                        Pathologist:           Jesus Dias MD                                                         Specimen:    Back, Middle                                                                               Final Diagnosis       MASS, MIDDLE BACK:   ANGIOLIPOMA.       Gross Description       The container is labeled \"middle back\" and has an ovoid yellow mass measuring 3.0 x 2.0 x 2.0 cm.  The cut surfaces are yellow.  Part of the specimen is embedded as 1A and 1B.       Results for orders placed or performed in visit on 02/08/19   PSA Screen    Specimen: Blood   Result Value Ref Range    PSA 0.920 0.000 - 4.000 ng/mL   Urinalysis With Culture If Indicated - Urine, Clean Catch    Specimen: Urine, Clean Catch   Result Value Ref Range    Color, UA Yellow Yellow, Straw, Dark Yellow, Kirsten    Appearance, UA Clear Clear    pH, UA 6.5 5.0 - 9.0    Specific Gravity, UA 1.009 1.003 - 1.030    Glucose, UA Negative Negative    Ketones, UA " Negative Negative    Bilirubin, UA Negative Negative    Blood, UA Negative Negative    Protein, UA Negative Negative    Leuk Esterase, UA Negative Negative    Nitrite, UA Negative Negative    Urobilinogen, UA 0.2 E.U./dL 0.2 - 1.0 E.U./dL   CBC Auto Differential    Specimen: Blood   Result Value Ref Range    WBC 7.13 3.20 - 9.80 10*3/mm3    RBC 5.68 4.37 - 5.74 10*6/mm3    Hemoglobin 16.2 13.7 - 17.3 g/dL    Hematocrit 46.2 39.0 - 49.0 %    MCV 81.3 80.0 - 98.0 fL    MCH 28.5 26.5 - 34.0 pg    MCHC 35.1 31.5 - 36.3 g/dL    RDW 13.1 11.5 - 14.5 %    RDW-SD 39.2 35.1 - 43.9 fl    MPV 11.0 8.0 - 12.0 fL    Platelets 232 150 - 450 10*3/mm3    Neutrophil % 62.8 37.0 - 80.0 %    Lymphocyte % 30.6 10.0 - 50.0 %    Monocyte % 5.8 0.0 - 12.0 %    Eosinophil % 0.1 0.0 - 7.0 %    Basophil % 0.3 0.0 - 2.0 %    Immature Grans % 0.4 0.0 - 0.5 %    Neutrophils, Absolute 4.48 2.00 - 8.60 10*3/mm3    Lymphocytes, Absolute 2.18 0.60 - 4.20 10*3/mm3    Monocytes, Absolute 0.41 0.00 - 0.90 10*3/mm3    Eosinophils, Absolute 0.01 0.00 - 0.70 10*3/mm3    Basophils, Absolute 0.02 0.00 - 0.20 10*3/mm3    Immature Grans, Absolute 0.03 (H) 0.00 - 0.02 10*3/mm3   TSH    Specimen: Blood   Result Value Ref Range    TSH 4.120 0.460 - 4.680 mIU/mL   Magnesium    Specimen: Blood   Result Value Ref Range    Magnesium 2.3 1.6 - 2.3 mg/dL   Comprehensive Metabolic Panel    Specimen: Blood   Result Value Ref Range    Glucose 71 60 - 100 mg/dL    BUN 17 7 - 21 mg/dL    Creatinine 1.01 0.70 - 1.30 mg/dL    Sodium 139 137 - 145 mmol/L    Potassium 4.2 3.5 - 5.1 mmol/L    Chloride 104 95 - 110 mmol/L    CO2 25.0 22.0 - 31.0 mmol/L    Calcium 9.9 8.4 - 10.2 mg/dL    Total Protein 7.8 6.3 - 8.6 g/dL    Albumin 5.00 (H) 3.40 - 4.80 g/dL    ALT (SGPT) 26 21 - 72 U/L    AST (SGOT) 25 17 - 59 U/L    Alkaline Phosphatase 87 38 - 126 U/L    Total Bilirubin 0.4 0.2 - 1.3 mg/dL    eGFR Non  Amer 81 63 - 147 mL/min/1.73    Globulin 2.8 2.3 - 3.5 gm/dL    A/G  Ratio 1.8 1.1 - 1.8 g/dL    BUN/Creatinine Ratio 16.8 7.0 - 25.0    Anion Gap 10.0 5.0 - 15.0 mmol/L     *Note: Due to a large number of results and/or encounters for the requested time period, some results have not been displayed. A complete set of results can be found in Results Review.

## 2021-02-22 LAB
LAB AP CASE REPORT: NORMAL
PATH REPORT.FINAL DX SPEC: NORMAL

## 2021-02-23 ENCOUNTER — TELEPHONE (OUTPATIENT)
Dept: FAMILY MEDICINE CLINIC | Facility: CLINIC | Age: 46
End: 2021-02-23

## 2021-02-23 NOTE — TELEPHONE ENCOUNTER
PATIENT CALLED AND STATED THAT HE WOULD LIKE TO SPEAK TO PROVIDER ABOUT PROCEDURE HE HAD DONE RECNTLY. NO FURTHER INFO GIVEN.     PLEASE ADVISE    CALLBACK: [phone]

## 2021-02-24 NOTE — TELEPHONE ENCOUNTER
Patient called regarding results from colonoscopy/biopsies.  Patient states he had called GI to get results and was told to call his family provider.

## 2021-02-25 ENCOUNTER — TELEPHONE (OUTPATIENT)
Dept: GASTROENTEROLOGY | Facility: CLINIC | Age: 46
End: 2021-02-25

## 2021-02-25 NOTE — TELEPHONE ENCOUNTER
02/25/2021, 1552 - Patient telephoned per this staff member [phone].  Zero answer.  Voice message submitted on patient's self identified answering machine with request to contact office in order to reschedule clinical appointment with ERIC Quarles regarding EGD/Colonoscopy results.      Note:  Patient cancelled clinical appointment with ERIC Quarles scheduled on Wednesday February 24, 2021 at 3:30 P.M. regarding Endoscopy results.

## 2021-02-25 NOTE — TELEPHONE ENCOUNTER
02/25/2021, 1552 - Patient telephoned per this staff member [phone].  Zero answer.  Voice message submitted on patient's self identified answering machine with request to contact office in order to reschedule clinical appointment with ERIC Quarles regarding EGD/Colonoscopy results.      Note:  Patient cancelled clinical appointment with ERIC Quarles scheduled on Wednesday February 24, 2021 at 3:30 P.M. regarding Endoscopy results.

## 2021-03-03 ENCOUNTER — TELEPHONE (OUTPATIENT)
Dept: FAMILY MEDICINE CLINIC | Facility: CLINIC | Age: 46
End: 2021-03-03

## 2021-03-03 NOTE — TELEPHONE ENCOUNTER
Please send refills to [facility] in [place].    pravastatin (PRAVACHOL) 40 MG tablet    buPROPion XL (WELLBUTRIN XL) 150 MG 24 hr tablet    methIMAzole (TAPAZOLE) 5 MG tablet    pantoprazole (PROTONIX) 40 MG EC tablet (still getting heartburn, would like to talk to APRN)    Urgent request

## 2021-03-04 RX ORDER — BUPROPION HYDROCHLORIDE 150 MG/1
150 TABLET ORAL DAILY
Qty: 90 TABLET | Refills: 1 | Status: SHIPPED | OUTPATIENT
Start: 2021-03-04 | End: 2021-08-03

## 2021-03-04 RX ORDER — METHIMAZOLE 5 MG/1
2.5 TABLET ORAL DAILY
Qty: 90 TABLET | Refills: 1 | Status: SHIPPED | OUTPATIENT
Start: 2021-03-04 | End: 2021-08-03

## 2021-03-04 RX ORDER — PRAVASTATIN SODIUM 40 MG
40 TABLET ORAL DAILY
Qty: 90 TABLET | Refills: 1 | Status: SHIPPED | OUTPATIENT
Start: 2021-03-04 | End: 2021-08-03

## 2021-03-04 RX ORDER — PANTOPRAZOLE SODIUM 40 MG/1
40 TABLET, DELAYED RELEASE ORAL DAILY
Qty: 90 TABLET | Refills: 1 | Status: SHIPPED | OUTPATIENT
Start: 2021-03-04 | End: 2021-08-03

## 2021-04-01 ENCOUNTER — TELEPHONE (OUTPATIENT)
Dept: FAMILY MEDICINE CLINIC | Facility: CLINIC | Age: 46
End: 2021-04-01

## 2021-04-01 DIAGNOSIS — J44.9 CHRONIC OBSTRUCTIVE PULMONARY DISEASE, UNSPECIFIED COPD TYPE (HCC): ICD-10-CM

## 2021-04-01 RX ORDER — IPRATROPIUM BROMIDE AND ALBUTEROL SULFATE 2.5; .5 MG/3ML; MG/3ML
3 SOLUTION RESPIRATORY (INHALATION) EVERY 4 HOURS PRN
Qty: 360 ML | Refills: 3 | Status: SHIPPED | OUTPATIENT
Start: 2021-04-01 | End: 2021-08-24 | Stop reason: ALTCHOICE

## 2021-04-01 NOTE — TELEPHONE ENCOUNTER
Pt called to request 90 day supply of     ipratropium-albuterol (DUONEB) 0.5-2.5 mg/3 ml nebulizer    [place] [id number] - [place], [place] - [facility] [address] AT [facility] [address] - [phone] [facility] [phone] FX    Would like 90 day supply of all med Rx when refilling please.

## 2021-05-03 ENCOUNTER — TELEPHONE (OUTPATIENT)
Dept: FAMILY MEDICINE CLINIC | Facility: CLINIC | Age: 46
End: 2021-05-03

## 2021-05-03 NOTE — TELEPHONE ENCOUNTER
Please call patient about a new medication and a referral.  Pt would not tell me the name of medication nor what type of referral.

## 2021-05-04 NOTE — TELEPHONE ENCOUNTER
Patient states his Physical Therapy recommends him to get a referral to a Neurosurgeon (prefer in [place]) and a muscle relaxer.

## 2021-05-05 DIAGNOSIS — M54.12 CERVICAL RADICULITIS: Primary | ICD-10-CM

## 2021-05-05 RX ORDER — TIZANIDINE 4 MG/1
4 TABLET ORAL NIGHTLY PRN
Qty: 30 TABLET | Refills: 3 | Status: SHIPPED | OUTPATIENT
Start: 2021-05-05 | End: 2021-05-19

## 2021-05-05 NOTE — TELEPHONE ENCOUNTER
Patient was called to let him know referral was placed for neurosurgery and prescription sent to pharmacy.  Instructed patient to not take medications with any other sedating medications or alcohol.  Instructed to not drive, work, or operated machinery while taking as medication may cause drowsiness. Patient verbalized understanding.

## 2021-05-05 NOTE — TELEPHONE ENCOUNTER
Neurosurgery referral placed.  Zanaflex 4 mg p.o. nightly sent to pharmacy.  Do not take with other sedating medications or alcohol.  Do not work, drive or operate machinery while taking this medication as it may cause drowsiness.

## 2021-05-19 ENCOUNTER — OFFICE VISIT (OUTPATIENT)
Dept: FAMILY MEDICINE CLINIC | Facility: CLINIC | Age: 46
End: 2021-05-19

## 2021-05-19 VITALS
SYSTOLIC BLOOD PRESSURE: 140 MMHG | OXYGEN SATURATION: 97 % | TEMPERATURE: 98.3 F | HEIGHT: 68 IN | WEIGHT: 217.8 LBS | BODY MASS INDEX: 33.01 KG/M2 | HEART RATE: 87 BPM | DIASTOLIC BLOOD PRESSURE: 90 MMHG

## 2021-05-19 DIAGNOSIS — F51.01 PRIMARY INSOMNIA: ICD-10-CM

## 2021-05-19 DIAGNOSIS — M54.12 CERVICAL RADICULITIS: ICD-10-CM

## 2021-05-19 DIAGNOSIS — J44.9 CHRONIC OBSTRUCTIVE PULMONARY DISEASE, UNSPECIFIED COPD TYPE (HCC): Primary | ICD-10-CM

## 2021-05-19 PROCEDURE — 99214 OFFICE O/P EST MOD 30 MIN: CPT | Performed by: NURSE PRACTITIONER

## 2021-05-19 RX ORDER — GABAPENTIN 300 MG/1
300 CAPSULE ORAL 3 TIMES DAILY
COMMUNITY
Start: 2021-03-02 | End: 2021-05-19 | Stop reason: HOSPADM

## 2021-05-19 RX ORDER — TIZANIDINE 4 MG/1
4 TABLET ORAL 2 TIMES DAILY PRN
Qty: 60 TABLET | Refills: 3 | Status: SHIPPED | OUTPATIENT
Start: 2021-05-19 | End: 2021-08-24 | Stop reason: ALTCHOICE

## 2021-05-19 RX ORDER — TRAZODONE HYDROCHLORIDE 50 MG/1
50 TABLET ORAL NIGHTLY
Qty: 30 TABLET | Refills: 5 | Status: SHIPPED | OUTPATIENT
Start: 2021-05-19 | End: 2021-08-03

## 2021-05-19 RX ORDER — BUDESONIDE AND FORMOTEROL FUMARATE DIHYDRATE 80; 4.5 UG/1; UG/1
2 AEROSOL RESPIRATORY (INHALATION)
Qty: 10.2 G | Refills: 12
Start: 2021-05-19 | End: 2021-08-24 | Stop reason: ALTCHOICE

## 2021-05-19 NOTE — PROGRESS NOTES
Subjective   Aquiles Najera is a 45 y.o. male.     CC: COPD, neck pain, insomnia    Insomnia  This is a chronic problem. The current episode started more than 1 year ago. The problem occurs daily. The problem has been gradually worsening. Associated symptoms include neck pain. Pertinent negatives include no abdominal pain, arthralgias, chest pain, chills, congestion, coughing, fatigue, fever, headaches, myalgias, nausea, numbness, rash, sore throat, visual change, vomiting or weakness. The symptoms are aggravated by stress. Treatments tried: Melatonin. The treatment provided no relief.   COPD  There is no chest tightness, cough, difficulty breathing, frequent throat clearing, hemoptysis, hoarse voice, shortness of breath, sputum production or wheezing. This is a chronic problem. The current episode started more than 1 year ago. The problem occurs intermittently. The problem has been gradually improving. Pertinent negatives include no appetite change, chest pain, dyspnea on exertion, fever, headaches, malaise/fatigue, myalgias, orthopnea, sore throat, trouble swallowing or weight loss. His symptoms are aggravated by strenuous activity and URI. His symptoms are alleviated by rest and beta-agonist. He reports significant improvement on treatment. His past medical history is significant for COPD.   Neck Pain   This is a chronic problem. The current episode started more than 1 year ago. The problem occurs daily. The problem has been unchanged. The pain is associated with nothing. The pain is present in the left side and right side. The quality of the pain is described as aching. The pain is at a severity of 2/10. The pain is mild. The symptoms are aggravated by twisting, bending and position. The pain is same all the time. Stiffness is present all day. Pertinent negatives include no chest pain, fever, headaches, leg pain, numbness, pain with swallowing, paresis, syncope, tingling, trouble swallowing, visual  change, weakness or weight loss. He has tried NSAIDs, neck support, muscle relaxants, home exercises and acetaminophen for the symptoms. The treatment provided moderate relief.        The following portions of the patient's history were reviewed and updated as appropriate: allergies, current medications, past family history, past medical history, past social history, past surgical history and problem list.    Review of Systems   Constitutional: Negative for activity change, appetite change, chills, fatigue, fever, malaise/fatigue, unexpected weight gain and unexpected weight loss.   HENT: Negative for congestion, hoarse voice, sore throat, trouble swallowing and voice change.    Eyes: Negative.    Respiratory: Negative for cough, hemoptysis, sputum production, chest tightness, shortness of breath and wheezing.    Cardiovascular: Negative for chest pain, dyspnea on exertion, palpitations, leg swelling and syncope.   Gastrointestinal: Negative for abdominal pain, diarrhea, nausea and vomiting.   Endocrine: Negative.    Genitourinary: Negative for dysuria, hematuria and urgency.   Musculoskeletal: Positive for neck pain and neck stiffness. Negative for arthralgias and myalgias.   Skin: Negative for rash.   Neurological: Negative for dizziness, tingling, weakness, light-headedness, numbness and headache.   Hematological: Negative.    Psychiatric/Behavioral: Positive for sleep disturbance and stress. Negative for agitation, suicidal ideas and depressed mood. The patient is nervous/anxious and has insomnia.        Objective   Physical Exam  Vitals and nursing note reviewed.   Constitutional:       General: He is not in acute distress.     Appearance: Normal appearance. He is well-developed and normal weight. He is not ill-appearing, toxic-appearing or diaphoretic.   HENT:      Head: Normocephalic and atraumatic.   Eyes:      Conjunctiva/sclera: Conjunctivae normal.   Cardiovascular:      Rate and Rhythm: Normal rate and  regular rhythm.      Heart sounds: Normal heart sounds. No murmur heard.   No friction rub. No gallop.    Pulmonary:      Effort: Pulmonary effort is normal. No respiratory distress.      Breath sounds: Normal breath sounds. No stridor. No wheezing, rhonchi or rales.   Abdominal:      General: Bowel sounds are normal. There is no distension.      Palpations: Abdomen is soft. There is no mass.      Tenderness: There is no abdominal tenderness. There is no guarding or rebound.      Hernia: No hernia is present.   Musculoskeletal:         General: No tenderness. Normal range of motion.      Cervical back: Normal range of motion. Muscular tenderness present.   Skin:     General: Skin is warm and dry.      Coloration: Skin is not pale.      Findings: No erythema or rash.   Neurological:      Mental Status: He is alert and oriented to person, place, and time.   Psychiatric:         Attention and Perception: Attention and perception normal.         Mood and Affect: Affect normal. Mood is anxious.         Speech: Speech normal.         Behavior: Behavior normal. Behavior is cooperative.         Thought Content: Thought content normal. Thought content does not include homicidal or suicidal ideation.         Cognition and Memory: Cognition and memory normal.         Judgment: Judgment normal.           Assessment/Plan   Diagnoses and all orders for this visit:    1. Chronic obstructive pulmonary disease, unspecified COPD type (CMS/HCC) (Primary)  -COPD well-controlled with no increased cough, shortness of breath or albuterol usage.  Patient currently receiving Dulera through pharmaceutical company.  Patient states Dulera through the company will be discontinued soon and needs transition to a new inhaler.  Patient cannot tolerate powder inhalers because it causes shortness of breath and a choking sensation.  We will transition to Symbicort 80-4.5 mcg per ACT 2 puffs twice daily.  Will initiate contact with [employer]  company for cost assistance.       -budesonide-formoterol (Symbicort) 80-4.5 MCG/ACT inhaler; Inhale 2 puffs 2 (Two) Times a Day.  Dispense: 10.2 g; Refill: 12    2. Primary insomnia  -Patient has been on melatonin for more than a year and has become ineffective.  We will start a trial of trazodone 50 mg p.o. nightly.  Instructed patient not to take with other sedating medications or alcohol.  Instructed not to work drive or operate machinery while taking this medication.  Instructed to take 8 to 10 hours before planned awakening.  Discontinue if any adverse side effects are experienced.  Patient verbalized understanding of instruction.        - traZODone (DESYREL) 50 MG tablet; Take 1 tablet by mouth Every Night.  Dispense: 30 tablet; Refill: 5    3. Cervical radiculitis  -     Patient reports slight increase in daytime neck pain.  Will increase Zanaflex to 1 tablet twice daily.  Patient denies any symptoms of drowsiness when taking Zanaflex.  Instructed patient to take with caution.  -tiZANidine (ZANAFLEX) 4 MG tablet; Take 1 tablet by mouth 2 (Two) Times a Day As Needed for Muscle Spasms.  Dispense: 60 tablet; Refill: 3    4.  Follow-up as scheduled or sooner for any acute needs.          This document has been electronically signed by ERIC Oleary on May 19, 2021 11:34 CDT

## 2021-07-13 ENCOUNTER — TELEPHONE (OUTPATIENT)
Dept: FAMILY MEDICINE CLINIC | Facility: CLINIC | Age: 46
End: 2021-07-13

## 2021-07-13 NOTE — TELEPHONE ENCOUNTER
Mr. Najera called needing to speak with you today about his FMLA and the referral to a Neurologist for his neck pain. Please call back @ [phone]

## 2021-07-14 NOTE — TELEPHONE ENCOUNTER
We will need some additional information for FMLA.  Is it going to be a temporary continuous pain or diseases need days off for doctor's appointments or a few days a month when the pain gets bad?

## 2021-07-14 NOTE — TELEPHONE ENCOUNTER
Patient states he needs a referral to [facility] in [place].  He states the Dry Needling is not working for his neck and is getting worse.  He is applying for [facility] and will have his mother to bring paperwork.    Please advise,  Thanks so much.

## 2021-07-15 NOTE — TELEPHONE ENCOUNTER
FMLA is for intermediate leave for doctor appointments and when pain is bad enough he has to miss work.

## 2021-07-19 ENCOUNTER — TELEPHONE (OUTPATIENT)
Dept: FAMILY MEDICINE CLINIC | Facility: CLINIC | Age: 46
End: 2021-07-19

## 2021-07-19 DIAGNOSIS — M54.12 CERVICAL RADICULITIS: Primary | ICD-10-CM

## 2021-07-19 NOTE — TELEPHONE ENCOUNTER
Patient needs the referral sent to Dr. Lowell Edwards neurology in [place].   Patient was informed that the [facility] paperwork was faxed to Angie on 07/13/2021.

## 2021-07-19 NOTE — TELEPHONE ENCOUNTER
Pt calling to see if that [employer] form have been submitted. Pt said it is due by 7/22/21. Please call the patient back at [phone]      Pt is asking about a referral to see dr. Rohit vidal in [place] as well

## 2021-08-02 ENCOUNTER — TELEPHONE (OUTPATIENT)
Dept: FAMILY MEDICINE CLINIC | Facility: CLINIC | Age: 46
End: 2021-08-02

## 2021-08-02 ENCOUNTER — LAB (OUTPATIENT)
Dept: LAB | Facility: HOSPITAL | Age: 46
End: 2021-08-02

## 2021-08-02 ENCOUNTER — OFFICE VISIT (OUTPATIENT)
Dept: FAMILY MEDICINE CLINIC | Facility: CLINIC | Age: 46
End: 2021-08-02

## 2021-08-02 VITALS
OXYGEN SATURATION: 96 % | SYSTOLIC BLOOD PRESSURE: 140 MMHG | HEART RATE: 64 BPM | RESPIRATION RATE: 20 BRPM | TEMPERATURE: 96.3 F | BODY MASS INDEX: 33.07 KG/M2 | DIASTOLIC BLOOD PRESSURE: 96 MMHG | WEIGHT: 218.2 LBS | HEIGHT: 68 IN

## 2021-08-02 DIAGNOSIS — M43.02 CERVICAL SPONDYLOLYSIS: Primary | ICD-10-CM

## 2021-08-02 DIAGNOSIS — T14.8XXA BRUISING: ICD-10-CM

## 2021-08-02 DIAGNOSIS — M50.30 DDD (DEGENERATIVE DISC DISEASE), CERVICAL: ICD-10-CM

## 2021-08-02 DIAGNOSIS — E05.90 HYPERTHYROIDISM: ICD-10-CM

## 2021-08-02 LAB
AMPHET+METHAMPHET UR QL: NEGATIVE
AMPHETAMINES UR QL: NEGATIVE
BARBITURATES UR QL SCN: NEGATIVE
BENZODIAZ UR QL SCN: NEGATIVE
BUPRENORPHINE SERPL-MCNC: NEGATIVE NG/ML
CANNABINOIDS SERPL QL: NEGATIVE
COCAINE UR QL: NEGATIVE
METHADONE UR QL SCN: NEGATIVE
OPIATES UR QL: NEGATIVE
OXYCODONE UR QL SCN: NEGATIVE
PCP UR QL SCN: NEGATIVE
PROPOXYPH UR QL: NEGATIVE
TRICYCLICS UR QL SCN: NEGATIVE

## 2021-08-02 PROCEDURE — 85025 COMPLETE CBC W/AUTO DIFF WBC: CPT

## 2021-08-02 PROCEDURE — 80306 DRUG TEST PRSMV INSTRMNT: CPT | Performed by: NURSE PRACTITIONER

## 2021-08-02 PROCEDURE — 36415 COLL VENOUS BLD VENIPUNCTURE: CPT

## 2021-08-02 PROCEDURE — 99214 OFFICE O/P EST MOD 30 MIN: CPT | Performed by: NURSE PRACTITIONER

## 2021-08-02 PROCEDURE — 84443 ASSAY THYROID STIM HORMONE: CPT

## 2021-08-02 PROCEDURE — 84439 ASSAY OF FREE THYROXINE: CPT

## 2021-08-02 NOTE — PROGRESS NOTES
Subjective   Aquiles Najera is a 45 y.o. male.     CC: Chronic neck pain    Neck Pain   This is a chronic problem. The current episode started more than 1 year ago. The problem occurs constantly. The problem has been rapidly worsening. The pain is associated with nothing. The pain is present in the left side, right side and midline. The quality of the pain is described as aching. The pain is at a severity of 10/10. The pain is severe. The symptoms are aggravated by twisting, stress, position and bending. The pain is same all the time. Stiffness is present all day. Associated symptoms include tingling. Pertinent negatives include no chest pain, fever, headaches, leg pain, numbness, pain with swallowing, paresis, photophobia, syncope, trouble swallowing, visual change, weakness or weight loss. He has tried NSAIDs, neck support, muscle relaxants, home exercises and heat (dry needling, gabapentin, TENS unit) for the symptoms.        The following portions of the patient's history were reviewed and updated as appropriate: allergies, current medications, past family history, past medical history, past social history, past surgical history and problem list.    Review of Systems   Constitutional: Negative for activity change, appetite change, chills, diaphoresis, fatigue, fever, unexpected weight gain and unexpected weight loss.   HENT: Negative for congestion, sore throat, trouble swallowing and voice change.    Eyes: Negative for blurred vision, double vision, photophobia, pain and visual disturbance.   Respiratory: Negative for cough, chest tightness, shortness of breath and wheezing.    Cardiovascular: Negative for chest pain, palpitations, leg swelling and syncope.   Gastrointestinal: Negative for abdominal distention, abdominal pain, anal bleeding, blood in stool, constipation, diarrhea, nausea, vomiting, GERD and indigestion.   Endocrine: Negative for cold intolerance, heat intolerance, polydipsia, polyphagia  and polyuria.   Genitourinary: Negative for dysuria, hematuria and urgency.   Musculoskeletal: Positive for neck pain and neck stiffness. Negative for back pain and myalgias.   Skin: Negative for rash.   Allergic/Immunologic: Negative.    Neurological: Positive for tingling. Negative for dizziness, syncope, weakness, light-headedness, numbness and headache.   Hematological: Negative.    Psychiatric/Behavioral: Negative for depressed mood.       Objective   Physical Exam  Vitals and nursing note reviewed.   Constitutional:       General: He is not in acute distress.     Appearance: Normal appearance. He is well-developed and normal weight. He is not ill-appearing, toxic-appearing or diaphoretic.   HENT:      Head: Normocephalic and atraumatic.      Right Ear: External ear normal.      Left Ear: External ear normal.      Nose: Nose normal.   Eyes:      Conjunctiva/sclera: Conjunctivae normal.      Pupils: Pupils are equal, round, and reactive to light.   Neck:      Thyroid: No thyromegaly.      Trachea: No tracheal deviation.   Pulmonary:      Effort: Pulmonary effort is normal. No respiratory distress.   Musculoskeletal:         General: No tenderness.      Cervical back: Neck supple. Pain with movement, spinous process tenderness and muscular tenderness present. Decreased range of motion.   Lymphadenopathy:      Cervical: No cervical adenopathy.   Skin:     General: Skin is warm and dry.      Coloration: Skin is not pale.      Findings: No erythema or rash.   Neurological:      Mental Status: He is alert and oriented to person, place, and time.      Cranial Nerves: No cranial nerve deficit.      Coordination: Coordination normal.   Psychiatric:         Mood and Affect: Mood normal.         Behavior: Behavior normal.         Thought Content: Thought content normal.         Judgment: Judgment normal.           Assessment/Plan   Diagnoses and all orders for this visit:    1. Cervical spondylolysis (Primary)  -      Urine Drug Screen - Urine, Clean Catch  -     pregabalin (Lyrica) 25 MG capsule; Take 1 capsule by mouth 2 (Two) Times a Day.  Dispense: 60 capsule; Refill: 0  -     traMADol (ULTRAM) 50 MG tablet; Take 1 tablet by mouth Every 8 (Eight) Hours As Needed for Moderate Pain .  Dispense: 90 tablet; Refill: 0  -     Ambulatory Referral to Pain Management   -Andrei reviewed and appropriate, urine drug screen appropriate.  Controlled substance contract signed.  Lyrica 25 mg 1 capsule p.o. twice daily as needed.  Tramadol 50 mg 1 tablet by mouth every 8 hours as needed for pain.  Instructed patient not to take either medication with other sedating medications or alcohol.  Do not work, drive or operate machinery while taking these medications.  Do not take trazodone with tramadol.  Patient verbalized understanding of instruction.  We will continue to monitor.  Follow-up with pain management as scheduled.    2. DDD (degenerative disc disease), cervical  -     Urine Drug Screen - Urine, Clean Catch  -     pregabalin (Lyrica) 25 MG capsule; Take 1 capsule by mouth 2 (Two) Times a Day.  Dispense: 60 capsule; Refill: 0  -     traMADol (ULTRAM) 50 MG tablet; Take 1 tablet by mouth Every 8 (Eight) Hours As Needed for Moderate Pain .  Dispense: 90 tablet; Refill: 0  -     Ambulatory Referral to Pain Management   -Plan of care stated above #1.    3.  Follow-up in 3 months or sooner for any acute needs.            This document has been electronically signed by ERIC Oleary on August 3, 2021 08:04 CDT

## 2021-08-02 NOTE — TELEPHONE ENCOUNTER
PT called this morning and ask that you please give him a call. He has not been able to work for a couple of days due to his neck and needs help with it.     [phone]

## 2021-08-03 ENCOUNTER — TELEPHONE (OUTPATIENT)
Dept: FAMILY MEDICINE CLINIC | Facility: CLINIC | Age: 46
End: 2021-08-03

## 2021-08-03 DIAGNOSIS — E05.90 HYPERTHYROIDISM: Primary | ICD-10-CM

## 2021-08-03 LAB
BASOPHILS # BLD AUTO: 0.05 10*3/MM3 (ref 0–0.2)
BASOPHILS NFR BLD AUTO: 0.8 % (ref 0–1.5)
DEPRECATED RDW RBC AUTO: 39.4 FL (ref 37–54)
EOSINOPHIL # BLD AUTO: 0.01 10*3/MM3 (ref 0–0.4)
EOSINOPHIL NFR BLD AUTO: 0.2 % (ref 0.3–6.2)
ERYTHROCYTE [DISTWIDTH] IN BLOOD BY AUTOMATED COUNT: 13.2 % (ref 12.3–15.4)
HCT VFR BLD AUTO: 45.1 % (ref 37.5–51)
HGB BLD-MCNC: 14.9 G/DL (ref 13–17.7)
IMM GRANULOCYTES # BLD AUTO: 0.04 10*3/MM3 (ref 0–0.05)
IMM GRANULOCYTES NFR BLD AUTO: 0.7 % (ref 0–0.5)
LYMPHOCYTES # BLD AUTO: 1.88 10*3/MM3 (ref 0.7–3.1)
LYMPHOCYTES NFR BLD AUTO: 31.6 % (ref 19.6–45.3)
MCH RBC QN AUTO: 27.3 PG (ref 26.6–33)
MCHC RBC AUTO-ENTMCNC: 33 G/DL (ref 31.5–35.7)
MCV RBC AUTO: 82.8 FL (ref 79–97)
MONOCYTES # BLD AUTO: 0.49 10*3/MM3 (ref 0.1–0.9)
MONOCYTES NFR BLD AUTO: 8.2 % (ref 5–12)
NEUTROPHILS NFR BLD AUTO: 3.48 10*3/MM3 (ref 1.7–7)
NEUTROPHILS NFR BLD AUTO: 58.5 % (ref 42.7–76)
NRBC BLD AUTO-RTO: 0 /100 WBC (ref 0–0.2)
PLATELET # BLD AUTO: 274 10*3/MM3 (ref 140–450)
PMV BLD AUTO: 11.5 FL (ref 6–12)
RBC # BLD AUTO: 5.45 10*6/MM3 (ref 4.14–5.8)
T4 FREE SERPL-MCNC: 1.05 NG/DL (ref 0.93–1.7)
TSH SERPL DL<=0.05 MIU/L-ACNC: 8.12 UIU/ML (ref 0.27–4.2)
WBC # BLD AUTO: 5.95 10*3/MM3 (ref 3.4–10.8)

## 2021-08-03 RX ORDER — PRAVASTATIN SODIUM 40 MG
TABLET ORAL
Qty: 90 TABLET | Refills: 1 | Status: SHIPPED | OUTPATIENT
Start: 2021-08-03 | End: 2022-01-05 | Stop reason: SDUPTHER

## 2021-08-03 RX ORDER — PREGABALIN 25 MG/1
25 CAPSULE ORAL 2 TIMES DAILY
Qty: 60 CAPSULE | Refills: 0 | Status: SHIPPED | OUTPATIENT
Start: 2021-08-03 | End: 2021-08-30

## 2021-08-03 RX ORDER — TRAMADOL HYDROCHLORIDE 50 MG/1
50 TABLET ORAL EVERY 8 HOURS PRN
Qty: 90 TABLET | Refills: 0 | Status: SHIPPED | OUTPATIENT
Start: 2021-08-03 | End: 2021-08-11

## 2021-08-03 RX ORDER — PANTOPRAZOLE SODIUM 40 MG/1
TABLET, DELAYED RELEASE ORAL
Qty: 90 TABLET | Refills: 1 | Status: SHIPPED | OUTPATIENT
Start: 2021-08-03 | End: 2022-01-05 | Stop reason: SDUPTHER

## 2021-08-03 RX ORDER — BUPROPION HYDROCHLORIDE 150 MG/1
TABLET ORAL
Qty: 90 TABLET | Refills: 1 | Status: SHIPPED | OUTPATIENT
Start: 2021-08-03 | End: 2022-01-05 | Stop reason: SDUPTHER

## 2021-08-03 NOTE — TELEPHONE ENCOUNTER
Pt said he had paper work that he needs to send to Sarita and Dr. Rg. I give him the fax number but he insisted on getting [facility]eland[facility] email, which I told him I couldn't do that. I told him I would leave a message for Sarita or Dr. Rg to call the Patient back.     Aquiles Najera  [phone]

## 2021-08-03 NOTE — PROGRESS NOTES
TSH elevated.  Discontinue methimazole.  Recheck TSH and T4 in 4 to 6 weeks.  Urine drug screen appropriate.  Prescription for tramadol and Lyrica sent to pharmacy.  Do not take trazodone with tramadol.  Follow-up in 3 months or sooner for any acute needs.

## 2021-08-09 ENCOUNTER — TELEPHONE (OUTPATIENT)
Dept: FAMILY MEDICINE CLINIC | Facility: CLINIC | Age: 46
End: 2021-08-09

## 2021-08-11 ENCOUNTER — TELEPHONE (OUTPATIENT)
Dept: FAMILY MEDICINE CLINIC | Facility: CLINIC | Age: 46
End: 2021-08-11

## 2021-08-11 DIAGNOSIS — M50.30 DDD (DEGENERATIVE DISC DISEASE), CERVICAL: ICD-10-CM

## 2021-08-11 DIAGNOSIS — M43.02 CERVICAL SPONDYLOLYSIS: ICD-10-CM

## 2021-08-11 NOTE — TELEPHONE ENCOUNTER
Note has been written.  Check in box.  Tell him to increase his tramadol to 100 mg every 8 hours.  I will send in a new prescription reflecting this change as soon as his Andrei report becomes available.

## 2021-08-11 NOTE — TELEPHONE ENCOUNTER
Patient was called to let him know to increase tramadol to 100 mg every 8 hours.  Informed patient that a new prescription will be sent to the pharmacy pending Andrei report.

## 2021-08-12 ENCOUNTER — TELEPHONE (OUTPATIENT)
Dept: FAMILY MEDICINE CLINIC | Facility: CLINIC | Age: 46
End: 2021-08-12

## 2021-08-12 RX ORDER — TRAMADOL HYDROCHLORIDE 50 MG/1
100 TABLET ORAL EVERY 8 HOURS PRN
Qty: 90 TABLET | Refills: 0 | Status: SHIPPED | OUTPATIENT
Start: 2021-08-12 | End: 2021-09-15

## 2021-08-12 NOTE — PROGRESS NOTES
Andrei reviewed and appropriate for tramadol prescription refilled on 8/12/2021.  Dose increased due to decreased efficacy of prior dose.  Urine drug screen and controlled substance contract on file.  Patient has upcoming neurosurgery appointment.

## 2021-08-24 ENCOUNTER — TELEPHONE (OUTPATIENT)
Dept: FAMILY MEDICINE CLINIC | Facility: CLINIC | Age: 46
End: 2021-08-24

## 2021-08-24 ENCOUNTER — OFFICE VISIT (OUTPATIENT)
Dept: NEUROSURGERY | Facility: CLINIC | Age: 46
End: 2021-08-24

## 2021-08-24 VITALS
BODY MASS INDEX: 33.1 KG/M2 | WEIGHT: 218.4 LBS | HEIGHT: 68 IN | DIASTOLIC BLOOD PRESSURE: 88 MMHG | SYSTOLIC BLOOD PRESSURE: 128 MMHG

## 2021-08-24 DIAGNOSIS — Z87.891 FORMER SMOKER: ICD-10-CM

## 2021-08-24 DIAGNOSIS — E66.09 CLASS 1 OBESITY DUE TO EXCESS CALORIES WITH BODY MASS INDEX (BMI) OF 33.0 TO 33.9 IN ADULT, UNSPECIFIED WHETHER SERIOUS COMORBIDITY PRESENT: ICD-10-CM

## 2021-08-24 DIAGNOSIS — M54.2 CERVICALGIA: Primary | ICD-10-CM

## 2021-08-24 PROCEDURE — 99213 OFFICE O/P EST LOW 20 MIN: CPT | Performed by: NURSE PRACTITIONER

## 2021-08-24 NOTE — PROGRESS NOTES
Chief complaint:   Chief Complaint   Patient presents with   • Neck Pain     Aquiles is bringing an MRI of the cervical from [facility] in [place], he has neck pain with arm pain worse on the right.  He has tried physical therapy and pain management but nothing has helped his pain.        Subjective     HPI: This is a 45-year-old male gentleman who was referred to us by ERIC Peter for neck pain.  He is here to be evaluated today.  The patient states that he had shoulder surgery in 2017 and has had issues with his neck since that time.  The pain in his neck has been progressively getting worse.  The pain is constant.  Nothing makes it worse or better.  He has a tingling sensation that he can feel going down into his right arm all the way to his hand.  This is constant as well.  Nothing makes it better or worse.  Denies any bowel or bladder incontinence.  He has been through an extended course of physical therapy as well as chiropractic care without any significant improvement.  He has had injections in his neck without any improvement.  He currently is taking 6 tramadol a day.  He is right-hand dominant.  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  Denies any tobacco, alcohol, or illicit drug use.     Review of Systems   Constitutional: Positive for activity change.   Respiratory: Positive for wheezing.    Gastrointestinal: Positive for abdominal pain, constipation and rectal pain.   Musculoskeletal: Positive for neck pain and neck stiffness.   Psychiatric/Behavioral: Positive for agitation, decreased concentration and sleep disturbance.   All other systems reviewed and are negative.       Past Medical History:   Diagnosis Date   • Abdominal pain    • Acute exacerbation of chronic obstructive airways disease (CMS/HCC)    • Allergic    • Allergic rhinitis    • Arthritis    • Asthma    • Cervical disc disorder    • Chest pain     unspecified   • Chronic constipation    •  Chronic cough    • Conjunctivitis    • Disease of thyroid gland    • Dyslipidemia    • Dyspnea    • Elevated cholesterol    • Gastroesophageal reflux disease    • Headache    • Pain     Pain in left ankle and joints of left foot      • Pain     pain in left leg   • Tobacco dependence syndrome    • Umbilical hernia without obstruction or gangrene    • Viral gastroenteritis      Past Surgical History:   Procedure Laterality Date   • COLONOSCOPY N/A 2021    Procedure: COLONOSCOPY;  Surgeon: Moose Ruvalcaba MD;  Location: [facility] ENDOSCOPY;  Service: Gastroenterology;  Laterality: N/A;   • ENDOSCOPY N/A 2021    Procedure: ESOPHAGOGASTRODUODENOSCOPY;  Surgeon: Moose Ruvalcaba MD;  Location: [facility] ENDOSCOPY;  Service: Gastroenterology;  Laterality: N/A;   • INCISION AND DRAINAGE ABSCESS  2009    Irrigation and debridement of facial soft tissue injuries with closure of complex lacerations and repair of maxillary dental alveolar fracture   • SHOULDER ARTHROSCOPY     • UMBILICAL HERNIA REPAIR N/A 2017    Procedure: OPEN UMBILICAL AND EPIGASTRIC HERNIA REPAIR WITH MESH;  Surgeon: Sal Herbert MD;  Location: [facility] OR;  Service:      Family History   Problem Relation Age of Onset   • Stroke Mother    • Alcohol abuse Father    • Cirrhosis Father    • No Known Problems Sister    • No Known Problems Brother    • Anxiety disorder Son    • Coronary artery disease Neg Hx    • Hypertension Neg Hx    • Thyroid disease Neg Hx      Social History     Tobacco Use   • Smoking status: Former Smoker     Years: 20.00     Types: Cigarettes     Quit date: 2017     Years since quittin.4   • Smokeless tobacco: Former User   Vaping Use   • Vaping Use: Never used   Substance Use Topics   • Alcohol use: No   • Drug use: No     Types: Cocaine(coke), Marijuana     Comment: 3/2017 last time     (Not in a hospital admission)    Allergies:  Colace [docusate]    Objective      Vital Signs  /88   Ht 172.7 cm  "(68\")   Wt 99.1 kg (218 lb 6.4 oz)   BMI 33.21 kg/m²     Physical Exam  Constitutional:       Appearance: Normal appearance. He is well-developed.   HENT:      Head: Normocephalic.   Eyes:      General: Lids are normal.      Extraocular Movements: EOM normal.      Conjunctiva/sclera: Conjunctivae normal.      Pupils: Pupils are equal, round, and reactive to light.   Cardiovascular:      Rate and Rhythm: Normal rate and regular rhythm.   Pulmonary:      Effort: Pulmonary effort is normal.      Breath sounds: Normal breath sounds.   Musculoskeletal:         General: Normal range of motion.      Cervical back: Normal range of motion.   Skin:     General: Skin is warm.   Neurological:      Mental Status: He is alert and oriented to person, place, and time.      GCS: GCS eye subscore is 4. GCS verbal subscore is 5. GCS motor subscore is 6.      Cranial Nerves: No cranial nerve deficit.      Sensory: No sensory deficit.      Gait: Gait is intact.      Deep Tendon Reflexes: Strength normal and reflexes are normal and symmetric. Reflexes normal.   Psychiatric:         Speech: Speech normal.         Behavior: Behavior normal.         Thought Content: Thought content normal.         Neurologic Exam     Mental Status   Oriented to person, place, and time.   Attention: normal. Concentration: normal.   Speech: speech is normal   Level of consciousness: alert  Normal comprehension.     Cranial Nerves     CN II   Visual fields full to confrontation.     CN III, IV, VI   Pupils are equal, round, and reactive to light.  Extraocular motions are normal.     CN V   Facial sensation intact.     CN VII   Facial expression full, symmetric.     CN VIII   CN VIII normal.     CN IX, X   CN IX normal.   CN X normal.     CN XI   CN XI normal.     CN XII   CN XII normal.     Motor Exam   Muscle bulk: normal    Strength   Strength 5/5 throughout.     Sensory Exam   Light touch normal.     Gait, Coordination, and Reflexes     Gait  Gait: " normal    Reflexes   Reflexes 2+ except as noted.       Imaging review: MRI of the cervical spine that was done on November 18, 2020 shows patient does have left-sided foraminal narrowing at C6-7 due to spurs.  No fracture visualized.  No significant disc degeneration.  No Modic endplate changes.  No cord signal change        Assessment/Plan: At this point I do not have anything from a surgical standpoint to offer the patient.  I did recommend that the patient continue working with pain management and see about possibly undergoing a spinal cord stimulator trial.  I do not see that there is any reservations and the patient going back to work from a neurosurgical standpoint.  At this time we will need to see him on an as-needed basis  Patient is a nonsmoker  The patient's Body mass index is 33.21 kg/m².. BMI is above normal parameters. Recommendations include: educational material and nutrition counseling    Diagnoses and all orders for this visit:    1. Cervicalgia (Primary)    2. Former smoker    3. Class 1 obesity due to excess calories with body mass index (BMI) of 33.0 to 33.9 in adult, unspecified whether serious comorbidity present          I discussed the patients findings and my recommendations with patient    Camacho Medellin, APRN  08/24/21  09:39 CDT

## 2021-08-24 NOTE — TELEPHONE ENCOUNTER
Patient was called to let him know a copy of the return to work letter was sent to him via email and a copy was faxed to [facility].       ----- Message from ERIC Oleary sent at 8/24/2021 10:50 AM CDT -----  Letter written.  Check in box.  ----- Message -----  From: Sarita Vilchis MA  Sent: 8/24/2021  10:42 AM CDT  To: ERIC Oleary    Patient states he went to see the Neurosurgeon and was told there was nothing they could do for him as they did not find anything wrong with his neck.  Patient is wanting to go back to work but will need a return to work note.

## 2021-08-24 NOTE — PATIENT INSTRUCTIONS
"BMI for Adults  What is BMI?  Body mass index (BMI) is a number that is calculated from a person's weight and height. BMI can help estimate how much of a person's weight is composed of fat. BMI does not measure body fat directly. Rather, it is an alternative to procedures that directly measure body fat, which can be difficult and expensive.  BMI can help identify people who may be at higher risk for certain medical problems.  What are BMI measurements used for?  BMI is used as a screening tool to identify possible weight problems. It helps determine whether a person is obese, overweight, a healthy weight, or underweight.  BMI is useful for:  · Identifying a weight problem that may be related to a medical condition or may increase the risk for medical problems.  · Promoting changes, such as changes in diet and exercise, to help reach a healthy weight. BMI screening can be repeated to see if these changes are working.  How is BMI calculated?  BMI involves measuring your weight in relation to your height. Both height and weight are measured, and the BMI is calculated from those numbers. This can be done either in English ([place] or metric measurements. Note that charts and online BMI calculators are available to help you find your BMI quickly and easily without having to do these calculations yourself.  To calculate your BMI in English ([place] measurements:    1. Measure your weight in pounds (lb).  2. Multiply the number of pounds by 703.  ? For example, for a person who weighs 180 lb, multiply that number by 703, which equals 126,540.  3. Measure your height in inches. Then multiply that number by itself to get a measurement called \"inches squared.\"  ? For example, for a person who is 70 inches tall, the \"inches squared\" measurement is 70 inches x 70 inches, which equals 4,900 inches squared.  4. Divide the total from step 2 (number of lb x 703) by the total from step 3 (inches squared): 126,540 ÷ 4,900 = 25.8. This is " "your BMI.  To calculate your BMI in metric measurements:  1. Measure your weight in kilograms (kg).  2. Measure your height in meters (m). Then multiply that number by itself to get a measurement called \"meters squared.\"  ? For example, for a person who is 1.75 m tall, the \"meters squared\" measurement is 1.75 m x 1.75 m, which is equal to 3.1 meters squared.  3. Divide the number of kilograms (your weight) by the meters squared number. In this example: 70 ÷ 3.1 = 22.6. This is your BMI.  What do the results mean?  BMI charts are used to identify whether you are underweight, normal weight, overweight, or obese. The following guidelines will be used:  · Underweight: BMI less than 18.5.  · Normal weight: BMI between 18.5 and 24.9.  · Overweight: BMI between 25 and 29.9.  · Obese: BMI of 30 or above.  Keep these notes in mind:  · Weight includes both fat and muscle, so someone with a muscular build, such as an athlete, may have a BMI that is higher than 24.9. In cases like these, BMI is not an accurate measure of body fat.  · To determine if excess body fat is the cause of a BMI of 25 or higher, further assessments may need to be done by a health care provider.  · BMI is usually interpreted in the same way for men and women.  Where to find more information  For more information about BMI, including tools to quickly calculate your BMI, go to these websites:  · Centers for Disease Control and Prevention: www.cdc.gov  · American Heart Association: www.heart.org  · National Heart, Lung, and Blood [place]: www.nhlbi.nih.gov  Summary  · Body mass index (BMI) is a number that is calculated from a person's weight and height.  · BMI may help estimate how much of a person's weight is composed of fat. BMI can help identify those who may be at higher risk for certain medical problems.  · BMI can be measured using English measurements or metric measurements.  · BMI charts are used to identify whether you are underweight, normal " "weight, overweight, or obese.  This information is not intended to replace advice given to you by your health care provider. Make sure you discuss any questions you have with your health care provider.  Document Revised: 09/09/2020 Document Reviewed: 07/17/2020  Joan Patient Education © 2021 [employer]      https://www.nhlbi.nih.gov/files/docs/public/heart/dash_brief.pdf\">   DASH Eating Plan  DASH stands for Dietary Approaches to Stop Hypertension. The DASH eating plan is a healthy eating plan that has been shown to:  · Reduce high blood pressure (hypertension).  · Reduce your risk for type 2 diabetes, heart disease, and stroke.  · Help with weight loss.  What are tips for following this plan?  Reading food labels  · Check food labels for the amount of salt (sodium) per serving. Choose foods with less than 5 percent of the Daily Value of sodium. Generally, foods with less than 300 milligrams (mg) of sodium per serving fit into this eating plan.  · To find whole grains, look for the word \"whole\" as the first word in the ingredient list.  Shopping  · Buy products labeled as \"low-sodium\" or \"no salt added.\"  · Buy fresh foods. Avoid canned foods and pre-made or frozen meals.  Cooking  · Avoid adding salt when cooking. Use salt-free seasonings or herbs instead of table salt or sea salt. Check with your health care provider or pharmacist before using salt substitutes.  · Do not hall foods. Cook foods using healthy methods such as baking, boiling, grilling, roasting, and broiling instead.  · Cook with heart-healthy oils, such as olive, canola, avocado, soybean, or sunflower oil.  Meal planning    · Eat a balanced diet that includes:  ? 4 or more servings of fruits and 4 or more servings of vegetables each day. Try to fill one-half of your plate with fruits and vegetables.  ? 6-8 servings of whole grains each day.  ? Less than 6 oz (170 g) of lean meat, poultry, or fish each day. A 3-oz (85-g) serving of meat is " about the same size as a deck of cards. One egg equals 1 oz (28 g).  ? 2-3 servings of low-fat dairy each day. One serving is 1 cup (237 mL).  ? 1 serving of nuts, seeds, or beans 5 times each week.  ? 2-3 servings of heart-healthy fats. Healthy fats called omega-3 fatty acids are found in foods such as walnuts, flaxseeds, fortified milks, and eggs. These fats are also found in cold-water fish, such as sardines, salmon, and mackerel.  · Limit how much you eat of:  ? Canned or prepackaged foods.  ? Food that is high in trans fat, such as some fried foods.  ? Food that is high in saturated fat, such as fatty meat.  ? Desserts and other sweets, sugary drinks, and other foods with added sugar.  ? Full-fat dairy products.  · Do not salt foods before eating.  · Do not eat more than 4 egg yolks a week.  · Try to eat at least 2 vegetarian meals a week.  · Eat more home-cooked food and less restaurant, buffet, and fast food.  Lifestyle  · When eating at a restaurant, ask that your food be prepared with less salt or no salt, if possible.  · If you drink alcohol:  ? Limit how much you use to:  § 0-1 drink a day for women who are not pregnant.  § 0-2 drinks a day for men.  ? Be aware of how much alcohol is in your drink. In the [place] one drink equals one 12 oz bottle of beer (355 mL), one 5 oz glass of wine (148 mL), or one 1½ oz glass of hard liquor (44 mL).  General information  · Avoid eating more than 2,300 mg of salt a day. If you have hypertension, you may need to reduce your sodium intake to 1,500 mg a day.  · Work with your health care provider to maintain a healthy body weight or to lose weight. Ask what an ideal weight is for you.  · Get at least 30 minutes of exercise that causes your heart to beat faster (aerobic exercise) most days of the week. Activities may include walking, swimming, or biking.  · Work with your health care provider or dietitian to adjust your eating plan to your individual calorie needs.  What  foods should I eat?  Fruits  All fresh, dried, or frozen fruit. Canned fruit in natural juice (without added sugar).  Vegetables  Fresh or frozen vegetables (raw, steamed, roasted, or grilled). Low-sodium or reduced-sodium tomato and vegetable juice. Low-sodium or reduced-sodium tomato sauce and tomato paste. Low-sodium or reduced-sodium canned vegetables.  Grains  Whole-grain or whole-wheat bread. Whole-grain or whole-wheat pasta. Brown rice. Oatmeal. Quinoa. Bulgur. Whole-grain and low-sodium cereals. Sandi bread. Low-fat, low-sodium crackers. Whole-wheat flour tortillas.  Meats and other proteins  Skinless chicken or turkey. Ground chicken or turkey. Pork with fat trimmed off. Fish and seafood. Egg whites. Dried beans, peas, or lentils. Unsalted nuts, nut butters, and seeds. Unsalted canned beans. Lean cuts of beef with fat trimmed off. Low-sodium, lean precooked or cured meat, such as sausages or meat loaves.  Dairy  Low-fat (1%) or fat-free (skim) milk. Reduced-fat, low-fat, or fat-free cheeses. Nonfat, low-sodium ricotta or cottage cheese. Low-fat or nonfat yogurt. Low-fat, low-sodium cheese.  Fats and oils  Soft margarine without trans fats. Vegetable oil. Reduced-fat, low-fat, or light mayonnaise and salad dressings (reduced-sodium). Canola, safflower, olive, avocado, soybean, and sunflower oils. Avocado.  Seasonings and condiments  Herbs. Spices. Seasoning mixes without salt.  Other foods  Unsalted popcorn and pretzels. Fat-free sweets.  The items listed above may not be a complete list of foods and beverages you can eat. Contact a dietitian for more information.  What foods should I avoid?  Fruits  Canned fruit in a light or heavy syrup. Fried fruit. Fruit in cream or butter sauce.  Vegetables  Creamed or fried vegetables. Vegetables in a cheese sauce. Regular canned vegetables (not low-sodium or reduced-sodium). Regular canned tomato sauce and paste (not low-sodium or reduced-sodium). Regular tomato and  vegetable juice (not low-sodium or reduced-sodium). Pickles. Olives.  Grains  Baked goods made with fat, such as croissants, muffins, or some breads. Dry pasta or rice meal packs.  Meats and other proteins  Fatty cuts of meat. Ribs. Fried meat. Jennings. Bologna, salami, and other precooked or cured meats, such as sausages or meat loaves. Fat from the back of a pig (fatback). Bratwurst. Salted nuts and seeds. Canned beans with added salt. Canned or smoked fish. Whole eggs or egg yolks. Chicken or turkey with skin.  Dairy  Whole or 2% milk, cream, and half-and-half. Whole or full-fat cream cheese. Whole-fat or sweetened yogurt. Full-fat cheese. Nondairy creamers. Whipped toppings. Processed cheese and cheese spreads.  Fats and oils  Butter. Stick margarine. Lard. Shortening. Ghee. Jennings fat. Tropical oils, such as coconut, palm kernel, or palm oil.  Seasonings and condiments  Onion salt, garlic salt, seasoned salt, table salt, and sea salt. Worcestershire sauce. Tartar sauce. Barbecue sauce. Teriyaki sauce. Soy sauce, including reduced-sodium. Steak sauce. Canned and packaged gravies. Fish sauce. Oyster sauce. Cocktail sauce. Store-bought horseradish. Ketchup. Mustard. Meat flavorings and tenderizers. Bouillon cubes. Hot sauces. Pre-made or packaged marinades. Pre-made or packaged taco seasonings. Relishes. Regular salad dressings.  Other foods  Salted popcorn and pretzels.  The items listed above may not be a complete list of foods and beverages you should avoid. Contact a dietitian for more information.  Where to find more information  · National Heart, Lung, and Blood [place]: www.nhlbi.nih.gov  · American Heart Association: www.heart.org  · Academy of Nutrition and Dietetics: www.eatright.org  · National Kidney Foundation: www.kidney.org  Summary  · The DASH eating plan is a healthy eating plan that has been shown to reduce high blood pressure (hypertension). It may also reduce your risk for type 2 diabetes, heart  disease, and stroke.  · When on the DASH eating plan, aim to eat more fresh fruits and vegetables, whole grains, lean proteins, low-fat dairy, and heart-healthy fats.  · With the DASH eating plan, you should limit salt (sodium) intake to 2,300 mg a day. If you have hypertension, you may need to reduce your sodium intake to 1,500 mg a day.  · Work with your health care provider or dietitian to adjust your eating plan to your individual calorie needs.  This information is not intended to replace advice given to you by your health care provider. Make sure you discuss any questions you have with your health care provider.  Document Revised: 11/20/2020 Document Reviewed: 11/20/2020  Elsevier Patient Education © 2021 [employer]

## 2021-08-30 DIAGNOSIS — M43.02 CERVICAL SPONDYLOLYSIS: ICD-10-CM

## 2021-08-30 DIAGNOSIS — M50.30 DDD (DEGENERATIVE DISC DISEASE), CERVICAL: ICD-10-CM

## 2021-08-31 RX ORDER — PREGABALIN 25 MG/1
25 CAPSULE ORAL 2 TIMES DAILY PRN
Qty: 60 CAPSULE | Refills: 1 | Status: SHIPPED | OUTPATIENT
Start: 2021-08-31 | End: 2021-11-01

## 2021-08-31 NOTE — PROGRESS NOTES
Andrei reviewed and appropriate for Lyrica prescription refilled on 8/31/2021. Controlled substance contract and urine drug screen on file.

## 2021-09-01 ENCOUNTER — PATIENT ROUNDING (BHMG ONLY) (OUTPATIENT)
Dept: NEUROSURGERY | Facility: CLINIC | Age: 46
End: 2021-09-01

## 2021-09-01 NOTE — PROGRESS NOTES
September 1, 2021    Hello, may I speak with Aquiles Najera?    My name is Lynette.       I am  with [facility]  [facility]  [address], [address]  [place] [address] [phone].    Before we get started may I verify your date of birth? 1975    I am calling to officially welcome you to our practice and ask about your recent visit. Is this a good time to talk? no    Tell me about your visit with us. What things went well?  Nothing went well the visit was terrible. (patient then hung up)       We're always looking for ways to make our patients' experiences even better. Do you have recommendations on ways we may improve?  no    Overall were you satisfied with your first visit to our practice? no       I appreciate you taking the time to speak with me today. Is there anything else I can do for you? no      Thank you, and have a great day.

## 2021-09-14 DIAGNOSIS — M43.02 CERVICAL SPONDYLOLYSIS: ICD-10-CM

## 2021-09-14 DIAGNOSIS — M50.30 DDD (DEGENERATIVE DISC DISEASE), CERVICAL: ICD-10-CM

## 2021-09-15 DIAGNOSIS — M43.02 CERVICAL SPONDYLOLYSIS: ICD-10-CM

## 2021-09-15 DIAGNOSIS — M50.30 DDD (DEGENERATIVE DISC DISEASE), CERVICAL: ICD-10-CM

## 2021-09-15 RX ORDER — TRAMADOL HYDROCHLORIDE 50 MG/1
100 TABLET ORAL EVERY 8 HOURS PRN
Qty: 90 TABLET | Refills: 0 | Status: SHIPPED | OUTPATIENT
Start: 2021-09-15 | End: 2021-10-06 | Stop reason: SDUPTHER

## 2021-09-15 RX ORDER — TRAMADOL HYDROCHLORIDE 50 MG/1
TABLET ORAL
Qty: 90 TABLET | Refills: 0 | Status: SHIPPED | OUTPATIENT
Start: 2021-09-15 | End: 2021-09-15 | Stop reason: SDUPTHER

## 2021-09-15 NOTE — PROGRESS NOTES
Andrei reviewed and appropriate for tramadol prescription refilled on 9/15/2021.  Controlled substance contract and urine drug screen on file.

## 2021-10-04 DIAGNOSIS — M43.02 CERVICAL SPONDYLOLYSIS: ICD-10-CM

## 2021-10-04 DIAGNOSIS — M50.30 DDD (DEGENERATIVE DISC DISEASE), CERVICAL: ICD-10-CM

## 2021-10-04 RX ORDER — TRAMADOL HYDROCHLORIDE 50 MG/1
TABLET ORAL
Qty: 90 TABLET | OUTPATIENT
Start: 2021-10-04

## 2021-10-06 DIAGNOSIS — M50.30 DDD (DEGENERATIVE DISC DISEASE), CERVICAL: ICD-10-CM

## 2021-10-06 DIAGNOSIS — M43.02 CERVICAL SPONDYLOLYSIS: ICD-10-CM

## 2021-10-06 RX ORDER — TRAMADOL HYDROCHLORIDE 50 MG/1
100 TABLET ORAL EVERY 8 HOURS PRN
Qty: 120 TABLET | Refills: 0 | Status: SHIPPED | OUTPATIENT
Start: 2021-10-06 | End: 2021-11-01

## 2021-11-01 ENCOUNTER — TELEPHONE (OUTPATIENT)
Dept: FAMILY MEDICINE CLINIC | Facility: CLINIC | Age: 46
End: 2021-11-01

## 2021-11-01 DIAGNOSIS — M50.30 DDD (DEGENERATIVE DISC DISEASE), CERVICAL: ICD-10-CM

## 2021-11-01 DIAGNOSIS — M43.02 CERVICAL SPONDYLOLYSIS: ICD-10-CM

## 2021-11-01 RX ORDER — TRAMADOL HYDROCHLORIDE 50 MG/1
TABLET ORAL
Qty: 180 TABLET | Refills: 1 | Status: SHIPPED | OUTPATIENT
Start: 2021-11-01 | End: 2022-01-05 | Stop reason: SDUPTHER

## 2021-11-01 RX ORDER — PREGABALIN 25 MG/1
CAPSULE ORAL
Qty: 60 CAPSULE | Refills: 2 | Status: SHIPPED | OUTPATIENT
Start: 2021-11-01 | End: 2021-11-11

## 2021-11-01 NOTE — PROGRESS NOTES
Andrei reviewed and appropriate for tramadol and Lyrica prescription refilled on 11/1/2021.  Controlled substance contract and urine drug screen on file.

## 2021-11-01 NOTE — TELEPHONE ENCOUNTER
PT NEEDS A CALL BACK CONCERNING HIS TRAMADOL. SAYS [facility] IS NOT WRITING ENOUGH PILLS FOR THE MONTH. SAYS THAT HE IS ONLY GETTING ENOUGH FOR 20 DAYS AND TAKES 6 A DAY SO IS REQUESTING 180 TABLET. THANK YOU!  
Patient was called to let him know of quantity increase and a new prescription to the pharmacy  
Please advise,  Thanks so much.   
Prescription changed yo 180 quantity.  Refill sent to pharmacy.
(4) no limitations

## 2021-11-09 ENCOUNTER — LAB (OUTPATIENT)
Dept: LAB | Facility: HOSPITAL | Age: 46
End: 2021-11-09

## 2021-11-09 DIAGNOSIS — E05.90 HYPERTHYROIDISM: ICD-10-CM

## 2021-11-09 PROCEDURE — 84439 ASSAY OF FREE THYROXINE: CPT

## 2021-11-09 PROCEDURE — 84443 ASSAY THYROID STIM HORMONE: CPT

## 2021-11-10 LAB
T4 FREE SERPL-MCNC: 0.95 NG/DL (ref 0.93–1.7)
TSH SERPL DL<=0.05 MIU/L-ACNC: 12.1 UIU/ML (ref 0.27–4.2)

## 2021-11-11 ENCOUNTER — OFFICE VISIT (OUTPATIENT)
Dept: FAMILY MEDICINE CLINIC | Facility: CLINIC | Age: 46
End: 2021-11-11

## 2021-11-11 VITALS
RESPIRATION RATE: 20 BRPM | WEIGHT: 220.9 LBS | HEIGHT: 68 IN | OXYGEN SATURATION: 96 % | BODY MASS INDEX: 33.48 KG/M2 | HEART RATE: 62 BPM | SYSTOLIC BLOOD PRESSURE: 110 MMHG | DIASTOLIC BLOOD PRESSURE: 88 MMHG | TEMPERATURE: 97.3 F

## 2021-11-11 DIAGNOSIS — J44.9 CHRONIC OBSTRUCTIVE PULMONARY DISEASE, UNSPECIFIED COPD TYPE (HCC): ICD-10-CM

## 2021-11-11 DIAGNOSIS — M43.02 CERVICAL SPONDYLOLYSIS: ICD-10-CM

## 2021-11-11 DIAGNOSIS — M50.30 DDD (DEGENERATIVE DISC DISEASE), CERVICAL: ICD-10-CM

## 2021-11-11 DIAGNOSIS — E03.2 HYPOTHYROIDISM DUE TO MEDICATION: Primary | ICD-10-CM

## 2021-11-11 DIAGNOSIS — F51.01 PRIMARY INSOMNIA: ICD-10-CM

## 2021-11-11 PROCEDURE — 99214 OFFICE O/P EST MOD 30 MIN: CPT | Performed by: NURSE PRACTITIONER

## 2021-11-11 RX ORDER — LEVOTHYROXINE SODIUM 0.03 MG/1
25 TABLET ORAL DAILY
Qty: 30 TABLET | Refills: 1 | Status: SHIPPED | OUTPATIENT
Start: 2021-11-11 | End: 2022-02-02

## 2021-11-11 RX ORDER — FLUTICASONE PROPIONATE AND SALMETEROL XINAFOATE 45; 21 UG/1; UG/1
2 AEROSOL, METERED RESPIRATORY (INHALATION)
Qty: 12 G | Refills: 11 | Status: SHIPPED | OUTPATIENT
Start: 2021-11-11 | End: 2022-01-05 | Stop reason: SDUPTHER

## 2021-11-11 RX ORDER — MIRTAZAPINE 7.5 MG/1
7.5 TABLET, FILM COATED ORAL NIGHTLY
Qty: 30 TABLET | Refills: 3 | Status: CANCELLED | OUTPATIENT
Start: 2021-11-11

## 2021-11-11 RX ORDER — PREGABALIN 50 MG/1
50 CAPSULE ORAL 2 TIMES DAILY
Qty: 60 CAPSULE | Refills: 2 | Status: SHIPPED | OUTPATIENT
Start: 2021-11-11 | End: 2022-01-05 | Stop reason: SDUPTHER

## 2021-11-11 RX ORDER — RAMELTEON 8 MG/1
8 TABLET ORAL NIGHTLY PRN
Qty: 30 TABLET | Refills: 3 | Status: SHIPPED | OUTPATIENT
Start: 2021-11-11 | End: 2022-01-05 | Stop reason: RX

## 2021-11-11 NOTE — PROGRESS NOTES
Subjective   Aquiles Najera is a 46 y.o. male.     CC: Chronic neck pain, insomnia, hypothyroidism, COPD,     Neck Pain   This is a chronic problem. The current episode started more than 1 year ago. The problem occurs constantly. The problem has been unchanged. The pain is associated with nothing. The pain is present in the midline. The quality of the pain is described as aching. The pain is at a severity of 8/10. The symptoms are aggravated by twisting, stress and position. The pain is same all the time. Stiffness is present all day. Pertinent negatives include no chest pain, trouble swallowing, weakness or weight loss. He has tried bed rest, acetaminophen, chiropractic manipulation, heat, home exercises, ice, muscle relaxants, neck support and NSAIDs (tramadol, lyrica, gabapentin) for the symptoms. The treatment provided mild relief.   Insomnia  This is a chronic problem. The current episode started more than 1 year ago. The problem occurs daily. The problem has been unchanged. Associated symptoms include a change in bowel habit, fatigue and neck pain (chronic, stable). Pertinent negatives include no abdominal pain, arthralgias, chest pain, chills, congestion, coughing, myalgias, nausea, rash, sore throat, vomiting or weakness. The symptoms are aggravated by stress (neck pain). Treatments tried: vistaril, trazadone, otc sleep aids. The treatment provided no relief.   Hypothyroidism  This is a chronic problem. The current episode started more than 1 year ago. The problem occurs constantly. The problem has been gradually worsening. Associated symptoms include a change in bowel habit, fatigue and neck pain (chronic, stable). Pertinent negatives include no abdominal pain, arthralgias, chest pain, chills, congestion, coughing, myalgias, nausea, rash, sore throat, vomiting or weakness. Nothing aggravates the symptoms. He has tried nothing for the symptoms. The treatment provided no relief.   COPD  He complains of  shortness of breath (chronic, stable). There is no cough or wheezing. This is a chronic problem. The current episode started more than 1 year ago. The problem occurs intermittently. The problem has been waxing and waning. Pertinent negatives include no appetite change, chest pain, myalgias, sore throat, trouble swallowing or weight loss. His symptoms are aggravated by URI, strenuous activity and change in weather. His symptoms are alleviated by rest and beta-agonist. He reports significant improvement on treatment. His past medical history is significant for COPD.        The following portions of the patient's history were reviewed and updated as appropriate: allergies, current medications, past family history, past medical history, past social history, past surgical history and problem list.    Review of Systems   Constitutional: Positive for fatigue. Negative for activity change, appetite change, chills, unexpected weight gain and unexpected weight loss.   HENT: Negative for congestion, sore throat, trouble swallowing and voice change.    Eyes: Negative.    Respiratory: Positive for shortness of breath (chronic, stable). Negative for cough, chest tightness and wheezing.    Cardiovascular: Negative for chest pain, palpitations and leg swelling.   Gastrointestinal: Positive for change in bowel habit. Negative for abdominal pain, diarrhea, nausea and vomiting.   Endocrine: Negative.    Genitourinary: Negative for dysuria, hematuria and urgency.   Musculoskeletal: Positive for neck pain (chronic, stable). Negative for arthralgias and myalgias.   Skin: Negative for rash.   Neurological: Negative for dizziness, weakness, light-headedness and headache.   Hematological: Negative.    Psychiatric/Behavioral: The patient has insomnia.        Objective   Physical Exam  Vitals and nursing note reviewed.   Constitutional:       General: He is not in acute distress.     Appearance: Normal appearance. He is well-developed. He is  obese. He is not ill-appearing, toxic-appearing or diaphoretic.   HENT:      Head: Normocephalic and atraumatic.   Eyes:      Conjunctiva/sclera: Conjunctivae normal.   Cardiovascular:      Rate and Rhythm: Normal rate and regular rhythm.      Heart sounds: Normal heart sounds. No murmur heard.  No friction rub. No gallop.    Pulmonary:      Effort: Pulmonary effort is normal. No respiratory distress.      Breath sounds: Normal breath sounds. No stridor. No wheezing, rhonchi or rales.   Abdominal:      General: Bowel sounds are normal. There is no distension.      Palpations: Abdomen is soft. There is no mass.      Tenderness: There is no abdominal tenderness. There is no guarding or rebound.      Hernia: No hernia is present.   Musculoskeletal:         General: No tenderness. Normal range of motion.      Cervical back: Normal range of motion.   Skin:     General: Skin is warm and dry.      Coloration: Skin is not pale.      Findings: No erythema or rash.   Neurological:      Mental Status: He is alert and oriented to person, place, and time.   Psychiatric:         Mood and Affect: Mood normal.         Behavior: Behavior normal.         Thought Content: Thought content normal.         Judgment: Judgment normal.           Assessment/Plan   Diagnoses and all orders for this visit:    1. Hypothyroidism due to medication (Primary)  -     levothyroxine (SYNTHROID, LEVOTHROID) 25 MCG tablet; Take 1 tablet by mouth Daily.  Dispense: 30 tablet; Refill: 1  -     TSH; Future  -     T4, Free; Future   -We will start patient on 25 mcg of levothyroxine daily. Will repeat TSH and T4 in 4 to 6 weeks. We will continue to monitor.    2. DDD (degenerative disc disease), cervical  -     pregabalin (LYRICA) 50 MG capsule; Take 1 capsule by mouth 2 (Two) Times a Day.  Dispense: 60 capsule; Refill: 2   -Patient tolerating Lyrica well but with no reported improvement in pain. Will increase Lyrica to 50 mg 1 capsule twice daily. Patient  may take 2 of his 25 mg twice daily until completed. Andrei reviewed and appropriate. Urine drug screen and controlled substance contract on file. Patient scheduled to establish with pain management within the next 4 to 6 weeks. We will continue to manage neck pain until that time. We will continue to monitor.    3. Cervical spondylolysis  -     pregabalin (LYRICA) 50 MG capsule; Take 1 capsule by mouth 2 (Two) Times a Day.  Dispense: 60 capsule; Refill: 2   -Plan of care stated above in #2. We will continue to monitor.    4. Primary insomnia  -     Discontinue trazodone. Patient has tried hydroxyzine, trazodone and multiple over-the-counter sleep aids with no benefit. We will start a trial of Rozerem 8 mg 1 tablet p.o. nightly as needed. Take with caution. Do not mix with alcohol or other sedating substances. Do not work, drive or operate machinery while taking this medication. Patient verbalized understanding of instruction. We will continue to monitor.  -Ramelteon (Rozerem) 8 MG tablet; Take 1 tablet by mouth At Night As Needed for Sleep.  Dispense: 30 tablet; Refill: 3    5. Chronic obstructive pulmonary disease, unspecified COPD type (HCC)  -     fluticasone-salmeterol (Advair HFA) 45-21 MCG/ACT inhaler; Inhale 2 puffs 2 (Two) Times a Day.  Dispense: 12 g; Refill: 11    6. Follow-up in 3 months or sooner for any acute needs.          This document has been electronically signed by ERIC Oleary on November 11, 2021 16:49 CST

## 2021-12-10 ENCOUNTER — TELEPHONE (OUTPATIENT)
Dept: FAMILY MEDICINE CLINIC | Facility: CLINIC | Age: 46
End: 2021-12-10

## 2021-12-10 NOTE — TELEPHONE ENCOUNTER
Mr. Najera called wanting to speak with you about getting another pain management referral. He stated the last one did not work out. Please call back @ [phone]

## 2021-12-13 DIAGNOSIS — M43.02 CERVICAL SPONDYLOLYSIS: ICD-10-CM

## 2021-12-13 DIAGNOSIS — M50.30 DDD (DEGENERATIVE DISC DISEASE), CERVICAL: Primary | ICD-10-CM

## 2022-01-03 ENCOUNTER — TELEPHONE (OUTPATIENT)
Dept: FAMILY MEDICINE CLINIC | Facility: CLINIC | Age: 47
End: 2022-01-03

## 2022-01-03 DIAGNOSIS — M54.12 CERVICAL RADICULITIS: ICD-10-CM

## 2022-01-03 RX ORDER — TIZANIDINE 4 MG/1
TABLET ORAL
Qty: 60 TABLET | Refills: 3 | Status: SHIPPED | OUTPATIENT
Start: 2022-01-03 | End: 2022-01-07 | Stop reason: SDUPTHER

## 2022-01-03 NOTE — TELEPHONE ENCOUNTER
Pt needs a referral for a personal reason he would rather speak to Sarita directly. His call back number is [phone]

## 2022-01-05 ENCOUNTER — OFFICE VISIT (OUTPATIENT)
Dept: FAMILY MEDICINE CLINIC | Facility: CLINIC | Age: 47
End: 2022-01-05

## 2022-01-05 VITALS
SYSTOLIC BLOOD PRESSURE: 120 MMHG | BODY MASS INDEX: 34.1 KG/M2 | WEIGHT: 225 LBS | RESPIRATION RATE: 20 BRPM | HEART RATE: 71 BPM | DIASTOLIC BLOOD PRESSURE: 84 MMHG | HEIGHT: 68 IN | OXYGEN SATURATION: 97 % | TEMPERATURE: 97.8 F

## 2022-01-05 DIAGNOSIS — E03.2 HYPOTHYROIDISM DUE TO MEDICATION: Primary | ICD-10-CM

## 2022-01-05 DIAGNOSIS — K59.04 CHRONIC IDIOPATHIC CONSTIPATION: ICD-10-CM

## 2022-01-05 DIAGNOSIS — J44.9 CHRONIC OBSTRUCTIVE PULMONARY DISEASE, UNSPECIFIED COPD TYPE: ICD-10-CM

## 2022-01-05 DIAGNOSIS — F51.01 PRIMARY INSOMNIA: ICD-10-CM

## 2022-01-05 DIAGNOSIS — M43.02 CERVICAL SPONDYLOLYSIS: ICD-10-CM

## 2022-01-05 DIAGNOSIS — R45.4 IRRITABILITY AND ANGER: ICD-10-CM

## 2022-01-05 DIAGNOSIS — E78.2 MIXED HYPERLIPIDEMIA: ICD-10-CM

## 2022-01-05 DIAGNOSIS — M54.12 CERVICAL RADICULITIS: ICD-10-CM

## 2022-01-05 DIAGNOSIS — E78.5 DYSLIPIDEMIA: ICD-10-CM

## 2022-01-05 DIAGNOSIS — M50.30 DDD (DEGENERATIVE DISC DISEASE), CERVICAL: ICD-10-CM

## 2022-01-05 DIAGNOSIS — K21.9 GASTROESOPHAGEAL REFLUX DISEASE WITHOUT ESOPHAGITIS: ICD-10-CM

## 2022-01-05 DIAGNOSIS — F41.9 ANXIETY: ICD-10-CM

## 2022-01-05 PROCEDURE — 99214 OFFICE O/P EST MOD 30 MIN: CPT | Performed by: NURSE PRACTITIONER

## 2022-01-05 RX ORDER — ASPIRIN 81 MG/1
81 TABLET, CHEWABLE ORAL DAILY
Qty: 90 TABLET | Refills: 3 | Status: SHIPPED | OUTPATIENT
Start: 2022-01-05 | End: 2022-01-07 | Stop reason: SDUPTHER

## 2022-01-05 RX ORDER — BUPROPION HYDROCHLORIDE 150 MG/1
150 TABLET ORAL DAILY
Qty: 90 TABLET | Refills: 3 | Status: SHIPPED | OUTPATIENT
Start: 2022-01-05 | End: 2022-01-07 | Stop reason: SDUPTHER

## 2022-01-05 RX ORDER — FLUTICASONE PROPIONATE AND SALMETEROL XINAFOATE 45; 21 UG/1; UG/1
2 AEROSOL, METERED RESPIRATORY (INHALATION)
Qty: 12 G | Refills: 11 | Status: SHIPPED | OUTPATIENT
Start: 2022-01-05 | End: 2022-01-07 | Stop reason: SDUPTHER

## 2022-01-05 RX ORDER — PREGABALIN 50 MG/1
50 CAPSULE ORAL 2 TIMES DAILY
Qty: 180 CAPSULE | Refills: 0 | Status: SHIPPED | OUTPATIENT
Start: 2022-01-05 | End: 2022-01-07 | Stop reason: SDUPTHER

## 2022-01-05 RX ORDER — QUETIAPINE FUMARATE 25 MG/1
25 TABLET, FILM COATED ORAL NIGHTLY
Qty: 90 TABLET | Refills: 0 | Status: CANCELLED | OUTPATIENT
Start: 2022-01-05

## 2022-01-05 RX ORDER — TRAMADOL HYDROCHLORIDE 50 MG/1
100 TABLET ORAL EVERY 8 HOURS PRN
Qty: 180 TABLET | Refills: 1 | Status: SHIPPED | OUTPATIENT
Start: 2022-01-05 | End: 2022-01-07 | Stop reason: SDUPTHER

## 2022-01-05 RX ORDER — QUETIAPINE FUMARATE 25 MG/1
25 TABLET, FILM COATED ORAL NIGHTLY
Qty: 90 TABLET | Refills: 0 | Status: SHIPPED | OUTPATIENT
Start: 2022-01-05 | End: 2022-01-07 | Stop reason: SDUPTHER

## 2022-01-05 RX ORDER — PANTOPRAZOLE SODIUM 40 MG/1
40 TABLET, DELAYED RELEASE ORAL DAILY
Qty: 90 TABLET | Refills: 3 | Status: SHIPPED | OUTPATIENT
Start: 2022-01-05 | End: 2022-01-07 | Stop reason: SDUPTHER

## 2022-01-05 RX ORDER — PRAVASTATIN SODIUM 40 MG
40 TABLET ORAL DAILY
Qty: 90 TABLET | Refills: 3 | Status: SHIPPED | OUTPATIENT
Start: 2022-01-05 | End: 2022-01-07 | Stop reason: SDUPTHER

## 2022-01-05 RX ORDER — POLYETHYLENE GLYCOL 3350 17 G/17G
17 POWDER, FOR SOLUTION ORAL DAILY
Qty: 850 G | Refills: 5 | Status: SHIPPED | OUTPATIENT
Start: 2022-01-05 | End: 2022-01-07 | Stop reason: SDUPTHER

## 2022-01-05 NOTE — PROGRESS NOTES
Subjective   Aquiles Najera is a 46 y.o. male.     CC: Anxiety/irritability, medication refills (patient transitioning to mail order pharmacy)    Anxiety  Presents for follow-up visit. Symptoms include excessive worry, irritability, nervous/anxious behavior, panic and restlessness. Patient reports no chest pain, depressed mood, dizziness, nausea, palpitations, shortness of breath or suicidal ideas. Symptoms occur most days. The severity of symptoms is moderate, causing significant distress and interfering with daily activities. The quality of sleep is poor. Nighttime awakenings: occasional.     Compliance with medications is %.        The following portions of the patient's history were reviewed and updated as appropriate: allergies, current medications, past family history, past medical history, past social history, past surgical history and problem list.    Review of Systems   Constitutional: Positive for irritability. Negative for activity change, appetite change, chills, fatigue, fever, unexpected weight gain and unexpected weight loss.   HENT: Negative for congestion, sore throat, trouble swallowing and voice change.    Eyes: Negative.    Respiratory: Negative for cough, chest tightness, shortness of breath and wheezing.    Cardiovascular: Negative for chest pain, palpitations and leg swelling.   Gastrointestinal: Negative for abdominal pain, diarrhea, nausea and vomiting.   Endocrine: Negative.  Negative for cold intolerance, heat intolerance, polydipsia, polyphagia and polyuria.   Genitourinary: Negative for dysuria, hematuria and urgency.   Musculoskeletal: Positive for neck pain (chronic, stable) and neck stiffness. Negative for arthralgias and myalgias.   Skin: Negative for rash.   Neurological: Negative for dizziness, weakness, light-headedness and headache.   Hematological: Negative.    Psychiatric/Behavioral: Positive for agitation, sleep disturbance and stress. Negative for self-injury,  suicidal ideas and depressed mood. The patient is nervous/anxious.        Objective   Physical Exam  Vitals and nursing note reviewed.   Constitutional:       General: He is not in acute distress.     Appearance: Normal appearance. He is well-developed. He is obese. He is not ill-appearing, toxic-appearing or diaphoretic.   HENT:      Head: Normocephalic and atraumatic.   Eyes:      Conjunctiva/sclera: Conjunctivae normal.   Cardiovascular:      Rate and Rhythm: Normal rate and regular rhythm.      Heart sounds: Normal heart sounds. No murmur heard.  No friction rub. No gallop.    Pulmonary:      Effort: Pulmonary effort is normal. No respiratory distress.      Breath sounds: Normal breath sounds. No stridor. No wheezing, rhonchi or rales.   Abdominal:      General: Bowel sounds are normal. There is no distension.      Palpations: Abdomen is soft. There is no mass.      Tenderness: There is no abdominal tenderness. There is no guarding or rebound.      Hernia: No hernia is present.   Musculoskeletal:         General: No tenderness. Normal range of motion.      Cervical back: Normal range of motion. Pain with movement, spinous process tenderness and muscular tenderness present.   Skin:     General: Skin is warm and dry.      Coloration: Skin is not pale.      Findings: No erythema or rash.   Neurological:      Mental Status: He is alert and oriented to person, place, and time.   Psychiatric:         Attention and Perception: Attention and perception normal.         Mood and Affect: Mood is anxious.         Speech: Speech normal.         Behavior: Behavior normal. Behavior is cooperative.         Thought Content: Thought content normal. Thought content is not paranoid or delusional. Thought content does not include homicidal or suicidal ideation. Thought content does not include homicidal or suicidal plan.         Cognition and Memory: Cognition and memory normal.         Judgment: Judgment normal.            Assessment/Plan   Diagnoses and all orders for this visit:    1. Hypothyroidism due to medication (Primary)   -Patient to complete TSH and T4 today.  Will call with results.  We will continue to monitor.    2. Anxiety  -     Ambulatory Referral to Psychiatry  -     QUEtiapine (SEROquel) 25 MG tablet; Take 1 tablet by mouth Every Night.  Dispense: 90 tablet; Refill: 0  -     buPROPion XL (WELLBUTRIN XL) 150 MG 24 hr tablet; Take 1 tablet by mouth Daily.  Dispense: 90 tablet; Refill: 3   -Patient reporting irritability and anger issues.  Continue Wellbutrin as prescribed.  We will add Seroquel 25 mg 1 tablet p.o. nightly.  Take 8 to 10 hours for planned awakening.  Do not mix with alcohol or other sedating substances.  Follow-up with psychiatry as scheduled.    3. Irritability and anger  -     Ambulatory Referral to Psychiatry  -     QUEtiapine (SEROquel) 25 MG tablet; Take 1 tablet by mouth Every Night.  Dispense: 90 tablet; Refill: 0  -     buPROPion XL (WELLBUTRIN XL) 150 MG 24 hr tablet; Take 1 tablet by mouth Daily.  Dispense: 90 tablet; Refill: 3   -Plan of care stated above #2.  We will continue to monitor.    4. Dyslipidemia  -    Refill, aspirin 81 MG chewable tablet; Chew 1 tablet Daily.  Dispense: 90 tablet; Refill: 3    5. Primary insomnia  -     QUEtiapine (SEROquel) 25 MG tablet; Take 1 tablet by mouth Every Night.  Dispense: 90 tablet; Refill: 0   -We will add Seroquel to aid in sleep.  Do not take any other sleep aids.  Plan of care stated above in #2.  We will continue to monitor.    6. Chronic obstructive pulmonary disease, unspecified COPD type (HCC)  -     Stable.  Refill, fluticasone-salmeterol (Advair HFA) 45-21 MCG/ACT inhaler; Inhale 2 puffs 2 (Two) Times a Day.  Dispense: 12 g; Refill: 11    7. Gastroesophageal reflux disease without esophagitis  -    Controlled.  Refill, pantoprazole (PROTONIX) 40 MG EC tablet; Take 1 tablet by mouth Daily.  Dispense: 90 tablet; Refill: 3    8.  Chronic idiopathic constipation  -    Controlled.  Refill, polyethylene glycol (MIRALAX) 17 GM/SCOOP powder; Take 17 g by mouth Daily.  Dispense: 850 g; Refill: 5    9. Mixed hyperlipidemia  -     pravastatin (PRAVACHOL) 40 MG tablet; Take 1 tablet by mouth Daily.  Dispense: 90 tablet; Refill: 3    10. Cervical spondylolysis  -     Ambulatory Referral to Pain Management  -     traMADol (ULTRAM) 50 MG tablet; Take 2 tablets by mouth Every 8 (Eight) Hours As Needed for Moderate Pain .  Dispense: 180 tablet; Refill: 1  -     pregabalin (LYRICA) 50 MG capsule; Take 1 capsule by mouth 2 (Two) Times a Day.  Dispense: 180 capsule; Refill: 0   -Andrei reviewed and appropriate.  Urine drug screen and controlled substance contract on file.  Follow-up with pain management as scheduled.  We will continue to monitor.    11. Cervical radiculitis  -     Ambulatory Referral to Pain Management, follow-up as scheduled.  Plan of care stated above #10.    12. DDD (degenerative disc disease), cervical  -     Ambulatory Referral to Pain Management  -     traMADol (ULTRAM) 50 MG tablet; Take 2 tablets by mouth Every 8 (Eight) Hours As Needed for Moderate Pain .  Dispense: 180 tablet; Refill: 1  -     pregabalin (LYRICA) 50 MG capsule; Take 1 capsule by mouth 2 (Two) Times a Day.  Dispense: 180 capsule; Refill: 0   -Plan of care stated above #10.    13.  Follow-up in 1 month or sooner for any acute needs.          This document has been electronically signed by ERIC Oleary on January 5, 2022 12:16 CST

## 2022-01-07 ENCOUNTER — TELEPHONE (OUTPATIENT)
Dept: FAMILY MEDICINE CLINIC | Facility: CLINIC | Age: 47
End: 2022-01-07

## 2022-01-07 DIAGNOSIS — F51.01 PRIMARY INSOMNIA: ICD-10-CM

## 2022-01-07 DIAGNOSIS — K21.9 GASTROESOPHAGEAL REFLUX DISEASE WITHOUT ESOPHAGITIS: ICD-10-CM

## 2022-01-07 DIAGNOSIS — E78.5 DYSLIPIDEMIA: ICD-10-CM

## 2022-01-07 DIAGNOSIS — F41.9 ANXIETY: ICD-10-CM

## 2022-01-07 DIAGNOSIS — J44.9 CHRONIC OBSTRUCTIVE PULMONARY DISEASE, UNSPECIFIED COPD TYPE: ICD-10-CM

## 2022-01-07 DIAGNOSIS — M43.02 CERVICAL SPONDYLOLYSIS: ICD-10-CM

## 2022-01-07 DIAGNOSIS — K59.04 CHRONIC IDIOPATHIC CONSTIPATION: ICD-10-CM

## 2022-01-07 DIAGNOSIS — M54.12 CERVICAL RADICULITIS: ICD-10-CM

## 2022-01-07 DIAGNOSIS — E78.2 MIXED HYPERLIPIDEMIA: ICD-10-CM

## 2022-01-07 DIAGNOSIS — M50.30 DDD (DEGENERATIVE DISC DISEASE), CERVICAL: ICD-10-CM

## 2022-01-07 DIAGNOSIS — R45.4 IRRITABILITY AND ANGER: ICD-10-CM

## 2022-01-07 RX ORDER — PREGABALIN 50 MG/1
50 CAPSULE ORAL 2 TIMES DAILY
Qty: 180 CAPSULE | Refills: 0 | Status: SHIPPED | OUTPATIENT
Start: 2022-01-07 | End: 2022-07-26 | Stop reason: SDUPTHER

## 2022-01-07 RX ORDER — PRAVASTATIN SODIUM 40 MG
40 TABLET ORAL DAILY
Qty: 90 TABLET | Refills: 3 | Status: SHIPPED | OUTPATIENT
Start: 2022-01-07 | End: 2022-10-31

## 2022-01-07 RX ORDER — BUPROPION HYDROCHLORIDE 150 MG/1
150 TABLET ORAL DAILY
Qty: 90 TABLET | Refills: 3 | Status: SHIPPED | OUTPATIENT
Start: 2022-01-07 | End: 2022-11-04

## 2022-01-07 RX ORDER — POLYETHYLENE GLYCOL 3350 17 G/17G
17 POWDER, FOR SOLUTION ORAL DAILY
Qty: 850 G | Refills: 5 | Status: SHIPPED | OUTPATIENT
Start: 2022-01-07

## 2022-01-07 RX ORDER — TIZANIDINE 4 MG/1
4 TABLET ORAL 2 TIMES DAILY PRN
Qty: 60 TABLET | Refills: 3 | Status: SHIPPED | OUTPATIENT
Start: 2022-01-07 | End: 2022-01-18 | Stop reason: SDUPTHER

## 2022-01-07 RX ORDER — ASPIRIN 81 MG/1
81 TABLET, CHEWABLE ORAL DAILY
Qty: 90 TABLET | Refills: 3 | Status: SHIPPED | OUTPATIENT
Start: 2022-01-07

## 2022-01-07 RX ORDER — TRAMADOL HYDROCHLORIDE 50 MG/1
100 TABLET ORAL EVERY 8 HOURS PRN
Qty: 180 TABLET | Refills: 1 | Status: SHIPPED | OUTPATIENT
Start: 2022-01-07 | End: 2022-01-19 | Stop reason: SDUPTHER

## 2022-01-07 RX ORDER — QUETIAPINE FUMARATE 25 MG/1
25 TABLET, FILM COATED ORAL NIGHTLY
Qty: 90 TABLET | Refills: 0 | Status: SHIPPED | OUTPATIENT
Start: 2022-01-07 | End: 2022-01-18

## 2022-01-07 RX ORDER — PANTOPRAZOLE SODIUM 40 MG/1
40 TABLET, DELAYED RELEASE ORAL DAILY
Qty: 90 TABLET | Refills: 3 | Status: SHIPPED | OUTPATIENT
Start: 2022-01-07 | End: 2022-10-31

## 2022-01-07 RX ORDER — FLUTICASONE PROPIONATE AND SALMETEROL XINAFOATE 45; 21 UG/1; UG/1
2 AEROSOL, METERED RESPIRATORY (INHALATION)
Qty: 12 G | Refills: 11 | Status: SHIPPED | OUTPATIENT
Start: 2022-01-07 | End: 2022-10-03 | Stop reason: DRUGHIGH

## 2022-01-07 NOTE — TELEPHONE ENCOUNTER
All medications have been sent to [facility].  FYI, I will not dispense 90 days at a time of a controlled substance.  I we will give a 30-day supply with 2 refills which will cover him for 90 days.

## 2022-01-07 NOTE — TELEPHONE ENCOUNTER
Patient was called to let him know prescriptions have been transferred to [facility] mailorder and to let him know the controlled medications can only be sent a month supply with 2 refills to equal a 90 day supply.

## 2022-01-07 NOTE — TELEPHONE ENCOUNTER
Patient states he found out that his work uses [facility] for mail order prescriptions.  He would like his scripts sent to that pharmacy.  Also patient states we should be expecting a PA request for his Tramadol for  3 month supply (540 tablets).    Thanks so much

## 2022-01-07 NOTE — PROGRESS NOTES
Patient called stating his mail-order pharmacy is [facility] and not [facility].  All medications refilled and sent to [facility] including controlled substances.  Andrei reviewed and appropriate for tramadol and Lyrica prescription refilled and sent to [facility].  Controlled substance contract and urine drug screen on file.

## 2022-01-07 NOTE — TELEPHONE ENCOUNTER
Mr. Najera would like to speak with either Sarita or Jamie about all of his medications. He would not go into any details about this. Please call back @ [phone]

## 2022-01-17 ENCOUNTER — TELEPHONE (OUTPATIENT)
Dept: FAMILY MEDICINE CLINIC | Facility: CLINIC | Age: 47
End: 2022-01-17

## 2022-01-18 ENCOUNTER — TELEPHONE (OUTPATIENT)
Dept: FAMILY MEDICINE CLINIC | Facility: CLINIC | Age: 47
End: 2022-01-18

## 2022-01-18 DIAGNOSIS — F41.9 ANXIETY: ICD-10-CM

## 2022-01-18 DIAGNOSIS — M54.12 CERVICAL RADICULITIS: ICD-10-CM

## 2022-01-18 DIAGNOSIS — R45.4 IRRITABILITY AND ANGER: ICD-10-CM

## 2022-01-18 DIAGNOSIS — F51.01 PRIMARY INSOMNIA: ICD-10-CM

## 2022-01-18 RX ORDER — TIZANIDINE 4 MG/1
4 TABLET ORAL 2 TIMES DAILY PRN
Qty: 180 TABLET | Refills: 3 | Status: SHIPPED | OUTPATIENT
Start: 2022-01-18 | End: 2023-02-10 | Stop reason: SDUPTHER

## 2022-01-18 RX ORDER — QUETIAPINE FUMARATE 50 MG/1
50 TABLET, FILM COATED ORAL NIGHTLY
Qty: 90 TABLET | Refills: 0 | Status: SHIPPED | OUTPATIENT
Start: 2022-01-18 | End: 2022-02-15

## 2022-01-18 NOTE — TELEPHONE ENCOUNTER
Would like for you to call him back about his tramadol. Does not want it sent to mail order wants it sent to [facility] in [place]

## 2022-01-19 DIAGNOSIS — M43.02 CERVICAL SPONDYLOLYSIS: ICD-10-CM

## 2022-01-19 DIAGNOSIS — M50.30 DDD (DEGENERATIVE DISC DISEASE), CERVICAL: ICD-10-CM

## 2022-01-19 RX ORDER — TRAMADOL HYDROCHLORIDE 50 MG/1
100 TABLET ORAL EVERY 8 HOURS PRN
Qty: 180 TABLET | Refills: 1 | Status: SHIPPED | OUTPATIENT
Start: 2022-01-19 | End: 2022-04-12 | Stop reason: SDUPTHER

## 2022-01-19 NOTE — PROGRESS NOTES
Andrei reviewed and appropriate for tramadol prescription refilled on 1/19/2022.  Controlled substance contract and urine drug screen on file.

## 2022-01-21 ENCOUNTER — TELEPHONE (OUTPATIENT)
Dept: FAMILY MEDICINE CLINIC | Facility: CLINIC | Age: 47
End: 2022-01-21

## 2022-02-01 ENCOUNTER — LAB (OUTPATIENT)
Dept: LAB | Facility: HOSPITAL | Age: 47
End: 2022-02-01

## 2022-02-01 DIAGNOSIS — E03.2 HYPOTHYROIDISM DUE TO MEDICATION: ICD-10-CM

## 2022-02-01 PROCEDURE — 84439 ASSAY OF FREE THYROXINE: CPT

## 2022-02-01 PROCEDURE — 84443 ASSAY THYROID STIM HORMONE: CPT

## 2022-02-02 ENCOUNTER — TELEPHONE (OUTPATIENT)
Dept: FAMILY MEDICINE CLINIC | Facility: CLINIC | Age: 47
End: 2022-02-02

## 2022-02-02 DIAGNOSIS — E03.2 HYPOTHYROIDISM DUE TO MEDICATION: Primary | ICD-10-CM

## 2022-02-02 LAB
T4 FREE SERPL-MCNC: 1.27 NG/DL (ref 0.93–1.7)
TSH SERPL DL<=0.05 MIU/L-ACNC: 9.06 UIU/ML (ref 0.27–4.2)

## 2022-02-02 RX ORDER — LEVOTHYROXINE SODIUM 0.05 MG/1
50 TABLET ORAL DAILY
Qty: 90 TABLET | Refills: 3 | Status: SHIPPED | OUTPATIENT
Start: 2022-02-02 | End: 2022-02-14 | Stop reason: SDUPTHER

## 2022-02-02 NOTE — TELEPHONE ENCOUNTER
Mr. Najera needs to speak with you about getting a 45 day supply of his pregabalin sent to [facility]. He is trying to get all of his medications on the same mail order schedule. He also needs to speak with you about his advair inhaler prescription. Please call back @ [phone]

## 2022-02-02 NOTE — PROGRESS NOTES
TSH elevated but improving, 9.06.  I am increasing his levothyroxine to 50 mcg daily.  Repeat TSH and T4 in 4 to 6 weeks.

## 2022-02-07 NOTE — TELEPHONE ENCOUNTER
Patient states [facility] is to send and order for a 3 month supply of the advair inhaler. He was informed that he could only get a 30 day supply of the tramadol.

## 2022-02-10 ENCOUNTER — TELEPHONE (OUTPATIENT)
Dept: FAMILY MEDICINE CLINIC | Facility: CLINIC | Age: 47
End: 2022-02-10

## 2022-02-10 NOTE — TELEPHONE ENCOUNTER
Mr. Najera would like to speak with you about his medication. He states that [employer] has messed up his medications. Please call back @ [phone] asap please.

## 2022-02-14 DIAGNOSIS — E03.2 HYPOTHYROIDISM DUE TO MEDICATION: ICD-10-CM

## 2022-02-14 RX ORDER — LEVOTHYROXINE SODIUM 0.05 MG/1
50 TABLET ORAL DAILY
Qty: 90 TABLET | Refills: 3 | Status: SHIPPED | OUTPATIENT
Start: 2022-02-14 | End: 2022-08-19 | Stop reason: SDUPTHER

## 2022-02-14 NOTE — TELEPHONE ENCOUNTER
Patient did not receive his thyroid medication.  He requested to be sent to [facility] in [place].  Prescription has been sent.  He also states the Seroquel XR 50 mg seems to be to much.  He wants to know if you can decrease or send something else in.  Please advise,  Thanks so much.

## 2022-02-15 DIAGNOSIS — F41.9 ANXIETY: ICD-10-CM

## 2022-02-15 DIAGNOSIS — R45.4 IRRITABILITY AND ANGER: ICD-10-CM

## 2022-02-15 DIAGNOSIS — F51.01 PRIMARY INSOMNIA: ICD-10-CM

## 2022-02-15 RX ORDER — QUETIAPINE FUMARATE 25 MG/1
50 TABLET, FILM COATED ORAL NIGHTLY
Qty: 30 TABLET | Refills: 3 | Status: SHIPPED | OUTPATIENT
Start: 2022-02-15 | End: 2022-03-24

## 2022-03-22 ENCOUNTER — LAB (OUTPATIENT)
Dept: LAB | Facility: HOSPITAL | Age: 47
End: 2022-03-22

## 2022-03-22 DIAGNOSIS — E03.2 HYPOTHYROIDISM DUE TO MEDICATION: ICD-10-CM

## 2022-03-22 PROCEDURE — 84439 ASSAY OF FREE THYROXINE: CPT

## 2022-03-22 PROCEDURE — 84443 ASSAY THYROID STIM HORMONE: CPT

## 2022-03-23 LAB
T4 FREE SERPL-MCNC: 1.32 NG/DL (ref 0.93–1.7)
TSH SERPL DL<=0.05 MIU/L-ACNC: 3.45 UIU/ML (ref 0.27–4.2)

## 2022-03-24 DIAGNOSIS — F51.01 PRIMARY INSOMNIA: ICD-10-CM

## 2022-03-24 DIAGNOSIS — R45.4 IRRITABILITY AND ANGER: ICD-10-CM

## 2022-03-24 DIAGNOSIS — M50.30 DDD (DEGENERATIVE DISC DISEASE), CERVICAL: Primary | ICD-10-CM

## 2022-03-24 DIAGNOSIS — F41.9 ANXIETY: ICD-10-CM

## 2022-03-24 RX ORDER — QUETIAPINE FUMARATE 50 MG/1
50 TABLET, FILM COATED ORAL NIGHTLY
Qty: 90 TABLET | Refills: 3 | Status: SHIPPED | OUTPATIENT
Start: 2022-03-24 | End: 2022-05-11 | Stop reason: SINTOL

## 2022-03-24 NOTE — PROGRESS NOTES
Thyroid levels within normal limits.  Continue medication as prescribed.  We will continue to monitor.  Follow-up as scheduled.

## 2022-03-28 ENCOUNTER — TELEPHONE (OUTPATIENT)
Dept: FAMILY MEDICINE CLINIC | Facility: CLINIC | Age: 47
End: 2022-03-28

## 2022-03-28 NOTE — TELEPHONE ENCOUNTER
Patient called wanting to speak with you. He said it is about his [place] papers. He said it is urgent.     Thanks

## 2022-03-28 NOTE — TELEPHONE ENCOUNTER
Patient states he was seen at Dr. Rahman office and has had problems with moving and pain since.  He states he talked to his FMLA advisor and was suggested to increase is days.  He states he has not worked since March 22-current.  When asked about another FMLA form; patient states he was told the previous could be revised.

## 2022-03-30 ENCOUNTER — TELEPHONE (OUTPATIENT)
Dept: FAMILY MEDICINE CLINIC | Facility: CLINIC | Age: 47
End: 2022-03-30

## 2022-03-30 ENCOUNTER — OFFICE VISIT (OUTPATIENT)
Dept: FAMILY MEDICINE CLINIC | Facility: CLINIC | Age: 47
End: 2022-03-30

## 2022-03-30 VITALS
HEIGHT: 68 IN | OXYGEN SATURATION: 99 % | HEART RATE: 83 BPM | BODY MASS INDEX: 32.42 KG/M2 | SYSTOLIC BLOOD PRESSURE: 124 MMHG | WEIGHT: 213.9 LBS | DIASTOLIC BLOOD PRESSURE: 76 MMHG

## 2022-03-30 DIAGNOSIS — E03.2 HYPOTHYROIDISM DUE TO MEDICATION: ICD-10-CM

## 2022-03-30 DIAGNOSIS — M43.02 CERVICAL SPONDYLOLYSIS: ICD-10-CM

## 2022-03-30 DIAGNOSIS — I10 ESSENTIAL HYPERTENSION: Primary | ICD-10-CM

## 2022-03-30 DIAGNOSIS — M50.30 DDD (DEGENERATIVE DISC DISEASE), CERVICAL: ICD-10-CM

## 2022-03-30 PROCEDURE — 99214 OFFICE O/P EST MOD 30 MIN: CPT | Performed by: NURSE PRACTITIONER

## 2022-03-30 NOTE — TELEPHONE ENCOUNTER
Patient states [facility] paperwork has not been received.  Informed patient that the LA was faxed to [facility]; but, will fax again.

## 2022-03-30 NOTE — PROGRESS NOTES
Subjective   Aquiles Najera is a 46 y.o. male.     CC: Hypertension, chronic neck pain, hypothyroidism    Hypertension  This is a chronic problem. The current episode started more than 1 year ago. The problem is controlled. Associated symptoms include neck pain. Pertinent negatives include no chest pain, headaches, palpitations or shortness of breath. Risk factors for coronary artery disease include family history, dyslipidemia, male gender, sedentary lifestyle and obesity. Current antihypertension treatment includes lifestyle changes. The current treatment provides significant improvement. There are no compliance problems.  There is no history of angina, kidney disease or CAD/MI.   Neck Pain   This is a chronic problem. The current episode started more than 1 year ago. The problem occurs constantly. The problem has been unchanged. The pain is associated with nothing. The pain is at a severity of 10/10. The pain is severe. The symptoms are aggravated by twisting, stress, position and bending. The pain is same all the time. Stiffness is present at night and all day. Pertinent negatives include no chest pain, fever, headaches, leg pain, numbness, pain with swallowing, paresis, photophobia, syncope, tingling, trouble swallowing, visual change, weakness or weight loss. He has tried NSAIDs, muscle relaxants, chiropractic manipulation, home exercises, bed rest, acetaminophen, heat, ice and neck support (tramadol, lyrica) for the symptoms. The treatment provided mild relief.   Hypothyroidism  This is a chronic problem. The current episode started more than 1 year ago. The problem has been unchanged. Associated symptoms include neck pain. Pertinent negatives include no abdominal pain, arthralgias, chest pain, chills, congestion, coughing, fatigue, fever, headaches, myalgias, nausea, numbness, rash, sore throat, visual change, vomiting or weakness. Associated symptoms comments: Globus sensation recently  . Nothing  aggravates the symptoms. Treatments tried: levothyroxine. The treatment provided significant relief.        The following portions of the patient's history were reviewed and updated as appropriate: allergies, current medications, past family history, past medical history, past social history, past surgical history and problem list.    Review of Systems   Constitutional: Negative for activity change, appetite change, chills, fatigue, fever, unexpected weight gain and unexpected weight loss.   HENT: Negative for congestion, sore throat, trouble swallowing and voice change.    Eyes: Negative.  Negative for photophobia.   Respiratory: Negative for cough, chest tightness, shortness of breath and wheezing.    Cardiovascular: Negative for chest pain, palpitations, leg swelling and syncope.   Gastrointestinal: Negative for abdominal pain, diarrhea, nausea and vomiting.        Globus sensation     Endocrine: Negative.  Negative for cold intolerance, heat intolerance, polydipsia, polyphagia and polyuria.   Genitourinary: Negative for dysuria, hematuria and urgency.   Musculoskeletal: Positive for neck pain. Negative for arthralgias and myalgias.   Skin: Negative for rash.   Neurological: Negative for dizziness, tingling, weakness, light-headedness, numbness and headache.   Hematological: Negative.    Psychiatric/Behavioral: Negative.        Objective   Physical Exam  Vitals and nursing note reviewed.   Constitutional:       General: He is not in acute distress.     Appearance: Normal appearance. He is well-developed and normal weight. He is not ill-appearing, toxic-appearing or diaphoretic.   HENT:      Head: Normocephalic and atraumatic.   Eyes:      Conjunctiva/sclera: Conjunctivae normal.   Cardiovascular:      Rate and Rhythm: Normal rate and regular rhythm.      Heart sounds: Normal heart sounds. No murmur heard.    No friction rub. No gallop.   Pulmonary:      Effort: Pulmonary effort is normal. No respiratory distress.       Breath sounds: Normal breath sounds. No stridor. No wheezing, rhonchi or rales.   Abdominal:      General: Bowel sounds are normal. There is no distension.      Palpations: Abdomen is soft. There is no mass.      Tenderness: There is no abdominal tenderness. There is no guarding or rebound.      Hernia: No hernia is present.   Musculoskeletal:         General: No tenderness.      Cervical back: Pain with movement, spinous process tenderness and muscular tenderness present. Decreased range of motion.   Skin:     General: Skin is warm and dry.      Coloration: Skin is not pale.      Findings: No erythema or rash.   Neurological:      Mental Status: He is alert and oriented to person, place, and time.   Psychiatric:         Mood and Affect: Mood normal.         Behavior: Behavior normal.         Thought Content: Thought content normal.         Judgment: Judgment normal.           Assessment/Plan   Diagnoses and all orders for this visit:    1. Essential hypertension (Primary)   -Controlled.  We will continue to monitor.    2. Cervical spondylolysis   -Currently not well controlled with tramadol or Lyrica.  Patient has scheduled appointment with pain management this Friday.  Follow-up with pain management as scheduled.  Continue physical therapy as scheduled.  We will continue to monitor.    3. DDD (degenerative disc disease), cervical   -Plan of care stated above #2.  We will continue to monitor.    4. Hypothyroidism due to medication  -     US Thyroid; Future, will call with results.  Thyroid labs reviewed and within normal limits.  Patient reports globus sensation.  Will call with ultrasound results.    5.  Follow-up in 3 months or sooner for any acute needs.            This document has been electronically signed by ERIC Oleary on March 30, 2022 11:38 CDT

## 2022-04-07 DIAGNOSIS — M50.30 DDD (DEGENERATIVE DISC DISEASE), CERVICAL: ICD-10-CM

## 2022-04-07 DIAGNOSIS — M43.02 CERVICAL SPONDYLOLYSIS: ICD-10-CM

## 2022-04-08 ENCOUNTER — TELEPHONE (OUTPATIENT)
Dept: FAMILY MEDICINE CLINIC | Facility: CLINIC | Age: 47
End: 2022-04-08

## 2022-04-08 RX ORDER — TRAMADOL HYDROCHLORIDE 50 MG/1
TABLET ORAL
Qty: 180 TABLET | Refills: 0 | OUTPATIENT
Start: 2022-04-08

## 2022-04-08 NOTE — TELEPHONE ENCOUNTER
Desire with [facility] called stating she faxed a Medical Clearance Epidural Injection form on 4/4 that was needing to be signed. Caller would like a call back please [phone]

## 2022-04-08 NOTE — TELEPHONE ENCOUNTER
Spoke to Desire at [facility] to let her know you was out of the office until Monday and the Medical Clearance form will be on your desk.

## 2022-04-08 NOTE — TELEPHONE ENCOUNTER
Jamie ERIC Rg pt    [facility] Tramadol 50 mg #180,  01/19/2022 with 1 refill.       Last OV with Jamie APRN 03/30/2021    No Future OV with Jamie, APRN at this time

## 2022-04-12 ENCOUNTER — APPOINTMENT (OUTPATIENT)
Dept: ULTRASOUND IMAGING | Facility: HOSPITAL | Age: 47
End: 2022-04-12

## 2022-04-12 ENCOUNTER — TELEPHONE (OUTPATIENT)
Dept: FAMILY MEDICINE CLINIC | Facility: CLINIC | Age: 47
End: 2022-04-12

## 2022-04-12 DIAGNOSIS — M43.02 CERVICAL SPONDYLOLYSIS: Primary | ICD-10-CM

## 2022-04-12 DIAGNOSIS — M43.02 CERVICAL SPONDYLOLYSIS: ICD-10-CM

## 2022-04-12 DIAGNOSIS — M50.30 DDD (DEGENERATIVE DISC DISEASE), CERVICAL: ICD-10-CM

## 2022-04-12 RX ORDER — TRAMADOL HYDROCHLORIDE 50 MG/1
100 TABLET ORAL EVERY 8 HOURS PRN
Qty: 180 TABLET | Refills: 2 | Status: SHIPPED | OUTPATIENT
Start: 2022-04-12 | End: 2022-06-23 | Stop reason: SDUPTHER

## 2022-04-12 RX ORDER — TRAMADOL HYDROCHLORIDE 50 MG/1
100 TABLET ORAL EVERY 8 HOURS PRN
Qty: 18 TABLET | Refills: 0 | Status: SHIPPED | OUTPATIENT
Start: 2022-04-12 | End: 2022-04-12 | Stop reason: SDUPTHER

## 2022-04-12 NOTE — TELEPHONE ENCOUNTER
Adding to note that goes along with pt's first call about pain medication request. The pt called requesting a refill for Tramadol, the opt advised that he was being seen in [place] for Epidural/steriod injections but they do not handle pain medication management. I then touched base with ERIC Ayala who stated that he sent in a referral for both [place] office and one to [place] for both services. After talking with CHARLINE Phipps and confirming that Dr Albright does not handle Med management. ERIC Ayala has agreed to send in a 30 day supply of Tramadol and putting in a referral to the [facility] for medication management. Pt is understanding of what's happening and wants to stay with the [place] office for the injections in the mean time.

## 2022-04-12 NOTE — TELEPHONE ENCOUNTER
Desire from Dr. Albright office called stating they do not give Tramodol to patients, they only give the Epidural injections.

## 2022-04-12 NOTE — PROGRESS NOTES
Andrei reviewed and appropriate for 3-day supply of tramadol refilled on 4/12/2022.  Patient to contact his pain management center for further medication needs.

## 2022-04-12 NOTE — TELEPHONE ENCOUNTER
Pt went to pain med they only gave him a shot so still needs a refill and is now completely out of Medicine . Can you call him at [phone]

## 2022-04-13 DIAGNOSIS — M50.30 DDD (DEGENERATIVE DISC DISEASE), CERVICAL: ICD-10-CM

## 2022-04-13 DIAGNOSIS — M43.02 CERVICAL SPONDYLOLYSIS: Primary | ICD-10-CM

## 2022-04-29 ENCOUNTER — APPOINTMENT (OUTPATIENT)
Dept: ULTRASOUND IMAGING | Facility: HOSPITAL | Age: 47
End: 2022-04-29

## 2022-05-03 ENCOUNTER — TELEPHONE (OUTPATIENT)
Dept: FAMILY MEDICINE CLINIC | Facility: CLINIC | Age: 47
End: 2022-05-03

## 2022-05-03 NOTE — TELEPHONE ENCOUNTER
Mr. Najera called stating he has not heard anything from Pain Management. Please call back @ [phone]

## 2022-05-03 NOTE — TELEPHONE ENCOUNTER
Patient was called to elt him know PM referral has been authorized and ready to schedule.  Patient was given the number to [facility] in [place]

## 2022-05-11 ENCOUNTER — TELEPHONE (OUTPATIENT)
Dept: FAMILY MEDICINE CLINIC | Facility: CLINIC | Age: 47
End: 2022-05-11

## 2022-05-11 DIAGNOSIS — R45.4 IRRITABILITY AND ANGER: Primary | ICD-10-CM

## 2022-05-11 DIAGNOSIS — F41.9 ANXIETY: ICD-10-CM

## 2022-05-11 RX ORDER — LAMOTRIGINE 25 MG/1
25 TABLET ORAL DAILY
Qty: 30 TABLET | Refills: 3 | Status: SHIPPED | OUTPATIENT
Start: 2022-05-11 | End: 2022-05-25

## 2022-05-11 NOTE — TELEPHONE ENCOUNTER
Discontinue Seroquel.  I Christen start him on Lamictal 25 mg daily.  Continue other medications as prescribed.  I will also place a referral to psychiatry for further evaluation and medication management.  It is important that he establish with psychiatry to have a proper evaluation and fully evaluate his medicine needs.

## 2022-05-11 NOTE — TELEPHONE ENCOUNTER
"Pt needs to speak to Jamie or Sarita about his   QUEtiapine (SEROquel) 50 MG tablet [33547] (Order [id number])    When asked if he needed a refill he simply said \"No \" I just need to talk to one of them about the seroquel \"    Please contact him at [phone]  "

## 2022-05-11 NOTE — TELEPHONE ENCOUNTER
Patient states the seroquel 25 mg is not enough.  If he takes 50 mg; he hallucinates.  What do you recommend?

## 2022-05-23 DIAGNOSIS — M43.02 CERVICAL SPONDYLOLYSIS: ICD-10-CM

## 2022-05-23 DIAGNOSIS — M50.30 DDD (DEGENERATIVE DISC DISEASE), CERVICAL: ICD-10-CM

## 2022-05-23 RX ORDER — PREGABALIN 50 MG/1
CAPSULE ORAL
Qty: 180 CAPSULE | Refills: 0 | OUTPATIENT
Start: 2022-05-23

## 2022-05-24 ENCOUNTER — TELEPHONE (OUTPATIENT)
Dept: FAMILY MEDICINE CLINIC | Facility: CLINIC | Age: 47
End: 2022-05-24

## 2022-05-24 DIAGNOSIS — M50.30 DDD (DEGENERATIVE DISC DISEASE), CERVICAL: ICD-10-CM

## 2022-05-24 DIAGNOSIS — M43.02 CERVICAL SPONDYLOLYSIS: Primary | ICD-10-CM

## 2022-05-24 NOTE — TELEPHONE ENCOUNTER
Referral placed to pain management [facility]. I have already placed a referral to psych. Has he been contacted with an appointment yet? We have tried multiple medications. If nothing is working he will need to follow up with psych for further evaluation and treatment.

## 2022-05-24 NOTE — TELEPHONE ENCOUNTER
Patient states the [facility] will not see him, the epidural injections is not working.  He has no preference as to which pain mgmt you send him to.  He also states he cannot tell a difference with the Lamictal.

## 2022-05-24 NOTE — TELEPHONE ENCOUNTER
Pt called stating he is needing a referral for a different pain management because the one in [place] will not see him Please advise @ [phone]

## 2022-05-25 ENCOUNTER — TELEPHONE (OUTPATIENT)
Dept: FAMILY MEDICINE CLINIC | Facility: CLINIC | Age: 47
End: 2022-05-25

## 2022-05-25 DIAGNOSIS — R45.4 IRRITABILITY AND ANGER: ICD-10-CM

## 2022-05-25 DIAGNOSIS — F41.9 ANXIETY: ICD-10-CM

## 2022-05-25 DIAGNOSIS — M43.02 CERVICAL SPONDYLOLYSIS: Primary | ICD-10-CM

## 2022-05-25 DIAGNOSIS — M50.30 DDD (DEGENERATIVE DISC DISEASE), CERVICAL: ICD-10-CM

## 2022-05-25 RX ORDER — LAMOTRIGINE 25 MG/1
25 TABLET ORAL 2 TIMES DAILY
Qty: 60 TABLET | Refills: 3 | Status: SHIPPED | OUTPATIENT
Start: 2022-05-25 | End: 2022-10-31

## 2022-05-25 NOTE — TELEPHONE ENCOUNTER
New pain management referral placed.   I recommend he stay with his current ps location. I think this is a good opportunity for him to work through and accept things not always working out as planned and not immediately running away from the situation. If he continues to have difficulty and is making his best effort with the situation then I will be happy to send a new referral to somewhere else.

## 2022-05-25 NOTE — TELEPHONE ENCOUNTER
Patient was called to let himknow referral to Dr. aMrli Meier MD Pain mgmt. was placed.  Also recommendations to give [facility] another opportunity.  Patient verbalized understanding.

## 2022-05-25 NOTE — TELEPHONE ENCOUNTER
"Patient prefers not to go to [facility] as they are affiliated with [facility]. He states he would like a different referral to Psych.  He went to the first appt. And \"opened up to the doctor\" and the second appt. He said he got in the parking lot and they cancelled his appt.   "

## 2022-06-08 ENCOUNTER — TELEPHONE (OUTPATIENT)
Dept: FAMILY MEDICINE CLINIC | Facility: CLINIC | Age: 47
End: 2022-06-08

## 2022-06-08 NOTE — TELEPHONE ENCOUNTER
Chhaya from  pain management call wanting Jamie to be aware of the incident they had with Mr. Najera. Chhaya stated that he cussed her over the phone and was calling the staff inappropriate names. She stated Dr. Meier does not tolerate that behavior towards staff.   Chhaya said she was just wanting to let Jamie know what happen so that he is aware of the situation.     Any questions please call Chhaya @ [phone]

## 2022-06-13 ENCOUNTER — TELEPHONE (OUTPATIENT)
Dept: FAMILY MEDICINE CLINIC | Facility: CLINIC | Age: 47
End: 2022-06-13

## 2022-06-13 DIAGNOSIS — M43.02 CERVICAL SPONDYLOLYSIS: Primary | ICD-10-CM

## 2022-06-13 DIAGNOSIS — M50.30 DDD (DEGENERATIVE DISC DISEASE), CERVICAL: ICD-10-CM

## 2022-06-13 NOTE — TELEPHONE ENCOUNTER
Pt can't go to our referral pain mgmt so needs a referral Dr Keo chan in [place]  [phone] Fax   [phone] phone

## 2022-06-22 ENCOUNTER — TELEPHONE (OUTPATIENT)
Dept: FAMILY MEDICINE CLINIC | Facility: CLINIC | Age: 47
End: 2022-06-22

## 2022-06-22 NOTE — TELEPHONE ENCOUNTER
Pt said just needs Sarita to call him about his medicine no other details would be given   [phone]

## 2022-06-22 NOTE — TELEPHONE ENCOUNTER
Patient states his appointment with Pain Management is not until August 5th.  He states he will be needing a refill on Tramadol by the end of the week.

## 2022-06-23 DIAGNOSIS — M43.02 CERVICAL SPONDYLOLYSIS: ICD-10-CM

## 2022-06-23 RX ORDER — TRAMADOL HYDROCHLORIDE 50 MG/1
100 TABLET ORAL EVERY 8 HOURS PRN
Qty: 180 TABLET | Refills: 1 | Status: SHIPPED | OUTPATIENT
Start: 2022-06-23 | End: 2022-11-04 | Stop reason: SDUPTHER

## 2022-06-23 NOTE — TELEPHONE ENCOUNTER
Refill sent to pharmacy.  Refill not due until on or close to 7/6/2022 per Andrei report.  I have sent in a 30-day refill with 1 additional refill which should cover him until he can establish with pain management.

## 2022-06-23 NOTE — PROGRESS NOTES
Andrei reviewed and appropriate for tramadol prescription refilled on 6/23/2022.  Controlled substance contract and urine drug screen on file.

## 2022-07-07 ENCOUNTER — TELEPHONE (OUTPATIENT)
Dept: FAMILY MEDICINE CLINIC | Facility: CLINIC | Age: 47
End: 2022-07-07

## 2022-07-07 NOTE — TELEPHONE ENCOUNTER
Mr. Najera called and is needing Jamie to send an order to [facility] for an Ultra sound of his thyroid. He has moved to [place] and is needing to get this done in [facility]. Please call back @ [phone]. He is also wanting to speak to Sarita or Jamie about getting in to see another physcian in that area. Pt was informed that Jamie is out until 7-11-22

## 2022-07-25 ENCOUNTER — TELEPHONE (OUTPATIENT)
Dept: FAMILY MEDICINE CLINIC | Facility: CLINIC | Age: 47
End: 2022-07-25

## 2022-07-25 NOTE — TELEPHONE ENCOUNTER
Patient was called; he requested ultra sound order to be faxed to [facility] at [phone].

## 2022-07-25 NOTE — TELEPHONE ENCOUNTER
Patient called and said he needed to talk to Sarita or Jamie. Patient was ask what this was in reference to and he stated he just needed to talk to one of them. Would not leave any other information. Please call [phone]

## 2022-07-26 ENCOUNTER — OFFICE VISIT (OUTPATIENT)
Dept: FAMILY MEDICINE CLINIC | Facility: CLINIC | Age: 47
End: 2022-07-26

## 2022-07-26 VITALS
WEIGHT: 214.1 LBS | SYSTOLIC BLOOD PRESSURE: 120 MMHG | HEIGHT: 68 IN | DIASTOLIC BLOOD PRESSURE: 80 MMHG | HEART RATE: 72 BPM | OXYGEN SATURATION: 96 % | BODY MASS INDEX: 32.45 KG/M2 | TEMPERATURE: 97.7 F

## 2022-07-26 DIAGNOSIS — L91.8 CUTANEOUS SKIN TAGS: ICD-10-CM

## 2022-07-26 DIAGNOSIS — M43.02 CERVICAL SPONDYLOLYSIS: ICD-10-CM

## 2022-07-26 DIAGNOSIS — R11.0 NAUSEA: ICD-10-CM

## 2022-07-26 DIAGNOSIS — M50.30 DDD (DEGENERATIVE DISC DISEASE), CERVICAL: ICD-10-CM

## 2022-07-26 DIAGNOSIS — D17.9 MULTIPLE LIPOMAS: Primary | ICD-10-CM

## 2022-07-26 PROCEDURE — 99214 OFFICE O/P EST MOD 30 MIN: CPT | Performed by: NURSE PRACTITIONER

## 2022-07-26 RX ORDER — ONDANSETRON 4 MG/1
4 TABLET, ORALLY DISINTEGRATING ORAL EVERY 8 HOURS PRN
Qty: 20 TABLET | Refills: 1 | Status: SHIPPED | OUTPATIENT
Start: 2022-07-26 | End: 2023-02-14

## 2022-07-26 RX ORDER — PREGABALIN 50 MG/1
50 CAPSULE ORAL 2 TIMES DAILY
Qty: 180 CAPSULE | Refills: 0 | Status: SHIPPED | OUTPATIENT
Start: 2022-07-26 | End: 2022-10-03 | Stop reason: DRUGHIGH

## 2022-07-26 NOTE — PROGRESS NOTES
Subjective   Aquiles Najera is a 46 y.o. male.     CC: Masses of torso and thighs (lipomas), chronic neck pain, nausea    Other  This is a recurrent (lipomas, abdomen and thighs) problem. The current episode started more than 1 month ago. The problem occurs constantly. The problem has been gradually worsening. Associated symptoms include nausea and neck pain (chronic, stable). Pertinent negatives include no abdominal pain, arthralgias, chest pain, congestion, coughing, fatigue, myalgias, rash, sore throat, vomiting or weakness. Nothing aggravates the symptoms. He has tried nothing for the symptoms. The treatment provided no relief.   Nausea  This is a new problem. The current episode started yesterday. The problem occurs intermittently. The problem has been waxing and waning. Associated symptoms include nausea and neck pain (chronic, stable). Pertinent negatives include no abdominal pain, arthralgias, chest pain, congestion, coughing, fatigue, myalgias, rash, sore throat, vomiting or weakness. Nothing aggravates the symptoms. He has tried nothing for the symptoms. The treatment provided no relief.   Neck Pain   This is a chronic problem. The current episode started more than 1 year ago. The problem occurs daily. The problem has been unchanged. The pain is associated with nothing. The pain is present in the midline, right side and left side. The quality of the pain is described as aching. The pain is moderate. The symptoms are aggravated by twisting, position and bending. Stiffness is present all day. Pertinent negatives include no chest pain, trouble swallowing, weakness or weight loss. He has tried NSAIDs, neck support, home exercises, bed rest and acetaminophen (lyrica) for the symptoms. The treatment provided moderate relief.        The following portions of the patient's history were reviewed and updated as appropriate: allergies, current medications, past family history, past medical history, past social  history, past surgical history and problem list.    Review of Systems   Constitutional: Negative for activity change, appetite change, fatigue, unexpected weight gain and unexpected weight loss.   HENT: Negative for congestion, sore throat, trouble swallowing and voice change.    Eyes: Negative.    Respiratory: Negative for cough, chest tightness, shortness of breath and wheezing.    Cardiovascular: Negative for chest pain, palpitations and leg swelling.   Gastrointestinal: Positive for nausea. Negative for abdominal pain, diarrhea and vomiting.   Endocrine: Negative.    Genitourinary: Negative for dysuria, hematuria and urgency.   Musculoskeletal: Positive for neck pain (chronic, stable). Negative for arthralgias and myalgias.   Skin: Positive for skin lesions (small, tender lesions to low back and thighs). Negative for rash.   Neurological: Negative for dizziness, weakness, light-headedness and headache.   Hematological: Negative.    Psychiatric/Behavioral: Negative.        Objective   Physical Exam  Vitals and nursing note reviewed.   Constitutional:       General: He is not in acute distress.     Appearance: Normal appearance. He is well-developed. He is not ill-appearing, toxic-appearing or diaphoretic.   HENT:      Head: Normocephalic and atraumatic.   Eyes:      Conjunctiva/sclera: Conjunctivae normal.   Cardiovascular:      Rate and Rhythm: Regular rhythm.   Pulmonary:      Effort: Pulmonary effort is normal. No respiratory distress.   Abdominal:      General: Bowel sounds are normal. There is no distension.      Palpations: Abdomen is soft. There is no mass.      Tenderness: There is no abdominal tenderness. There is no guarding or rebound.      Hernia: No hernia is present.   Musculoskeletal:         General: No tenderness.      Cervical back: Pain with movement, spinous process tenderness and muscular tenderness present. Decreased range of motion.   Skin:     General: Skin is warm and dry.       Coloration: Skin is not pale.      Findings: Lesion present. No erythema or rash.          Neurological:      Mental Status: He is alert and oriented to person, place, and time.   Psychiatric:         Mood and Affect: Mood normal.         Behavior: Behavior normal.         Thought Content: Thought content normal.         Judgment: Judgment normal.           Assessment & Plan   Diagnoses and all orders for this visit:    1. Multiple lipomas (Primary)  -     Ambulatory Referral to General Surgery, follow-up as scheduled    2. Cutaneous skin tags  -     Ambulatory Referral to General Surgery, follow-up as scheduled.    3. Nausea  -     ondansetron ODT (Zofran ODT) 4 MG disintegrating tablet; Place 1 tablet on the tongue Every 8 (Eight) Hours As Needed for Nausea or Vomiting.  Dispense: 20 tablet; Refill: 1    4. Cervical spondylolysis  -     pregabalin (LYRICA) 50 MG capsule; Take 1 capsule by mouth 2 (Two) Times a Day.  Dispense: 180 capsule; Refill: 0   - Stable.  Continue Lyrica as prescribed.  Continue follow-up pain management as scheduled.  Andrei reviewed and appropriate.  Urine drug screen controlled substance contract on file.  We will continue to monitor.    5. DDD (degenerative disc disease), cervical  -     pregabalin (LYRICA) 50 MG capsule; Take 1 capsule by mouth 2 (Two) Times a Day.  Dispense: 180 capsule; Refill: 0   -Plan of care stated above #4.  We will continue to monitor.    6.  Follow-up in 3 months or sooner for any acute needs.            This document has been electronically signed by ERIC Oleary on July 26, 2022 12:04 CDT

## 2022-08-01 ENCOUNTER — TELEPHONE (OUTPATIENT)
Dept: FAMILY MEDICINE CLINIC | Facility: CLINIC | Age: 47
End: 2022-08-01

## 2022-08-01 NOTE — TELEPHONE ENCOUNTER
PATIENT CALLED STATING HE RECENTLY HAD AN ULTRASOUND AT [facility]. HE STATES HE WAS TOLD THE RESULTS WOULD BE SENT TO OUR OFFICE. HE WOULD LIKE SOMEONE TO CALL HIM TO DISCUSS THE ISSUE.     THANKS

## 2022-08-02 ENCOUNTER — TELEPHONE (OUTPATIENT)
Dept: FAMILY MEDICINE CLINIC | Facility: CLINIC | Age: 47
End: 2022-08-02

## 2022-08-03 DIAGNOSIS — E03.2 HYPOTHYROIDISM DUE TO MEDICATION: ICD-10-CM

## 2022-08-19 ENCOUNTER — TELEPHONE (OUTPATIENT)
Dept: FAMILY MEDICINE CLINIC | Facility: CLINIC | Age: 47
End: 2022-08-19

## 2022-08-19 DIAGNOSIS — E03.2 HYPOTHYROIDISM DUE TO MEDICATION: ICD-10-CM

## 2022-08-19 RX ORDER — LEVOTHYROXINE SODIUM 0.05 MG/1
50 TABLET ORAL DAILY
Qty: 90 TABLET | Refills: 3 | Status: SHIPPED | OUTPATIENT
Start: 2022-08-19 | End: 2022-10-04

## 2022-08-19 NOTE — TELEPHONE ENCOUNTER
Pt says must talk to Sarita or Jamie concerning some of his medicine pt [phone]    Has levothyroxine not sure how much to take

## 2022-08-19 NOTE — TELEPHONE ENCOUNTER
Patient had a question about his levothyroxine dosage.  Informed him that he is to take 50 mcg once a day.  Refill sent to [facility] pharmacy in [place].

## 2022-08-22 NOTE — TELEPHONE ENCOUNTER
Patient Instructions from Today's Visit    Reason for Visit:  Pre chemo cycle 2 day 1 BEP    Plan:  Proceed to infusion  Sleeping pill sent to pharmacy to help with sleep     Follow Up:  1 week    Recent Lab Results:  Hospital Outpatient Visit on 08/22/2022   Component Date Value Ref Range Status    WBC 08/22/2022 9.6  4.3 - 11.1 K/uL Final    Comment: RESULTS CHECKED X 2  PERIPHERAL REVIEW TO FOLLOW      RBC 08/22/2022 4.98  4.23 - 5.6 M/uL Final    Hemoglobin 08/22/2022 14.3  13.6 - 17.2 g/dL Final    Hematocrit 08/22/2022 41.5  % Final    MCV 08/22/2022 83.3  79.6 - 97.8 FL Final    MCH 08/22/2022 28.7  26.1 - 32.9 PG Final    MCHC 08/22/2022 34.5  31.4 - 35.0 g/dL Final    RDW 08/22/2022 12.9  11.9 - 14.6 % Final    Platelets 57/78/9059 352  150 - 450 K/uL Final    MPV 08/22/2022 8.9 (A) 9.4 - 12.3 FL Final    nRBC 08/22/2022 0.00  0.0 - 0.2 K/uL Final    **Note: Absolute NRBC parameter is now reported with Hemogram**    Differential Type 08/22/2022 PENDING   Incomplete         Treatment Summary has been discussed and given to patient: no        -------------------------------------------------------------------------------------------------------------------  Please call our office at [phone] if you have any  of the following symptoms:   Fever of 100.5 or greater  Chills  Shortness of breath  Swelling or pain in one leg    After office hours an answering service is available and will contact a provider for emergencies or if you are experiencing any of the above symptoms. Patient did express an interest in My Chart. My Chart log in information explained on the after visit summary printout at the [address] [facility] [address] desk.     Aida Mason RN, BSN  Nurse Navigator  [phone] cell  [email or web address] Patient states Angie request a letter stating his medical condition regarding his bulging disks that is causing numbness in his hands and the medical treatment he has had such as the treatment you provided, referral to PT/OT, dry needling etc.. and imaging as well as office notes.    He also states the tramadol helped his pain for about 2 days.  He states he is still taking the tramadol but it is no longer helping.

## 2022-08-29 ENCOUNTER — TELEPHONE (OUTPATIENT)
Dept: FAMILY MEDICINE CLINIC | Facility: CLINIC | Age: 47
End: 2022-08-29

## 2022-08-29 NOTE — TELEPHONE ENCOUNTER
FYI    Patient called stating his wife will be bringing his [facility] papers to the office today and will have to hand them to Sarita herself. Patient said the papers can not be given to anyone except Jamie'Sarita thomson MA.

## 2022-08-30 ENCOUNTER — TELEPHONE (OUTPATIENT)
Dept: FAMILY MEDICINE CLINIC | Facility: CLINIC | Age: 47
End: 2022-08-30

## 2022-08-30 NOTE — TELEPHONE ENCOUNTER
I need to know if he wants/needs intermittent leave or continuous, when to start it and for how long.

## 2022-08-30 NOTE — TELEPHONE ENCOUNTER
Sarita Basilio called back and said FMLA will be intermittent just like before and the first day was 8/15/2022. He said he thought Jamie did it with 2 office visits and 8 days

## 2022-08-30 NOTE — TELEPHONE ENCOUNTER
Patient was called; no answer.  A message was left to return call regarding FMLA: need to know if he prefers intermittent leave or continuous, when to start it and for how long.

## 2022-09-27 NOTE — TELEPHONE ENCOUNTER
3.1 L removed, 100 ml 25% albumin given per protocol. Pt tolerated well, vss, pt returned to inpt room via stretcher w/ transporter, post procedure report called to primary nurse, Sherita. VSS,   Sent Arnuity Ellipta 100 mcg Rx.          This document has been electronically signed by Dong Lozada MD on July 19, 2017 12:00 PM      ---------------------------------------    WANT TO REPLACE AEROSPAN INHALER WITH ARNUITY ELLIPTA. THIS IS COVERED AND AVAILABLE NOW. PLEASE LET HER KNOW IF THIS IS ACCEPTABLE.

## 2022-10-03 ENCOUNTER — LAB (OUTPATIENT)
Dept: LAB | Facility: HOSPITAL | Age: 47
End: 2022-10-03

## 2022-10-03 ENCOUNTER — OFFICE VISIT (OUTPATIENT)
Dept: FAMILY MEDICINE CLINIC | Facility: CLINIC | Age: 47
End: 2022-10-03

## 2022-10-03 VITALS
DIASTOLIC BLOOD PRESSURE: 82 MMHG | RESPIRATION RATE: 18 BRPM | TEMPERATURE: 97.1 F | HEART RATE: 77 BPM | HEIGHT: 68 IN | WEIGHT: 221.3 LBS | SYSTOLIC BLOOD PRESSURE: 120 MMHG | BODY MASS INDEX: 33.54 KG/M2 | OXYGEN SATURATION: 97 %

## 2022-10-03 DIAGNOSIS — I10 ESSENTIAL HYPERTENSION: ICD-10-CM

## 2022-10-03 DIAGNOSIS — E03.2 HYPOTHYROIDISM DUE TO MEDICATION: ICD-10-CM

## 2022-10-03 DIAGNOSIS — J45.30 MILD PERSISTENT ASTHMA WITHOUT COMPLICATION: Primary | ICD-10-CM

## 2022-10-03 DIAGNOSIS — E78.2 MIXED HYPERLIPIDEMIA: ICD-10-CM

## 2022-10-03 DIAGNOSIS — Z12.5 PROSTATE CANCER SCREENING: ICD-10-CM

## 2022-10-03 DIAGNOSIS — R73.03 PREDIABETES: ICD-10-CM

## 2022-10-03 DIAGNOSIS — Z00.00 ANNUAL PHYSICAL EXAM: ICD-10-CM

## 2022-10-03 DIAGNOSIS — Z23 NEED FOR PNEUMOCOCCAL VACCINATION: ICD-10-CM

## 2022-10-03 PROBLEM — L98.9 DISORDER OF SKIN OR SUBCUTANEOUS TISSUE: Status: ACTIVE | Noted: 2022-10-03

## 2022-10-03 PROBLEM — M53.1: Status: ACTIVE | Noted: 2022-10-03

## 2022-10-03 PROBLEM — M50.30 DDD (DEGENERATIVE DISC DISEASE), CERVICAL: Status: ACTIVE | Noted: 2022-10-03

## 2022-10-03 PROCEDURE — 90471 IMMUNIZATION ADMIN: CPT | Performed by: NURSE PRACTITIONER

## 2022-10-03 PROCEDURE — 80050 GENERAL HEALTH PANEL: CPT

## 2022-10-03 PROCEDURE — 83036 HEMOGLOBIN GLYCOSYLATED A1C: CPT

## 2022-10-03 PROCEDURE — G0103 PSA SCREENING: HCPCS

## 2022-10-03 PROCEDURE — 99214 OFFICE O/P EST MOD 30 MIN: CPT | Performed by: NURSE PRACTITIONER

## 2022-10-03 PROCEDURE — 90677 PCV20 VACCINE IM: CPT | Performed by: NURSE PRACTITIONER

## 2022-10-03 PROCEDURE — 36415 COLL VENOUS BLD VENIPUNCTURE: CPT

## 2022-10-03 PROCEDURE — 80061 LIPID PANEL: CPT

## 2022-10-03 PROCEDURE — 84439 ASSAY OF FREE THYROXINE: CPT

## 2022-10-03 RX ORDER — PREGABALIN 75 MG/1
75 CAPSULE ORAL 2 TIMES DAILY
COMMUNITY
Start: 2022-08-05 | End: 2022-12-13

## 2022-10-03 RX ORDER — FLUTICASONE PROPIONATE AND SALMETEROL XINAFOATE 115; 21 UG/1; UG/1
AEROSOL, METERED RESPIRATORY (INHALATION) EVERY 12 HOURS SCHEDULED
COMMUNITY
End: 2022-10-03 | Stop reason: ALTCHOICE

## 2022-10-03 RX ORDER — NAPROXEN 250 MG/1
TABLET ORAL
COMMUNITY
Start: 2022-08-26 | End: 2023-01-16 | Stop reason: SDUPTHER

## 2022-10-03 NOTE — PROGRESS NOTES
Subjective   Aquiles Najera is a 47 y.o. male.     History of Present Illness  CC: Annual follow-up-hypertension, hyperlipidemia, hypothyroidism, asthma, prediabetes  Asthma  He complains of chest tightness, cough, difficulty breathing and shortness of breath. There is no wheezing. This is a chronic problem. The current episode started more than 1 year ago. The problem occurs intermittently. The problem has been waxing and waning. The cough is non-productive. Associated symptoms include dyspnea on exertion. Pertinent negatives include no appetite change, chest pain, fever, headaches, myalgias, orthopnea, sore throat, trouble swallowing or weight loss. His symptoms are aggravated by change in weather, URI, climbing stairs and minimal activity. His symptoms are alleviated by rest and beta-agonist. He reports significant improvement on treatment. His past medical history is significant for asthma.   Hypertension  This is a chronic problem. The current episode started more than 1 year ago. The problem is controlled. Associated symptoms include shortness of breath. Pertinent negatives include no chest pain, headaches or palpitations. Risk factors for coronary artery disease include family history, dyslipidemia, obesity, male gender and stress. Current antihypertension treatment includes lifestyle changes. The current treatment provides significant improvement. There is no history of angina, kidney disease or CAD/MI.   Hyperlipidemia  This is a chronic problem. The current episode started more than 1 year ago. The problem is controlled. Recent lipid tests were reviewed and are variable. Exacerbating diseases include diabetes (prediabetes), hypothyroidism and obesity. Factors aggravating his hyperlipidemia include fatty foods. Associated symptoms include shortness of breath. Pertinent negatives include no chest pain, focal sensory loss, focal weakness, leg pain or myalgias. Current antihyperlipidemic treatment  includes statins. The current treatment provides significant improvement of lipids. Compliance problems include adherence to diet and adherence to exercise.  Risk factors for coronary artery disease include family history, dyslipidemia, hypertension, male sex, obesity, a sedentary lifestyle and stress.   Blood Sugar Problem  This is a chronic (prediabetes) problem. The current episode started more than 1 year ago. The problem has been waxing and waning. Associated symptoms include coughing. Pertinent negatives include no abdominal pain, arthralgias, chest pain, chills, congestion, diaphoresis, fatigue, fever, headaches, myalgias, nausea, rash, sore throat, vomiting or weakness. The symptoms are aggravated by drinking and eating. He has tried eating and drinking for the symptoms. The treatment provided mild relief.   Hypothyroidism  This is a chronic problem. The current episode started more than 1 year ago. Associated symptoms include coughing. Pertinent negatives include no abdominal pain, arthralgias, chest pain, chills, congestion, diaphoresis, fatigue, fever, headaches, myalgias, nausea, rash, sore throat, vomiting or weakness. Nothing aggravates the symptoms. Treatments tried: levothyroxine. The treatment provided significant relief.        The following portions of the patient's history were reviewed and updated as appropriate: allergies, current medications, past family history, past medical history, past social history, past surgical history and problem list.    Review of Systems   Constitutional: Negative for activity change, appetite change, chills, diaphoresis, fatigue, fever, unexpected weight gain and unexpected weight loss.   HENT: Negative for congestion, sore throat, trouble swallowing and voice change.    Eyes: Negative.    Respiratory: Positive for cough and shortness of breath. Negative for chest tightness and wheezing.    Cardiovascular: Positive for dyspnea on exertion. Negative for chest pain,  palpitations and leg swelling.   Gastrointestinal: Negative for abdominal distention, abdominal pain, anal bleeding, blood in stool, constipation, diarrhea, nausea, vomiting, GERD and indigestion.   Endocrine: Negative.  Negative for cold intolerance, heat intolerance, polydipsia, polyphagia and polyuria.   Genitourinary: Negative for dysuria, hematuria and urgency.   Musculoskeletal: Negative for arthralgias, back pain and myalgias.   Skin: Negative for rash.   Neurological: Negative for dizziness, focal weakness, syncope, weakness, light-headedness and headache.   Hematological: Negative.    Psychiatric/Behavioral: Negative.  Negative for depressed mood.       Objective   Physical Exam  Vitals and nursing note reviewed.   Constitutional:       General: He is not in acute distress.     Appearance: Normal appearance. He is well-developed. He is obese. He is not ill-appearing, toxic-appearing or diaphoretic.   HENT:      Head: Normocephalic and atraumatic.      Right Ear: External ear normal.      Left Ear: External ear normal.      Nose: Nose normal.   Eyes:      Conjunctiva/sclera: Conjunctivae normal.      Pupils: Pupils are equal, round, and reactive to light.   Neck:      Thyroid: No thyromegaly.      Trachea: No tracheal deviation.   Cardiovascular:      Rate and Rhythm: Normal rate and regular rhythm.      Heart sounds: Normal heart sounds. No murmur heard.    No friction rub. No gallop.   Pulmonary:      Effort: Pulmonary effort is normal. No respiratory distress.      Breath sounds: Normal breath sounds. No stridor. No wheezing, rhonchi or rales.   Abdominal:      General: Bowel sounds are normal. There is no distension.      Palpations: Abdomen is soft. There is no mass.      Tenderness: There is no abdominal tenderness. There is no guarding or rebound.      Hernia: No hernia is present.   Musculoskeletal:         General: No tenderness. Normal range of motion.      Cervical back: Normal range of motion and  neck supple.   Lymphadenopathy:      Cervical: No cervical adenopathy.   Skin:     General: Skin is warm and dry.      Coloration: Skin is not pale.      Findings: No erythema or rash.   Neurological:      Mental Status: He is alert and oriented to person, place, and time.      Cranial Nerves: No cranial nerve deficit.      Coordination: Coordination normal.   Psychiatric:         Mood and Affect: Mood normal.         Behavior: Behavior normal.         Thought Content: Thought content normal.         Judgment: Judgment normal.           Assessment & Plan   Diagnoses and all orders for this visit:    1. Mild persistent asthma without complication (Primary)  -     mometasone-formoterol (Dulera) 100-5 MCG/ACT inhaler; Inhale 2 puffs 2 (Two) Times a Day.  Dispense: 13 g; Refill: 11    2. Need for pneumococcal vaccination  -     Pneumococcal Conjugate Vaccine 20-Valent (PCV20), tolerated well no adverse reaction.    3. Annual physical exam  -     CBC & Differential; Future  -     Comprehensive Metabolic Panel; Future  -     Hemoglobin A1c; Future  -     Lipid Panel; Future  -     TSH; Future, will call with results    4. Prostate cancer screening  -     PSA Screen; Future, will call with results    5. Essential hypertension  -     CBC & Differential; Future  -     Comprehensive Metabolic Panel; Future, will call with results.  Controlled.  We will continue to monitor.    6. Mixed hyperlipidemia  -     Lipid Panel; Future, will call with results.  Continue pravastatin and low-fat diet.  We will continue to monitor.    7. Prediabetes  -     Comprehensive Metabolic Panel; Future  -     Hemoglobin A1c; Future, will call with results.  Continue low-carb diet.  We will continue to monitor.    8. Hypothyroidism due to medication  -     TSH; Future  -     T4, Free; Future, will call with results.  Continue levothyroxine as prescribed.  We will continue to monitor.    9.  Follow-up in 6 months or sooner for any acute  needs.            This document has been electronically signed by ERIC Oleary on October 3, 2022 17:00 CDT

## 2022-10-04 DIAGNOSIS — E03.2 HYPOTHYROIDISM DUE TO MEDICATION: ICD-10-CM

## 2022-10-04 LAB
ALBUMIN SERPL-MCNC: 4.6 G/DL (ref 3.5–5.2)
ALBUMIN/GLOB SERPL: 1.9 G/DL
ALP SERPL-CCNC: 71 U/L (ref 39–117)
ALT SERPL W P-5'-P-CCNC: 25 U/L (ref 1–41)
ANION GAP SERPL CALCULATED.3IONS-SCNC: 9 MMOL/L (ref 5–15)
AST SERPL-CCNC: 20 U/L (ref 1–40)
BASOPHILS # BLD AUTO: 0.08 10*3/MM3 (ref 0–0.2)
BASOPHILS NFR BLD AUTO: 1.1 % (ref 0–1.5)
BILIRUB SERPL-MCNC: 0.3 MG/DL (ref 0–1.2)
BUN SERPL-MCNC: 19 MG/DL (ref 6–20)
BUN/CREAT SERPL: 19.6 (ref 7–25)
CALCIUM SPEC-SCNC: 9.2 MG/DL (ref 8.6–10.5)
CHLORIDE SERPL-SCNC: 104 MMOL/L (ref 98–107)
CHOLEST SERPL-MCNC: 176 MG/DL (ref 0–200)
CO2 SERPL-SCNC: 24 MMOL/L (ref 22–29)
CREAT SERPL-MCNC: 0.97 MG/DL (ref 0.76–1.27)
DEPRECATED RDW RBC AUTO: 38.4 FL (ref 37–54)
EGFRCR SERPLBLD CKD-EPI 2021: 98.1 ML/MIN/1.73
EOSINOPHIL # BLD AUTO: 0.08 10*3/MM3 (ref 0–0.4)
EOSINOPHIL NFR BLD AUTO: 1.1 % (ref 0.3–6.2)
ERYTHROCYTE [DISTWIDTH] IN BLOOD BY AUTOMATED COUNT: 12.9 % (ref 12.3–15.4)
GLOBULIN UR ELPH-MCNC: 2.4 GM/DL
GLUCOSE SERPL-MCNC: 81 MG/DL (ref 65–99)
HBA1C MFR BLD: 5.6 % (ref 4.8–5.6)
HCT VFR BLD AUTO: 41.2 % (ref 37.5–51)
HDLC SERPL-MCNC: 45 MG/DL (ref 40–60)
HGB BLD-MCNC: 14.2 G/DL (ref 13–17.7)
IMM GRANULOCYTES # BLD AUTO: 0.04 10*3/MM3 (ref 0–0.05)
IMM GRANULOCYTES NFR BLD AUTO: 0.5 % (ref 0–0.5)
LDLC SERPL CALC-MCNC: 91 MG/DL (ref 0–100)
LDLC/HDLC SERPL: 1.84 {RATIO}
LYMPHOCYTES # BLD AUTO: 3.06 10*3/MM3 (ref 0.7–3.1)
LYMPHOCYTES NFR BLD AUTO: 41.6 % (ref 19.6–45.3)
MCH RBC QN AUTO: 28.2 PG (ref 26.6–33)
MCHC RBC AUTO-ENTMCNC: 34.5 G/DL (ref 31.5–35.7)
MCV RBC AUTO: 81.7 FL (ref 79–97)
MONOCYTES # BLD AUTO: 0.5 10*3/MM3 (ref 0.1–0.9)
MONOCYTES NFR BLD AUTO: 6.8 % (ref 5–12)
NEUTROPHILS NFR BLD AUTO: 3.6 10*3/MM3 (ref 1.7–7)
NEUTROPHILS NFR BLD AUTO: 48.9 % (ref 42.7–76)
NRBC BLD AUTO-RTO: 0 /100 WBC (ref 0–0.2)
PLATELET # BLD AUTO: 229 10*3/MM3 (ref 140–450)
PMV BLD AUTO: 12.2 FL (ref 6–12)
POTASSIUM SERPL-SCNC: 4.2 MMOL/L (ref 3.5–5.2)
PROT SERPL-MCNC: 7 G/DL (ref 6–8.5)
PSA SERPL-MCNC: 0.84 NG/ML (ref 0–4)
RBC # BLD AUTO: 5.04 10*6/MM3 (ref 4.14–5.8)
SODIUM SERPL-SCNC: 137 MMOL/L (ref 136–145)
T4 FREE SERPL-MCNC: 0.81 NG/DL (ref 0.93–1.7)
TRIGL SERPL-MCNC: 241 MG/DL (ref 0–150)
TSH SERPL DL<=0.05 MIU/L-ACNC: 57.5 UIU/ML (ref 0.27–4.2)
VLDLC SERPL-MCNC: 40 MG/DL (ref 5–40)
WBC NRBC COR # BLD: 7.36 10*3/MM3 (ref 3.4–10.8)

## 2022-10-04 RX ORDER — LEVOTHYROXINE SODIUM 88 UG/1
88 TABLET ORAL DAILY
Qty: 30 TABLET | Refills: 3 | Status: SHIPPED | OUTPATIENT
Start: 2022-10-04 | End: 2022-11-07

## 2022-10-04 NOTE — PROGRESS NOTES
TSH elevated.  Make sure he is taking his levothyroxine every morning without missing dosages, take on an empty stomach in an hour or 2 before taking any other medication to ensure that is not interacting with other medications or food and thus causing the elevation in his TSH level.  I am increasing his doses to 88 mcg daily.  New prescription sent to pharmacy.  Repeat TSH and T4 in 4 to 6 weeks.  Triglycerides elevated.  Low-fat diet.  Follow-up as scheduled.

## 2022-10-05 ENCOUNTER — TELEPHONE (OUTPATIENT)
Dept: FAMILY MEDICINE CLINIC | Facility: CLINIC | Age: 47
End: 2022-10-05

## 2022-10-05 NOTE — TELEPHONE ENCOUNTER
Patient states he cannot get the dulera inhaler.  He was told by pharmacy the Symbicort is the closest to the dulera.  He would like a 30 day supply of the Symbicort to try to see if it will work for him.  He said if it works; he will need a prescription for 90 days as he will get from [facility] at a cheaper cost as his insurance may not cover.

## 2022-10-06 DIAGNOSIS — J45.30 MILD PERSISTENT ASTHMA WITHOUT COMPLICATION: Primary | ICD-10-CM

## 2022-10-06 RX ORDER — BUDESONIDE AND FORMOTEROL FUMARATE DIHYDRATE 160; 4.5 UG/1; UG/1
2 AEROSOL RESPIRATORY (INHALATION)
Qty: 10 G | Refills: 1 | Status: SHIPPED | OUTPATIENT
Start: 2022-10-06 | End: 2022-12-08

## 2022-10-17 ENCOUNTER — TELEPHONE (OUTPATIENT)
Dept: FAMILY MEDICINE CLINIC | Facility: CLINIC | Age: 47
End: 2022-10-17

## 2022-10-17 NOTE — TELEPHONE ENCOUNTER
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 Thanks!

## 2022-10-18 ENCOUNTER — TELEPHONE (OUTPATIENT)
Dept: FAMILY MEDICINE CLINIC | Facility: CLINIC | Age: 47
End: 2022-10-18

## 2022-10-18 NOTE — TELEPHONE ENCOUNTER
Patient was called to let him know work accomodation forms were faxed to DR. Aquiles Gamboa; [facility] [employer] and copy was emailed to patients personal email.

## 2022-10-18 NOTE — TELEPHONE ENCOUNTER
Mr. Najera called stating that it is important that he speak with Sarita or Jamie today concerning his job. Please call back @ [phone]

## 2022-10-25 ENCOUNTER — TELEPHONE (OUTPATIENT)
Dept: FAMILY MEDICINE CLINIC | Facility: CLINIC | Age: 47
End: 2022-10-25

## 2022-10-25 NOTE — TELEPHONE ENCOUNTER
Pt needs Sarita to call and prefers to speak to her about the issues   Can be reached at [phone]

## 2022-10-26 ENCOUNTER — TELEPHONE (OUTPATIENT)
Dept: FAMILY MEDICINE CLINIC | Facility: CLINIC | Age: 47
End: 2022-10-26

## 2022-10-26 DIAGNOSIS — M50.30 DDD (DEGENERATIVE DISC DISEASE), CERVICAL: ICD-10-CM

## 2022-10-26 DIAGNOSIS — R93.89 ABNORMAL MRI, NECK: ICD-10-CM

## 2022-10-26 DIAGNOSIS — M43.02 CERVICAL SPONDYLOLYSIS: Primary | ICD-10-CM

## 2022-10-26 NOTE — TELEPHONE ENCOUNTER
Referral to neurosurgery in [facility].  As I get refill request I will make sure everything is a 90-day supply.  However, I do not dispense 90-day supplies on controlled substances.  I usually order those as a 30-day supply with refills added on.

## 2022-10-26 NOTE — TELEPHONE ENCOUNTER
Patient was called to let him know PCP placed referral to Neurosurgery in [place].  Informed him that medications can be filled for 90 days except the controlled medications which are 30 days with refills.

## 2022-10-29 DIAGNOSIS — E78.2 MIXED HYPERLIPIDEMIA: ICD-10-CM

## 2022-10-29 DIAGNOSIS — K21.9 GASTROESOPHAGEAL REFLUX DISEASE WITHOUT ESOPHAGITIS: ICD-10-CM

## 2022-10-29 DIAGNOSIS — R45.4 IRRITABILITY AND ANGER: ICD-10-CM

## 2022-10-29 DIAGNOSIS — F41.9 ANXIETY: ICD-10-CM

## 2022-10-31 ENCOUNTER — TELEPHONE (OUTPATIENT)
Dept: FAMILY MEDICINE CLINIC | Facility: CLINIC | Age: 47
End: 2022-10-31

## 2022-10-31 RX ORDER — LAMOTRIGINE 25 MG/1
TABLET ORAL
Qty: 60 TABLET | Refills: 3 | Status: SHIPPED | OUTPATIENT
Start: 2022-10-31 | End: 2022-12-13

## 2022-10-31 RX ORDER — PRAVASTATIN SODIUM 40 MG
TABLET ORAL
Qty: 90 TABLET | Refills: 3 | Status: SHIPPED | OUTPATIENT
Start: 2022-10-31

## 2022-10-31 RX ORDER — PANTOPRAZOLE SODIUM 40 MG/1
TABLET, DELAYED RELEASE ORAL
Qty: 90 TABLET | Refills: 3 | Status: SHIPPED | OUTPATIENT
Start: 2022-10-31

## 2022-10-31 NOTE — TELEPHONE ENCOUNTER
Patient states he can only get the tramadol for 7 days at a time.  He would like the muscle relaxer for 90 days and he would like a higher dose on the seroquel the 25 mg is not working.   
Please call pt concerning his medications. He says his scripts are messed up and he is out of medicine.  He is also needing to speak to you concerning his Jossue CHRISTENSENA. Thanks!  
Zanaflex refilled for 90 days. Seroquel increased to 50 mg nightly. Rx's have been sent to mail order on file. 
NAUSEA/PAIN

## 2022-10-31 NOTE — TELEPHONE ENCOUNTER
Patient wanted to clarify that refills needed to be for 90 days. Explained to patient that the controlled meds will only be for 30 days. Patient verbalized understanding.

## 2022-11-04 ENCOUNTER — LAB (OUTPATIENT)
Dept: LAB | Facility: HOSPITAL | Age: 47
End: 2022-11-04

## 2022-11-04 ENCOUNTER — OFFICE VISIT (OUTPATIENT)
Dept: FAMILY MEDICINE CLINIC | Facility: CLINIC | Age: 47
End: 2022-11-04

## 2022-11-04 VITALS
HEIGHT: 68 IN | BODY MASS INDEX: 33.8 KG/M2 | OXYGEN SATURATION: 98 % | TEMPERATURE: 98 F | SYSTOLIC BLOOD PRESSURE: 136 MMHG | RESPIRATION RATE: 18 BRPM | HEART RATE: 68 BPM | DIASTOLIC BLOOD PRESSURE: 82 MMHG | WEIGHT: 223 LBS

## 2022-11-04 DIAGNOSIS — M43.02 CERVICAL SPONDYLOLYSIS: ICD-10-CM

## 2022-11-04 DIAGNOSIS — E03.2 HYPOTHYROIDISM DUE TO MEDICATION: ICD-10-CM

## 2022-11-04 DIAGNOSIS — M50.30 DDD (DEGENERATIVE DISC DISEASE), CERVICAL: Primary | ICD-10-CM

## 2022-11-04 DIAGNOSIS — J44.9 CHRONIC OBSTRUCTIVE PULMONARY DISEASE, UNSPECIFIED COPD TYPE: ICD-10-CM

## 2022-11-04 LAB
T4 FREE SERPL-MCNC: 0.99 NG/DL (ref 0.93–1.7)
TSH SERPL DL<=0.05 MIU/L-ACNC: 26.7 UIU/ML (ref 0.27–4.2)

## 2022-11-04 PROCEDURE — 99214 OFFICE O/P EST MOD 30 MIN: CPT | Performed by: NURSE PRACTITIONER

## 2022-11-04 PROCEDURE — 84439 ASSAY OF FREE THYROXINE: CPT

## 2022-11-04 PROCEDURE — 84443 ASSAY THYROID STIM HORMONE: CPT

## 2022-11-04 PROCEDURE — 36415 COLL VENOUS BLD VENIPUNCTURE: CPT

## 2022-11-04 RX ORDER — ARIPIPRAZOLE 10 MG/1
TABLET ORAL
COMMUNITY
Start: 2022-10-19

## 2022-11-04 RX ORDER — TRAMADOL HYDROCHLORIDE 50 MG/1
100 TABLET ORAL EVERY 8 HOURS PRN
Qty: 180 TABLET | Refills: 2 | Status: SHIPPED | OUTPATIENT
Start: 2022-11-04 | End: 2023-02-09

## 2022-11-04 NOTE — PROGRESS NOTES
Subjective   Aquiles Najera is a 47 y.o. male.     History of Present Illness  CC: Chronic neck pain, COPD  Neck Pain   This is a chronic problem. The current episode started more than 1 year ago. The problem occurs constantly. The problem has been unchanged. The pain is associated with nothing. The pain is present in the midline. The quality of the pain is described as aching. The pain is at a severity of 7/10. The pain is severe. The symptoms are aggravated by twisting, stress, position and bending. Stiffness is present all day. Pertinent negatives include no chest pain, fever, trouble swallowing, weakness or weight loss. He has tried acetaminophen, bed rest, chiropractic manipulation, home exercises, heat, ice, muscle relaxants, NSAIDs, oral narcotics and neck support for the symptoms. The treatment provided moderate relief.   COPD  There is no cough, shortness of breath or wheezing. This is a chronic problem. The current episode started more than 1 year ago. The problem occurs intermittently. The problem has been waxing and waning. Pertinent negatives include no appetite change, chest pain, fever, myalgias, sore throat, trouble swallowing or weight loss. His symptoms are aggravated by URI, climbing stairs, minimal activity and change in weather. His symptoms are alleviated by rest and beta-agonist. He reports significant improvement on treatment. His past medical history is significant for COPD.        The following portions of the patient's history were reviewed and updated as appropriate: allergies, current medications, past family history, past medical history, past social history, past surgical history and problem list.    Review of Systems   Constitutional: Negative for activity change, appetite change, chills, fatigue, fever, unexpected weight gain and unexpected weight loss.   HENT: Negative for congestion, sore throat, trouble swallowing and voice change.    Eyes: Negative.    Respiratory:  Negative for cough, chest tightness, shortness of breath and wheezing.    Cardiovascular: Negative for chest pain, palpitations and leg swelling.   Gastrointestinal: Negative for abdominal pain, diarrhea, nausea and vomiting.   Endocrine: Negative.  Negative for cold intolerance, heat intolerance, polydipsia, polyphagia and polyuria.   Genitourinary: Negative for dysuria, hematuria and urgency.   Musculoskeletal: Positive for neck pain and neck stiffness. Negative for arthralgias and myalgias.   Skin: Negative for rash.   Neurological: Negative for dizziness, weakness, light-headedness and headache.   Hematological: Negative.    Psychiatric/Behavioral: Negative.        Objective   Physical Exam  Vitals and nursing note reviewed.   Constitutional:       General: He is not in acute distress.     Appearance: Normal appearance. He is well-developed and normal weight. He is not ill-appearing, toxic-appearing or diaphoretic.   HENT:      Head: Normocephalic and atraumatic.   Eyes:      Conjunctiva/sclera: Conjunctivae normal.   Cardiovascular:      Rate and Rhythm: Normal rate and regular rhythm.      Heart sounds: Normal heart sounds. No murmur heard.    No friction rub. No gallop.   Pulmonary:      Effort: Pulmonary effort is normal. No respiratory distress.      Breath sounds: Normal breath sounds. No stridor. No wheezing, rhonchi or rales.   Abdominal:      General: Bowel sounds are normal. There is no distension.      Palpations: Abdomen is soft. There is no mass.      Tenderness: There is no abdominal tenderness. There is no guarding or rebound.      Hernia: No hernia is present.   Musculoskeletal:         General: No tenderness.      Cervical back: Pain with movement, spinous process tenderness and muscular tenderness present. Decreased range of motion.   Skin:     General: Skin is warm and dry.      Coloration: Skin is not pale.      Findings: No erythema or rash.   Neurological:      Mental Status: He is alert  and oriented to person, place, and time.   Psychiatric:         Mood and Affect: Mood normal.         Behavior: Behavior normal.         Thought Content: Thought content normal.         Judgment: Judgment normal.           Assessment & Plan   Diagnoses and all orders for this visit:    1. DDD (degenerative disc disease), cervical (Primary)   -Stable.  Patient has follow-up with neurosurgery next month for consideration of surgery.  Continue tramadol and Lyrica as prescribed.  Andrei reviewed and appropriate.  Urine drug screen controlled substance contract on file.  Tramadol refill sent to pharmacy today.  We will continue to monitor.    2. Cervical spondylolysis  -     traMADol (ULTRAM) 50 MG tablet; Take 2 tablets by mouth Every 8 (Eight) Hours As Needed for Moderate Pain.  Dispense: 180 tablet; Refill: 2   -Plan of care stated above #1.  We will continue to monitor.    3. Chronic obstructive pulmonary disease, unspecified COPD type (HCC)   -Stable.  Continue Symbicort as prescribed and albuterol as needed.  We will continue to monitor.    4.  Follow-up in 3 months or sooner for any acute needs.            This document has been electronically signed by ERIC Oleary on November 4, 2022 14:50 CDT

## 2022-11-07 DIAGNOSIS — E03.2 HYPOTHYROIDISM DUE TO MEDICATION: ICD-10-CM

## 2022-11-07 RX ORDER — LEVOTHYROXINE SODIUM 0.1 MG/1
100 TABLET ORAL DAILY
Qty: 90 TABLET | Refills: 1 | Status: SHIPPED | OUTPATIENT
Start: 2022-11-07 | End: 2022-11-17

## 2022-11-07 NOTE — PROGRESS NOTES
TSH elevated but improving, 26.7.  I am increasing his levothyroxine to 100 mcg daily.  New prescription sent to pharmacy.  Repeat TSH and T4 in 4 to 6 weeks.  Follow-up as scheduled.

## 2022-11-15 ENCOUNTER — LAB (OUTPATIENT)
Dept: LAB | Facility: HOSPITAL | Age: 47
End: 2022-11-15

## 2022-11-15 ENCOUNTER — TELEPHONE (OUTPATIENT)
Dept: FAMILY MEDICINE CLINIC | Facility: CLINIC | Age: 47
End: 2022-11-15

## 2022-11-15 DIAGNOSIS — E03.2 HYPOTHYROIDISM DUE TO MEDICATION: ICD-10-CM

## 2022-11-15 PROCEDURE — 84443 ASSAY THYROID STIM HORMONE: CPT

## 2022-11-15 PROCEDURE — 84439 ASSAY OF FREE THYROXINE: CPT

## 2022-11-15 NOTE — TELEPHONE ENCOUNTER
PT CALLED, WANTING TO SPEAK TO JEREMY ABOUT HIS MEDICATION. I ASKED IF IT WAS PERTAINING TO A REFILL; HE SAID NO. HE WOULDN'T TELL ME MORE DETAILS OTHER THAN HE NEEDS TO SPEAK TO SOMEONE ABOUT HIS MEDICATION.    CALLBACK NUMBER [phone]  SUJEY DAI

## 2022-11-16 LAB
T4 FREE SERPL-MCNC: 0.9 NG/DL (ref 0.93–1.7)
TSH SERPL DL<=0.05 MIU/L-ACNC: 30.6 UIU/ML (ref 0.27–4.2)

## 2022-11-17 DIAGNOSIS — E03.2 HYPOTHYROIDISM DUE TO MEDICATION: ICD-10-CM

## 2022-11-17 RX ORDER — LEVOTHYROXINE SODIUM 112 UG/1
112 TABLET ORAL DAILY
Qty: 90 TABLET | Refills: 1 | Status: SHIPPED | OUTPATIENT
Start: 2022-11-17

## 2022-11-17 NOTE — PROGRESS NOTES
TSH remains elevated.  I am going to increase his levothyroxine to 112 mcg daily.  He needs to take it in the morning on an empty stomach with no other medications.  We will recheck at follow-up.

## 2022-12-08 DIAGNOSIS — J45.30 MILD PERSISTENT ASTHMA WITHOUT COMPLICATION: ICD-10-CM

## 2022-12-08 RX ORDER — BUDESONIDE AND FORMOTEROL FUMARATE DIHYDRATE 160; 4.5 UG/1; UG/1
AEROSOL RESPIRATORY (INHALATION)
Qty: 10.2 G | Refills: 1 | Status: SHIPPED | OUTPATIENT
Start: 2022-12-08

## 2022-12-13 ENCOUNTER — OFFICE VISIT (OUTPATIENT)
Dept: NEUROSURGERY | Facility: CLINIC | Age: 47
End: 2022-12-13

## 2022-12-13 VITALS — HEIGHT: 68 IN | BODY MASS INDEX: 33.83 KG/M2 | WEIGHT: 223.2 LBS

## 2022-12-13 DIAGNOSIS — E66.09 CLASS 1 OBESITY DUE TO EXCESS CALORIES WITH BODY MASS INDEX (BMI) OF 33.0 TO 33.9 IN ADULT, UNSPECIFIED WHETHER SERIOUS COMORBIDITY PRESENT: ICD-10-CM

## 2022-12-13 DIAGNOSIS — M54.12 CERVICAL RADICULITIS: ICD-10-CM

## 2022-12-13 DIAGNOSIS — G89.29 CHRONIC NECK PAIN: Primary | ICD-10-CM

## 2022-12-13 DIAGNOSIS — Z87.891 FORMER SMOKER: ICD-10-CM

## 2022-12-13 DIAGNOSIS — M50.30 DDD (DEGENERATIVE DISC DISEASE), CERVICAL: ICD-10-CM

## 2022-12-13 DIAGNOSIS — M54.2 CERVICALGIA: ICD-10-CM

## 2022-12-13 DIAGNOSIS — M54.2 CHRONIC NECK PAIN: Primary | ICD-10-CM

## 2022-12-13 PROCEDURE — 99214 OFFICE O/P EST MOD 30 MIN: CPT | Performed by: NURSE PRACTITIONER

## 2022-12-13 RX ORDER — PREGABALIN 100 MG/1
CAPSULE ORAL
COMMUNITY
Start: 2022-12-09 | End: 2023-02-14 | Stop reason: SDUPTHER

## 2022-12-13 RX ORDER — PREGABALIN 50 MG/1
CAPSULE ORAL
COMMUNITY
Start: 2022-12-08 | End: 2023-02-14 | Stop reason: SDUPTHER

## 2022-12-13 RX ORDER — LAMOTRIGINE 100 MG/1
TABLET ORAL
COMMUNITY
Start: 2022-12-07

## 2022-12-13 NOTE — PATIENT INSTRUCTIONS
"DASH Eating Plan  DASH stands for Dietary Approaches to Stop Hypertension. The DASH eating plan is a healthy eating plan that has been shown to:  Reduce high blood pressure (hypertension).  Reduce your risk for type 2 diabetes, heart disease, and stroke.  Help with weight loss.  What are tips for following this plan?  Reading food labels  Check food labels for the amount of salt (sodium) per serving. Choose foods with less than 5 percent of the Daily Value of sodium. Generally, foods with less than 300 milligrams (mg) of sodium per serving fit into this eating plan.  To find whole grains, look for the word \"whole\" as the first word in the ingredient list.  Shopping  Buy products labeled as \"low-sodium\" or \"no salt added.\"  Buy fresh foods. Avoid canned foods and pre-made or frozen meals.  Cooking  Avoid adding salt when cooking. Use salt-free seasonings or herbs instead of table salt or sea salt. Check with your health care provider or pharmacist before using salt substitutes.  Do not hall foods. Cook foods using healthy methods such as baking, boiling, grilling, roasting, and broiling instead.  Cook with heart-healthy oils, such as olive, canola, avocado, soybean, or sunflower oil.  Meal planning    Eat a balanced diet that includes:  4 or more servings of fruits and 4 or more servings of vegetables each day. Try to fill one-half of your plate with fruits and vegetables.  6-8 servings of whole grains each day.  Less than 6 oz (170 g) of lean meat, poultry, or fish each day. A 3-oz (85-g) serving of meat is about the same size as a deck of cards. One egg equals 1 oz (28 g).  2-3 servings of low-fat dairy each day. One serving is 1 cup (237 mL).  1 serving of nuts, seeds, or beans 5 times each week.  2-3 servings of heart-healthy fats. Healthy fats called omega-3 fatty acids are found in foods such as walnuts, flaxseeds, fortified milks, and eggs. These fats are also found in cold-water fish, such as sardines, salmon, " and mackerel.  Limit how much you eat of:  Canned or prepackaged foods.  Food that is high in trans fat, such as some fried foods.  Food that is high in saturated fat, such as fatty meat.  Desserts and other sweets, sugary drinks, and other foods with added sugar.  Full-fat dairy products.  Do not salt foods before eating.  Do not eat more than 4 egg yolks a week.  Try to eat at least 2 vegetarian meals a week.  Eat more home-cooked food and less restaurant, buffet, and fast food.  Lifestyle  When eating at a restaurant, ask that your food be prepared with less salt or no salt, if possible.  If you drink alcohol:  Limit how much you use to:  0-1 drink a day for women who are not pregnant.  0-2 drinks a day for men.  Be aware of how much alcohol is in your drink. In the [place] one drink equals one 12 oz bottle of beer (355 mL), one 5 oz glass of wine (148 mL), or one 1½ oz glass of hard liquor (44 mL).  General information  Avoid eating more than 2,300 mg of salt a day. If you have hypertension, you may need to reduce your sodium intake to 1,500 mg a day.  Work with your health care provider to maintain a healthy body weight or to lose weight. Ask what an ideal weight is for you.  Get at least 30 minutes of exercise that causes your heart to beat faster (aerobic exercise) most days of the week. Activities may include walking, swimming, or biking.  Work with your health care provider or dietitian to adjust your eating plan to your individual calorie needs.  What foods should I eat?  Fruits  All fresh, dried, or frozen fruit. Canned fruit in natural juice (without added sugar).  Vegetables  Fresh or frozen vegetables (raw, steamed, roasted, or grilled). Low-sodium or reduced-sodium tomato and vegetable juice. Low-sodium or reduced-sodium tomato sauce and tomato paste. Low-sodium or reduced-sodium canned vegetables.  Grains  Whole-grain or whole-wheat bread. Whole-grain or whole-wheat pasta. Brown rice. Oatmeal. Quinoa.  Bulgur. Whole-grain and low-sodium cereals. Sandi bread. Low-fat, low-sodium crackers. Whole-wheat flour tortillas.  Meats and other proteins  Skinless chicken or turkey. Ground chicken or turkey. Pork with fat trimmed off. Fish and seafood. Egg whites. Dried beans, peas, or lentils. Unsalted nuts, nut butters, and seeds. Unsalted canned beans. Lean cuts of beef with fat trimmed off. Low-sodium, lean precooked or cured meat, such as sausages or meat loaves.  Dairy  Low-fat (1%) or fat-free (skim) milk. Reduced-fat, low-fat, or fat-free cheeses. Nonfat, low-sodium ricotta or cottage cheese. Low-fat or nonfat yogurt. Low-fat, low-sodium cheese.  Fats and oils  Soft margarine without trans fats. Vegetable oil. Reduced-fat, low-fat, or light mayonnaise and salad dressings (reduced-sodium). Canola, safflower, olive, avocado, soybean, and sunflower oils. Avocado.  Seasonings and condiments  Herbs. Spices. Seasoning mixes without salt.  Other foods  Unsalted popcorn and pretzels. Fat-free sweets.  The items listed above may not be a complete list of foods and beverages you can eat. Contact a dietitian for more information.  What foods should I avoid?  Fruits  Canned fruit in a light or heavy syrup. Fried fruit. Fruit in cream or butter sauce.  Vegetables  Creamed or fried vegetables. Vegetables in a cheese sauce. Regular canned vegetables (not low-sodium or reduced-sodium). Regular canned tomato sauce and paste (not low-sodium or reduced-sodium). Regular tomato and vegetable juice (not low-sodium or reduced-sodium). Pickles. Olives.  Grains  Baked goods made with fat, such as croissants, muffins, or some breads. Dry pasta or rice meal packs.  Meats and other proteins  Fatty cuts of meat. Ribs. Fried meat. Jennings. Bologna, salami, and other precooked or cured meats, such as sausages or meat loaves. Fat from the back of a pig (fatback). Bratwurst. Salted nuts and seeds. Canned beans with added salt. Canned or smoked fish.  Whole eggs or egg yolks. Chicken or turkey with skin.  Dairy  Whole or 2% milk, cream, and half-and-half. Whole or full-fat cream cheese. Whole-fat or sweetened yogurt. Full-fat cheese. Nondairy creamers. Whipped toppings. Processed cheese and cheese spreads.  Fats and oils  Butter. Stick margarine. Lard. Shortening. Ghee. Jennings fat. Tropical oils, such as coconut, palm kernel, or palm oil.  Seasonings and condiments  Onion salt, garlic salt, seasoned salt, table salt, and sea salt. Worcestershire sauce. Tartar sauce. Barbecue sauce. Teriyaki sauce. Soy sauce, including reduced-sodium. Steak sauce. Canned and packaged gravies. Fish sauce. Oyster sauce. Cocktail sauce. Store-bought horseradish. Ketchup. Mustard. Meat flavorings and tenderizers. Bouillon cubes. Hot sauces. Pre-made or packaged marinades. Pre-made or packaged taco seasonings. Relishes. Regular salad dressings.  Other foods  Salted popcorn and pretzels.  The items listed above may not be a complete list of foods and beverages you should avoid. Contact a dietitian for more information.  Where to find more information  National Heart, Lung, and Blood [place]: www.nhlbi.nih.gov  American Heart Association: www.heart.org  Academy of Nutrition and Dietetics: www.eatright.org  National Kidney Foundation: www.kidney.org  Summary  The DASH eating plan is a healthy eating plan that has been shown to reduce high blood pressure (hypertension). It may also reduce your risk for type 2 diabetes, heart disease, and stroke.  When on the DASH eating plan, aim to eat more fresh fruits and vegetables, whole grains, lean proteins, low-fat dairy, and heart-healthy fats.  With the DASH eating plan, you should limit salt (sodium) intake to 2,300 mg a day. If you have hypertension, you may need to reduce your sodium intake to 1,500 mg a day.  Work with your health care provider or dietitian to adjust your eating plan to your individual calorie needs.  This information is not  "intended to replace advice given to you by your health care provider. Make sure you discuss any questions you have with your health care provider.  Document Revised: 11/20/2020 Document Reviewed: 11/20/2020  Elsevier Patient Education © 2022 [employer]  BMI for Adults  What is BMI?  Body mass index (BMI) is a number that is calculated from a person's weight and height. BMI can help estimate how much of a person's weight is composed of fat. BMI does not measure body fat directly. Rather, it is an alternative to procedures that directly measure body fat, which can be difficult and expensive.  BMI can help identify people who may be at higher risk for certain medical problems.  What are BMI measurements used for?  BMI is used as a screening tool to identify possible weight problems. It helps determine whether a person is obese, overweight, a healthy weight, or underweight.  BMI is useful for:  Identifying a weight problem that may be related to a medical condition or may increase the risk for medical problems.  Promoting changes, such as changes in diet and exercise, to help reach a healthy weight. BMI screening can be repeated to see if these changes are working.  How is BMI calculated?  BMI involves measuring your weight in relation to your height. Both height and weight are measured, and the BMI is calculated from those numbers. This can be done either in English ([place] or metric measurements. Note that charts and online BMI calculators are available to help you find your BMI quickly and easily without having to do these calculations yourself.  To calculate your BMI in English ([place] measurements:    Measure your weight in pounds (lb).  Multiply the number of pounds by 703.  For example, for a person who weighs 180 lb, multiply that number by 703, which equals 126,540.  Measure your height in inches. Then multiply that number by itself to get a measurement called \"inches squared.\"  For example, for a person who " "is 70 inches tall, the \"inches squared\" measurement is 70 inches x 70 inches, which equals 4,900 inches squared.  Divide the total from step 2 (number of lb x 703) by the total from step 3 (inches squared): 126,540 ÷ 4,900 = 25.8. This is your BMI.  To calculate your BMI in metric measurements:  Measure your weight in kilograms (kg).  Measure your height in meters (m). Then multiply that number by itself to get a measurement called \"meters squared.\"  For example, for a person who is 1.75 m tall, the \"meters squared\" measurement is 1.75 m x 1.75 m, which is equal to 3.1 meters squared.  Divide the number of kilograms (your weight) by the meters squared number. In this example: 70 ÷ 3.1 = 22.6. This is your BMI.  What do the results mean?  BMI charts are used to identify whether you are underweight, normal weight, overweight, or obese. The following guidelines will be used:  Underweight: BMI less than 18.5.  Normal weight: BMI between 18.5 and 24.9.  Overweight: BMI between 25 and 29.9.  Obese: BMI of 30 or above.  Keep these notes in mind:  Weight includes both fat and muscle, so someone with a muscular build, such as an athlete, may have a BMI that is higher than 24.9. In cases like these, BMI is not an accurate measure of body fat.  To determine if excess body fat is the cause of a BMI of 25 or higher, further assessments may need to be done by a health care provider.  BMI is usually interpreted in the same way for men and women.  Where to find more information  For more information about BMI, including tools to quickly calculate your BMI, go to these websites:  Centers for Disease Control and Prevention: www.cdc.gov  American Heart Association: www.heart.org  National Heart, Lung, and Blood [place]: www.nhlbi.nih.gov  Summary  Body mass index (BMI) is a number that is calculated from a person's weight and height.  BMI may help estimate how much of a person's weight is composed of fat. BMI can help identify those " who may be at higher risk for certain medical problems.  BMI can be measured using English measurements or metric measurements.  BMI charts are used to identify whether you are underweight, normal weight, overweight, or obese.  This information is not intended to replace advice given to you by your health care provider. Make sure you discuss any questions you have with your health care provider.  Document Revised: 09/09/2020 Document Reviewed: 07/17/2020  Elsevier Patient Education © 2022 [employer]

## 2022-12-13 NOTE — PROGRESS NOTES
Chief complaint:   Chief Complaint   Patient presents with   • Neck Pain     Pt here for constant neck pain. Pt states he has numbness and tingling in arm and hands. Pt states he is currently in physical therapy, and pain mgmt. Pt has not seen chiropractor.       Subjective     HPI: This is a 47-year-old male gentleman who we have seen in the past for neck pain.  The patient did have a shoulder surgery 2017 and has been dealing with neck issues since that time.  The patient has been through physical therapy as well as pain management injections.  At his last office appointment was not felt that anything from a surgical standpoint would be of a benefit to him.  He comes back in today for reevaluation.  The patient continues to have neck pain.  He says the pain in his neck is constant.  It is worse with activity and working.  It is better with resting.  He says that he has had a change in his arms and is having numbness and tingling in his hands.  This is in both hands.  The right does seem to be a little worse.  Denies any bowel or bladder incontinence.  He says the numbness and tingling does seem to be worse in the evenings and when he wakes up in the morning.  He has not done any recent injections in his neck but he continues to work with physical therapy..  He continues to work making AC units.    Review of Systems   Constitutional: Positive for activity change.   Musculoskeletal: Positive for arthralgias, myalgias and neck pain.   Neurological: Positive for numbness.   Psychiatric/Behavioral: Negative.    All other systems reviewed and are negative.       Past Medical History:   Diagnosis Date   • Abdominal pain    • Acute exacerbation of chronic obstructive airways disease (HCC)    • Allergic    • Allergic rhinitis    • Arthritis    • Asthma    • Cervical disc disorder    • Chest pain     unspecified   • Chronic constipation    • Chronic cough    • Conjunctivitis    • Disease of thyroid gland    •  "Dyslipidemia    • Dyspnea    • Elevated cholesterol    • Gastroesophageal reflux disease    • Headache    • Pain     Pain in left ankle and joints of left foot      • Pain     pain in left leg   • Tobacco dependence syndrome    • Umbilical hernia without obstruction or gangrene    • Viral gastroenteritis      Past Surgical History:   Procedure Laterality Date   • COLONOSCOPY N/A 2021    Procedure: COLONOSCOPY;  Surgeon: Moose Ruvalcaba MD;  Location: [facility] ENDOSCOPY;  Service: Gastroenterology;  Laterality: N/A;   • ENDOSCOPY N/A 2021    Procedure: ESOPHAGOGASTRODUODENOSCOPY;  Surgeon: Moose Ruvalcaba MD;  Location: [facility] ENDOSCOPY;  Service: Gastroenterology;  Laterality: N/A;   • INCISION AND DRAINAGE ABSCESS  2009    Irrigation and debridement of facial soft tissue injuries with closure of complex lacerations and repair of maxillary dental alveolar fracture   • SHOULDER ARTHROSCOPY     • UMBILICAL HERNIA REPAIR N/A 2017    Procedure: OPEN UMBILICAL AND EPIGASTRIC HERNIA REPAIR WITH MESH;  Surgeon: Sal Herbert MD;  Location: [facility] OR;  Service:      Family History   Problem Relation Age of Onset   • Stroke Mother    • Alcohol abuse Father    • Cirrhosis Father    • No Known Problems Sister    • No Known Problems Brother    • Anxiety disorder Son    • Coronary artery disease Neg Hx    • Hypertension Neg Hx    • Thyroid disease Neg Hx      Social History     Tobacco Use   • Smoking status: Former     Years: 20.00     Types: Cigarettes     Quit date: 2017     Years since quittin.7   • Smokeless tobacco: Former   Vaping Use   • Vaping Use: Never used   Substance Use Topics   • Alcohol use: No   • Drug use: Not Currently     Types: Cocaine(coke), Marijuana     Comment: 3/2017 last time     (Not in a hospital admission)    Allergies:  Colace [docusate]    Objective      Vital Signs  Ht 172.7 cm (68\")   Wt 101 kg (223 lb 3.2 oz)   BMI 33.94 kg/m²     Physical " Exam  Constitutional:       Appearance: Normal appearance. He is well-developed.   HENT:      Head: Normocephalic.   Eyes:      General: Lids are normal.      Extraocular Movements: EOM normal.      Conjunctiva/sclera: Conjunctivae normal.      Pupils: Pupils are equal, round, and reactive to light.   Cardiovascular:      Rate and Rhythm: Normal rate and regular rhythm.   Pulmonary:      Effort: Pulmonary effort is normal.      Breath sounds: Normal breath sounds.   Musculoskeletal:         General: Normal range of motion.      Cervical back: Normal range of motion.      Comments: Neck pain   Skin:     General: Skin is warm.   Neurological:      Mental Status: He is alert and oriented to person, place, and time.      GCS: GCS eye subscore is 4. GCS verbal subscore is 5. GCS motor subscore is 6.      Cranial Nerves: No cranial nerve deficit.      Sensory: No sensory deficit.      Motor: Motor strength is normal.      Gait: Gait is intact.      Deep Tendon Reflexes: Reflexes are normal and symmetric. Reflexes normal.   Psychiatric:         Speech: Speech normal.         Behavior: Behavior normal.         Thought Content: Thought content normal.         Neurologic Exam     Mental Status   Oriented to person, place, and time.   Attention: normal. Concentration: normal.   Speech: speech is normal   Level of consciousness: alert  Normal comprehension.     Cranial Nerves     CN II   Visual fields full to confrontation.     CN III, IV, VI   Pupils are equal, round, and reactive to light.  Extraocular motions are normal.     CN V   Facial sensation intact.     CN VII   Facial expression full, symmetric.     CN VIII   CN VIII normal.     CN IX, X   CN IX normal.   CN X normal.     CN XI   CN XI normal.     CN XII   CN XII normal.     Motor Exam   Muscle bulk: normal    Strength   Strength 5/5 throughout.     Sensory Exam   Light touch normal.     Gait, Coordination, and Reflexes     Gait  Gait: normal    Reflexes   Reflexes  2+ except as noted.       Imaging review: MRI of the cervical spine that was done at [facility] on September 27, 2022 shows some mild foraminal narrowing bilaterally at C5-6.  At C6-7 mild disc bulging with mild left-sided foraminal narrowing.    The patient also had an EMG/NCS of the right upper extremity done which was consistent with carpal tunnel syndrome on the right.  This was done by Dr. Moreno in August 2022      Assessment/Plan: The patient is complaining of bilateral hand numbness and tingling.  We will reach out to Dr. Moreno's office and see if the left arm was included in the nerve conduction study.  We will also order a cock-up splint for the patient to try wearing at nighttime to see if this will help with the numbness and tingling he is experiencing.  I will follow-up with him in 4 weeks to reevaluate his hand issues and see if he is made any progress with the bracing.  In regards to his neck issues there is nothing from a surgical standpoint that needs to be addressed at this time.  Patient can continue with conservative measures at this point.  Patient is a nonsmoker  The patient's Body mass index is 33.94 kg/m².. BMI is above normal parameters. Recommendations include: educational material and nutrition counseling    Diagnoses and all orders for this visit:    1. Chronic neck pain (Primary)  -     Miscellaneous DME    2. Cervicalgia  -     Miscellaneous DME    3. DDD (degenerative disc disease), cervical  -     Miscellaneous DME    4. Cervical radiculitis  -     Miscellaneous DME    5. Former smoker    6. Class 1 obesity due to excess calories with body mass index (BMI) of 33.0 to 33.9 in adult, unspecified whether serious comorbidity present          I discussed the patients findings and my recommendations with patient    Camacho Medellin, APRN  12/13/22  16:16 CST

## 2023-01-16 ENCOUNTER — TELEPHONE (OUTPATIENT)
Dept: FAMILY MEDICINE CLINIC | Facility: CLINIC | Age: 48
End: 2023-01-16
Payer: COMMERCIAL

## 2023-01-16 DIAGNOSIS — M50.30 DDD (DEGENERATIVE DISC DISEASE), CERVICAL: Primary | ICD-10-CM

## 2023-01-16 RX ORDER — NAPROXEN 250 MG/1
250 TABLET ORAL 2 TIMES DAILY PRN
Qty: 60 TABLET | Refills: 5 | Status: SHIPPED | OUTPATIENT
Start: 2023-01-16 | End: 2023-02-03

## 2023-01-16 NOTE — TELEPHONE ENCOUNTER
Pt. Called regarding his naproxen (NAPROSYN) 250 MG tablet medication. The medication was prescribed by the neurologist. The Pt. Does not see this Dr. As often as he sees his PCP. The Pt. Would like to talk about this medication and was wanting to know if his PCP would refill this prescription.    You can call back at  [phone]    Pharmacy: [facility] in [place]

## 2023-02-03 DIAGNOSIS — M50.30 DDD (DEGENERATIVE DISC DISEASE), CERVICAL: ICD-10-CM

## 2023-02-03 RX ORDER — NAPROXEN 250 MG/1
250 TABLET ORAL
Qty: 90 TABLET | Refills: 5 | Status: SHIPPED | OUTPATIENT
Start: 2023-02-03 | End: 2023-02-14 | Stop reason: SDUPTHER

## 2023-02-08 DIAGNOSIS — M43.02 CERVICAL SPONDYLOLYSIS: ICD-10-CM

## 2023-02-09 RX ORDER — TRAMADOL HYDROCHLORIDE 50 MG/1
TABLET ORAL
Qty: 180 TABLET | Refills: 0 | Status: SHIPPED | OUTPATIENT
Start: 2023-02-09 | End: 2023-03-20 | Stop reason: SDUPTHER

## 2023-02-09 NOTE — PROGRESS NOTES
Andrei reviewed and appropriate for tramadol prescription refilled on 2/9/2023.  Controlled substance contract and urine drug screen on file.

## 2023-02-10 DIAGNOSIS — M54.12 CERVICAL RADICULITIS: ICD-10-CM

## 2023-02-10 RX ORDER — PREGABALIN 100 MG/1
CAPSULE ORAL
OUTPATIENT
Start: 2023-02-10

## 2023-02-10 RX ORDER — PREGABALIN 50 MG/1
CAPSULE ORAL
OUTPATIENT
Start: 2023-02-10

## 2023-02-10 RX ORDER — TIZANIDINE 4 MG/1
4 TABLET ORAL 2 TIMES DAILY PRN
Qty: 180 TABLET | Refills: 3 | Status: SHIPPED | OUTPATIENT
Start: 2023-02-10 | End: 2023-02-14 | Stop reason: SDUPTHER

## 2023-02-10 NOTE — TELEPHONE ENCOUNTER
Incoming Refill Request      Medication requested (name and dose): Tizanidine 4 mg, pregabalin 100 mg,pregabalin 50 mg  Pharmacy where request should be sent: [facility]    Additional details provided by patient: Pt is out of medication    Best call back number: [phone]    Does the patient have less than a 3 day supply:  [x] Yes  [] No    Neri Leal Rep  02/10/23, 10:27 CST

## 2023-02-10 NOTE — TELEPHONE ENCOUNTER
Tizanidine refilled.  Lyrica prescribed by Dr. Moreno and thus declined.  He needs to contact Dr. Moreno for refill.

## 2023-02-14 ENCOUNTER — OFFICE VISIT (OUTPATIENT)
Dept: FAMILY MEDICINE CLINIC | Facility: CLINIC | Age: 48
End: 2023-02-14
Payer: COMMERCIAL

## 2023-02-14 VITALS
RESPIRATION RATE: 20 BRPM | TEMPERATURE: 96.3 F | WEIGHT: 234.1 LBS | BODY MASS INDEX: 35.48 KG/M2 | OXYGEN SATURATION: 98 % | SYSTOLIC BLOOD PRESSURE: 130 MMHG | DIASTOLIC BLOOD PRESSURE: 102 MMHG | HEIGHT: 68 IN | HEART RATE: 68 BPM

## 2023-02-14 DIAGNOSIS — M54.12 CERVICAL RADICULITIS: ICD-10-CM

## 2023-02-14 DIAGNOSIS — I10 ESSENTIAL HYPERTENSION: ICD-10-CM

## 2023-02-14 DIAGNOSIS — E03.2 HYPOTHYROIDISM DUE TO MEDICATION: ICD-10-CM

## 2023-02-14 DIAGNOSIS — R73.03 PREDIABETES: ICD-10-CM

## 2023-02-14 DIAGNOSIS — M50.30 DDD (DEGENERATIVE DISC DISEASE), CERVICAL: Primary | ICD-10-CM

## 2023-02-14 PROCEDURE — 99214 OFFICE O/P EST MOD 30 MIN: CPT | Performed by: NURSE PRACTITIONER

## 2023-02-14 RX ORDER — TIZANIDINE 4 MG/1
4 TABLET ORAL EVERY 8 HOURS PRN
Qty: 270 TABLET | Refills: 1 | Status: SHIPPED | OUTPATIENT
Start: 2023-02-14

## 2023-02-14 RX ORDER — LOSARTAN POTASSIUM 25 MG/1
25 TABLET ORAL NIGHTLY
Qty: 90 TABLET | Refills: 1 | Status: SHIPPED | OUTPATIENT
Start: 2023-02-14

## 2023-02-14 RX ORDER — PREGABALIN 100 MG/1
100 CAPSULE ORAL 2 TIMES DAILY
Qty: 60 CAPSULE | Refills: 2 | Status: SHIPPED | OUTPATIENT
Start: 2023-02-14

## 2023-02-14 RX ORDER — PREGABALIN 50 MG/1
50 CAPSULE ORAL 2 TIMES DAILY
Qty: 60 CAPSULE | Refills: 2 | Status: SHIPPED | OUTPATIENT
Start: 2023-02-14

## 2023-02-14 RX ORDER — NAPROXEN 250 MG/1
250 TABLET ORAL
Qty: 270 TABLET | Refills: 3 | Status: SHIPPED | OUTPATIENT
Start: 2023-02-14

## 2023-02-14 NOTE — PROGRESS NOTES
Subjective   Aquiles Najera is a 47 y.o. male.     History of Present Illness  CC: Chronic neck pain, hypothyroidism, prediabetes, hypertension  Neck Pain   This is a chronic problem. The current episode started more than 1 year ago. The problem occurs constantly. The problem has been unchanged. The pain is associated with nothing. The pain is present in the midline, right side and left side. The quality of the pain is described as aching and shooting. The pain is at a severity of 6/10. The pain is moderate. The symptoms are aggravated by twisting, stress, position and bending. The pain is same all the time. Stiffness is present all day. Pertinent negatives include no chest pain, fever, trouble swallowing, weakness or weight loss. He has tried acetaminophen, NSAIDs, neck support, muscle relaxants, home exercises, heat, ice, bed rest and chiropractic manipulation (tramadol, lyrica) for the symptoms. The treatment provided moderate relief.   Hypothyroidism  This is a chronic problem. The current episode started more than 1 year ago. The problem occurs constantly. The problem has been waxing and waning. Associated symptoms include neck pain. Pertinent negatives include no abdominal pain, arthralgias, chest pain, chills, congestion, coughing, fatigue, fever, myalgias, nausea, rash, sore throat, vomiting or weakness. Nothing aggravates the symptoms. Treatments tried: levothyroxine. The treatment provided moderate relief.   Blood Sugar Problem  This is a chronic problem. The current episode started more than 1 year ago. The problem has been waxing and waning. Associated symptoms include neck pain. Pertinent negatives include no abdominal pain, arthralgias, chest pain, chills, congestion, coughing, fatigue, fever, myalgias, nausea, rash, sore throat, vomiting or weakness. The symptoms are aggravated by drinking and eating. He has tried eating and drinking for the symptoms. The treatment provided mild relief.    Hypertension  This is a chronic problem. The current episode started more than 1 year ago. The problem has been gradually worsening since onset. The problem is uncontrolled (wt gain). Associated symptoms include neck pain. Pertinent negatives include no chest pain, palpitations or shortness of breath. Risk factors for coronary artery disease include family history, dyslipidemia, male gender, sedentary lifestyle, stress and obesity. Current antihypertension treatment includes angiotensin blockers. The current treatment provides significant improvement. Compliance problems include exercise and diet.  There is no history of angina, kidney disease or CAD/MI.        The following portions of the patient's history were reviewed and updated as appropriate: allergies, current medications, past family history, past medical history, past social history, past surgical history and problem list.    Review of Systems   Constitutional: Negative for activity change, appetite change, chills, fatigue, fever, unexpected weight gain and unexpected weight loss.   HENT: Negative for congestion, sore throat, trouble swallowing and voice change.    Eyes: Negative.    Respiratory: Negative for cough, chest tightness, shortness of breath and wheezing.    Cardiovascular: Negative for chest pain, palpitations and leg swelling.   Gastrointestinal: Negative for abdominal pain, diarrhea, nausea and vomiting.   Endocrine: Negative.  Negative for cold intolerance, heat intolerance, polydipsia, polyphagia and polyuria.   Genitourinary: Negative for dysuria, hematuria and urgency.   Musculoskeletal: Positive for neck pain and neck stiffness. Negative for arthralgias and myalgias.   Skin: Negative for rash.   Neurological: Negative for dizziness, weakness, light-headedness and headache.   Hematological: Negative.    Psychiatric/Behavioral: Negative.        Objective   Physical Exam  Vitals and nursing note reviewed.   Constitutional:       General:  He is not in acute distress.     Appearance: Normal appearance. He is well-developed. He is obese. He is not ill-appearing, toxic-appearing or diaphoretic.   HENT:      Head: Normocephalic and atraumatic.   Eyes:      Conjunctiva/sclera: Conjunctivae normal.   Cardiovascular:      Rate and Rhythm: Normal rate and regular rhythm.      Heart sounds: Normal heart sounds. No murmur heard.    No friction rub. No gallop.   Pulmonary:      Effort: Pulmonary effort is normal. No respiratory distress.      Breath sounds: Normal breath sounds. No stridor. No wheezing, rhonchi or rales.   Abdominal:      General: Bowel sounds are normal. There is no distension.      Palpations: Abdomen is soft. There is no mass.      Tenderness: There is no abdominal tenderness. There is no guarding or rebound.      Hernia: No hernia is present.   Musculoskeletal:         General: No tenderness.      Cervical back: Pain with movement, spinous process tenderness and muscular tenderness present. Decreased range of motion.   Skin:     General: Skin is warm and dry.      Coloration: Skin is not pale.      Findings: No erythema or rash.   Neurological:      Mental Status: He is alert and oriented to person, place, and time.   Psychiatric:         Mood and Affect: Mood normal.         Behavior: Behavior normal.         Thought Content: Thought content normal.         Judgment: Judgment normal.           Assessment & Plan   Diagnoses and all orders for this visit:    1. DDD (degenerative disc disease), cervical (Primary)  -     naproxen (NAPROSYN) 250 MG tablet; Take 1 tablet by mouth 3 (Three) Times a Day With Meals.  Dispense: 270 tablet; Refill: 3  -     pregabalin (LYRICA) 100 MG capsule; Take 1 capsule by mouth 2 (Two) Times a Day.  Dispense: 60 capsule; Refill: 2  -     pregabalin (LYRICA) 50 MG capsule; Take 1 capsule by mouth 2 (Two) Times a Day.  Dispense: 60 capsule; Refill: 2   - Symptoms stable.  Continue follow-up with neurology,  neurosurgery and pain management as scheduled.  Andrei reviewed and appropriate.  Urine drug screen controlled substance contract on file.  Continue Lyrica and naproxen and tizanidine as prescribed.  Refill sent to pharmacy.  We will continue to monitor.    2. Hypothyroidism due to medication  -     TSH; Future  -     T4, Free; Future, will call with results.  Continue levothyroxine prescribed.  We will continue to monitor    3. Prediabetes  -     Hemoglobin A1c; Future  -     Basic Metabolic Panel; Future, will call with results.  Continue low-carb diet.  Weight loss recommended.  We will continue to monitor    4. Cervical radiculitis  -     tiZANidine (ZANAFLEX) 4 MG tablet; Take 1 tablet by mouth Every 8 (Eight) Hours As Needed for Muscle Spasms.  Dispense: 270 tablet; Refill: 1  -     pregabalin (LYRICA) 100 MG capsule; Take 1 capsule by mouth 2 (Two) Times a Day.  Dispense: 60 capsule; Refill: 2  -     pregabalin (LYRICA) 50 MG capsule; Take 1 capsule by mouth 2 (Two) Times a Day.  Dispense: 60 capsule; Refill: 2   - Stable.  Plan of care stated above #1.  We will continue to monitor.    5. Essential hypertension  -     losartan (COZAAR) 25 MG tablet; Take 1 tablet by mouth Every Night.  Dispense: 90 tablet; Refill: 1   -Mildly elevated likely due to recent weight gain.  Recommended weight loss as noted above #3.  We will restart losartan 25 mg daily.  We will continue to monitor.    6.  Follow-up in 3 months or sooner for any acute needs.            This document has been electronically signed by ERIC Oleary on February 14, 2023 13:17 CST

## 2023-02-27 ENCOUNTER — TELEPHONE (OUTPATIENT)
Dept: FAMILY MEDICINE CLINIC | Facility: CLINIC | Age: 48
End: 2023-02-27
Payer: COMMERCIAL

## 2023-02-27 DIAGNOSIS — J30.9 ALLERGIC RHINITIS: ICD-10-CM

## 2023-02-27 RX ORDER — LORATADINE 10 MG/1
10 TABLET ORAL DAILY
Qty: 90 TABLET | Refills: 3 | Status: SHIPPED | OUTPATIENT
Start: 2023-02-27

## 2023-03-20 DIAGNOSIS — M43.02 CERVICAL SPONDYLOLYSIS: ICD-10-CM

## 2023-03-20 RX ORDER — TRAMADOL HYDROCHLORIDE 50 MG/1
100 TABLET ORAL EVERY 8 HOURS PRN
Qty: 180 TABLET | Refills: 0 | Status: SHIPPED | OUTPATIENT
Start: 2023-03-20

## 2023-03-20 NOTE — PROGRESS NOTES
Andrei reviewed and appropriate for tramadol prescription refilled on 3/20/2023.  Controlled substance contract and urine drug screen on file.

## 2023-03-20 NOTE — TELEPHONE ENCOUNTER
Incoming Refill Request      Medication requested (name and dose):   traMADol (ULTRAM) 50 MG tablet    Pharmacy where request should be sent:   [facility] [place]    Additional details provided by patient: None    Best call back number: [phone]    Does the patient have less than a 3 day supply:  [x] Yes  [] No    Neri De León Rep  03/20/23, 08:41 CDT

## 2023-03-30 ENCOUNTER — LAB (OUTPATIENT)
Dept: LAB | Facility: HOSPITAL | Age: 48
End: 2023-03-30
Payer: COMMERCIAL

## 2023-03-30 DIAGNOSIS — R73.03 PREDIABETES: ICD-10-CM

## 2023-03-30 DIAGNOSIS — E03.2 HYPOTHYROIDISM DUE TO MEDICATION: ICD-10-CM

## 2023-03-30 PROCEDURE — 80048 BASIC METABOLIC PNL TOTAL CA: CPT

## 2023-03-30 PROCEDURE — 83036 HEMOGLOBIN GLYCOSYLATED A1C: CPT

## 2023-03-30 PROCEDURE — 84439 ASSAY OF FREE THYROXINE: CPT

## 2023-03-30 PROCEDURE — 84443 ASSAY THYROID STIM HORMONE: CPT

## 2023-03-31 LAB
ANION GAP SERPL CALCULATED.3IONS-SCNC: 10 MMOL/L (ref 5–15)
BUN SERPL-MCNC: 20 MG/DL (ref 6–20)
BUN/CREAT SERPL: 17.5 (ref 7–25)
CALCIUM SPEC-SCNC: 9.4 MG/DL (ref 8.6–10.5)
CHLORIDE SERPL-SCNC: 104 MMOL/L (ref 98–107)
CO2 SERPL-SCNC: 26 MMOL/L (ref 22–29)
CREAT SERPL-MCNC: 1.14 MG/DL (ref 0.76–1.27)
EGFRCR SERPLBLD CKD-EPI 2021: 80.8 ML/MIN/1.73
GLUCOSE SERPL-MCNC: 81 MG/DL (ref 65–99)
HBA1C MFR BLD: 5.4 % (ref 4.8–5.6)
POTASSIUM SERPL-SCNC: 4.3 MMOL/L (ref 3.5–5.2)
SODIUM SERPL-SCNC: 140 MMOL/L (ref 136–145)
T4 FREE SERPL-MCNC: 1.02 NG/DL (ref 0.93–1.7)
TSH SERPL DL<=0.05 MIU/L-ACNC: 25.1 UIU/ML (ref 0.27–4.2)

## 2023-04-10 DIAGNOSIS — E03.2 HYPOTHYROIDISM DUE TO MEDICATION: ICD-10-CM

## 2023-04-10 RX ORDER — LEVOTHYROXINE SODIUM 0.12 MG/1
125 TABLET ORAL DAILY
Qty: 30 TABLET | Refills: 3 | Status: SHIPPED | OUTPATIENT
Start: 2023-04-10

## 2023-04-10 NOTE — PROGRESS NOTES
TSH improving but still elevated, 25.1.  I am increasing his levothyroxine to 125 mcg daily.  Please instruct him to take this medication on an empty stomach and do not take within 2 hours of any of his other medications to ensure adequate dosing.  Follow-up as scheduled.

## 2023-04-15 DIAGNOSIS — M43.02 CERVICAL SPONDYLOLYSIS: ICD-10-CM

## 2023-04-17 RX ORDER — TRAMADOL HYDROCHLORIDE 50 MG/1
TABLET ORAL
Qty: 180 TABLET | Refills: 0 | Status: SHIPPED | OUTPATIENT
Start: 2023-04-17

## 2023-04-17 NOTE — PROGRESS NOTES
Andrei reviewed and appropriate for tramadol prescription refilled on 4/17/2023.  Controlled substance contract and urine drug screen on file.

## 2023-05-01 ENCOUNTER — TELEPHONE (OUTPATIENT)
Dept: FAMILY MEDICINE CLINIC | Facility: CLINIC | Age: 48
End: 2023-05-01
Payer: COMMERCIAL

## 2023-05-01 RX ORDER — TRIAMCINOLONE ACETONIDE 1 MG/G
1 CREAM TOPICAL 2 TIMES DAILY PRN
Qty: 60 G | Refills: 0 | Status: SHIPPED | OUTPATIENT
Start: 2023-05-01

## 2023-05-01 NOTE — TELEPHONE ENCOUNTER
Mr. Najera called and wants to know if Jamie could call in some Triamcinolone, states that jamie has prescribed this once before.    Pt uses [facility] in [place]    Pt call back is [phone]

## 2023-05-09 ENCOUNTER — TELEPHONE (OUTPATIENT)
Dept: FAMILY MEDICINE CLINIC | Facility: CLINIC | Age: 48
End: 2023-05-09
Payer: COMMERCIAL

## 2023-05-09 NOTE — TELEPHONE ENCOUNTER
Mr. Najera called concerning his medication.  He has moved jobs. His medication was going to [facility] and now it needs to go to [facility] in [place].  If you could please contact him please.    Pt [phone]

## 2023-05-09 NOTE — TELEPHONE ENCOUNTER
Patient was called; no answer.  A message was left regarding his medication and pharmacy preference; [facility].  A message was left for him to call back with a detailed message if he needs all or some of his medication to go to [facility] and refills needed.

## 2023-05-10 ENCOUNTER — TELEPHONE (OUTPATIENT)
Dept: FAMILY MEDICINE CLINIC | Facility: CLINIC | Age: 48
End: 2023-05-10
Payer: COMMERCIAL

## 2023-05-10 NOTE — TELEPHONE ENCOUNTER
Mr. Najera was returning Felicias call. Its concerning his medication.     Pt call back is [phone]

## 2023-05-11 DIAGNOSIS — J45.30 MILD PERSISTENT ASTHMA WITHOUT COMPLICATION: ICD-10-CM

## 2023-05-11 RX ORDER — BUDESONIDE AND FORMOTEROL FUMARATE DIHYDRATE 160; 4.5 UG/1; UG/1
AEROSOL RESPIRATORY (INHALATION)
Qty: 10.2 G | Refills: 1 | Status: SHIPPED | OUTPATIENT
Start: 2023-05-11

## 2023-05-12 ENCOUNTER — TELEPHONE (OUTPATIENT)
Dept: FAMILY MEDICINE CLINIC | Facility: CLINIC | Age: 48
End: 2023-05-12
Payer: COMMERCIAL

## 2023-05-12 NOTE — TELEPHONE ENCOUNTER
Patient was called to let him know our office received his message about changing pharmacy's and to call or have pharmacy send refill request when needed.

## 2023-05-15 DIAGNOSIS — M43.02 CERVICAL SPONDYLOLYSIS: ICD-10-CM

## 2023-05-15 RX ORDER — TRAMADOL HYDROCHLORIDE 50 MG/1
TABLET ORAL
Qty: 180 TABLET | Refills: 0 | Status: SHIPPED | OUTPATIENT
Start: 2023-05-15

## 2023-05-15 NOTE — TELEPHONE ENCOUNTER
Andrei #[id number] reviewed and appropriate for tramadol prescription refilled on 5/15/2023.  Controlled substance contract and urine drug screen on file

## 2023-05-30 DIAGNOSIS — M50.30 DDD (DEGENERATIVE DISC DISEASE), CERVICAL: ICD-10-CM

## 2023-05-30 DIAGNOSIS — M54.12 CERVICAL RADICULITIS: ICD-10-CM

## 2023-05-30 RX ORDER — PREGABALIN 50 MG/1
50 CAPSULE ORAL 2 TIMES DAILY
Qty: 60 CAPSULE | Refills: 2 | Status: CANCELLED | OUTPATIENT
Start: 2023-05-30

## 2023-05-31 ENCOUNTER — OFFICE VISIT (OUTPATIENT)
Dept: FAMILY MEDICINE CLINIC | Facility: CLINIC | Age: 48
End: 2023-05-31

## 2023-05-31 VITALS
TEMPERATURE: 97 F | HEART RATE: 71 BPM | DIASTOLIC BLOOD PRESSURE: 90 MMHG | HEIGHT: 68 IN | OXYGEN SATURATION: 97 % | SYSTOLIC BLOOD PRESSURE: 122 MMHG | RESPIRATION RATE: 18 BRPM | BODY MASS INDEX: 37.08 KG/M2 | WEIGHT: 244.7 LBS

## 2023-05-31 DIAGNOSIS — J45.30 MILD PERSISTENT ASTHMA WITHOUT COMPLICATION: ICD-10-CM

## 2023-05-31 DIAGNOSIS — B36.9 FUNGAL SKIN INFECTION: ICD-10-CM

## 2023-05-31 DIAGNOSIS — E03.9 ACQUIRED HYPOTHYROIDISM: ICD-10-CM

## 2023-05-31 DIAGNOSIS — M50.30 DDD (DEGENERATIVE DISC DISEASE), CERVICAL: Primary | ICD-10-CM

## 2023-05-31 DIAGNOSIS — M54.12 CERVICAL RADICULITIS: ICD-10-CM

## 2023-05-31 PROCEDURE — 99214 OFFICE O/P EST MOD 30 MIN: CPT | Performed by: NURSE PRACTITIONER

## 2023-05-31 RX ORDER — PREGABALIN 50 MG/1
50 CAPSULE ORAL 2 TIMES DAILY
Qty: 60 CAPSULE | Refills: 2 | Status: SHIPPED | OUTPATIENT
Start: 2023-05-31

## 2023-05-31 RX ORDER — NYSTATIN 100000 U/G
1 CREAM TOPICAL 2 TIMES DAILY
Qty: 30 G | Refills: 3 | Status: SHIPPED | OUTPATIENT
Start: 2023-05-31

## 2023-05-31 RX ORDER — ALBUTEROL SULFATE 90 UG/1
2 AEROSOL, METERED RESPIRATORY (INHALATION) EVERY 6 HOURS PRN
Qty: 18 G | Refills: 3 | Status: SHIPPED | OUTPATIENT
Start: 2023-05-31

## 2023-05-31 RX ORDER — PREGABALIN 100 MG/1
100 CAPSULE ORAL 2 TIMES DAILY
Qty: 60 CAPSULE | Refills: 2 | Status: SHIPPED | OUTPATIENT
Start: 2023-05-31

## 2023-05-31 NOTE — PROGRESS NOTES
Subjective   Aquiles Najera is a 47 y.o. male.     History of Present Illness  CC: Chronic neck pain, hypothyroidism, rash, asthma  Neck Pain   This is a chronic problem. The current episode started more than 1 year ago. The problem occurs constantly. The problem has been unchanged. The pain is associated with nothing. The pain is present in the midline, right side and left side. The pain is at a severity of 6/10. The pain is moderate. The symptoms are aggravated by twisting, position and bending. Pertinent negatives include no chest pain, fever, trouble swallowing, weakness or weight loss. He has tried oral narcotics, NSAIDs, neck support and muscle relaxants (Lyrica and tramadol) for the symptoms. The treatment provided significant relief.   Hypothyroidism  This is a chronic problem. The current episode started more than 1 year ago. Associated symptoms include coughing (chronic, stable/controlled), neck pain and a rash (bilateral groin). Pertinent negatives include no abdominal pain, arthralgias, chest pain, chills, congestion, fatigue, fever, myalgias, nausea, sore throat, vomiting or weakness. Nothing aggravates the symptoms. Treatments tried: levothyroxine. The treatment provided significant relief.   Rash  This is a new problem. The current episode started 1 to 4 weeks ago. The problem has been waxing and waning since onset. The affected locations include the groin. The rash is characterized by redness and itchiness. He was exposed to nothing. Associated symptoms include coughing (chronic, stable/controlled). Pertinent negatives include no congestion, diarrhea, fatigue, fever, shortness of breath, sore throat or vomiting. Treatments tried: OTC antifungals and and prescription topical triamcinolone. The treatment provided mild relief. His past medical history is significant for asthma.   Asthma  He complains of chest tightness and cough (chronic, stable/controlled). There is no shortness of breath,  sputum production or wheezing. This is a chronic problem. The current episode started more than 1 year ago. The problem occurs constantly. The problem has been unchanged. The cough is non-productive and dry. Pertinent negatives include no appetite change, chest pain, dyspnea on exertion, fever, myalgias, orthopnea, sore throat, trouble swallowing or weight loss. His symptoms are aggravated by URI, climbing stairs, change in weather and strenuous activity. His symptoms are alleviated by rest and beta-agonist. He reports significant improvement on treatment. His past medical history is significant for asthma.        The following portions of the patient's history were reviewed and updated as appropriate: allergies, current medications, past family history, past medical history, past social history, past surgical history and problem list.    Review of Systems   Constitutional: Negative for activity change, appetite change, chills, fatigue, fever, unexpected weight gain and unexpected weight loss.   HENT: Negative for congestion, sore throat, trouble swallowing and voice change.    Eyes: Negative.    Respiratory: Positive for cough (chronic, stable/controlled). Negative for sputum production, chest tightness, shortness of breath and wheezing.    Cardiovascular: Negative for chest pain, dyspnea on exertion, palpitations and leg swelling.   Gastrointestinal: Negative for abdominal pain, diarrhea, nausea and vomiting.   Endocrine: Negative.    Genitourinary: Negative for dysuria, hematuria and urgency.   Musculoskeletal: Positive for neck pain. Negative for arthralgias and myalgias.   Skin: Positive for rash (bilateral groin).   Neurological: Negative for dizziness, weakness, light-headedness and headache.   Hematological: Negative.    Psychiatric/Behavioral: Negative.        Objective   Physical Exam  Vitals and nursing note reviewed.   Constitutional:       General: He is not in acute distress.     Appearance: Normal  appearance. He is well-developed. He is obese. He is not ill-appearing, toxic-appearing or diaphoretic.   HENT:      Head: Normocephalic and atraumatic.   Eyes:      Conjunctiva/sclera: Conjunctivae normal.   Neck:      Thyroid: No thyroid mass, thyromegaly or thyroid tenderness.   Cardiovascular:      Rate and Rhythm: Normal rate and regular rhythm.      Heart sounds: Normal heart sounds. No murmur heard.    No friction rub. No gallop.   Pulmonary:      Effort: Pulmonary effort is normal. No respiratory distress.      Breath sounds: Normal breath sounds. No stridor. No wheezing, rhonchi or rales.   Abdominal:      General: Bowel sounds are normal. There is no distension.      Palpations: Abdomen is soft. There is no mass.      Tenderness: There is no abdominal tenderness. There is no guarding or rebound.      Hernia: No hernia is present.   Musculoskeletal:         General: No tenderness. Normal range of motion.      Cervical back: Normal range of motion.   Skin:     General: Skin is warm and dry.      Coloration: Skin is not pale.      Findings: Erythema and rash present.          Neurological:      Mental Status: He is alert and oriented to person, place, and time.   Psychiatric:         Behavior: Behavior normal.         Thought Content: Thought content normal.         Judgment: Judgment normal.           Assessment & Plan   Diagnoses and all orders for this visit:    1. DDD (degenerative disc disease), cervical (Primary)  -     Urine Drug Screen - Urine, Clean Catch; Future  -     pregabalin (LYRICA) 100 MG capsule; Take 1 capsule by mouth 2 (Two) Times a Day.  Dispense: 60 capsule; Refill: 2  -     pregabalin (LYRICA) 50 MG capsule; Take 1 capsule by mouth 2 (Two) Times a Day.  Dispense: 60 capsule; Refill: 2   -Stable.  Continue tramadol and Lyrica as prescribed.  Lyrica refilled.  Tramadol not due for refill at this time.  Controlled substance contract updated.  Urine drug screen ordered/updated.  Andrei  reviewed and appropriate.  We will continue to monitor.    2. Cervical radiculitis  -     Urine Drug Screen - Urine, Clean Catch; Future  -     pregabalin (LYRICA) 100 MG capsule; Take 1 capsule by mouth 2 (Two) Times a Day.  Dispense: 60 capsule; Refill: 2  -     pregabalin (LYRICA) 50 MG capsule; Take 1 capsule by mouth 2 (Two) Times a Day.  Dispense: 60 capsule; Refill: 2   -Plan of care stated above in 1.  We will continue to monitor.    3. Acquired hypothyroidism  -     TSH; Future  -     T4, Free; Future, will call with results.  Continue levothyroxine as prescribed.    4. Fungal skin infection  -     nystatin (MYCOSTATIN) 091177 UNIT/GM cream; Apply 1 application topically to the appropriate area as directed 2 (Two) Times a Day.  Dispense: 30 g; Refill: 3    5. Mild persistent asthma without complication  -    Controlled/stable.  Refill, albuterol sulfate  (90 Base) MCG/ACT inhaler; Inhale 2 puffs Every 6 (Six) Hours As Needed for Wheezing or Shortness of Air.  Dispense: 18 g; Refill: 3    6.  Follow-up in 3 months or sooner for any acute needs.            This document has been electronically signed by ERIC Oleary on May 31, 2023 09:41 CDT

## 2023-06-16 DIAGNOSIS — B36.9 FUNGAL SKIN INFECTION: ICD-10-CM

## 2023-06-16 DIAGNOSIS — M43.02 CERVICAL SPONDYLOLYSIS: ICD-10-CM

## 2023-06-16 RX ORDER — NYSTATIN 100000 U/G
CREAM TOPICAL
Qty: 90 G | Refills: 0 | Status: SHIPPED | OUTPATIENT
Start: 2023-06-16

## 2023-06-16 RX ORDER — TRAMADOL HYDROCHLORIDE 50 MG/1
TABLET ORAL
Qty: 30 TABLET | Refills: 0 | Status: SHIPPED | OUTPATIENT
Start: 2023-06-16

## 2023-06-16 NOTE — TELEPHONE ENCOUNTER
ERIC Ayala's patient    Last Rx 05/15/2023  #[id number], NR    No Future [facility]    LAST OFFICE VISIT - THIS DEPT  5/31/2023 Jaylon Rg APRN

## 2023-07-27 DIAGNOSIS — M43.02 CERVICAL SPONDYLOLYSIS: ICD-10-CM

## 2023-07-27 RX ORDER — TRAMADOL HYDROCHLORIDE 50 MG/1
100 TABLET ORAL EVERY 8 HOURS PRN
Qty: 180 TABLET | Refills: 0 | Status: SHIPPED | OUTPATIENT
Start: 2023-07-27

## 2023-07-27 NOTE — TELEPHONE ENCOUNTER
Refill sent to pharmacy.  Patient is due for follow-up in August and he has not scheduled an appointment yet.

## 2023-07-27 NOTE — PROGRESS NOTES
Andrei reviewed and appropriate for tramadol prescription refilled on 7/27/2023.  Controlled substance contract and urine drug screen on file.

## 2023-08-01 ENCOUNTER — TELEPHONE (OUTPATIENT)
Dept: FAMILY MEDICINE CLINIC | Facility: CLINIC | Age: 48
End: 2023-08-01
Payer: MEDICAID

## 2023-08-06 DIAGNOSIS — I10 ESSENTIAL HYPERTENSION: ICD-10-CM

## 2023-08-07 RX ORDER — LOSARTAN POTASSIUM 25 MG/1
TABLET ORAL
Qty: 90 TABLET | Refills: 1 | Status: SHIPPED | OUTPATIENT
Start: 2023-08-07

## 2023-08-30 DIAGNOSIS — M43.02 CERVICAL SPONDYLOLYSIS: ICD-10-CM

## 2023-08-30 RX ORDER — TRAMADOL HYDROCHLORIDE 50 MG/1
TABLET ORAL
Qty: 180 TABLET | OUTPATIENT
Start: 2023-08-30

## 2023-08-30 NOTE — TELEPHONE ENCOUNTER
Patient was called; no answer. A message was left to let him know pain medication could not be refilled as he is due for a follow up.  Instructed to call the office to schedule appointment.

## 2023-08-30 NOTE — TELEPHONE ENCOUNTER
Patient is due for follow-up and he has no follow-up scheduled.  Refill will be declined until an appointment is scheduled.

## 2023-09-07 ENCOUNTER — OFFICE VISIT (OUTPATIENT)
Dept: FAMILY MEDICINE CLINIC | Facility: CLINIC | Age: 48
End: 2023-09-07
Payer: COMMERCIAL

## 2023-09-07 VITALS
RESPIRATION RATE: 18 BRPM | HEART RATE: 80 BPM | SYSTOLIC BLOOD PRESSURE: 138 MMHG | WEIGHT: 239.1 LBS | HEIGHT: 68 IN | DIASTOLIC BLOOD PRESSURE: 70 MMHG | TEMPERATURE: 98.1 F | BODY MASS INDEX: 36.24 KG/M2 | OXYGEN SATURATION: 97 %

## 2023-09-07 DIAGNOSIS — R53.83 OTHER FATIGUE: ICD-10-CM

## 2023-09-07 DIAGNOSIS — N50.9 SCROTAL SKIN LESION: ICD-10-CM

## 2023-09-07 DIAGNOSIS — M43.02 CERVICAL SPONDYLOLYSIS: Primary | ICD-10-CM

## 2023-09-07 DIAGNOSIS — M50.30 DDD (DEGENERATIVE DISC DISEASE), CERVICAL: ICD-10-CM

## 2023-09-07 DIAGNOSIS — M54.12 CERVICAL RADICULITIS: ICD-10-CM

## 2023-09-07 DIAGNOSIS — E03.2 HYPOTHYROIDISM DUE TO MEDICATION: ICD-10-CM

## 2023-09-07 PROCEDURE — 99214 OFFICE O/P EST MOD 30 MIN: CPT | Performed by: NURSE PRACTITIONER

## 2023-09-07 RX ORDER — PREGABALIN 100 MG/1
100 CAPSULE ORAL 2 TIMES DAILY
Qty: 60 CAPSULE | Refills: 2 | Status: CANCELLED | OUTPATIENT
Start: 2023-09-07

## 2023-09-07 RX ORDER — PREGABALIN 50 MG/1
50 CAPSULE ORAL 2 TIMES DAILY
Qty: 60 CAPSULE | Refills: 2 | Status: CANCELLED | OUTPATIENT
Start: 2023-09-07

## 2023-09-07 RX ORDER — TRAMADOL HYDROCHLORIDE 50 MG/1
100 TABLET ORAL EVERY 8 HOURS PRN
Qty: 180 TABLET | Refills: 0 | Status: SHIPPED | OUTPATIENT
Start: 2023-09-07

## 2023-09-07 RX ORDER — PREGABALIN 200 MG/1
200 CAPSULE ORAL 2 TIMES DAILY
Qty: 60 CAPSULE | Refills: 2 | Status: SHIPPED | OUTPATIENT
Start: 2023-09-07

## 2023-09-07 NOTE — PROGRESS NOTES
Subjective   Aquiles Najera is a 47 y.o. male.     History of Present Illness  CC: Chronic neck pain, hypothyroidism, scrotal lesion, fatigue  Neck Pain   This is a chronic problem. The current episode started more than 1 year ago. The problem occurs constantly. The pain is associated with nothing. The pain is present in the midline. The quality of the pain is described as aching and shooting. The pain is moderate. The symptoms are aggravated by twisting, stress, position and bending. The pain is Same all the time. Stiffness is present All day. Pertinent negatives include no chest pain, trouble swallowing, weakness or weight loss. He has tried NSAIDs, neck support, muscle relaxants, chiropractic manipulation, bed rest, acetaminophen, heat, home exercises and ice (lyrica, tramadol) for the symptoms. The treatment provided significant relief.   Hypothyroidism  This is a chronic problem. The current episode started more than 1 year ago. Progression since onset: stable. Associated symptoms include fatigue and neck pain (chronic, stable). Pertinent negatives include no abdominal pain, arthralgias, chest pain, congestion, coughing, myalgias, nausea, rash, sore throat, vomiting or weakness. Nothing aggravates the symptoms. Treatments tried: levothyroxine. The treatment provided significant relief.   Other  This is a new (Scrotal lesions) problem. The current episode started 1 to 4 weeks ago. The problem occurs constantly. The problem has been gradually worsening. Associated symptoms include fatigue and neck pain (chronic, stable). Pertinent negatives include no abdominal pain, arthralgias, chest pain, congestion, coughing, myalgias, nausea, rash, sore throat, vomiting or weakness. Nothing aggravates the symptoms. He has tried nothing for the symptoms. The treatment provided no relief.      The following portions of the patient's history were reviewed and updated as appropriate: allergies, current medications, past  family history, past medical history, past social history, past surgical history, and problem list.    Review of Systems   Constitutional:  Positive for fatigue. Negative for activity change, appetite change, unexpected weight gain and unexpected weight loss.   HENT:  Negative for congestion, sore throat, trouble swallowing and voice change.    Eyes: Negative.    Respiratory:  Negative for cough, chest tightness, shortness of breath and wheezing.    Cardiovascular:  Negative for chest pain, palpitations and leg swelling.   Gastrointestinal:  Negative for abdominal pain, diarrhea, nausea and vomiting.   Endocrine: Negative.  Negative for cold intolerance, heat intolerance, polydipsia, polyphagia and polyuria.   Genitourinary:  Negative for dysuria, hematuria and urgency.        Raised scrotal lesions.   Musculoskeletal:  Positive for neck pain (chronic, stable) and neck stiffness. Negative for arthralgias and myalgias.   Skin:  Negative for rash.   Neurological:  Negative for dizziness, weakness, light-headedness and headache.   Hematological: Negative.    Psychiatric/Behavioral: Negative.       Objective   Physical Exam  Vitals and nursing note reviewed.   Constitutional:       General: He is not in acute distress.     Appearance: Normal appearance. He is well-developed. He is obese. He is not ill-appearing, toxic-appearing or diaphoretic.   HENT:      Head: Normocephalic and atraumatic.   Eyes:      Conjunctiva/sclera: Conjunctivae normal.   Cardiovascular:      Rate and Rhythm: Normal rate and regular rhythm.      Heart sounds: Normal heart sounds. No murmur heard.    No friction rub. No gallop.   Pulmonary:      Effort: Pulmonary effort is normal. No respiratory distress.      Breath sounds: Normal breath sounds. No stridor. No wheezing, rhonchi or rales.   Abdominal:      General: Bowel sounds are normal. There is no distension.      Palpations: Abdomen is soft. There is no mass.      Tenderness: There is no  abdominal tenderness. There is no guarding or rebound.      Hernia: No hernia is present.   Genitourinary:     Penis: Lesions present.        Musculoskeletal:         General: No tenderness. Normal range of motion.      Cervical back: Normal range of motion.   Skin:     General: Skin is warm and dry.      Coloration: Skin is not pale.      Findings: No erythema or rash.   Neurological:      Mental Status: He is alert and oriented to person, place, and time.   Psychiatric:         Mood and Affect: Mood normal.         Behavior: Behavior normal.         Thought Content: Thought content normal.         Judgment: Judgment normal.         Assessment & Plan   Diagnoses and all orders for this visit:    1. Cervical spondylolysis (Primary)  -     traMADol (ULTRAM) 50 MG tablet; Take 2 tablets by mouth Every 8 (Eight) Hours As Needed for Moderate Pain.  Dispense: 180 tablet; Refill: 0  -     pregabalin (Lyrica) 200 MG capsule; Take 1 capsule by mouth 2 (Two) Times a Day.  Dispense: 60 capsule; Refill: 2   -Poorly controlled chronic neck pain.  Continue tramadol as prescribed.  We will increase gabapentin from 150 mg twice a day to 200 mg twice a day.  Andrei reviewed and appropriate.  Urine drug screen and controlled substance contract on file.  We will continue to monitor.    2. Cervical radiculitis  -     pregabalin (Lyrica) 200 MG capsule; Take 1 capsule by mouth 2 (Two) Times a Day.  Dispense: 60 capsule; Refill: 2   -Plan of care stated above #1.  We will continue to monitor.    3. DDD (degenerative disc disease), cervical  -     pregabalin (Lyrica) 200 MG capsule; Take 1 capsule by mouth 2 (Two) Times a Day.  Dispense: 60 capsule; Refill: 2   -Plan of care stated above #1.  We will continue to monitor.    4. Hypothyroidism due to medication  -     TSH; Future  -     T4, Free; Future, will call with results.  Continue levothyroxine as prescribed.  We will continue to monitor.    5. Scrotal skin lesion  -     Ambulatory  "Referral to Urology, new onset raised fleshy lesions to scrotum and base of penis.  Based on appearance and patient's reported history that his soon-to-be ex-wife had a \"woman cancer\" these likely represent HPV lesions.  Referral to urology for further evaluation and treatment.  Safe sex practices recommended.    6. Other fatigue  -     TSH; Future  -     T4, Free; Future  -     Vitamin B12; Future, will call with results.    7.  Follow-up in 3 months or sooner for any acute needs.            This document has been electronically signed by ERIC Oleary on September 7, 2023 11:49 CDT                     "

## 2023-09-14 ENCOUNTER — TELEPHONE (OUTPATIENT)
Dept: FAMILY MEDICINE CLINIC | Facility: CLINIC | Age: 48
End: 2023-09-14
Payer: COMMERCIAL

## 2023-09-14 DIAGNOSIS — M54.12 CERVICAL RADICULITIS: ICD-10-CM

## 2023-09-14 RX ORDER — TIZANIDINE 4 MG/1
4 TABLET ORAL EVERY 8 HOURS PRN
Qty: 270 TABLET | Refills: 3 | Status: SHIPPED | OUTPATIENT
Start: 2023-09-14

## 2023-09-14 NOTE — TELEPHONE ENCOUNTER
Aquiles called asking to speak with Sarita. He stated it is concerning one of his medications. He did not say which one or what the problem was.       Please call back @ [phone] as soon as possible. Please.

## 2023-09-14 NOTE — TELEPHONE ENCOUNTER
He can see if the pharmacy will fill it early however his insurance will likely not pay for it and he might have to pay for it out-of-pocket.

## 2023-09-18 DIAGNOSIS — J45.30 MILD PERSISTENT ASTHMA WITHOUT COMPLICATION: ICD-10-CM

## 2023-09-18 RX ORDER — BUDESONIDE AND FORMOTEROL FUMARATE DIHYDRATE 160; 4.5 UG/1; UG/1
AEROSOL RESPIRATORY (INHALATION)
Qty: 10.2 G | Refills: 1 | Status: SHIPPED | OUTPATIENT
Start: 2023-09-18

## 2023-09-22 ENCOUNTER — OFFICE VISIT (OUTPATIENT)
Dept: FAMILY MEDICINE CLINIC | Facility: CLINIC | Age: 48
End: 2023-09-22
Payer: COMMERCIAL

## 2023-09-22 VITALS
OXYGEN SATURATION: 96 % | RESPIRATION RATE: 18 BRPM | SYSTOLIC BLOOD PRESSURE: 110 MMHG | HEART RATE: 70 BPM | DIASTOLIC BLOOD PRESSURE: 70 MMHG | TEMPERATURE: 97.3 F | WEIGHT: 237.8 LBS | HEIGHT: 68 IN | BODY MASS INDEX: 36.04 KG/M2

## 2023-09-22 DIAGNOSIS — L03.116 CELLULITIS OF LEFT HIP: Primary | ICD-10-CM

## 2023-09-22 PROCEDURE — 99213 OFFICE O/P EST LOW 20 MIN: CPT | Performed by: NURSE PRACTITIONER

## 2023-09-22 RX ORDER — SULFAMETHOXAZOLE AND TRIMETHOPRIM 800; 160 MG/1; MG/1
1 TABLET ORAL 2 TIMES DAILY
Qty: 20 TABLET | Refills: 0 | Status: SHIPPED | OUTPATIENT
Start: 2023-09-22 | End: 2023-10-02

## 2023-09-22 NOTE — PROGRESS NOTES
Subjective   Aquiles Najera is a 47 y.o. male.     History of Present Illness  CC: redness to skin of left hip  Rash  This is a new problem. The current episode started in the past 7 days. The problem has been gradually worsening since onset. The affected locations include the left hip. The rash is characterized by redness, pain and peeling. It is unknown (possible spider bite) if there was an exposure to a precipitant. Pertinent negatives include no congestion, cough, diarrhea, fatigue, shortness of breath, sore throat or vomiting. Past treatments include nothing. The treatment provided no relief.      The following portions of the patient's history were reviewed and updated as appropriate: allergies, current medications, past family history, past medical history, past social history, past surgical history, and problem list.    Review of Systems   Constitutional:  Negative for activity change, appetite change, fatigue, unexpected weight gain and unexpected weight loss.   HENT:  Negative for congestion, sore throat, trouble swallowing and voice change.    Eyes: Negative.    Respiratory:  Negative for cough, chest tightness, shortness of breath and wheezing.    Cardiovascular:  Negative for chest pain, palpitations and leg swelling.   Gastrointestinal:  Negative for abdominal pain, diarrhea, nausea and vomiting.   Endocrine: Negative.    Genitourinary:  Negative for dysuria, hematuria and urgency.   Musculoskeletal:  Negative for arthralgias and myalgias.   Skin:  Positive for rash (left hip isolated redness with central brown scab. no reported injury. no witnessed spider bite but has been working in spider infested areas).   Neurological:  Negative for dizziness, weakness, light-headedness and headache.   Hematological: Negative.    Psychiatric/Behavioral: Negative.       Objective   Physical Exam  Vitals and nursing note reviewed.   Constitutional:       General: He is not in acute distress.     Appearance:  Normal appearance. He is well-developed. He is obese. He is not ill-appearing, toxic-appearing or diaphoretic.   HENT:      Head: Normocephalic and atraumatic.   Eyes:      Conjunctiva/sclera: Conjunctivae normal.   Pulmonary:      Effort: Pulmonary effort is normal. No respiratory distress.   Musculoskeletal:         General: No tenderness. Normal range of motion.      Cervical back: Normal range of motion.   Skin:     General: Skin is warm and dry.      Coloration: Skin is not pale.      Findings: Erythema and wound present.          Neurological:      Mental Status: He is alert and oriented to person, place, and time.   Psychiatric:         Mood and Affect: Mood normal.         Behavior: Behavior normal.         Thought Content: Thought content normal.         Judgment: Judgment normal.         Assessment & Plan   Diagnoses and all orders for this visit:    1. Cellulitis of left hip (Primary)  Comments:  probable spider bite  Orders:  -     sulfamethoxazole-trimethoprim (BACTRIM DS,SEPTRA DS) 800-160 MG per tablet; Take 1 tablet by mouth 2 (Two) Times a Day for 10 days.  Dispense: 20 tablet; Refill: 0  -     mupirocin (BACTROBAN) 2 % ointment; Apply 1 application  topically to the appropriate area as directed Daily.  Dispense: 22 g; Refill: 0   -Probable spider bite to left hip.  Reaction appears mild at this point and does not require referral for debridement.  Bactrim DS twice a day for 10 days, mupirocin ointment topically daily.  Clean daily.    2.  Follow-up in 3 to 5 days should symptoms fail to improve or sooner symptoms worsen.  Otherwise take antibiotic to completion and follow-up as scheduled.            This document has been electronically signed by ERIC Oleary on September 22, 2023 11:34 CDT

## 2023-09-25 ENCOUNTER — TELEPHONE (OUTPATIENT)
Dept: FAMILY MEDICINE CLINIC | Facility: CLINIC | Age: 48
End: 2023-09-25

## 2023-09-25 ENCOUNTER — TELEPHONE (OUTPATIENT)
Dept: FAMILY MEDICINE CLINIC | Facility: CLINIC | Age: 48
End: 2023-09-25
Payer: COMMERCIAL

## 2023-09-25 NOTE — TELEPHONE ENCOUNTER
Aquiles called checking to see if Jamie's office received the fax that he sent on Friday 9-22-23 concerning him being able to return to work.      Pt call back # [phone]

## 2023-09-25 NOTE — TELEPHONE ENCOUNTER
Mr. Aquiles Najera called and stated that Sarita was supposed to be faxing over a form to a place he can get his physical done else where.  The fax number is     Fax number [phone]    Pt call back [phone]

## 2023-09-25 NOTE — TELEPHONE ENCOUNTER
Patient was called; no answer. A message was left to let him know physical clearance form was faxed to number provided on form and a copy mailed to him for his personal records.

## 2023-09-26 ENCOUNTER — DOCUMENTATION (OUTPATIENT)
Dept: FAMILY MEDICINE CLINIC | Facility: CLINIC | Age: 48
End: 2023-09-26
Payer: COMMERCIAL

## 2023-09-29 ENCOUNTER — LAB (OUTPATIENT)
Dept: LAB | Facility: HOSPITAL | Age: 48
End: 2023-09-29
Payer: COMMERCIAL

## 2023-09-29 DIAGNOSIS — E03.2 HYPOTHYROIDISM DUE TO MEDICATION: ICD-10-CM

## 2023-09-29 DIAGNOSIS — R53.83 OTHER FATIGUE: ICD-10-CM

## 2023-09-29 LAB
T4 FREE SERPL-MCNC: 1.1 NG/DL (ref 0.93–1.7)
TSH SERPL DL<=0.05 MIU/L-ACNC: 11.5 UIU/ML (ref 0.27–4.2)
VIT B12 BLD-MCNC: 1364 PG/ML (ref 211–946)

## 2023-09-29 PROCEDURE — 82607 VITAMIN B-12: CPT

## 2023-09-29 PROCEDURE — 84443 ASSAY THYROID STIM HORMONE: CPT

## 2023-09-29 PROCEDURE — 84439 ASSAY OF FREE THYROXINE: CPT

## 2023-09-30 DIAGNOSIS — E03.2 HYPOTHYROIDISM DUE TO MEDICATION: ICD-10-CM

## 2023-09-30 RX ORDER — LEVOTHYROXINE SODIUM 175 UG/1
175 TABLET ORAL DAILY
Qty: 30 TABLET | Refills: 3 | Status: SHIPPED | OUTPATIENT
Start: 2023-09-30

## 2023-10-01 NOTE — PROGRESS NOTES
Tsh elevated. Increasing levothyroxine to 175 mcg daily. Take on empty stomach. Follow up as scheduled.

## 2025-03-10 NOTE — THERAPY EVALUATION
Outpatient Physical Therapy Ortho Initial Evaluation  [facility]     Patient Name: Aquiles Najera  : 1975  MRN: [id number]  Today's Date: 8/3/2018      Visit Date: 2018   Recert: 18  Visit:   MD f/u: 5 weeks    Patient Active Problem List   Diagnosis   • Umbilical hernia without obstruction or gangrene   • Tobacco dependence syndrome   • Gastroesophageal reflux disease   • Dyslipidemia   • Chest pain   • Allergic rhinitis   • COPD (chronic obstructive pulmonary disease) - presumed   • Constipation   • Cocaine abuse in remission   • Pre-syncope   • Essential hypertension        Past Medical History:   Diagnosis Date   • Abdominal pain    • Acute exacerbation of chronic obstructive airways disease (CMS/HCC)    • Allergic rhinitis    • Chest pain     unspecified   • Chronic cough    • Conjunctivitis    • Dyslipidemia    • Dyspnea    • Gastroesophageal reflux disease    • Pain     Pain in left ankle and joints of left foot      • Pain     pain in left leg   • Tobacco dependence syndrome    • Umbilical hernia without obstruction or gangrene    • Viral gastroenteritis         Past Surgical History:   Procedure Laterality Date   • INCISION AND DRAINAGE ABSCESS  2009    Irrigation and debridement of facial soft tissue injuries with closure of complex lacerations and repair of maxillary dental alveolar fracture   • UMBILICAL HERNIA REPAIR N/A 2017    Procedure: OPEN UMBILICAL AND EPIGASTRIC HERNIA REPAIR WITH MESH;  Surgeon: Sal Herbert MD;  Location: [facility];  Service:        Visit Dx:     ICD-10-CM ICD-9-CM   1. Cervical radiculitis M54.12 723.4             Patient History     Row Name 18 1000             History    Chief Complaint Pain  -MW      Type of Pain Neck pain;Shoulder pain  -MW      Brief Description of Current Complaint The pt is a 41 yo male who comes to PT today for referral for cervical radiculopathy. The pt states his neck and shoulder blade  "began bothering him approx 1 month after his completion of the last PT sessions here. He was previously in therapy for his R shoulder after shoulder scope. He states they did an MRI recently on his neck and he states it showed \"nothing\". He states \"no pinched nerve, nothing\" in his neck. Pt states he is working out w/ weights currently and his shoulder hurts w/ this but he is able to do it. The pt states he thinks \"going to a massage therapist would help as much as anything\".  -MW      Patient/Caregiver Goals Relieve pain  -MW      Occupation/sports/leisure activities   -MW         Pain     Pain at Present 5  -MW      Pain at Best 5  -MW      Pain at Worst 5  -MW      Pain Frequency Constant/continuous  -MW      Pain Description Aching;Dull  -MW      Difficulties at work? yes  -MW      Difficulties with ADL's? yes  -MW      Difficulties with recreational activities? yes  -MW         Daily Activities    Primary Language English  -MW        User Key  (r) = Recorded By, (t) = Taken By, (c) = Cosigned By    Initials Name Provider Type    MW Sabiha Maravilla, PT Physical Therapist                PT Ortho     Row Name 08/03/18 1100       Subjective Comments    Subjective Comments See hx  -MW       Precautions and Contraindications    Precautions hx of chest pains in the past  -MW       Posture/Observations    Posture/Observations Comments Pt has forward head and rounded shoulders. The pt has palpable triggerpoint present throughout his R scap stabilizers. Increased muscle tightness present throughout scap stabilizers and R UT.  TTP along thoracic paraspinals. No scapular dyskinesia noted.  -MW       General ROM    GENERAL ROM COMMENTS Bilat shoulder and neck ROM WFL for all planes. Pt has report of UT stretching w/ cervial lat flex to the L.  -MW       General Assessment (Manual Muscle Testing)    Comment, General Manual Muscle Testing (MMT) Assessment Strength WFL for B UE  -MW      User Key  (r) = " Recorded By, (t) = Taken By, (c) = Cosigned By    Initials Name Provider Type    Sabiha Le, PT Physical Therapist                                  PT OP Goals     Row Name 08/03/18 1100          PT Short Term Goals    STG 1 STG deferred  -MW        Long Term Goals    LTG Date to Achieve 08/31/18  -MW     LTG 1 pt will be independent w/ HEP  -MW     LTG 1 Progress New  -MW     LTG 2 Pt will report subjective improvement >50%  -MW     LTG 2 Progress New  -MW     LTG 3 pt will tolerate ADLs and work activities w/ <2/10 pain   -MW     LTG 3 Progress New  -MW        Time Calculation    PT Goal Re-Cert Due Date 08/24/18  -MW       User Key  (r) = Recorded By, (t) = Taken By, (c) = Cosigned By    Initials Name Provider Type    Sabiha Le, PT Physical Therapist                PT Assessment/Plan     Row Name 08/03/18 1100          PT Assessment    Functional Limitations Performance in self-care ADL;Performance in work activities  -MW     Impairments Impaired flexibility;Pain;Posture  -MW     Assessment Comments The pt presented today for eval and tx of neck pain and responded well. The pt has tissue restrictions including triggerpoints present throughout his scap stabilizers and posterior neck musculature that are limiting his shoulder/neck mobility intermittently. The pt has pain w/ work activities. It is my impression his shoulder tissue restrictions may be related to overuse and residual compensation. The pt would benefit from PT services to address these deficits and to ensure pt's safe recovery to PLOF and improved QOL. Time spent discussing JENNI and POC expectations this date. HEP implemented for sxs management today and pt demonstrated understanding. Based on today's session and pt's motivation to improve, I anticipate he will do well w/ rehab.  -MW     Please refer to paper survey for additional self-reported information Yes  -MW     Rehab Potential Good  -MW     Patient/caregiver participated in  establishment of treatment plan and goals Yes  -MW     Patient would benefit from skilled therapy intervention Yes  -MW        PT Plan    PT Frequency 1x/week  -MW     Predicted Duration of Therapy Intervention (Therapy Eval) 4 weeks  -MW     Planned CPT's? PT EVAL LOW COMPLEXITY: 35871;PT RE-EVAL: 49908;PT THER PROC EA 15 MIN: 98230;PT THER ACT EA 15 MIN: 47004;PT MANUAL THERAPY EA 15 MIN: 35539;PT NEUROMUSC RE-EDUCATION EA 15 MIN: 27075;PT SELF CARE/HOME MGMT/TRAIN EA 15: 78176;PT HOT OR COLD PACK TREAT MCARE;PT ELECTRICAL STIM UNATTEND:   -MW     PT Plan Comments Manual for sxs relief. IFC and modalities PRN.   -MW       User Key  (r) = Recorded By, (t) = Taken By, (c) = Cosigned By    Initials Name Provider Type    Sabiha Le, PT Physical Therapist                Modalities     Row Name 08/03/18 1100             Moist Heat    MH Applied Yes  -MW      Location R scapula  -MW      Rx Minutes 10 mins   not billed  -MW        User Key  (r) = Recorded By, (t) = Taken By, (c) = Cosigned By    Initials Name Provider Type    Sabiha Le, PT Physical Therapist              Exercises     Row Name 08/03/18 1100             Subjective Comments    Subjective Comments See hx  -MW        User Key  (r) = Recorded By, (t) = Taken By, (c) = Cosigned By    Initials Name Provider Type    Sabiha Le, PT Physical Therapist           Manual Rx (last 36 hours)      Manual Treatments     Row Name 08/03/18 1100             Manual Rx 1    Manual Rx 1 Location R scap/UT  -MW      Manual Rx 1 Type MFR  -MW      Manual Rx 1 Duration 8'  -MW        User Key  (r) = Recorded By, (t) = Taken By, (c) = Cosigned By    Initials Name Provider Type    Sabiha Le, PT Physical Therapist                      Outcome Measure Options: Neck Disability Index (NDI)  Neck Disability Index  Section 1 - Pain Intensity: The pain is moderate at the moment.  Section 2 - Personal Care: I can look after myself normally without  causing extra pain.  Section 3 - Lifting: I can lift heavy weights without causing extra pain  Section 4 - Work: I can do as much work as I want.  Section 5 - Headaches: I have no headaches at all.  Section 6 - Concentration: I can concentrate fully with slight difficulty.  Section 7 - Sleeping: I have no trouble sleeping.  Section 8 - Driving: I can drive as long as I want with slight neck pain.  Section 9 - Reading: I can read as much as I want with slight neck pain.  Section 10 - Recreation: I have some neck pain with all recreational activities.  Neck Disability Index Score: 6      Time Calculation:         Start Time: 1058  Stop Time: 1132  Time Calculation (min): 34 min  Total Timed Code Minutes- PT: 8 minute(s)     Therapy Charges for Today     Code Description Service Date Service Provider Modifiers Qty    10908489784 HC PT EVAL LOW COMPLEXITY 2 8/3/2018 Sabiha Maravilla, PT GP 1    28687944157 HC PT MANUAL THERAPY EA 15 MIN 8/3/2018 Sabiha Maravilla, PT GP 1    84560902340 HC PT THER SUPP EA 15 MIN 8/3/2018 Sabiha Maravilla, PT GP 1                  Sabiha Maravilla PT  8/3/2018       Universal Safety Interventions

## 2025-07-29 NOTE — TELEPHONE ENCOUNTER
I only prescribe 30 day supplies for controlled substances. Please let me know what the issue is with the Advair.  Working Diabetic Lab Report.   Pt has lab appt scheduled, 8/05/25.  Micro Albumin urine linked to appt.

## (undated) DEVICE — STERILE POLYISOPRENE POWDER-FREE SURGICAL GLOVES WITH EMOLLIENT COATING: Brand: PROTEXIS

## (undated) DEVICE — ANTIBACTERIAL UNDYED BRAIDED (POLYGLACTIN 910), SYNTHETIC ABSORBABLE SUTURE: Brand: COATED VICRYL

## (undated) DEVICE — BITEBLOCK ENDO W/STRAP 60F A/ LF DISP

## (undated) DEVICE — SUT VIC 3/0 SH 27IN J416H

## (undated) DEVICE — PK MAJ PROC LF 60

## (undated) DEVICE — GLV SURG SENSICARE GREEN W/ALOE PF LF 7 STRL

## (undated) DEVICE — SINGLE-USE BIOPSY FORCEPS: Brand: RADIAL JAW 4

## (undated) DEVICE — ADHS LIQ MASTISOL 2/3ML

## (undated) DEVICE — SUT VIC 0/0 UR6 27IN DYED J603H

## (undated) DEVICE — STERILE POLYISOPRENE POWDER-FREE SURGICAL GLOVES: Brand: PROTEXIS

## (undated) DEVICE — GLV SURG TRIUMPH LT PF LTX 6.5 STRL

## (undated) DEVICE — SUT ETHIB NO 0 OS4 X517H ETX517H

## (undated) DEVICE — 3M™ STERI-STRIP™ REINFORCED ADHESIVE SKIN CLOSURES, R1547, 1/2 IN X 4 IN (12 MM X 100 MM), 6 STRIPS/ENVELOPE: Brand: 3M™ STERI-STRIP™

## (undated) DEVICE — GLV SURG TRIUMPH LT PF LTX 7.5 STRL

## (undated) DEVICE — GLV SURG SENSICARE GREEN W/ALOE PF LF 6.5 STRL

## (undated) DEVICE — GLV SURG SENSICARE GREEN W/ALOE PF LF 8 STRL

## (undated) DEVICE — SPNG GZ WOVN 4X4IN 12PLY 10/BX STRL

## (undated) DEVICE — BLD CLIP UNIV SURG GRY

## (undated) DEVICE — SHEET,DRAPE,53X77,STERILE: Brand: MEDLINE

## (undated) DEVICE — DRSNG SURESITE WNDW 4X4.5

## (undated) DEVICE — SUT ETHIB 0 CT1 CR8 18IN CX21D

## (undated) DEVICE — 3M™ BAIR HUGGER® UPPER BODY BLANKET, 10 PER CASE 52200: Brand: BAIR HUGGER™